# Patient Record
Sex: FEMALE | Race: AMERICAN INDIAN OR ALASKA NATIVE | NOT HISPANIC OR LATINO | Employment: OTHER | RURAL
[De-identification: names, ages, dates, MRNs, and addresses within clinical notes are randomized per-mention and may not be internally consistent; named-entity substitution may affect disease eponyms.]

---

## 2020-03-17 ENCOUNTER — HISTORICAL (OUTPATIENT)
Dept: ADMINISTRATIVE | Facility: HOSPITAL | Age: 50
End: 2020-03-17

## 2021-03-23 ENCOUNTER — HOSPITAL ENCOUNTER (OUTPATIENT)
Dept: RADIOLOGY | Facility: HOSPITAL | Age: 51
Discharge: HOME | End: 2021-03-23
Payer: MEDICAID

## 2021-03-23 VITALS — HEIGHT: 57 IN | WEIGHT: 167 LBS | BODY MASS INDEX: 36.03 KG/M2

## 2021-03-23 DIAGNOSIS — Z12.39 SCREENING FOR BREAST CANCER: ICD-10-CM

## 2021-03-23 PROCEDURE — 77067 SCR MAMMO BI INCL CAD: CPT | Mod: TC

## 2021-05-21 ENCOUNTER — OFFICE VISIT (OUTPATIENT)
Dept: FAMILY MEDICINE | Facility: CLINIC | Age: 51
End: 2021-05-21
Payer: MEDICAID

## 2021-05-21 VITALS
WEIGHT: 172 LBS | DIASTOLIC BLOOD PRESSURE: 87 MMHG | TEMPERATURE: 98 F | SYSTOLIC BLOOD PRESSURE: 135 MMHG | HEART RATE: 96 BPM | BODY MASS INDEX: 37.11 KG/M2 | HEIGHT: 57 IN

## 2021-05-21 DIAGNOSIS — K59.1 FUNCTIONAL DIARRHEA: ICD-10-CM

## 2021-05-21 DIAGNOSIS — K58.0 IRRITABLE BOWEL SYNDROME WITH DIARRHEA: Primary | ICD-10-CM

## 2021-05-21 DIAGNOSIS — Z85.038 HISTORY OF COLON CANCER: ICD-10-CM

## 2021-05-21 PROCEDURE — 99214 PR OFFICE/OUTPT VISIT, EST, LEVL IV, 30-39 MIN: ICD-10-PCS | Mod: ,,, | Performed by: FAMILY MEDICINE

## 2021-05-21 PROCEDURE — 99214 OFFICE O/P EST MOD 30 MIN: CPT | Mod: ,,, | Performed by: FAMILY MEDICINE

## 2021-05-21 RX ORDER — HYOSCYAMINE SULFATE 0.125 MG
125 TABLET ORAL 3 TIMES DAILY
Qty: 30 TABLET | Refills: 0 | Status: SHIPPED | OUTPATIENT
Start: 2021-05-21 | End: 2021-06-08 | Stop reason: SDUPTHER

## 2021-05-21 RX ORDER — LISINOPRIL 10 MG/1
10 TABLET ORAL DAILY
COMMUNITY
Start: 2021-05-17 | End: 2021-11-30

## 2021-05-21 RX ORDER — INSULIN GLARGINE 100 [IU]/ML
50 INJECTION, SOLUTION SUBCUTANEOUS DAILY
COMMUNITY
End: 2021-06-08 | Stop reason: SDUPTHER

## 2021-05-21 RX ORDER — DICYCLOMINE HYDROCHLORIDE 10 MG/1
10 CAPSULE ORAL 3 TIMES DAILY
COMMUNITY
Start: 2021-05-11 | End: 2021-05-21

## 2021-05-21 RX ORDER — INSULIN ASPART 100 [IU]/ML
INJECTION, SOLUTION INTRAVENOUS; SUBCUTANEOUS
COMMUNITY
Start: 2021-02-04 | End: 2021-09-29

## 2021-05-21 RX ORDER — LEVETIRACETAM 500 MG/1
500 TABLET ORAL DAILY
COMMUNITY
Start: 2021-05-17 | End: 2021-11-30

## 2021-05-21 RX ORDER — HYDROCHLOROTHIAZIDE 12.5 MG/1
12.5 CAPSULE ORAL DAILY
COMMUNITY
Start: 2021-03-12 | End: 2021-10-28 | Stop reason: CLARIF

## 2021-05-21 RX ORDER — ERGOCALCIFEROL 1.25 MG/1
50000 CAPSULE ORAL
COMMUNITY
Start: 2021-05-04 | End: 2021-11-02 | Stop reason: SDUPTHER

## 2021-06-08 ENCOUNTER — OFFICE VISIT (OUTPATIENT)
Dept: FAMILY MEDICINE | Facility: CLINIC | Age: 51
End: 2021-06-08
Payer: MEDICAID

## 2021-06-08 VITALS
DIASTOLIC BLOOD PRESSURE: 84 MMHG | WEIGHT: 174 LBS | TEMPERATURE: 98 F | SYSTOLIC BLOOD PRESSURE: 131 MMHG | HEART RATE: 96 BPM | HEIGHT: 57 IN | BODY MASS INDEX: 37.54 KG/M2

## 2021-06-08 DIAGNOSIS — Z85.038 HISTORY OF COLON CANCER: Chronic | ICD-10-CM

## 2021-06-08 DIAGNOSIS — E11.9 TYPE 2 DIABETES MELLITUS WITHOUT COMPLICATION, WITH LONG-TERM CURRENT USE OF INSULIN: Primary | Chronic | ICD-10-CM

## 2021-06-08 DIAGNOSIS — Z79.4 TYPE 2 DIABETES MELLITUS WITHOUT COMPLICATION, WITH LONG-TERM CURRENT USE OF INSULIN: Primary | Chronic | ICD-10-CM

## 2021-06-08 DIAGNOSIS — K58.0 IRRITABLE BOWEL SYNDROME WITH DIARRHEA: Chronic | ICD-10-CM

## 2021-06-08 PROCEDURE — 99214 PR OFFICE/OUTPT VISIT, EST, LEVL IV, 30-39 MIN: ICD-10-PCS | Mod: ,,, | Performed by: FAMILY MEDICINE

## 2021-06-08 PROCEDURE — 99214 OFFICE O/P EST MOD 30 MIN: CPT | Mod: ,,, | Performed by: FAMILY MEDICINE

## 2021-06-08 RX ORDER — HYOSCYAMINE SULFATE 0.125 MG
125 TABLET ORAL 3 TIMES DAILY
Qty: 90 TABLET | Refills: 1 | Status: SHIPPED | OUTPATIENT
Start: 2021-06-08 | End: 2021-07-08

## 2021-06-08 RX ORDER — INSULIN GLARGINE 100 [IU]/ML
50 INJECTION, SOLUTION SUBCUTANEOUS DAILY
Qty: 10 ML | Refills: 5 | Status: SHIPPED | OUTPATIENT
Start: 2021-06-08 | End: 2021-08-10 | Stop reason: SDUPTHER

## 2021-07-15 ENCOUNTER — OFFICE VISIT (OUTPATIENT)
Dept: FAMILY MEDICINE | Facility: CLINIC | Age: 51
End: 2021-07-15
Payer: MEDICAID

## 2021-07-15 VITALS
HEIGHT: 57 IN | HEART RATE: 103 BPM | SYSTOLIC BLOOD PRESSURE: 140 MMHG | WEIGHT: 174 LBS | DIASTOLIC BLOOD PRESSURE: 89 MMHG | BODY MASS INDEX: 37.54 KG/M2

## 2021-07-15 DIAGNOSIS — Z13.0 SCREENING FOR DISORDER OF BLOOD AND BLOOD-FORMING ORGANS: ICD-10-CM

## 2021-07-15 DIAGNOSIS — Z13.220 SCREENING FOR LIPOID DISORDERS: ICD-10-CM

## 2021-07-15 DIAGNOSIS — Z13.1 SCREENING FOR DIABETES MELLITUS: ICD-10-CM

## 2021-07-15 DIAGNOSIS — Z00.00 ROUTINE GENERAL MEDICAL EXAMINATION AT A HEALTH CARE FACILITY: Primary | ICD-10-CM

## 2021-07-15 LAB
ALBUMIN SERPL BCP-MCNC: 3.8 G/DL (ref 3.5–5)
ALBUMIN/GLOB SERPL: 0.9 {RATIO}
ALP SERPL-CCNC: 125 U/L (ref 39–100)
ALT SERPL W P-5'-P-CCNC: 68 U/L (ref 13–56)
ANION GAP SERPL CALCULATED.3IONS-SCNC: 12 MMOL/L (ref 7–16)
AST SERPL W P-5'-P-CCNC: 37 U/L (ref 15–37)
BASOPHILS # BLD AUTO: 0.04 K/UL (ref 0–0.2)
BASOPHILS NFR BLD AUTO: 0.4 % (ref 0–1)
BILIRUB SERPL-MCNC: 0.4 MG/DL (ref 0–1.2)
BUN SERPL-MCNC: 14 MG/DL (ref 7–18)
BUN/CREAT SERPL: 18 (ref 6–20)
CALCIUM SERPL-MCNC: 9.2 MG/DL (ref 8.5–10.1)
CHLORIDE SERPL-SCNC: 99 MMOL/L (ref 98–107)
CHOLEST SERPL-MCNC: 186 MG/DL (ref 0–200)
CHOLEST/HDLC SERPL: 4.5 {RATIO}
CO2 SERPL-SCNC: 28 MMOL/L (ref 21–32)
CREAT SERPL-MCNC: 0.77 MG/DL (ref 0.55–1.02)
DIFFERENTIAL METHOD BLD: ABNORMAL
EOSINOPHIL # BLD AUTO: 0.16 K/UL (ref 0–0.5)
EOSINOPHIL NFR BLD AUTO: 1.7 % (ref 1–4)
ERYTHROCYTE [DISTWIDTH] IN BLOOD BY AUTOMATED COUNT: 15.8 % (ref 11.5–14.5)
EST. AVERAGE GLUCOSE BLD GHB EST-MCNC: 144 MG/DL
GLOBULIN SER-MCNC: 4.1 G/DL (ref 2–4)
GLUCOSE SERPL-MCNC: 205 MG/DL (ref 74–106)
HBA1C MFR BLD HPLC: 6.9 % (ref 4.5–6.6)
HCT VFR BLD AUTO: 44.7 % (ref 38–47)
HDLC SERPL-MCNC: 41 MG/DL (ref 40–60)
HGB BLD-MCNC: 14.5 G/DL (ref 12–16)
IMM GRANULOCYTES # BLD AUTO: 0.06 K/UL (ref 0–0.04)
IMM GRANULOCYTES NFR BLD: 0.6 % (ref 0–0.4)
LDLC SERPL CALC-MCNC: 84 MG/DL
LDLC/HDLC SERPL: 2 {RATIO}
LYMPHOCYTES # BLD AUTO: 2.05 K/UL (ref 1–4.8)
LYMPHOCYTES NFR BLD AUTO: 21.2 % (ref 27–41)
MCH RBC QN AUTO: 27.5 PG (ref 27–31)
MCHC RBC AUTO-ENTMCNC: 32.4 G/DL (ref 32–36)
MCV RBC AUTO: 84.8 FL (ref 80–96)
MONOCYTES # BLD AUTO: 0.65 K/UL (ref 0–0.8)
MONOCYTES NFR BLD AUTO: 6.7 % (ref 2–6)
MPC BLD CALC-MCNC: 12.9 FL (ref 9.4–12.4)
NEUTROPHILS # BLD AUTO: 6.71 K/UL (ref 1.8–7.7)
NEUTROPHILS NFR BLD AUTO: 69.4 % (ref 53–65)
NONHDLC SERPL-MCNC: 145 MG/DL
NRBC # BLD AUTO: 0 X10E3/UL
NRBC, AUTO (.00): 0 %
PLATELET # BLD AUTO: 165 K/UL (ref 150–400)
POTASSIUM SERPL-SCNC: 3.8 MMOL/L (ref 3.5–5.1)
PROT SERPL-MCNC: 7.9 G/DL (ref 6.4–8.2)
RBC # BLD AUTO: 5.27 M/UL (ref 4.2–5.4)
SODIUM SERPL-SCNC: 135 MMOL/L (ref 136–145)
TRIGL SERPL-MCNC: 306 MG/DL (ref 35–150)
VLDLC SERPL-MCNC: 61 MG/DL
WBC # BLD AUTO: 9.67 K/UL (ref 4.5–11)

## 2021-07-15 PROCEDURE — 80061 LIPID PANEL: CPT | Mod: ,,, | Performed by: CLINICAL MEDICAL LABORATORY

## 2021-07-15 PROCEDURE — 85025 CBC WITH DIFFERENTIAL: ICD-10-PCS | Mod: ,,, | Performed by: CLINICAL MEDICAL LABORATORY

## 2021-07-15 PROCEDURE — 80053 COMPREHEN METABOLIC PANEL: CPT | Mod: ,,, | Performed by: CLINICAL MEDICAL LABORATORY

## 2021-07-15 PROCEDURE — 99396 PR PREVENTIVE VISIT,EST,40-64: ICD-10-PCS | Mod: ,,, | Performed by: FAMILY MEDICINE

## 2021-07-15 PROCEDURE — 83036 HEMOGLOBIN A1C: ICD-10-PCS | Mod: ,,, | Performed by: CLINICAL MEDICAL LABORATORY

## 2021-07-15 PROCEDURE — 83036 HEMOGLOBIN GLYCOSYLATED A1C: CPT | Mod: ,,, | Performed by: CLINICAL MEDICAL LABORATORY

## 2021-07-15 PROCEDURE — 80053 COMPREHENSIVE METABOLIC PANEL: ICD-10-PCS | Mod: ,,, | Performed by: CLINICAL MEDICAL LABORATORY

## 2021-07-15 PROCEDURE — 80061 LIPID PANEL: ICD-10-PCS | Mod: ,,, | Performed by: CLINICAL MEDICAL LABORATORY

## 2021-07-15 PROCEDURE — 85025 COMPLETE CBC W/AUTO DIFF WBC: CPT | Mod: ,,, | Performed by: CLINICAL MEDICAL LABORATORY

## 2021-07-15 PROCEDURE — 99396 PREV VISIT EST AGE 40-64: CPT | Mod: ,,, | Performed by: FAMILY MEDICINE

## 2021-08-09 ENCOUNTER — OFFICE VISIT (OUTPATIENT)
Dept: FAMILY MEDICINE | Facility: CLINIC | Age: 51
End: 2021-08-09
Payer: MEDICAID

## 2021-08-09 DIAGNOSIS — Z20.822 CLOSE EXPOSURE TO COVID-19 VIRUS: Primary | ICD-10-CM

## 2021-08-09 LAB
CTP QC/QA: YES
SARS-COV-2 AG RESP QL IA.RAPID: NEGATIVE

## 2021-08-09 PROCEDURE — 99212 OFFICE O/P EST SF 10 MIN: CPT | Mod: ,,, | Performed by: NURSE PRACTITIONER

## 2021-08-09 PROCEDURE — 87426 SARSCOV CORONAVIRUS AG IA: CPT | Mod: RHCUB | Performed by: NURSE PRACTITIONER

## 2021-08-09 PROCEDURE — 99212 PR OFFICE/OUTPT VISIT, EST, LEVL II, 10-19 MIN: ICD-10-PCS | Mod: ,,, | Performed by: NURSE PRACTITIONER

## 2021-08-10 ENCOUNTER — OFFICE VISIT (OUTPATIENT)
Dept: FAMILY MEDICINE | Facility: CLINIC | Age: 51
End: 2021-08-10
Payer: MEDICAID

## 2021-08-10 DIAGNOSIS — Z79.4 TYPE 2 DIABETES MELLITUS WITHOUT COMPLICATION, WITH LONG-TERM CURRENT USE OF INSULIN: Primary | Chronic | ICD-10-CM

## 2021-08-10 DIAGNOSIS — E11.9 TYPE 2 DIABETES MELLITUS WITHOUT COMPLICATION, WITH LONG-TERM CURRENT USE OF INSULIN: Primary | Chronic | ICD-10-CM

## 2021-08-10 DIAGNOSIS — K58.0 IRRITABLE BOWEL SYNDROME WITH DIARRHEA: Chronic | ICD-10-CM

## 2021-08-10 DIAGNOSIS — R25.2 LEG CRAMPS: ICD-10-CM

## 2021-08-10 DIAGNOSIS — J06.9 UPPER RESPIRATORY TRACT INFECTION, UNSPECIFIED TYPE: ICD-10-CM

## 2021-08-10 PROCEDURE — 99214 OFFICE O/P EST MOD 30 MIN: CPT | Mod: ,,, | Performed by: FAMILY MEDICINE

## 2021-08-10 PROCEDURE — 99214 PR OFFICE/OUTPT VISIT, EST, LEVL IV, 30-39 MIN: ICD-10-PCS | Mod: ,,, | Performed by: FAMILY MEDICINE

## 2021-08-10 RX ORDER — INSULIN GLARGINE 100 [IU]/ML
70 INJECTION, SOLUTION SUBCUTANEOUS DAILY
Qty: 10 ML | Refills: 5 | Status: SHIPPED | OUTPATIENT
Start: 2021-08-10 | End: 2021-10-28 | Stop reason: SDUPTHER

## 2021-08-10 RX ORDER — POTASSIUM CHLORIDE 20 MEQ/1
20 TABLET, EXTENDED RELEASE ORAL DAILY
Qty: 7 TABLET | Refills: 0 | Status: SHIPPED | OUTPATIENT
Start: 2021-08-10 | End: 2022-04-28 | Stop reason: SDUPTHER

## 2021-09-01 ENCOUNTER — TELEPHONE (OUTPATIENT)
Dept: FAMILY MEDICINE | Facility: CLINIC | Age: 51
End: 2021-09-01

## 2021-09-01 ENCOUNTER — OFFICE VISIT (OUTPATIENT)
Dept: FAMILY MEDICINE | Facility: CLINIC | Age: 51
End: 2021-09-01
Payer: MEDICAID

## 2021-09-01 ENCOUNTER — HOSPITAL ENCOUNTER (OUTPATIENT)
Dept: RADIOLOGY | Facility: HOSPITAL | Age: 51
Discharge: HOME | End: 2021-09-01
Attending: NURSE PRACTITIONER
Payer: MEDICAID

## 2021-09-01 VITALS
HEIGHT: 57 IN | DIASTOLIC BLOOD PRESSURE: 92 MMHG | SYSTOLIC BLOOD PRESSURE: 141 MMHG | HEART RATE: 107 BPM | WEIGHT: 174 LBS | OXYGEN SATURATION: 97 % | BODY MASS INDEX: 37.54 KG/M2 | TEMPERATURE: 98 F

## 2021-09-01 DIAGNOSIS — S69.91XA INJURY OF FINGER OF RIGHT HAND, INITIAL ENCOUNTER: ICD-10-CM

## 2021-09-01 DIAGNOSIS — M79.644 PAIN IN FINGER OF RIGHT HAND: ICD-10-CM

## 2021-09-01 DIAGNOSIS — M79.644 PAIN IN FINGER OF RIGHT HAND: Primary | ICD-10-CM

## 2021-09-01 PROCEDURE — 99212 PR OFFICE/OUTPT VISIT, EST, LEVL II, 10-19 MIN: ICD-10-PCS | Mod: ,,, | Performed by: NURSE PRACTITIONER

## 2021-09-01 PROCEDURE — 99212 OFFICE O/P EST SF 10 MIN: CPT | Mod: ,,, | Performed by: NURSE PRACTITIONER

## 2021-09-01 PROCEDURE — 73130 X-RAY EXAM OF HAND: CPT | Mod: TC,RT

## 2021-09-01 RX ORDER — CALCIUM POLYCARBOPHIL 625 MG/1
2 CAPSULE ORAL DAILY
COMMUNITY
Start: 2021-07-27 | End: 2022-08-05

## 2021-09-01 RX ORDER — SYRINGE-NEEDLE,INSULIN,0.5 ML 30 GX5/16"
SYRINGE, EMPTY DISPOSABLE MISCELLANEOUS
COMMUNITY
Start: 2021-08-13 | End: 2022-09-07 | Stop reason: SDUPTHER

## 2021-09-01 RX ORDER — HYOSCYAMINE SULFATE 0.38 MG/1
1 TABLET, EXTENDED RELEASE ORAL 2 TIMES DAILY
COMMUNITY
Start: 2021-07-27 | End: 2022-11-01

## 2021-09-14 ENCOUNTER — OFFICE VISIT (OUTPATIENT)
Dept: FAMILY MEDICINE | Facility: CLINIC | Age: 51
End: 2021-09-14
Payer: MEDICAID

## 2021-09-14 VITALS
HEIGHT: 57 IN | HEART RATE: 99 BPM | DIASTOLIC BLOOD PRESSURE: 88 MMHG | WEIGHT: 174 LBS | SYSTOLIC BLOOD PRESSURE: 134 MMHG | BODY MASS INDEX: 37.54 KG/M2

## 2021-09-14 DIAGNOSIS — R42 DIZZINESS: ICD-10-CM

## 2021-09-14 DIAGNOSIS — Z85.038 HISTORY OF COLON CANCER: Chronic | ICD-10-CM

## 2021-09-14 DIAGNOSIS — Z79.4 TYPE 2 DIABETES MELLITUS WITHOUT COMPLICATION, WITH LONG-TERM CURRENT USE OF INSULIN: Chronic | ICD-10-CM

## 2021-09-14 DIAGNOSIS — R00.0 TACHYCARDIA: Primary | ICD-10-CM

## 2021-09-14 DIAGNOSIS — E11.9 TYPE 2 DIABETES MELLITUS WITHOUT COMPLICATION, WITH LONG-TERM CURRENT USE OF INSULIN: Chronic | ICD-10-CM

## 2021-09-14 LAB
ANION GAP SERPL CALCULATED.3IONS-SCNC: 10 MMOL/L (ref 7–16)
BASOPHILS # BLD AUTO: 0.05 K/UL (ref 0–0.2)
BASOPHILS NFR BLD AUTO: 0.5 % (ref 0–1)
BUN SERPL-MCNC: 13 MG/DL (ref 7–18)
BUN/CREAT SERPL: 17 (ref 6–20)
CALCIUM SERPL-MCNC: 9 MG/DL (ref 8.5–10.1)
CHLORIDE SERPL-SCNC: 98 MMOL/L (ref 98–107)
CO2 SERPL-SCNC: 30 MMOL/L (ref 21–32)
CREAT SERPL-MCNC: 0.76 MG/DL (ref 0.55–1.02)
DIFFERENTIAL METHOD BLD: ABNORMAL
EOSINOPHIL # BLD AUTO: 0.14 K/UL (ref 0–0.5)
EOSINOPHIL NFR BLD AUTO: 1.4 % (ref 1–4)
ERYTHROCYTE [DISTWIDTH] IN BLOOD BY AUTOMATED COUNT: 15 % (ref 11.5–14.5)
GLUCOSE SERPL-MCNC: 231 MG/DL (ref 74–106)
HCT VFR BLD AUTO: 44.5 % (ref 38–47)
HGB BLD-MCNC: 14.3 G/DL (ref 12–16)
IMM GRANULOCYTES # BLD AUTO: 0.05 K/UL (ref 0–0.04)
IMM GRANULOCYTES NFR BLD: 0.5 % (ref 0–0.4)
LYMPHOCYTES # BLD AUTO: 1.94 K/UL (ref 1–4.8)
LYMPHOCYTES NFR BLD AUTO: 18.8 % (ref 27–41)
MCH RBC QN AUTO: 27.3 PG (ref 27–31)
MCHC RBC AUTO-ENTMCNC: 32.1 G/DL (ref 32–36)
MCV RBC AUTO: 85.1 FL (ref 80–96)
MONOCYTES # BLD AUTO: 0.64 K/UL (ref 0–0.8)
MONOCYTES NFR BLD AUTO: 6.2 % (ref 2–6)
MPC BLD CALC-MCNC: 13.4 FL (ref 9.4–12.4)
NEUTROPHILS # BLD AUTO: 7.5 K/UL (ref 1.8–7.7)
NEUTROPHILS NFR BLD AUTO: 72.6 % (ref 53–65)
NRBC # BLD AUTO: 0 X10E3/UL
NRBC, AUTO (.00): 0 %
PLATELET # BLD AUTO: 165 K/UL (ref 150–400)
PLATELET MORPHOLOGY: ABNORMAL
POTASSIUM SERPL-SCNC: 3.7 MMOL/L (ref 3.5–5.1)
RBC # BLD AUTO: 5.23 M/UL (ref 4.2–5.4)
RBC MORPH BLD: NORMAL
SODIUM SERPL-SCNC: 134 MMOL/L (ref 136–145)
TSH SERPL DL<=0.005 MIU/L-ACNC: 0.88 UIU/ML (ref 0.36–3.74)
WBC # BLD AUTO: 10.32 K/UL (ref 4.5–11)

## 2021-09-14 PROCEDURE — 99214 OFFICE O/P EST MOD 30 MIN: CPT | Mod: ,,, | Performed by: FAMILY MEDICINE

## 2021-09-14 PROCEDURE — 80048 BASIC METABOLIC PANEL: ICD-10-PCS | Mod: ,,, | Performed by: CLINICAL MEDICAL LABORATORY

## 2021-09-14 PROCEDURE — 85025 COMPLETE CBC W/AUTO DIFF WBC: CPT | Mod: ,,, | Performed by: CLINICAL MEDICAL LABORATORY

## 2021-09-14 PROCEDURE — 84443 ASSAY THYROID STIM HORMONE: CPT | Mod: ,,, | Performed by: CLINICAL MEDICAL LABORATORY

## 2021-09-14 PROCEDURE — 85025 CBC WITH DIFFERENTIAL: ICD-10-PCS | Mod: ,,, | Performed by: CLINICAL MEDICAL LABORATORY

## 2021-09-14 PROCEDURE — 99214 PR OFFICE/OUTPT VISIT, EST, LEVL IV, 30-39 MIN: ICD-10-PCS | Mod: ,,, | Performed by: FAMILY MEDICINE

## 2021-09-14 PROCEDURE — 84443 TSH: ICD-10-PCS | Mod: ,,, | Performed by: CLINICAL MEDICAL LABORATORY

## 2021-09-14 PROCEDURE — 80048 BASIC METABOLIC PNL TOTAL CA: CPT | Mod: ,,, | Performed by: CLINICAL MEDICAL LABORATORY

## 2021-09-16 ENCOUNTER — OFFICE VISIT (OUTPATIENT)
Dept: FAMILY MEDICINE | Facility: CLINIC | Age: 51
End: 2021-09-16
Payer: MEDICAID

## 2021-09-16 VITALS
DIASTOLIC BLOOD PRESSURE: 83 MMHG | BODY MASS INDEX: 37.54 KG/M2 | HEIGHT: 57 IN | WEIGHT: 174 LBS | HEART RATE: 103 BPM | SYSTOLIC BLOOD PRESSURE: 140 MMHG

## 2021-09-16 DIAGNOSIS — R53.83 FATIGUE, UNSPECIFIED TYPE: Primary | ICD-10-CM

## 2021-09-16 DIAGNOSIS — Z79.4 TYPE 2 DIABETES MELLITUS WITHOUT COMPLICATION, WITH LONG-TERM CURRENT USE OF INSULIN: Chronic | ICD-10-CM

## 2021-09-16 DIAGNOSIS — E11.9 TYPE 2 DIABETES MELLITUS WITHOUT COMPLICATION, WITH LONG-TERM CURRENT USE OF INSULIN: Chronic | ICD-10-CM

## 2021-09-16 DIAGNOSIS — Z76.89 ENCOUNTER FOR MEDICAL CARE: ICD-10-CM

## 2021-09-16 PROCEDURE — 99213 OFFICE O/P EST LOW 20 MIN: CPT | Mod: ,,, | Performed by: FAMILY MEDICINE

## 2021-09-16 PROCEDURE — 99213 PR OFFICE/OUTPT VISIT, EST, LEVL III, 20-29 MIN: ICD-10-PCS | Mod: ,,, | Performed by: FAMILY MEDICINE

## 2021-09-21 PROBLEM — M20.011 MALLET FINGER OF RIGHT HAND: Status: ACTIVE | Noted: 2021-09-21

## 2021-10-28 ENCOUNTER — OFFICE VISIT (OUTPATIENT)
Dept: FAMILY MEDICINE | Facility: CLINIC | Age: 51
End: 2021-10-28
Payer: MEDICAID

## 2021-10-28 VITALS — SYSTOLIC BLOOD PRESSURE: 142 MMHG | DIASTOLIC BLOOD PRESSURE: 68 MMHG | HEART RATE: 87 BPM

## 2021-10-28 DIAGNOSIS — M19.041 PRIMARY OSTEOARTHRITIS OF RIGHT HAND: Chronic | ICD-10-CM

## 2021-10-28 DIAGNOSIS — Z79.4 TYPE 2 DIABETES MELLITUS WITHOUT COMPLICATION, WITH LONG-TERM CURRENT USE OF INSULIN: Chronic | ICD-10-CM

## 2021-10-28 DIAGNOSIS — M79.644 FINGER PAIN, RIGHT: Primary | ICD-10-CM

## 2021-10-28 DIAGNOSIS — Z85.038 HISTORY OF COLON CANCER: Chronic | ICD-10-CM

## 2021-10-28 DIAGNOSIS — E11.9 TYPE 2 DIABETES MELLITUS WITHOUT COMPLICATION, WITH LONG-TERM CURRENT USE OF INSULIN: Chronic | ICD-10-CM

## 2021-10-28 LAB
EST. AVERAGE GLUCOSE BLD GHB EST-MCNC: 137 MG/DL
HBA1C MFR BLD HPLC: 6.7 % (ref 4.5–6.6)

## 2021-10-28 PROCEDURE — 99214 OFFICE O/P EST MOD 30 MIN: CPT | Mod: ,,, | Performed by: FAMILY MEDICINE

## 2021-10-28 PROCEDURE — 99214 PR OFFICE/OUTPT VISIT, EST, LEVL IV, 30-39 MIN: ICD-10-PCS | Mod: ,,, | Performed by: FAMILY MEDICINE

## 2021-10-28 PROCEDURE — 83036 HEMOGLOBIN A1C: ICD-10-PCS | Mod: ,,, | Performed by: CLINICAL MEDICAL LABORATORY

## 2021-10-28 PROCEDURE — 83036 HEMOGLOBIN GLYCOSYLATED A1C: CPT | Mod: ,,, | Performed by: CLINICAL MEDICAL LABORATORY

## 2021-10-28 RX ORDER — INSULIN GLARGINE 100 [IU]/ML
70 INJECTION, SOLUTION SUBCUTANEOUS DAILY
Qty: 10 ML | Refills: 5 | Status: SHIPPED | OUTPATIENT
Start: 2021-10-28 | End: 2022-02-09 | Stop reason: SDUPTHER

## 2021-11-02 RX ORDER — ERGOCALCIFEROL 1.25 MG/1
50000 CAPSULE ORAL
Qty: 4 CAPSULE | Refills: 1 | Status: SHIPPED | OUTPATIENT
Start: 2021-11-02 | End: 2023-10-16 | Stop reason: DRUGHIGH

## 2021-11-23 DIAGNOSIS — E11.9 TYPE 2 DIABETES MELLITUS WITHOUT COMPLICATION, WITH LONG-TERM CURRENT USE OF INSULIN: Chronic | ICD-10-CM

## 2021-11-23 DIAGNOSIS — Z79.4 TYPE 2 DIABETES MELLITUS WITHOUT COMPLICATION, WITH LONG-TERM CURRENT USE OF INSULIN: Chronic | ICD-10-CM

## 2021-12-15 ENCOUNTER — OFFICE VISIT (OUTPATIENT)
Dept: FAMILY MEDICINE | Facility: CLINIC | Age: 51
End: 2021-12-15
Payer: MEDICAID

## 2021-12-15 VITALS — HEART RATE: 90 BPM | SYSTOLIC BLOOD PRESSURE: 148 MMHG | DIASTOLIC BLOOD PRESSURE: 96 MMHG

## 2021-12-15 DIAGNOSIS — Z79.4 TYPE 2 DIABETES MELLITUS WITHOUT COMPLICATION, WITH LONG-TERM CURRENT USE OF INSULIN: Chronic | ICD-10-CM

## 2021-12-15 DIAGNOSIS — Z11.4 SCREENING FOR HIV WITHOUT PRESENCE OF RISK FACTORS: ICD-10-CM

## 2021-12-15 DIAGNOSIS — I10 PRIMARY HYPERTENSION: Chronic | ICD-10-CM

## 2021-12-15 DIAGNOSIS — E11.9 TYPE 2 DIABETES MELLITUS WITHOUT COMPLICATION, WITH LONG-TERM CURRENT USE OF INSULIN: Chronic | ICD-10-CM

## 2021-12-15 DIAGNOSIS — Z11.59 NEED FOR HEPATITIS C SCREENING TEST: Primary | ICD-10-CM

## 2021-12-15 LAB
CREAT UR-MCNC: 224 MG/DL (ref 28–219)
HCV AB SER QL: NORMAL
MICROALBUMIN UR-MCNC: 2.7 MG/DL (ref 0–2.8)
MICROALBUMIN/CREAT RATIO PNL UR: 12.1 MG/G (ref 0–30)

## 2021-12-15 PROCEDURE — 82043 UR ALBUMIN QUANTITATIVE: CPT | Mod: ,,, | Performed by: CLINICAL MEDICAL LABORATORY

## 2021-12-15 PROCEDURE — 87536 HIV-1 RNA DETECTION AND QUANTIFICATION: ICD-10-PCS | Mod: 90,,, | Performed by: CLINICAL MEDICAL LABORATORY

## 2021-12-15 PROCEDURE — 82570 MICROALBUMIN / CREATININE RATIO URINE: ICD-10-PCS | Mod: ,,, | Performed by: CLINICAL MEDICAL LABORATORY

## 2021-12-15 PROCEDURE — 86803 HEPATITIS C AB TEST: CPT | Mod: ,,, | Performed by: CLINICAL MEDICAL LABORATORY

## 2021-12-15 PROCEDURE — 87536 HIV-1 QUANT&REVRSE TRNSCRPJ: CPT | Mod: 90,,, | Performed by: CLINICAL MEDICAL LABORATORY

## 2021-12-15 PROCEDURE — 99214 OFFICE O/P EST MOD 30 MIN: CPT | Mod: ,,, | Performed by: FAMILY MEDICINE

## 2021-12-15 PROCEDURE — 99214 PR OFFICE/OUTPT VISIT, EST, LEVL IV, 30-39 MIN: ICD-10-PCS | Mod: ,,, | Performed by: FAMILY MEDICINE

## 2021-12-15 PROCEDURE — 82043 MICROALBUMIN / CREATININE RATIO URINE: ICD-10-PCS | Mod: ,,, | Performed by: CLINICAL MEDICAL LABORATORY

## 2021-12-15 PROCEDURE — 86803 HEPATITIS C ANTIBODY: ICD-10-PCS | Mod: ,,, | Performed by: CLINICAL MEDICAL LABORATORY

## 2021-12-15 PROCEDURE — 82570 ASSAY OF URINE CREATININE: CPT | Mod: ,,, | Performed by: CLINICAL MEDICAL LABORATORY

## 2021-12-18 LAB — HIV1 RNA # PLAS NAA DL=20: NORMAL COPIES/ML

## 2021-12-23 ENCOUNTER — OFFICE VISIT (OUTPATIENT)
Dept: FAMILY MEDICINE | Facility: CLINIC | Age: 51
End: 2021-12-23
Payer: MEDICAID

## 2021-12-23 ENCOUNTER — APPOINTMENT (OUTPATIENT)
Dept: RADIOLOGY | Facility: CLINIC | Age: 51
End: 2021-12-23
Attending: FAMILY MEDICINE
Payer: MEDICAID

## 2021-12-23 VITALS
SYSTOLIC BLOOD PRESSURE: 155 MMHG | WEIGHT: 172 LBS | HEIGHT: 56 IN | BODY MASS INDEX: 38.69 KG/M2 | DIASTOLIC BLOOD PRESSURE: 89 MMHG | HEART RATE: 101 BPM

## 2021-12-23 DIAGNOSIS — R20.2 PARESTHESIA OF RIGHT FOOT: Chronic | ICD-10-CM

## 2021-12-23 DIAGNOSIS — E11.9 ENCOUNTER FOR DIABETIC FOOT EXAM: ICD-10-CM

## 2021-12-23 DIAGNOSIS — E11.9 TYPE 2 DIABETES MELLITUS WITHOUT COMPLICATION, WITH LONG-TERM CURRENT USE OF INSULIN: Chronic | ICD-10-CM

## 2021-12-23 DIAGNOSIS — M54.16 LUMBAR RADICULOPATHY, RIGHT: Primary | Chronic | ICD-10-CM

## 2021-12-23 DIAGNOSIS — M54.16 LUMBAR RADICULOPATHY, RIGHT: ICD-10-CM

## 2021-12-23 DIAGNOSIS — Z79.4 TYPE 2 DIABETES MELLITUS WITHOUT COMPLICATION, WITH LONG-TERM CURRENT USE OF INSULIN: Chronic | ICD-10-CM

## 2021-12-23 PROCEDURE — 4010F ACE/ARB THERAPY RXD/TAKEN: CPT | Mod: CPTII,,, | Performed by: FAMILY MEDICINE

## 2021-12-23 PROCEDURE — 3077F PR MOST RECENT SYSTOLIC BLOOD PRESSURE >= 140 MM HG: ICD-10-PCS | Mod: CPTII,,, | Performed by: FAMILY MEDICINE

## 2021-12-23 PROCEDURE — 99214 OFFICE O/P EST MOD 30 MIN: CPT | Mod: ,,, | Performed by: FAMILY MEDICINE

## 2021-12-23 PROCEDURE — 3008F BODY MASS INDEX DOCD: CPT | Mod: CPTII,,, | Performed by: FAMILY MEDICINE

## 2021-12-23 PROCEDURE — 72100 X-RAY EXAM L-S SPINE 2/3 VWS: CPT | Mod: TC,RHCUB,FY | Performed by: FAMILY MEDICINE

## 2021-12-23 PROCEDURE — 1160F RVW MEDS BY RX/DR IN RCRD: CPT | Mod: CPTII,,, | Performed by: FAMILY MEDICINE

## 2021-12-23 PROCEDURE — 3044F PR MOST RECENT HEMOGLOBIN A1C LEVEL <7.0%: ICD-10-PCS | Mod: CPTII,,, | Performed by: FAMILY MEDICINE

## 2021-12-23 PROCEDURE — 3066F NEPHROPATHY DOC TX: CPT | Mod: CPTII,,, | Performed by: FAMILY MEDICINE

## 2021-12-23 PROCEDURE — 3079F DIAST BP 80-89 MM HG: CPT | Mod: CPTII,,, | Performed by: FAMILY MEDICINE

## 2021-12-23 PROCEDURE — 3044F HG A1C LEVEL LT 7.0%: CPT | Mod: CPTII,,, | Performed by: FAMILY MEDICINE

## 2021-12-23 PROCEDURE — 3061F NEG MICROALBUMINURIA REV: CPT | Mod: CPTII,,, | Performed by: FAMILY MEDICINE

## 2021-12-23 PROCEDURE — 99214 PR OFFICE/OUTPT VISIT, EST, LEVL IV, 30-39 MIN: ICD-10-PCS | Mod: ,,, | Performed by: FAMILY MEDICINE

## 2021-12-23 PROCEDURE — 3079F PR MOST RECENT DIASTOLIC BLOOD PRESSURE 80-89 MM HG: ICD-10-PCS | Mod: CPTII,,, | Performed by: FAMILY MEDICINE

## 2021-12-23 PROCEDURE — 4010F PR ACE/ARB THEARPY RXD/TAKEN: ICD-10-PCS | Mod: CPTII,,, | Performed by: FAMILY MEDICINE

## 2021-12-23 PROCEDURE — 3008F PR BODY MASS INDEX (BMI) DOCUMENTED: ICD-10-PCS | Mod: CPTII,,, | Performed by: FAMILY MEDICINE

## 2021-12-23 PROCEDURE — 1159F PR MEDICATION LIST DOCUMENTED IN MEDICAL RECORD: ICD-10-PCS | Mod: CPTII,,, | Performed by: FAMILY MEDICINE

## 2021-12-23 PROCEDURE — 3066F PR DOCUMENTATION OF TREATMENT FOR NEPHROPATHY: ICD-10-PCS | Mod: CPTII,,, | Performed by: FAMILY MEDICINE

## 2021-12-23 PROCEDURE — 3061F PR NEG MICROALBUMINURIA RESULT DOCUMENTED/REVIEW: ICD-10-PCS | Mod: CPTII,,, | Performed by: FAMILY MEDICINE

## 2021-12-23 PROCEDURE — 3077F SYST BP >= 140 MM HG: CPT | Mod: CPTII,,, | Performed by: FAMILY MEDICINE

## 2021-12-23 PROCEDURE — 1159F MED LIST DOCD IN RCRD: CPT | Mod: CPTII,,, | Performed by: FAMILY MEDICINE

## 2021-12-23 PROCEDURE — 1160F PR REVIEW ALL MEDS BY PRESCRIBER/CLIN PHARMACIST DOCUMENTED: ICD-10-PCS | Mod: CPTII,,, | Performed by: FAMILY MEDICINE

## 2021-12-23 RX ORDER — DULOXETIN HYDROCHLORIDE 30 MG/1
30 CAPSULE, DELAYED RELEASE ORAL DAILY
Qty: 30 CAPSULE | Refills: 2 | Status: SHIPPED | OUTPATIENT
Start: 2021-12-23 | End: 2022-05-10 | Stop reason: SDUPTHER

## 2022-02-09 ENCOUNTER — OFFICE VISIT (OUTPATIENT)
Dept: FAMILY MEDICINE | Facility: CLINIC | Age: 52
End: 2022-02-09
Payer: MEDICAID

## 2022-02-09 VITALS
DIASTOLIC BLOOD PRESSURE: 86 MMHG | HEART RATE: 80 BPM | BODY MASS INDEX: 38.69 KG/M2 | WEIGHT: 172 LBS | HEIGHT: 56 IN | SYSTOLIC BLOOD PRESSURE: 145 MMHG

## 2022-02-09 DIAGNOSIS — R20.2 NUMBNESS AND TINGLING OF FOOT: Chronic | ICD-10-CM

## 2022-02-09 DIAGNOSIS — G57.50 TARSAL TUNNEL SYNDROME, UNSPECIFIED LATERALITY: Primary | Chronic | ICD-10-CM

## 2022-02-09 DIAGNOSIS — M79.672 FOOT PAIN, LEFT: Chronic | ICD-10-CM

## 2022-02-09 DIAGNOSIS — R20.0 NUMBNESS AND TINGLING OF FOOT: Chronic | ICD-10-CM

## 2022-02-09 DIAGNOSIS — E11.9 TYPE 2 DIABETES MELLITUS WITHOUT COMPLICATION, WITH LONG-TERM CURRENT USE OF INSULIN: ICD-10-CM

## 2022-02-09 DIAGNOSIS — Z79.4 TYPE 2 DIABETES MELLITUS WITHOUT COMPLICATION, WITH LONG-TERM CURRENT USE OF INSULIN: ICD-10-CM

## 2022-02-09 PROCEDURE — 3008F PR BODY MASS INDEX (BMI) DOCUMENTED: ICD-10-PCS | Mod: CPTII,,, | Performed by: FAMILY MEDICINE

## 2022-02-09 PROCEDURE — 1160F PR REVIEW ALL MEDS BY PRESCRIBER/CLIN PHARMACIST DOCUMENTED: ICD-10-PCS | Mod: CPTII,,, | Performed by: FAMILY MEDICINE

## 2022-02-09 PROCEDURE — 3077F SYST BP >= 140 MM HG: CPT | Mod: CPTII,,, | Performed by: FAMILY MEDICINE

## 2022-02-09 PROCEDURE — 1159F MED LIST DOCD IN RCRD: CPT | Mod: CPTII,,, | Performed by: FAMILY MEDICINE

## 2022-02-09 PROCEDURE — 99214 OFFICE O/P EST MOD 30 MIN: CPT | Mod: ,,, | Performed by: FAMILY MEDICINE

## 2022-02-09 PROCEDURE — 3008F BODY MASS INDEX DOCD: CPT | Mod: CPTII,,, | Performed by: FAMILY MEDICINE

## 2022-02-09 PROCEDURE — 3079F PR MOST RECENT DIASTOLIC BLOOD PRESSURE 80-89 MM HG: ICD-10-PCS | Mod: CPTII,,, | Performed by: FAMILY MEDICINE

## 2022-02-09 PROCEDURE — 3079F DIAST BP 80-89 MM HG: CPT | Mod: CPTII,,, | Performed by: FAMILY MEDICINE

## 2022-02-09 PROCEDURE — 1160F RVW MEDS BY RX/DR IN RCRD: CPT | Mod: CPTII,,, | Performed by: FAMILY MEDICINE

## 2022-02-09 PROCEDURE — 1159F PR MEDICATION LIST DOCUMENTED IN MEDICAL RECORD: ICD-10-PCS | Mod: CPTII,,, | Performed by: FAMILY MEDICINE

## 2022-02-09 PROCEDURE — 99214 PR OFFICE/OUTPT VISIT, EST, LEVL IV, 30-39 MIN: ICD-10-PCS | Mod: ,,, | Performed by: FAMILY MEDICINE

## 2022-02-09 PROCEDURE — 3077F PR MOST RECENT SYSTOLIC BLOOD PRESSURE >= 140 MM HG: ICD-10-PCS | Mod: CPTII,,, | Performed by: FAMILY MEDICINE

## 2022-02-09 RX ORDER — DICLOFENAC SODIUM 10 MG/G
2 GEL TOPICAL 4 TIMES DAILY
Qty: 200 G | Refills: 2 | Status: SHIPPED | OUTPATIENT
Start: 2022-02-09 | End: 2023-07-06

## 2022-02-09 RX ORDER — INSULIN GLARGINE 100 [IU]/ML
70 INJECTION, SOLUTION SUBCUTANEOUS DAILY
Qty: 10 ML | Refills: 5 | Status: SHIPPED | OUTPATIENT
Start: 2022-02-09 | End: 2022-02-09

## 2022-02-09 RX ORDER — INSULIN GLARGINE 100 [IU]/ML
70 INJECTION, SOLUTION SUBCUTANEOUS DAILY
Qty: 10 ML | Refills: 5 | Status: SHIPPED | OUTPATIENT
Start: 2022-02-09 | End: 2022-06-15

## 2022-02-09 NOTE — PROGRESS NOTES
"   Elkin Coombs MD   [facility]  MEDICAL [facility] [address] [address]  [phone]      PATIENT NAME: Hayley Landis  : 1970  DATE: 22  MRN: [id number]      Billing Provider: Elkin Coombs MD  Level of Service:   Patient PCP Information       Provider PCP Type    Elkin Coombs MD General            Reason for Visit / Chief Complaint: Foot Pain       Update PCP  Update Chief Complaint         History of Present Illness / Problem Focused Workflow     Hayley Landis presents to the clinic with Foot Pain       Chronic pain and numbness/tingling in toes left foot.  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  Did "toe dancing" per pt.  Does do epsom salt sokas.  Symptoms worse over the past 6 months.  Thay are worse at night.      Needs lantus refilled.      Review of Systems     Review of Systems   Musculoskeletal: Positive for arthralgias.   Neurological: Positive for numbness.        Medical / Social / Family History     Past Medical History:   Diagnosis Date    Colon cancer     CVA (cerebral vascular accident)     Diabetes     HTN (hypertension)     Seizure        Past Surgical History:   Procedure Laterality Date    COLON SURGERY      HERNIA REPAIR         Social History    reports that she has never smoked. She has never used smokeless tobacco. She reports that she does not drink alcohol and does not use drugs.   Social History     Tobacco Use    Smoking status: Never Smoker    Smokeless tobacco: Never Used   Substance Use Topics    Alcohol use: Never    Drug use: Never       Family History  Family History   Problem Relation Age of Onset    No Known Problems Mother     No Known Problems Father     No Known Problems Sister     No Known Problems Brother     No Known Problems Daughter     No Known Problems Son     No Known Problems Maternal Aunt     No Known Problems Maternal Uncle     No Known Problems " Paternal Aunt     No Known Problems Paternal Uncle     No Known Problems Maternal Grandmother     No Known Problems Maternal Grandfather     No Known Problems Paternal Grandmother     No Known Problems Paternal Grandfather        Medications and Allergies     Medications  No outpatient medications have been marked as taking for the 2/9/22 encounter (Office Visit) with Elkin Coombs MD.       Allergies  Review of patient's allergies indicates:   Allergen Reactions    Grape Anaphylaxis    Opioids - morphine analogues Anaphylaxis    Pineapple Anaphylaxis    Gabapentin     Hydrocodone      Headache      Iron analogues      Pt hx of hemachromatosis    Metformin      diarrhea    Penicillins        Physical Examination     Vitals:    02/09/22 1018   BP: (!) 145/86   Pulse: 80     Physical Exam  Vitals reviewed.   Constitutional:       Appearance: Normal appearance.   HENT:      Head: Normocephalic and atraumatic.   Eyes:      Extraocular Movements: Extraocular movements intact.      Conjunctiva/sclera: Conjunctivae normal.      Pupils: Pupils are equal, round, and reactive to light.   Cardiovascular:      Rate and Rhythm: Normal rate and regular rhythm.      Heart sounds: Normal heart sounds.   Pulmonary:      Effort: Pulmonary effort is normal.      Breath sounds: Normal breath sounds.   Musculoskeletal:         General: Normal range of motion.      Cervical back: Normal range of motion.   Skin:     General: Skin is warm and dry.   Neurological:      General: No focal deficit present.      Mental Status: She is alert and oriented to person, place, and time.      Comments: + Tinel's test   Psychiatric:         Mood and Affect: Mood normal.         Behavior: Behavior normal.          Assessment and Plan (including Health Maintenance)      Problem List  Smart Sets  Document Outside HM   :    Plan: maya SCHULZ  May try orthotics  Recommend shingles vaccine  Recommend COVID-19 booster.  She plans to [facility]  next Tuesday to get booster.  Schedule appt for wellness visit        Health Maintenance Due   Topic Date Due    Eye Exam  Never done    TETANUS VACCINE  Never done    Low Dose Statin  Never done    Shingles Vaccine (1 of 2) Never done    COVID-19 Vaccine (3 - Booster for Moderna series) 11/05/2021    Mammogram  03/23/2022       Problem List Items Addressed This Visit        Neuro    Tarsal tunnel syndrome - Primary (Chronic)       Endocrine    Type 2 diabetes mellitus without complication, with long-term current use of insulin (Chronic)    Relevant Medications    insulin glargine (LANTUS U-100 INSULIN) 100 unit/mL injection       Orthopedic    Foot pain, left    Relevant Medications    diclofenac sodium (VOLTAREN) 1 % Gel       Other    Numbness and tingling of foot          Health Maintenance Topics with due status: Not Due       Topic Last Completion Date    Colorectal Cancer Screening 06/08/2020    Lipid Panel 07/15/2021    Hemoglobin A1c 10/28/2021    Diabetes Urine Screening 12/15/2021    Foot Exam 12/23/2021       Future Appointments   Date Time Provider Department Center   3/15/2022  9:00 AM [facility]   3/30/2022  7:45 AM [facility]            Signature:  Elkin Coombs MD  [facility]  MEDICAL [facility] - FAMILY MEDICINE  [address] [place] [address] [place] [address]  [phone]    Date of encounter: 2/9/22

## 2022-02-09 NOTE — PATIENT INSTRUCTIONS
Patient Education       Tarsal Tunnel Syndrome   About this topic   Tarsal tunnel syndrome (TTS) is when a nerve along the inside of your ankle and on the bottom of your foot is compressed. The nerve passes through a tunnel on the inside of your ankle that is formed by bones and ligaments. When the nerve is compressed, you can have pain, numbness, or tingling in your foot. This condition is like carpal tunnel syndrome in your wrist. In both cases, the nerve passes through an area that is very small. If the nerve is pinched, you have problems.  What are the causes?   Doctors do not always know what causes TTS. Some people have a tumor or cyst that causes pressure on the nerve. So can an injury, diabetes, arthritis, or flat feet. Swelling, a varicose vein, or tight shoes can also cause pressure.  What can make this more likely to happen?   You are more likely to have problems with TTS if you:  · Have flat feet or other foot deformity  · Have muscle impingement  · Had an ankle sprain or other ankle injury  · Have had surgery in that area before  · Have an illness, like diabetes or arthritis  · Spend a lot of time standing  · Wear tight or improper shoes  What are the main signs?   You may have pain in your foot. The pain may be in the bottom or side of your foot or it may also be in your ankle and calf. Your foot may feel numb or tingly or it may burn. Some people have numbness or weakness in their big toe and first three toes. Other muscles in the ankle and foot may get weak. Your signs may come on all of a sudden or they may happen after you have used your foot a lot.  How does the doctor diagnose this health problem?   The doctor will ask you questions about your health history and activities and will do an exam. Your doctor may order:  · X-rays  · MRI  · Ultrasounds  · Nerve conduction and electromyography (EMG) tests to look for nerve and muscle problems  How does the doctor treat this health problem?   Your  doctor may order:  · Rest and ice  · You to keep your foot elevated  · A compression wrap, brace, or cast  · Physical therapy to help with range of motion and strength  · Shoe inserts or special shoes  What drugs may be needed?   · The doctor may order drugs to:  ? Help with pain and swelling  · The doctor may give you a shot of an anti-inflammatory drug called a corticosteroid. This will help with swelling.  Will surgery be needed?   Sometimes surgery is needed to help ease your symptoms.  What can be done to prevent this health problem?   · Stay active and work out to keep your muscles strong and flexible.  · Warm up slowly and stretch after you work out. Use good ways to train, such as slowly adding to how far you run. Do not work out if you are overly tired. Take extra care if working out in very cold or very hot weather.  · Wear the right equipment when playing sports.  · Keep a healthy weight. Being heavy puts more stress on your joints and then you are more likely to get hurt.  · Wear supportive shoes. If your feet are flat, talk with your doctor about getting inserts or orthotics for your shoes.  · Be sure your shoes are not too tight.  Where can I learn more?   National Organization for Rare Disorders  https://rarediseases.org/rare-diseases/tarsal-tunnel-syndrome/   Last Reviewed Date   2021-03-16  Consumer Information Use and Disclaimer   This information is not specific medical advice and does not replace information you receive from your health care provider. This is only a brief summary of general information. It does NOT include all information about conditions, illnesses, injuries, tests, procedures, treatments, therapies, discharge instructions or life-style choices that may apply to you. You must talk with your health care provider for complete information about your health and treatment options. This information should not be used to decide whether or not to accept your health care providers  advice, instructions or recommendations. Only your health care provider has the knowledge and training to provide advice that is right for you.  Copyright   Copyright © 2021 [employer] and its affiliates and/or licensors. All rights reserved.

## 2022-03-31 ENCOUNTER — OFFICE VISIT (OUTPATIENT)
Dept: FAMILY MEDICINE | Facility: CLINIC | Age: 52
End: 2022-03-31
Payer: MEDICAID

## 2022-03-31 VITALS
WEIGHT: 172 LBS | SYSTOLIC BLOOD PRESSURE: 130 MMHG | TEMPERATURE: 99 F | HEART RATE: 93 BPM | DIASTOLIC BLOOD PRESSURE: 78 MMHG | BODY MASS INDEX: 38.56 KG/M2

## 2022-03-31 DIAGNOSIS — K52.9 GASTROENTERITIS: Primary | ICD-10-CM

## 2022-03-31 DIAGNOSIS — I10 PRIMARY HYPERTENSION: Chronic | ICD-10-CM

## 2022-03-31 DIAGNOSIS — R19.7 DIARRHEA, UNSPECIFIED TYPE: ICD-10-CM

## 2022-03-31 DIAGNOSIS — Z85.038 HISTORY OF COLON CANCER: Chronic | ICD-10-CM

## 2022-03-31 LAB
ANION GAP SERPL CALCULATED.3IONS-SCNC: 10 MMOL/L (ref 7–16)
BUN SERPL-MCNC: 15 MG/DL (ref 7–18)
BUN/CREAT SERPL: 18 (ref 6–20)
CALCIUM SERPL-MCNC: 9.1 MG/DL (ref 8.5–10.1)
CHLORIDE SERPL-SCNC: 97 MMOL/L (ref 98–107)
CO2 SERPL-SCNC: 29 MMOL/L (ref 21–32)
CREAT SERPL-MCNC: 0.82 MG/DL (ref 0.55–1.02)
GLUCOSE SERPL-MCNC: 329 MG/DL (ref 74–106)
POTASSIUM SERPL-SCNC: 3.7 MMOL/L (ref 3.5–5.1)
SODIUM SERPL-SCNC: 132 MMOL/L (ref 136–145)

## 2022-03-31 PROCEDURE — 99214 OFFICE O/P EST MOD 30 MIN: CPT | Mod: ,,, | Performed by: FAMILY MEDICINE

## 2022-03-31 PROCEDURE — 3075F PR MOST RECENT SYSTOLIC BLOOD PRESS GE 130-139MM HG: ICD-10-PCS | Mod: CPTII,,, | Performed by: FAMILY MEDICINE

## 2022-03-31 PROCEDURE — 3075F SYST BP GE 130 - 139MM HG: CPT | Mod: CPTII,,, | Performed by: FAMILY MEDICINE

## 2022-03-31 PROCEDURE — 1159F PR MEDICATION LIST DOCUMENTED IN MEDICAL RECORD: ICD-10-PCS | Mod: CPTII,,, | Performed by: FAMILY MEDICINE

## 2022-03-31 PROCEDURE — 3078F PR MOST RECENT DIASTOLIC BLOOD PRESSURE < 80 MM HG: ICD-10-PCS | Mod: CPTII,,, | Performed by: FAMILY MEDICINE

## 2022-03-31 PROCEDURE — 1160F PR REVIEW ALL MEDS BY PRESCRIBER/CLIN PHARMACIST DOCUMENTED: ICD-10-PCS | Mod: CPTII,,, | Performed by: FAMILY MEDICINE

## 2022-03-31 PROCEDURE — 4010F PR ACE/ARB THEARPY RXD/TAKEN: ICD-10-PCS | Mod: CPTII,,, | Performed by: FAMILY MEDICINE

## 2022-03-31 PROCEDURE — 80048 BASIC METABOLIC PANEL: ICD-10-PCS | Mod: ,,, | Performed by: CLINICAL MEDICAL LABORATORY

## 2022-03-31 PROCEDURE — 1160F RVW MEDS BY RX/DR IN RCRD: CPT | Mod: CPTII,,, | Performed by: FAMILY MEDICINE

## 2022-03-31 PROCEDURE — 99214 PR OFFICE/OUTPT VISIT, EST, LEVL IV, 30-39 MIN: ICD-10-PCS | Mod: ,,, | Performed by: FAMILY MEDICINE

## 2022-03-31 PROCEDURE — 80048 BASIC METABOLIC PNL TOTAL CA: CPT | Mod: ,,, | Performed by: CLINICAL MEDICAL LABORATORY

## 2022-03-31 PROCEDURE — 3078F DIAST BP <80 MM HG: CPT | Mod: CPTII,,, | Performed by: FAMILY MEDICINE

## 2022-03-31 PROCEDURE — 3008F PR BODY MASS INDEX (BMI) DOCUMENTED: ICD-10-PCS | Mod: CPTII,,, | Performed by: FAMILY MEDICINE

## 2022-03-31 PROCEDURE — 4010F ACE/ARB THERAPY RXD/TAKEN: CPT | Mod: CPTII,,, | Performed by: FAMILY MEDICINE

## 2022-03-31 PROCEDURE — 1159F MED LIST DOCD IN RCRD: CPT | Mod: CPTII,,, | Performed by: FAMILY MEDICINE

## 2022-03-31 PROCEDURE — 3008F BODY MASS INDEX DOCD: CPT | Mod: CPTII,,, | Performed by: FAMILY MEDICINE

## 2022-03-31 RX ORDER — DIPHENOXYLATE HYDROCHLORIDE AND ATROPINE SULFATE 2.5; .025 MG/1; MG/1
1 TABLET ORAL 2 TIMES DAILY PRN
Qty: 20 TABLET | Refills: 0 | Status: SHIPPED | OUTPATIENT
Start: 2022-03-31 | End: 2022-11-01

## 2022-03-31 NOTE — PROGRESS NOTES
Elkin Coombs MD   [facility]  [facility] [address] [address]  [phone]      PATIENT NAME: Hayley Landis  : 1970  DATE: 3/31/22  MRN: [id number]      Billing Provider: Elkin Coombs MD  Level of Service:   Patient PCP Information       Provider PCP Type    Elkin Coombs MD General            Reason for Visit / Chief Complaint: Diarrhea (X 3 weeks)       Update PCP  Update Chief Complaint         History of Present Illness / Problem Focused Workflow     Hayley Landis presents to the clinic with Diarrhea (X 3 weeks)       Says that BMs will improve but then get worse again.  OTC meds reportedly have not helped.  Says that she feels like she is dehydrated.      Review of Systems     Review of Systems   Constitutional: Negative for chills and fever.   Gastrointestinal: Positive for diarrhea, nausea and vomiting (says that she has vomited about 3 times total over past 3 weks). Negative for blood in stool.   Genitourinary: Positive for bladder incontinence (is seeing urogyn).        Medical / Social / Family History     Past Medical History:   Diagnosis Date    Colon cancer     CVA (cerebral vascular accident)     Diabetes     HTN (hypertension)     Seizure        Past Surgical History:   Procedure Laterality Date    COLON SURGERY      HERNIA REPAIR         Social History    reports that she has never smoked. She has never used smokeless tobacco. She reports that she does not drink alcohol and does not use drugs.   Social History     Tobacco Use    Smoking status: Never Smoker    Smokeless tobacco: Never Used   Substance Use Topics    Alcohol use: Never    Drug use: Never       Family History  Family History   Problem Relation Age of Onset    No Known Problems Mother     No Known Problems Father     No Known Problems Sister     No Known Problems Brother     No Known Problems Daughter     No Known  Problems Son     No Known Problems Maternal Aunt     No Known Problems Maternal Uncle     No Known Problems Paternal Aunt     No Known Problems Paternal Uncle     No Known Problems Maternal Grandmother     No Known Problems Maternal Grandfather     No Known Problems Paternal Grandmother     No Known Problems Paternal Grandfather        Medications and Allergies     Medications  No outpatient medications have been marked as taking for the 3/31/22 encounter (Office Visit) with Elkin Coombs MD.       Allergies  Review of patient's allergies indicates:   Allergen Reactions    Grape Anaphylaxis    Opioids - morphine analogues Anaphylaxis    Pineapple Anaphylaxis    Gabapentin     Hydrocodone      Headache      Iron analogues      Pt hx of hemachromatosis    Metformin      diarrhea    Penicillins        Physical Examination     Vitals:    03/31/22 0950   BP: 130/78   Pulse: 93   Temp: 98.6 °F (37 °C)     Physical Exam  Vitals reviewed.   Constitutional:       Appearance: Normal appearance.   HENT:      Head: Normocephalic and atraumatic.   Eyes:      Extraocular Movements: Extraocular movements intact.      Conjunctiva/sclera: Conjunctivae normal.      Pupils: Pupils are equal, round, and reactive to light.   Cardiovascular:      Rate and Rhythm: Normal rate.   Pulmonary:      Effort: Pulmonary effort is normal.   Musculoskeletal:         General: Normal range of motion.      Cervical back: Normal range of motion.   Skin:     General: Skin is warm and dry.   Neurological:      General: No focal deficit present.      Mental Status: She is alert and oriented to person, place, and time.   Psychiatric:         Mood and Affect: Mood normal.         Behavior: Behavior normal.          Assessment and Plan (including Health Maintenance)      Problem List  Smart Sets  Document Outside HM   :    Plan: Patient does not appear dehydrated but will obtain BMP to make sure that electrolytes are wnl  Recommend bland  diet and stay well hydrated        Health Maintenance Due   Topic Date Due    Eye Exam  Never done    Low Dose Statin  Never done    Mammogram  03/23/2022       Problem List Items Addressed This Visit        Cardiac/Vascular    Primary hypertension (Chronic)      Other Visit Diagnoses     Gastroenteritis    -  Primary    Relevant Medications    diphenoxylate-atropine 2.5-0.025 mg (LOMOTIL) 2.5-0.025 mg per tablet    Other Relevant Orders    Basic Metabolic Panel    History of colon cancer  (Chronic)       Diarrhea, unspecified type              Health Maintenance Topics with due status: Not Due       Topic Last Completion Date    Colorectal Cancer Screening 06/08/2020    Lipid Panel 07/15/2021    Hemoglobin A1c 10/28/2021    Diabetes Urine Screening 12/15/2021    Foot Exam 12/23/2021       Future Appointments   Date Time Provider Department Center   4/14/2022  9:30 AM [facility]   7/19/2022  9:00 AM Elkin Coombs MD [facility]            Signature:  Elkin Coombs MD  [facility]  MEDICAL GROUP [facility] - FAMILY MEDICINE  [address] [place] [id number]  [phone]    Date of encounter: 3/31/22

## 2022-04-28 ENCOUNTER — OFFICE VISIT (OUTPATIENT)
Dept: FAMILY MEDICINE | Facility: CLINIC | Age: 52
End: 2022-04-28
Payer: MEDICAID

## 2022-04-28 VITALS
BODY MASS INDEX: 38.56 KG/M2 | DIASTOLIC BLOOD PRESSURE: 77 MMHG | WEIGHT: 172 LBS | SYSTOLIC BLOOD PRESSURE: 126 MMHG | HEART RATE: 99 BPM

## 2022-04-28 DIAGNOSIS — E11.9 TYPE 2 DIABETES MELLITUS WITHOUT COMPLICATION, WITH LONG-TERM CURRENT USE OF INSULIN: Primary | Chronic | ICD-10-CM

## 2022-04-28 DIAGNOSIS — Z79.4 TYPE 2 DIABETES MELLITUS WITHOUT COMPLICATION, WITH LONG-TERM CURRENT USE OF INSULIN: Primary | Chronic | ICD-10-CM

## 2022-04-28 DIAGNOSIS — R25.2 LEG CRAMPS: ICD-10-CM

## 2022-04-28 DIAGNOSIS — Z85.038 HISTORY OF COLON CANCER: ICD-10-CM

## 2022-04-28 DIAGNOSIS — I10 PRIMARY HYPERTENSION: Chronic | ICD-10-CM

## 2022-04-28 PROCEDURE — 1160F PR REVIEW ALL MEDS BY PRESCRIBER/CLIN PHARMACIST DOCUMENTED: ICD-10-PCS | Mod: CPTII,,, | Performed by: FAMILY MEDICINE

## 2022-04-28 PROCEDURE — 3078F PR MOST RECENT DIASTOLIC BLOOD PRESSURE < 80 MM HG: ICD-10-PCS | Mod: CPTII,,, | Performed by: FAMILY MEDICINE

## 2022-04-28 PROCEDURE — 3074F PR MOST RECENT SYSTOLIC BLOOD PRESSURE < 130 MM HG: ICD-10-PCS | Mod: CPTII,,, | Performed by: FAMILY MEDICINE

## 2022-04-28 PROCEDURE — 3074F SYST BP LT 130 MM HG: CPT | Mod: CPTII,,, | Performed by: FAMILY MEDICINE

## 2022-04-28 PROCEDURE — 3008F BODY MASS INDEX DOCD: CPT | Mod: CPTII,,, | Performed by: FAMILY MEDICINE

## 2022-04-28 PROCEDURE — 99214 OFFICE O/P EST MOD 30 MIN: CPT | Mod: ,,, | Performed by: FAMILY MEDICINE

## 2022-04-28 PROCEDURE — 1159F PR MEDICATION LIST DOCUMENTED IN MEDICAL RECORD: ICD-10-PCS | Mod: CPTII,,, | Performed by: FAMILY MEDICINE

## 2022-04-28 PROCEDURE — 4010F ACE/ARB THERAPY RXD/TAKEN: CPT | Mod: CPTII,,, | Performed by: FAMILY MEDICINE

## 2022-04-28 PROCEDURE — 1159F MED LIST DOCD IN RCRD: CPT | Mod: CPTII,,, | Performed by: FAMILY MEDICINE

## 2022-04-28 PROCEDURE — 3078F DIAST BP <80 MM HG: CPT | Mod: CPTII,,, | Performed by: FAMILY MEDICINE

## 2022-04-28 PROCEDURE — 1160F RVW MEDS BY RX/DR IN RCRD: CPT | Mod: CPTII,,, | Performed by: FAMILY MEDICINE

## 2022-04-28 PROCEDURE — 3008F PR BODY MASS INDEX (BMI) DOCUMENTED: ICD-10-PCS | Mod: CPTII,,, | Performed by: FAMILY MEDICINE

## 2022-04-28 PROCEDURE — 4010F PR ACE/ARB THEARPY RXD/TAKEN: ICD-10-PCS | Mod: CPTII,,, | Performed by: FAMILY MEDICINE

## 2022-04-28 PROCEDURE — 99214 PR OFFICE/OUTPT VISIT, EST, LEVL IV, 30-39 MIN: ICD-10-PCS | Mod: ,,, | Performed by: FAMILY MEDICINE

## 2022-04-28 RX ORDER — POTASSIUM CHLORIDE 20 MEQ/1
20 TABLET, EXTENDED RELEASE ORAL DAILY
Qty: 30 TABLET | Refills: 1 | Status: SHIPPED | OUTPATIENT
Start: 2022-04-28 | End: 2022-11-22 | Stop reason: SDUPTHER

## 2022-04-28 NOTE — PROGRESS NOTES
"   Elkin Coombs MD   [facility]  MEDICAL GROUP [address] [address]  [phone]      PATIENT NAME: Hayley Landis  : 1970  DATE: 22  MRN: [id number]      Billing Provider: Elkin Coombs MD  Level of Service:   Patient PCP Information       Provider PCP Type    Elkin Coombs MD General            Reason for Visit / Chief Complaint: Medication Refill       Update PCP  Update Chief Complaint         History of Present Illness / Problem Focused Workflow     Hayley Landis presents to the clinic with Medication Refill       50 yo WF with DM, HTN, h/o colon cancer.  Says that chronic conditions are stable.  BP well controlled.  Has recently seen oncologist in [place] and says that "everything looked good."  She has been having leg cramps and cramps in side.  Wants new rx for potassium which she states helped with last episode of cramps/spasms.      Medication Refill  Associated symptoms include myalgias.       Review of Systems     Review of Systems   Musculoskeletal: Positive for leg pain and myalgias.        Medical / Social / Family History     Past Medical History:   Diagnosis Date    Colon cancer     CVA (cerebral vascular accident)     Diabetes     HTN (hypertension)     Seizure        Past Surgical History:   Procedure Laterality Date    COLON SURGERY      HERNIA REPAIR         Social History    reports that she has never smoked. She has never used smokeless tobacco. She reports that she does not drink alcohol and does not use drugs.   Social History     Tobacco Use    Smoking status: Never Smoker    Smokeless tobacco: Never Used   Substance Use Topics    Alcohol use: Never    Drug use: Never       Family History  Family History   Problem Relation Age of Onset    No Known Problems Mother     No Known Problems Father     No Known Problems Sister     No Known Problems Brother     No Known Problems " Daughter     No Known Problems Son     No Known Problems Maternal Aunt     No Known Problems Maternal Uncle     No Known Problems Paternal Aunt     No Known Problems Paternal Uncle     No Known Problems Maternal Grandmother     No Known Problems Maternal Grandfather     No Known Problems Paternal Grandmother     No Known Problems Paternal Grandfather        Medications and Allergies     Medications  No outpatient medications have been marked as taking for the 4/28/22 encounter (Office Visit) with Eklin Coombs MD.       Allergies  Review of patient's allergies indicates:   Allergen Reactions    Grape Anaphylaxis    Opioids - morphine analogues Anaphylaxis    Pineapple Anaphylaxis    Gabapentin     Hydrocodone      Headache      Iron analogues      Pt hx of hemachromatosis    Metformin      diarrhea    Penicillins        Physical Examination     Vitals:    04/28/22 1402   BP: 126/77   Pulse: 99     Physical Exam  Vitals reviewed.   Constitutional:       Appearance: Normal appearance.   HENT:      Head: Normocephalic and atraumatic.   Eyes:      Extraocular Movements: Extraocular movements intact.      Conjunctiva/sclera: Conjunctivae normal.      Pupils: Pupils are equal, round, and reactive to light.   Cardiovascular:      Rate and Rhythm: Normal rate and regular rhythm.      Heart sounds: Normal heart sounds.   Pulmonary:      Effort: Pulmonary effort is normal.      Breath sounds: Normal breath sounds.   Musculoskeletal:         General: Normal range of motion.      Cervical back: Normal range of motion.   Skin:     General: Skin is warm and dry.   Neurological:      General: No focal deficit present.      Mental Status: She is alert and oriented to person, place, and time.   Psychiatric:         Mood and Affect: Mood normal.         Behavior: Behavior normal.        Office Visit on 03/31/2022   Component Date Value Ref Range Status    Sodium 03/31/2022 132 (A) 136 - 145 mmol/L Final     Potassium 03/31/2022 3.7  3.5 - 5.1 mmol/L Final    Chloride 03/31/2022 97 (A) 98 - 107 mmol/L Final    CO2 03/31/2022 29  21 - 32 mmol/L Final    Anion Gap 03/31/2022 10  7 - 16 mmol/L Final    Glucose 03/31/2022 329 (A) 74 - 106 mg/dL Final    BUN 03/31/2022 15  7 - 18 mg/dL Final    Creatinine 03/31/2022 0.82  0.55 - 1.02 mg/dL Final    BUN/Creatinine Ratio 03/31/2022 18  6 - 20 Final    Calcium 03/31/2022 9.1  8.5 - 10.1 mg/dL Final    eGFR 03/31/2022 78  >=60 mL/min/1.73m² Final         Assessment and Plan (including Health Maintenance)      Problem List  Smart Monthlys  Document Outside HM   :    Plan: she had to cancel mammogram but plans to get next month  Has follow up appt scheduled at diabetes clinic in June        Health Maintenance Due   Topic Date Due    Eye Exam  Never done    Low Dose Statin  Never done    Mammogram  03/23/2022    Hemoglobin A1c  04/28/2022       Problem List Items Addressed This Visit        Cardiac/Vascular    Primary hypertension (Chronic)       Oncology    History of colon cancer       Endocrine    Type 2 diabetes mellitus without complication, with long-term current use of insulin - Primary (Chronic)       Other    Leg cramps    Relevant Medications    potassium chloride SA (K-DUR,KLOR-CON) 20 MEQ tablet          Health Maintenance Topics with due status: Not Due       Topic Last Completion Date    Colorectal Cancer Screening 06/08/2020    Lipid Panel 07/15/2021    Diabetes Urine Screening 12/15/2021    Foot Exam 12/23/2021       Future Appointments   Date Time Provider Department Center   7/19/2022  9:00 AM Eklin Coombs MD [facility]            Signature:  Elkin Coombs MD  [facility]  MEDICAL GROUP [facility] - FAMILY MEDICINE  [address] [place] [address] [place] [address]  [phone]    Date of encounter: 4/28/22

## 2022-05-10 ENCOUNTER — OFFICE VISIT (OUTPATIENT)
Dept: FAMILY MEDICINE | Facility: CLINIC | Age: 52
End: 2022-05-10
Payer: MEDICAID

## 2022-05-10 VITALS
BODY MASS INDEX: 38.56 KG/M2 | SYSTOLIC BLOOD PRESSURE: 128 MMHG | HEART RATE: 99 BPM | DIASTOLIC BLOOD PRESSURE: 87 MMHG | WEIGHT: 172 LBS

## 2022-05-10 DIAGNOSIS — R20.2 NUMBNESS AND TINGLING OF FOOT: Primary | Chronic | ICD-10-CM

## 2022-05-10 DIAGNOSIS — S29.011A MUSCLE STRAIN OF CHEST WALL, INITIAL ENCOUNTER: ICD-10-CM

## 2022-05-10 DIAGNOSIS — R10.84 GENERALIZED ABDOMINAL PAIN: Chronic | ICD-10-CM

## 2022-05-10 DIAGNOSIS — Z85.038 HISTORY OF COLON CANCER: ICD-10-CM

## 2022-05-10 DIAGNOSIS — R20.2 PARESTHESIA OF RIGHT FOOT: Chronic | ICD-10-CM

## 2022-05-10 DIAGNOSIS — R20.0 NUMBNESS AND TINGLING OF FOOT: Primary | Chronic | ICD-10-CM

## 2022-05-10 PROCEDURE — 4010F PR ACE/ARB THEARPY RXD/TAKEN: ICD-10-PCS | Mod: CPTII,,, | Performed by: FAMILY MEDICINE

## 2022-05-10 PROCEDURE — 99214 PR OFFICE/OUTPT VISIT, EST, LEVL IV, 30-39 MIN: ICD-10-PCS | Mod: ,,, | Performed by: FAMILY MEDICINE

## 2022-05-10 PROCEDURE — 1160F RVW MEDS BY RX/DR IN RCRD: CPT | Mod: CPTII,,, | Performed by: FAMILY MEDICINE

## 2022-05-10 PROCEDURE — 1160F PR REVIEW ALL MEDS BY PRESCRIBER/CLIN PHARMACIST DOCUMENTED: ICD-10-PCS | Mod: CPTII,,, | Performed by: FAMILY MEDICINE

## 2022-05-10 PROCEDURE — 3079F PR MOST RECENT DIASTOLIC BLOOD PRESSURE 80-89 MM HG: ICD-10-PCS | Mod: CPTII,,, | Performed by: FAMILY MEDICINE

## 2022-05-10 PROCEDURE — 1159F MED LIST DOCD IN RCRD: CPT | Mod: CPTII,,, | Performed by: FAMILY MEDICINE

## 2022-05-10 PROCEDURE — 4010F ACE/ARB THERAPY RXD/TAKEN: CPT | Mod: CPTII,,, | Performed by: FAMILY MEDICINE

## 2022-05-10 PROCEDURE — 99214 OFFICE O/P EST MOD 30 MIN: CPT | Mod: ,,, | Performed by: FAMILY MEDICINE

## 2022-05-10 PROCEDURE — 3079F DIAST BP 80-89 MM HG: CPT | Mod: CPTII,,, | Performed by: FAMILY MEDICINE

## 2022-05-10 PROCEDURE — 3074F PR MOST RECENT SYSTOLIC BLOOD PRESSURE < 130 MM HG: ICD-10-PCS | Mod: CPTII,,, | Performed by: FAMILY MEDICINE

## 2022-05-10 PROCEDURE — 3008F PR BODY MASS INDEX (BMI) DOCUMENTED: ICD-10-PCS | Mod: CPTII,,, | Performed by: FAMILY MEDICINE

## 2022-05-10 PROCEDURE — 3008F BODY MASS INDEX DOCD: CPT | Mod: CPTII,,, | Performed by: FAMILY MEDICINE

## 2022-05-10 PROCEDURE — 1159F PR MEDICATION LIST DOCUMENTED IN MEDICAL RECORD: ICD-10-PCS | Mod: CPTII,,, | Performed by: FAMILY MEDICINE

## 2022-05-10 PROCEDURE — 3074F SYST BP LT 130 MM HG: CPT | Mod: CPTII,,, | Performed by: FAMILY MEDICINE

## 2022-05-10 RX ORDER — DULOXETIN HYDROCHLORIDE 30 MG/1
30 CAPSULE, DELAYED RELEASE ORAL DAILY
Qty: 30 CAPSULE | Refills: 5 | Status: SHIPPED | OUTPATIENT
Start: 2022-05-10 | End: 2022-05-10 | Stop reason: SDUPTHER

## 2022-05-10 RX ORDER — DULOXETIN HYDROCHLORIDE 30 MG/1
30 CAPSULE, DELAYED RELEASE ORAL DAILY
Qty: 30 CAPSULE | Refills: 5 | Status: SHIPPED | OUTPATIENT
Start: 2022-05-10 | End: 2022-06-24 | Stop reason: CLARIF

## 2022-05-10 RX ORDER — DULOXETIN HYDROCHLORIDE 30 MG/1
30 CAPSULE, DELAYED RELEASE ORAL DAILY
Qty: 30 CAPSULE | Refills: 5 | Status: SHIPPED | OUTPATIENT
Start: 2022-05-10 | End: 2022-05-10

## 2022-05-10 NOTE — PROGRESS NOTES
Elkin Coombs MD   [facility]  MEDICAL GROUP [facility] FAM[facility] [address] [address]  [phone]      PATIENT NAME: Hayley Landis  : 1970  DATE: 5/10/22  MRN: [id number]      Billing Provider: Elkin Coombs MD  Level of Service:   Patient PCP Information       Provider PCP Type    Elkin Coombs MD General            Reason for Visit / Chief Complaint: left big toe in fected, pain and Chest Pain       Update PCP  Update Chief Complaint         History of Present Illness / Problem Focused Workflow     Hayley Landis presents to the clinic with left big toe in fected, pain and Chest Pain       Says that she strained some muscles in chest.    Most recent glucose 127.  Has been well controlled.      Thinks that big toe was getting infected.      Needs cymbalta refilled. Takes for nerve pain in foot and abdomen.  Has h/o colon cancer.        Review of Systems     Review of Systems   Constitutional: Negative for activity change, chills and fever.   HENT: Negative for sore throat.    Eyes: Negative for pain.   Respiratory: Negative for cough, chest tightness and shortness of breath.    Cardiovascular: Positive for chest pain (muscle strain per pt). Negative for palpitations.   Gastrointestinal: Negative for abdominal pain.   Musculoskeletal: Positive for arthralgias.   Neurological: Negative for dizziness, syncope and weakness.   Psychiatric/Behavioral: Negative for confusion.        Medical / Social / Family History     Past Medical History:   Diagnosis Date    Colon cancer     CVA (cerebral vascular accident)     Diabetes     HTN (hypertension)     Seizure        Past Surgical History:   Procedure Laterality Date    COLON SURGERY      HERNIA REPAIR         Social History    reports that she has never smoked. She has never used smokeless tobacco. She reports that she does not drink alcohol and does not use drugs.   Social History      Tobacco Use    Smoking status: Never Smoker    Smokeless tobacco: Never Used   Substance Use Topics    Alcohol use: Never    Drug use: Never       Family History  Family History   Problem Relation Age of Onset    No Known Problems Mother     No Known Problems Father     No Known Problems Sister     No Known Problems Brother     No Known Problems Daughter     No Known Problems Son     No Known Problems Maternal Aunt     No Known Problems Maternal Uncle     No Known Problems Paternal Aunt     No Known Problems Paternal Uncle     No Known Problems Maternal Grandmother     No Known Problems Maternal Grandfather     No Known Problems Paternal Grandmother     No Known Problems Paternal Grandfather        Medications and Allergies     Medications  No outpatient medications have been marked as taking for the 5/10/22 encounter (Office Visit) with Elkin Coombs MD.       Allergies  Review of patient's allergies indicates:   Allergen Reactions    Grape Anaphylaxis    Opioids - morphine analogues Anaphylaxis    Pineapple Anaphylaxis    Gabapentin     Hydrocodone      Headache      Iron analogues      Pt hx of hemachromatosis    Metformin      diarrhea    Penicillins        Physical Examination     Vitals:    05/10/22 1058   BP: 128/87   Pulse: 99     Physical Exam  Vitals reviewed.   Constitutional:       Appearance: Normal appearance.   HENT:      Head: Normocephalic and atraumatic.   Eyes:      Extraocular Movements: Extraocular movements intact.      Conjunctiva/sclera: Conjunctivae normal.      Pupils: Pupils are equal, round, and reactive to light.   Cardiovascular:      Rate and Rhythm: Normal rate and regular rhythm.      Heart sounds: Normal heart sounds.   Pulmonary:      Effort: Pulmonary effort is normal.      Breath sounds: Normal breath sounds.   Musculoskeletal:         General: Normal range of motion.      Cervical back: Normal range of motion.   Skin:     General: Skin is warm  and dry.      Findings: No erythema.      Comments: Toe does not appear infected but may do epsom salt soaks   Neurological:      General: No focal deficit present.      Mental Status: She is alert and oriented to person, place, and time.   Psychiatric:         Mood and Affect: Mood normal.         Behavior: Behavior normal.          Assessment and Plan (including Health Maintenance)      Problem List  Smart Sets  Document Outside HM   :    Plan:         Health Maintenance Due   Topic Date Due    Eye Exam  Never done    Low Dose Statin  Never done    Mammogram  03/23/2022    Hemoglobin A1c  04/28/2022    COVID-19 Vaccine (4 - Booster for Moderna series) 06/15/2022       Problem List Items Addressed This Visit        Oncology    History of colon cancer       Other    Numbness and tingling of foot - Primary    Relevant Medications    DULoxetine (CYMBALTA) 30 MG capsule      Other Visit Diagnoses     Generalized abdominal pain  (Chronic)       Relevant Medications    DULoxetine (CYMBALTA) 30 MG capsule    Paresthesia of right foot  (Chronic)       Relevant Medications    DULoxetine (CYMBALTA) 30 MG capsule    Muscle strain of chest wall, initial encounter              Health Maintenance Topics with due status: Not Due       Topic Last Completion Date    Colorectal Cancer Screening 06/08/2020    Lipid Panel 07/15/2021    Diabetes Urine Screening 12/15/2021    Foot Exam 12/23/2021       Future Appointments   Date Time Provider Department Center   7/19/2022  9:00 AM Elkin Coombs MD [facility]            Signature:  MD RADHA [facility]  MEDICAL GROUP [facility] - FAMILY MEDICINE  [address] [place] [id number]  [phone]    Date of encounter: 5/10/22

## 2022-05-24 NOTE — TELEPHONE ENCOUNTER
----- Message from Mik Valenzuela sent at 5/23/2022  8:50 AM CDT -----  Rx refill on lisinopril to the pharmacy  [phone] thanks

## 2022-05-25 RX ORDER — LISINOPRIL 10 MG/1
10 TABLET ORAL DAILY
Qty: 90 TABLET | Refills: 2 | Status: SHIPPED | OUTPATIENT
Start: 2022-05-25 | End: 2023-01-12 | Stop reason: SDUPTHER

## 2022-06-17 ENCOUNTER — OFFICE VISIT (OUTPATIENT)
Dept: FAMILY MEDICINE | Facility: CLINIC | Age: 52
End: 2022-06-17
Payer: MEDICAID

## 2022-06-17 VITALS
DIASTOLIC BLOOD PRESSURE: 90 MMHG | SYSTOLIC BLOOD PRESSURE: 138 MMHG | HEART RATE: 85 BPM | BODY MASS INDEX: 38.56 KG/M2 | WEIGHT: 172 LBS

## 2022-06-17 DIAGNOSIS — I10 PRIMARY HYPERTENSION: Chronic | ICD-10-CM

## 2022-06-17 DIAGNOSIS — K58.0 IRRITABLE BOWEL SYNDROME WITH DIARRHEA: Chronic | ICD-10-CM

## 2022-06-17 DIAGNOSIS — Z85.038 HISTORY OF COLON CANCER: ICD-10-CM

## 2022-06-17 DIAGNOSIS — R10.84 GENERALIZED ABDOMINAL PAIN: Primary | ICD-10-CM

## 2022-06-17 PROCEDURE — 3008F BODY MASS INDEX DOCD: CPT | Mod: CPTII,,, | Performed by: FAMILY MEDICINE

## 2022-06-17 PROCEDURE — 99214 OFFICE O/P EST MOD 30 MIN: CPT | Mod: ,,, | Performed by: FAMILY MEDICINE

## 2022-06-17 PROCEDURE — 4010F PR ACE/ARB THEARPY RXD/TAKEN: ICD-10-PCS | Mod: CPTII,,, | Performed by: FAMILY MEDICINE

## 2022-06-17 PROCEDURE — 1159F MED LIST DOCD IN RCRD: CPT | Mod: CPTII,,, | Performed by: FAMILY MEDICINE

## 2022-06-17 PROCEDURE — 1160F RVW MEDS BY RX/DR IN RCRD: CPT | Mod: CPTII,,, | Performed by: FAMILY MEDICINE

## 2022-06-17 PROCEDURE — 3080F PR MOST RECENT DIASTOLIC BLOOD PRESSURE >= 90 MM HG: ICD-10-PCS | Mod: CPTII,,, | Performed by: FAMILY MEDICINE

## 2022-06-17 PROCEDURE — 3080F DIAST BP >= 90 MM HG: CPT | Mod: CPTII,,, | Performed by: FAMILY MEDICINE

## 2022-06-17 PROCEDURE — 1160F PR REVIEW ALL MEDS BY PRESCRIBER/CLIN PHARMACIST DOCUMENTED: ICD-10-PCS | Mod: CPTII,,, | Performed by: FAMILY MEDICINE

## 2022-06-17 PROCEDURE — 3008F PR BODY MASS INDEX (BMI) DOCUMENTED: ICD-10-PCS | Mod: CPTII,,, | Performed by: FAMILY MEDICINE

## 2022-06-17 PROCEDURE — 4010F ACE/ARB THERAPY RXD/TAKEN: CPT | Mod: CPTII,,, | Performed by: FAMILY MEDICINE

## 2022-06-17 PROCEDURE — 99214 PR OFFICE/OUTPT VISIT, EST, LEVL IV, 30-39 MIN: ICD-10-PCS | Mod: ,,, | Performed by: FAMILY MEDICINE

## 2022-06-17 PROCEDURE — 3075F SYST BP GE 130 - 139MM HG: CPT | Mod: CPTII,,, | Performed by: FAMILY MEDICINE

## 2022-06-17 PROCEDURE — 1159F PR MEDICATION LIST DOCUMENTED IN MEDICAL RECORD: ICD-10-PCS | Mod: CPTII,,, | Performed by: FAMILY MEDICINE

## 2022-06-17 PROCEDURE — 3075F PR MOST RECENT SYSTOLIC BLOOD PRESS GE 130-139MM HG: ICD-10-PCS | Mod: CPTII,,, | Performed by: FAMILY MEDICINE

## 2022-06-17 RX ORDER — HYDROCHLOROTHIAZIDE 12.5 MG/1
TABLET ORAL
Qty: 90 TABLET | Refills: 3 | Status: SHIPPED | OUTPATIENT
Start: 2022-06-17

## 2022-06-17 RX ORDER — PEPPERMINT OIL 90 MG
2 CAPSULE, DELAYED, AND EXTENDED RELEASE ORAL DAILY
Qty: 48 EACH | Refills: 2 | Status: SHIPPED | OUTPATIENT
Start: 2022-06-17

## 2022-06-17 NOTE — PROGRESS NOTES
"   Elkin Coombs MD   [facility]  MEDICAL GROUP [address] [place] [address]  [phone]      PATIENT NAME: Hayley Landis  : 1970  DATE: 22  MRN: [id number]      Billing Provider: Elkin Coombs MD  Level of Service: OR OFFICE/OUTPT VISIT, EST, LEVL IV, 30-39 MIN  Patient PCP Information       Provider PCP Type    Elkin Coombs MD General            Reason for Visit / Chief Complaint: Nausea, Abdominal Pain, Diarrhea, and Constipation       Update PCP  Update Chief Complaint         History of Present Illness / Problem Focused Workflow     Hayley Landis presents to the clinic with Nausea, Abdominal Pain, Diarrhea, and Constipation       50 yo WF who complains of constipation x several days.  After about 4 days she started becoming mildly nauseated.  Did start to have multiple soft BMs per day.  Has been diagnosed with IBS in past.  Also has h/o colon cancer.  Has seen her oncologist recently (22).  At that time she had a CT chest/ c abdomen/ c pelvis which showed "No evidence of recurrent or metastatic colon cancer."  I have reviewed that encounter note and associated labs/imaging.    Says that she quit taking cymblata.    Is requesting refill of her HCTZ.      Review of Systems     Review of Systems   Constitutional: Negative for chills and fever.   HENT: Negative for sore throat.    Respiratory: Negative for cough and shortness of breath.    Cardiovascular: Negative for chest pain and palpitations.   Gastrointestinal: Positive for change in bowel habit, constipation, diarrhea, nausea and change in bowel habit. Negative for blood in stool and vomiting.   Genitourinary: Negative for hematuria.   Neurological: Negative for dizziness and syncope.        Medical / Social / Family History     Past Medical History:   Diagnosis Date    Colon cancer     CVA (cerebral vascular accident)     Diabetes     HTN " (hypertension)     Seizure        Past Surgical History:   Procedure Laterality Date    COLON SURGERY      HERNIA REPAIR         Social History    reports that she has never smoked. She has never used smokeless tobacco. She reports that she does not drink alcohol and does not use drugs.   Social History     Tobacco Use    Smoking status: Never Smoker    Smokeless tobacco: Never Used   Substance Use Topics    Alcohol use: Never    Drug use: Never       Family History  Family History   Problem Relation Age of Onset    No Known Problems Mother     No Known Problems Father     No Known Problems Sister     No Known Problems Brother     No Known Problems Daughter     No Known Problems Son     No Known Problems Maternal Aunt     No Known Problems Maternal Uncle     No Known Problems Paternal Aunt     No Known Problems Paternal Uncle     No Known Problems Maternal Grandmother     No Known Problems Maternal Grandfather     No Known Problems Paternal Grandmother     No Known Problems Paternal Grandfather        Medications and Allergies     Medications  No outpatient medications have been marked as taking for the 6/17/22 encounter (Office Visit) with Elkin Coombs MD.       Allergies  Review of patient's allergies indicates:   Allergen Reactions    Grape Anaphylaxis    Opioids - morphine analogues Anaphylaxis    Pineapple Anaphylaxis    Gabapentin     Hydrocodone      Headache      Iron analogues      Pt hx of hemachromatosis    Metformin      diarrhea    Penicillins        Physical Examination     Vitals:    06/17/22 1625   BP: (!) 138/90   Pulse: 85     Physical Exam  Vitals reviewed.   Constitutional:       Appearance: Normal appearance.   HENT:      Head: Normocephalic and atraumatic.   Eyes:      Extraocular Movements: Extraocular movements intact.      Conjunctiva/sclera: Conjunctivae normal.      Pupils: Pupils are equal, round, and reactive to light.   Cardiovascular:      Rate and  Rhythm: Normal rate and regular rhythm.      Heart sounds: Normal heart sounds.   Pulmonary:      Effort: Pulmonary effort is normal.      Breath sounds: Normal breath sounds.   Musculoskeletal:         General: Normal range of motion.      Cervical back: Normal range of motion.   Skin:     General: Skin is warm and dry.   Neurological:      General: No focal deficit present.      Mental Status: She is alert and oriented to person, place, and time.   Psychiatric:         Mood and Affect: Mood normal.         Behavior: Behavior normal.          Assessment and Plan (including Health Maintenance)      Problem List  Smart Sets  Document Outside HM   :    Plan:  Trial of IBGard        Health Maintenance Due   Topic Date Due    Eye Exam  Never done    Low Dose Statin  Never done    Mammogram  03/23/2022    Hemoglobin A1c  04/28/2022    COVID-19 Vaccine (4 - Booster for Moderna series) 06/15/2022       Problem List Items Addressed This Visit        Cardiac/Vascular    Primary hypertension (Chronic)    Relevant Medications    hydroCHLOROthiazide (HYDRODIURIL) 12.5 MG Tab       Oncology    History of colon cancer      Other Visit Diagnoses     Generalized abdominal pain    -  Primary    Relevant Medications    peppermint oiL (IBGARD) 90 mg CECX    Irritable bowel syndrome with diarrhea  (Chronic)             Health Maintenance Topics with due status: Not Due       Topic Last Completion Date    Colorectal Cancer Screening 06/08/2020    Lipid Panel 07/15/2021    Diabetes Urine Screening 12/15/2021    Foot Exam 12/23/2021       Future Appointments   Date Time Provider Department Center   7/19/2022  9:00 AM Elkin Coombs MD [facility]            Signature:  MD RADHA [facility]  MEDICAL [facility]  [address] [place] [id number]  [phone]    Date of encounter: 6/17/22

## 2022-06-24 ENCOUNTER — HOSPITAL ENCOUNTER (EMERGENCY)
Facility: HOSPITAL | Age: 52
Discharge: HOME | End: 2022-06-24
Payer: MEDICAID

## 2022-06-24 VITALS
HEIGHT: 57 IN | OXYGEN SATURATION: 96 % | TEMPERATURE: 98 F | DIASTOLIC BLOOD PRESSURE: 64 MMHG | SYSTOLIC BLOOD PRESSURE: 114 MMHG | WEIGHT: 173 LBS | RESPIRATION RATE: 20 BRPM | BODY MASS INDEX: 37.32 KG/M2 | HEART RATE: 83 BPM

## 2022-06-24 DIAGNOSIS — M25.551 RIGHT HIP PAIN: ICD-10-CM

## 2022-06-24 DIAGNOSIS — S49.91XA INJURY OF RIGHT SHOULDER, INITIAL ENCOUNTER: ICD-10-CM

## 2022-06-24 DIAGNOSIS — M79.671 RIGHT FOOT PAIN: ICD-10-CM
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
## 2022-06-24 DIAGNOSIS — M25.532 BILATERAL WRIST PAIN: ICD-10-CM

## 2022-06-24 DIAGNOSIS — M25.531 BILATERAL WRIST PAIN: ICD-10-CM

## 2022-06-24 DIAGNOSIS — R51.9 ACUTE NONINTRACTABLE HEADACHE, UNSPECIFIED HEADACHE TYPE: ICD-10-CM

## 2022-06-24 DIAGNOSIS — M54.2 NECK PAIN: ICD-10-CM

## 2022-06-24 PROCEDURE — 99282 EMERGENCY DEPT VISIT SF MDM: CPT | Mod: ,,, | Performed by: NURSE PRACTITIONER

## 2022-06-24 PROCEDURE — 99285 EMERGENCY DEPT VISIT HI MDM: CPT | Mod: 25

## 2022-06-24 PROCEDURE — 99282 PR EMERGENCY DEPT VISIT,LEVEL II: ICD-10-PCS | Mod: ,,, | Performed by: NURSE PRACTITIONER

## 2022-06-24 NOTE — ED TRIAGE NOTES
Patient states that she was walking under her carport when she slipped in cat vomit causing her to fall and land on her right side. Patient denies hitting her head or loss of consciousness.

## 2022-06-24 NOTE — DISCHARGE INSTRUCTIONS
Take Tylenol as needed for pain. Follow-up with your PCP if pain persists in 1 week. Return to the ED for any new or worsening symptoms.

## 2022-06-24 NOTE — ED PROVIDER NOTES
"Encounter Date: 6/24/2022       History     Chief Complaint   Patient presents with    Fall     Presented with c/o headache, neck pain, right shoulder pain, right rib pain, right hip and proximal femur pain, and bilat wrist pain after slip and fall just PTA. States was getting groceries/bags out of the car and slipped on cat emesis and fell on right side. Denies known head injury or loss of consciousness.  drove to the ED and is able to ambulate with mild pain.        Review of patient's allergies indicates:   Allergen Reactions    Grape Anaphylaxis    Opioids - morphine analogues Anaphylaxis    Pineapple Anaphylaxis    Aspirin      "Veins breakdown" per patient    Dicyclomine Diarrhea    Gabapentin     Hydrocodone      Headache      Iron analogues      Pt hx of hemachromatosis    Metformin      diarrhea    Penicillins     Latex Rash     Past Medical History:   Diagnosis Date    Colon cancer     CVA (cerebral vascular accident)     Diabetes     HTN (hypertension)     Seizure      Past Surgical History:   Procedure Laterality Date    COLON SURGERY      HERNIA REPAIR       Family History   Problem Relation Age of Onset    No Known Problems Mother     No Known Problems Father     No Known Problems Sister     No Known Problems Brother     No Known Problems Daughter     No Known Problems Son     No Known Problems Maternal Aunt     No Known Problems Maternal Uncle     No Known Problems Paternal Aunt     No Known Problems Paternal Uncle     No Known Problems Maternal Grandmother     No Known Problems Maternal Grandfather     No Known Problems Paternal Grandmother     No Known Problems Paternal Grandfather      Social History     Tobacco Use    Smoking status: Never Smoker    Smokeless tobacco: Never Used   Substance Use Topics    Alcohol use: Never    Drug use: Never     Review of Systems   Constitutional: Positive for activity change.   HENT: Negative.    Respiratory: Negative.  " Negative for shortness of breath.    Cardiovascular: Negative.  Negative for chest pain.   Gastrointestinal: Negative.  Negative for abdominal pain, nausea and vomiting.   Genitourinary: Negative.    Musculoskeletal: Positive for arthralgias and neck pain.   Skin: Negative.    Neurological: Positive for headaches. Negative for dizziness and weakness.   Psychiatric/Behavioral: Negative.        Physical Exam     Initial Vitals [06/24/22 1220]   BP Pulse Resp Temp SpO2   113/68 93 20 98.4 °F (36.9 °C) 99 %      MAP       --         Physical Exam    Nursing note and vitals reviewed.  Constitutional: She appears well-developed and well-nourished. No distress.   HENT:   Head: Normocephalic.   Right Ear: External ear normal.   Left Ear: External ear normal.   Nose: Nose normal.   Mouth/Throat: Oropharynx is clear and moist.   Eyes: Conjunctivae and EOM are normal. Pupils are equal, round, and reactive to light.   Neck: Neck supple. No JVD present.   No midline cervical tenderness, c/o pain with active ROM   Normal range of motion.  Cardiovascular: Normal rate, regular rhythm, normal heart sounds and intact distal pulses.   Pulmonary/Chest: Breath sounds normal.   Abdominal: Abdomen is soft. Bowel sounds are normal.   Musculoskeletal:         General: Tenderness present. Normal range of motion.      Right shoulder: Tenderness present.        Arms:       Cervical back: Normal range of motion and neck supple.        Legs:      Neurological: She is alert and oriented to person, place, and time. She has normal strength. No cranial nerve deficit. GCS score is 15. GCS eye subscore is 4. GCS verbal subscore is 5. GCS motor subscore is 6.   Skin: Skin is warm and dry. Capillary refill takes less than 2 seconds.   NO bruising or abrasion   Psychiatric: She has a normal mood and affect. Her behavior is normal. Judgment and thought content normal.         Medical Screening Exam   See Full Note    ED Course   Procedures  Labs Reviewed  - No data to display       Imaging Results          X-Ray Foot Complete Right (Final result)  Result time 06/24/22 13:29:30    Final result by Calderon Harden II, MD (06/24/22 13:29:30)                 Impression:      No evidence of abnormality demonstrated      Electronically signed by: Calderon Harden  Date:    06/24/2022  Time:    13:29             Narrative:    EXAMINATION:  XR FOOT COMPLETE 3 VIEW RIGHT    CLINICAL HISTORY:  Fall on same level from slipping, tripping and stumbling without subsequent striking against object, initial encounter    COMPARISON:  None available    FINDINGS:  No evidence of fracture seen.  The alignment of the joints appears normal.  No degenerative change is present.  No soft tissue abnormality is seen.                               X-Ray Wrist Complete Bilateral (Final result)  Result time 06/24/22 13:23:03   Procedure changed from X-Ray Wrist Complete Left     Final result by Calderon Harden II, MD (06/24/22 13:23:03)                 Impression:      No evidence of abnormality demonstrated      Electronically signed by: Calderon Harden  Date:    06/24/2022  Time:    13:23             Narrative:    EXAMINATION:  XR WRIST COMPLETE 3 VIEWS BILATERAL    CLINICAL HISTORY:  fall; Fall on same level from slipping, tripping and stumbling without subsequent striking against object, initial encounter    COMPARISON:  None available    FINDINGS:  No evidence of fracture seen.  The alignment of the joints appears normal.  No degenerative change is present.  No soft tissue abnormality is seen.                               X-Ray Hip 2 or 3 views Right (with Pelvis when performed) (Final result)  Result time 06/24/22 13:22:43    Final result by Calderon Harden II, MD (06/24/22 13:22:43)                 Impression:      No evidence of acute injury demonstrated      Electronically signed by: Calderon Harden  Date:    06/24/2022  Time:    13:22             Narrative:    EXAMINATION:  XR HIP  WITH PELVIS WHEN PERFORMED, 2 OR 3  VIEWS RIGHT    COMPARISON:  None available    FINDINGS:  No evidence of fracture seen.  The alignment of the joints appears normal.  Mild hip degenerative change is present.  No soft tissue abnormality is seen.                               XR Ribs Min 3 Views w/PA Chest Right (Final result)  Result time 06/24/22 13:24:32    Final result by Henrique Armenta MD (06/24/22 13:24:32)                 Impression:      No acute findings.      Electronically signed by: Henrique Armenta  Date:    06/24/2022  Time:    13:24             Narrative:    EXAMINATION:  XR RIBS MIN 3 VIEWS W/ PA CHEST RIGHT    CLINICAL HISTORY:  right rib pain;    TECHNIQUE:  XR RIBS MIN 3 VIEWS W/ PA CHEST RIGHT    COMPARISON:  None    FINDINGS:  Heart size normal.  Lungs clear.  No pneumothorax or pleural effusion.  There is no rib fracture on the right or the left.                               CT Cervical Spine Without Contrast (Final result)  Result time 06/24/22 13:29:08    Final result by Calderon Harden II, MD (06/24/22 13:29:08)                 Impression:      No evidence of acute injury or other significant abnormality demonstrated      Electronically signed by: Calderon Harden  Date:    06/24/2022  Time:    13:29             Narrative:    EXAMINATION:  CT CERVICAL SPINE WITHOUT CONTRAST    CLINICAL HISTORY:  neck pain after slip and fall;    TECHNIQUE:  Axial CT imaging of the cervical spine is performed without contrast.  Computer reformatting is viewed in the sagittal and coronal planes.    CT dose reduction technique used - Dose Rite and tube current modulation.    COMPARISON:  None available    FINDINGS:  No fracture is seen.  Alignment of the cervical spine is within normal limits.  Vertebral body heights are normal.  No other abnormality is demonstrated.                               CT Head Without Contrast (Final result)  Result time 06/24/22 13:19:44    Final result by Calderon Haredn II, MD  (06/24/22 13:19:44)                 Impression:      No evidence of abnormality demonstrated.      Electronically signed by: Calderon Harden  Date:    06/24/2022  Time:    13:19             Narrative:    EXAMINATION:  CT HEAD WITHOUT CONTRAST    CLINICAL HISTORY:  headache after slip and fall;    TECHNIQUE:  Axial CT imaging of the brain is performed without contrast with 3 mm increments.    CT dose reduction technique used - Dose Rite and tube current modulation.    COMPARISON:  None available    FINDINGS:  No evidence of hemorrhage, mass, mass effect, midline shift or acute infarct seen.  The brain parenchyma attenuation and differentiation appears within normal limits. The ventricles and cisterns are normal in caliber.  No cranial or skull base abnormality is identified.                               X-Ray Shoulder Complete 2 View Right (Final result)  Result time 06/24/22 13:12:14    Final result by Calderon Harden II, MD (06/24/22 13:12:14)                 Impression:      No evidence of abnormality demonstrated      Electronically signed by: Calderon Harden  Date:    06/24/2022  Time:    13:12             Narrative:    EXAMINATION:  XR SHOULDER COMPLETE 2 OR MORE VIEWS RIGHT    COMPARISON:  None available    FINDINGS:  No evidence of fracture seen.  The alignment of the joints appears normal.  No degenerative change is present.  No soft tissue abnormality is seen.                              X-Rays:   Independently Interpreted Readings:   Chest X-Ray: No evidence of acute injury demonstrated   Head CT: No acute findings.   Other Readings:  Right foot: No acute abnormality.    CT c-spine:  No evidence of acute injury or other significant abnormality demonstrated    Right hip/pelvis: No evidence of acute injury demonstrated    Right shoulder: No evidence of acute injury demonstrated    Bilat wrists: No evidence of acute injury demonstrated    Medications - No data to display  Medical Decision Making:   ED  Management:  All xrays negative for acute findings. CT head neg. CT c-spine negative. Instructed to follow-up with PCP if 1 week if pain persists.                   Clinical Impression:   Final diagnoses:  [W01.0XXA] Fall from slip, trip, or stumble, initial encounter (Primary)  [R51.9] Acute nonintractable headache, unspecified headache type  [M54.2] Neck pain  [S49.91XA] Injury of right shoulder, initial encounter  [M25.531, M25.532] Bilateral wrist pain  [M79.671] Right foot pain  [M25.551] Right hip pain          ED Disposition Condition    Discharge Stable        ED Prescriptions     None        Follow-up Information     Follow up With Specialties Details Why Contact Info    Elkin Coombs MD Family Medicine In 1 week IF pain persists [address] [place] [address]  [facility] [place] [address]  [phone]             Yojana Madera NP  06/24/22 2185

## 2022-06-25 ENCOUNTER — TELEPHONE (OUTPATIENT)
Dept: EMERGENCY MEDICINE | Facility: HOSPITAL | Age: 52
End: 2022-06-25
Payer: MEDICAID

## 2022-07-21 ENCOUNTER — OFFICE VISIT (OUTPATIENT)
Dept: FAMILY MEDICINE | Facility: CLINIC | Age: 52
End: 2022-07-21
Payer: MEDICAID

## 2022-07-21 VITALS
BODY MASS INDEX: 37.32 KG/M2 | DIASTOLIC BLOOD PRESSURE: 85 MMHG | HEIGHT: 57 IN | HEART RATE: 94 BPM | WEIGHT: 173 LBS | SYSTOLIC BLOOD PRESSURE: 137 MMHG

## 2022-07-21 DIAGNOSIS — Z13.1 SCREENING FOR DIABETES MELLITUS: ICD-10-CM

## 2022-07-21 DIAGNOSIS — Z00.00 WELL ADULT EXAM: Primary | ICD-10-CM

## 2022-07-21 DIAGNOSIS — Z13.220 SCREENING FOR LIPOID DISORDERS: ICD-10-CM

## 2022-07-21 LAB
CHOLEST SERPL-MCNC: 199 MG/DL (ref 0–200)
CHOLEST/HDLC SERPL: 4.5 {RATIO}
EST. AVERAGE GLUCOSE BLD GHB EST-MCNC: 117 MG/DL
HBA1C MFR BLD HPLC: 6.1 % (ref 4.5–6.6)
HDLC SERPL-MCNC: 44 MG/DL (ref 40–60)
LDLC SERPL CALC-MCNC: 110 MG/DL
LDLC/HDLC SERPL: 2.5 {RATIO}
NONHDLC SERPL-MCNC: 155 MG/DL
TRIGL SERPL-MCNC: 226 MG/DL (ref 35–150)
VLDLC SERPL-MCNC: 45 MG/DL

## 2022-07-21 PROCEDURE — 3008F BODY MASS INDEX DOCD: CPT | Mod: CPTII,,, | Performed by: FAMILY MEDICINE

## 2022-07-21 PROCEDURE — 99396 PR PREVENTIVE VISIT,EST,40-64: ICD-10-PCS | Mod: ,,, | Performed by: FAMILY MEDICINE

## 2022-07-21 PROCEDURE — 83036 HEMOGLOBIN A1C: ICD-10-PCS | Mod: ,,, | Performed by: CLINICAL MEDICAL LABORATORY

## 2022-07-21 PROCEDURE — 4010F ACE/ARB THERAPY RXD/TAKEN: CPT | Mod: CPTII,,, | Performed by: FAMILY MEDICINE

## 2022-07-21 PROCEDURE — 3075F SYST BP GE 130 - 139MM HG: CPT | Mod: CPTII,,, | Performed by: FAMILY MEDICINE

## 2022-07-21 PROCEDURE — 1159F PR MEDICATION LIST DOCUMENTED IN MEDICAL RECORD: ICD-10-PCS | Mod: CPTII,,, | Performed by: FAMILY MEDICINE

## 2022-07-21 PROCEDURE — 80061 LIPID PANEL: ICD-10-PCS | Mod: ,,, | Performed by: CLINICAL MEDICAL LABORATORY

## 2022-07-21 PROCEDURE — 3079F PR MOST RECENT DIASTOLIC BLOOD PRESSURE 80-89 MM HG: ICD-10-PCS | Mod: CPTII,,, | Performed by: FAMILY MEDICINE

## 2022-07-21 PROCEDURE — 3075F PR MOST RECENT SYSTOLIC BLOOD PRESS GE 130-139MM HG: ICD-10-PCS | Mod: CPTII,,, | Performed by: FAMILY MEDICINE

## 2022-07-21 PROCEDURE — 83036 HEMOGLOBIN GLYCOSYLATED A1C: CPT | Mod: ,,, | Performed by: CLINICAL MEDICAL LABORATORY

## 2022-07-21 PROCEDURE — 3008F PR BODY MASS INDEX (BMI) DOCUMENTED: ICD-10-PCS | Mod: CPTII,,, | Performed by: FAMILY MEDICINE

## 2022-07-21 PROCEDURE — 80061 LIPID PANEL: CPT | Mod: ,,, | Performed by: CLINICAL MEDICAL LABORATORY

## 2022-07-21 PROCEDURE — 1160F PR REVIEW ALL MEDS BY PRESCRIBER/CLIN PHARMACIST DOCUMENTED: ICD-10-PCS | Mod: CPTII,,, | Performed by: FAMILY MEDICINE

## 2022-07-21 PROCEDURE — 3079F DIAST BP 80-89 MM HG: CPT | Mod: CPTII,,, | Performed by: FAMILY MEDICINE

## 2022-07-21 PROCEDURE — 1160F RVW MEDS BY RX/DR IN RCRD: CPT | Mod: CPTII,,, | Performed by: FAMILY MEDICINE

## 2022-07-21 PROCEDURE — 99396 PREV VISIT EST AGE 40-64: CPT | Mod: ,,, | Performed by: FAMILY MEDICINE

## 2022-07-21 PROCEDURE — 4010F PR ACE/ARB THEARPY RXD/TAKEN: ICD-10-PCS | Mod: CPTII,,, | Performed by: FAMILY MEDICINE

## 2022-07-21 PROCEDURE — 1159F MED LIST DOCD IN RCRD: CPT | Mod: CPTII,,, | Performed by: FAMILY MEDICINE

## 2022-07-21 NOTE — PROGRESS NOTES
Elkin Coombs MD   [facility]  MEDICAL GROUP [facility] FAMILY [address] [address]  [phone]      PATIENT NAME: Hayley Landis  : 1970  DATE: 22  MRN: [id number]      Billing Provider: Elkin Coombs MD  Level of Service:   Patient PCP Information       Provider PCP Type    Elkin Coombs MD General            Reason for Visit / Chief Complaint: Other ([facility] Wellness Visit)       Update PCP  Update Chief Complaint         History of Present Illness / Problem Focused Workflow     Hayley Landis presents to the clinic with Other ([facility])       52 yo WF here for wellness visit.  She does not smoke, drink alcohol or use illicit drugs.  Has h/o colon cancer.  Is followed by oncology and GI.  Is UTD on colonoscopy ().  Has had total hysterectomy ().   Is not UTD on mammogram (2021).  Says that she has had to cancel twice in the past few months but plans to get done in September.  Is due for A1C and lipid panel.        Review of Systems     Review of Systems   Constitutional: Negative for activity change, chills and fever.   HENT: Negative for sore throat.    Eyes: Negative for pain.   Respiratory: Negative for cough, chest tightness and shortness of breath.    Cardiovascular: Negative for chest pain and palpitations.   Neurological: Negative for dizziness, syncope and weakness.        Medical / Social / Family History     Past Medical History:   Diagnosis Date    Colon cancer     CVA (cerebral vascular accident)     Diabetes     HTN (hypertension)     Seizure        Past Surgical History:   Procedure Laterality Date    COLON SURGERY      HERNIA REPAIR         Social History    reports that she has never smoked. She has never used smokeless tobacco. She reports that she does not drink alcohol and does not use drugs.   Social History     Tobacco Use    Smoking status: Never Smoker     "Smokeless tobacco: Never Used   Substance Use Topics    Alcohol use: Never    Drug use: Never       Family History  Family History   Problem Relation Age of Onset    No Known Problems Mother     No Known Problems Father     No Known Problems Sister     No Known Problems Brother     No Known Problems Daughter     No Known Problems Son     No Known Problems Maternal Aunt     No Known Problems Maternal Uncle     No Known Problems Paternal Aunt     No Known Problems Paternal Uncle     No Known Problems Maternal Grandmother     No Known Problems Maternal Grandfather     No Known Problems Paternal Grandmother     No Known Problems Paternal Grandfather        Medications and Allergies     Medications  No outpatient medications have been marked as taking for the 7/21/22 encounter (Office Visit) with Elkin Coombs MD.       Allergies  Review of patient's allergies indicates:   Allergen Reactions    Grape Anaphylaxis    Opioids - morphine analogues Anaphylaxis    Pineapple Anaphylaxis    Aspirin      "Veins breakdown" per patient    Dicyclomine Diarrhea    Gabapentin     Hydrocodone      Headache      Iron analogues      Pt hx of hemachromatosis    Metformin      diarrhea    Penicillins     Latex Rash       Physical Examination     Vitals:    07/21/22 0956   BP: 137/85   Pulse: 94     Physical Exam  Vitals reviewed.   Constitutional:       Appearance: Normal appearance.   HENT:      Head: Normocephalic and atraumatic.   Eyes:      Extraocular Movements: Extraocular movements intact.      Conjunctiva/sclera: Conjunctivae normal.      Pupils: Pupils are equal, round, and reactive to light.   Cardiovascular:      Rate and Rhythm: Normal rate and regular rhythm.      Heart sounds: Normal heart sounds.   Pulmonary:      Effort: Pulmonary effort is normal.      Breath sounds: Normal breath sounds.   Musculoskeletal:         General: Normal range of motion.      Cervical back: Normal range of motion. "   Skin:     General: Skin is warm and dry.   Neurological:      General: No focal deficit present.      Mental Status: She is alert and oriented to person, place, and time.   Psychiatric:         Mood and Affect: Mood normal.         Behavior: Behavior normal.      Mammo Digital Screening Bilat  Order: [id number]   Status: Final result     Visible to patient: Yes (not seen)     Next appt: None     Dx: Screening for breast cancer     0 Result Notes     1  Topic    Assessment    Overall   1 - Negative     Mammography Recommendations     Side Due   Routine Screening Mammogram in 1 year  3/23/2022       Breast Density     Overall   Breast Composition a - Almost entirely fatty     Details    Reading Physician Reading Date Result Priority   Lonnie Nino MD  [phone] 3/23/2021 Routine     Physician Responsible for MQSA Outcome Reason    Lonnie Nino MD Signed      Narrative & Impression     Result:  Mammo Digital Screening Bilat     History:  Patient is 50 y.o. and is seen for a screening mammogram.     Films Compared:  Prior images (if available) were compared.     Findings:   Computer-aided detection was utilized in the interpretation of this examination.     The breasts are almost entirely fatty. There is no evidence of suspicious masses, microcalcifications or architectural distortion.     Impression:  No mammographic evidence of malignancy.     BI-RADS Category 1: Negative     Recommendation:  Routine screening mammogram in 1 year is recommended.     Your estimated lifetime risk of breast cancer (to age 85) based on Tyrer-Cuzick risk assessment model is Tyrer-Cuzick: 12.75 %. According to the American Cancer Society, patients with a lifetime breast cancer risk of 20% or higher might benefit from supplemental screening tests.                        Exam Ended: 03/23/21 08:12 Last Resulted: 03/23/21 08:45               Assessment and Plan (including Health Maintenance)      Problem List  [employer]   Document Outside HM   :    Plan:         Health Maintenance Due   Topic Date Due    Eye Exam  Never done    Low Dose Statin  Never done    Mammogram  03/23/2022    Hemoglobin A1c  04/28/2022    COVID-19 Vaccine (4 - Booster for Moderna series) 06/15/2022    Lipid Panel  07/15/2022       Problem List Items Addressed This Visit    None     Visit Diagnoses     Well adult exam    -  Primary    Screening for diabetes mellitus        Relevant Orders    Hemoglobin A1C    Screening for lipoid disorders        Relevant Orders    Lipid Panel          Health Maintenance Topics with due status: Not Due       Topic Last Completion Date    Colorectal Cancer Screening 06/08/2020    Diabetes Urine Screening 12/15/2021    Foot Exam 12/23/2021       No future appointments.         Signature:  MD RADHA Doyle [facility]  [facility] [place] [facility] - [facility]IC[facility]  [address] [place] [id number]  [phone]    Date of encounter: 7/21/22

## 2022-07-24 ENCOUNTER — HOSPITAL ENCOUNTER (EMERGENCY)
Facility: HOSPITAL | Age: 52
Discharge: HOME | End: 2022-07-24
Payer: MEDICAID

## 2022-07-24 VITALS
HEIGHT: 57 IN | HEART RATE: 87 BPM | SYSTOLIC BLOOD PRESSURE: 147 MMHG | DIASTOLIC BLOOD PRESSURE: 94 MMHG | OXYGEN SATURATION: 97 % | BODY MASS INDEX: 37.32 KG/M2 | WEIGHT: 173 LBS | RESPIRATION RATE: 18 BRPM | TEMPERATURE: 98 F

## 2022-07-24 DIAGNOSIS — G51.0 BELL'S PALSY: Primary | ICD-10-CM

## 2022-07-24 LAB
ALBUMIN SERPL BCP-MCNC: 3.5 G/DL (ref 3.5–5)
ALBUMIN/GLOB SERPL: 1 {RATIO}
ALP SERPL-CCNC: 108 U/L (ref 41–108)
ALT SERPL W P-5'-P-CCNC: 51 U/L (ref 13–56)
ANION GAP SERPL CALCULATED.3IONS-SCNC: 13 MMOL/L (ref 7–16)
AST SERPL W P-5'-P-CCNC: 21 U/L (ref 15–37)
BILIRUB SERPL-MCNC: 0.5 MG/DL (ref 0–1.2)
BUN SERPL-MCNC: 15 MG/DL (ref 7–18)
BUN/CREAT SERPL: 19 (ref 6–20)
CALCIUM SERPL-MCNC: 8.4 MG/DL (ref 8.5–10.1)
CHLORIDE SERPL-SCNC: 102 MMOL/L (ref 98–107)
CO2 SERPL-SCNC: 29 MMOL/L (ref 21–32)
CREAT SERPL-MCNC: 0.8 MG/DL (ref 0.55–1.02)
ERYTHROCYTE [DISTWIDTH] IN BLOOD BY AUTOMATED COUNT: 15.4 % (ref 11.5–14.5)
GLOBULIN SER-MCNC: 3.5 G/DL (ref 2–4)
GLUCOSE SERPL-MCNC: 203 MG/DL (ref 74–106)
HCT VFR BLD AUTO: 41.7 % (ref 38–47)
HGB BLD-MCNC: 13.4 G/DL (ref 12–16)
LYMPHOCYTES NFR BLD MANUAL: 23 % (ref 27–41)
MCH RBC QN AUTO: 27.5 PG (ref 27–31)
MCHC RBC AUTO-ENTMCNC: 32.1 G/DL (ref 32–36)
MCV RBC AUTO: 85.5 FL (ref 80–96)
MONOCYTES NFR BLD MANUAL: 7 % (ref 2–6)
MPC BLD CALC-MCNC: 13.1 FL (ref 9.4–12.4)
NEUTS BAND NFR BLD MANUAL: 3 % (ref 1–5)
NEUTS SEG NFR BLD MANUAL: 67 % (ref 50–62)
NRBC BLD MANUAL-RTO: ABNORMAL %
PLATELET # BLD AUTO: 147 K/UL (ref 150–400)
PLATELET MORPHOLOGY: NORMAL
POTASSIUM SERPL-SCNC: 3.8 MMOL/L (ref 3.5–5.1)
PROT SERPL-MCNC: 7 G/DL (ref 6.4–8.2)
RBC # BLD AUTO: 4.88 M/UL (ref 4.2–5.4)
RBC MORPH BLD: NORMAL
SARS-COV+SARS-COV-2 AG RESP QL IA.RAPID: NEGATIVE
SODIUM SERPL-SCNC: 140 MMOL/L (ref 136–145)
WBC # BLD AUTO: 8.4 K/UL (ref 4.5–11)

## 2022-07-24 PROCEDURE — 82435 ASSAY OF BLOOD CHLORIDE: CPT | Performed by: NURSE PRACTITIONER

## 2022-07-24 PROCEDURE — 96374 THER/PROPH/DIAG INJ IV PUSH: CPT

## 2022-07-24 PROCEDURE — 87426 SARSCOV CORONAVIRUS AG IA: CPT | Performed by: NURSE PRACTITIONER

## 2022-07-24 PROCEDURE — 36415 COLL VENOUS BLD VENIPUNCTURE: CPT | Performed by: NURSE PRACTITIONER

## 2022-07-24 PROCEDURE — 99285 EMERGENCY DEPT VISIT HI MDM: CPT | Mod: 25

## 2022-07-24 PROCEDURE — 63600175 PHARM REV CODE 636 W HCPCS: Performed by: NURSE PRACTITIONER

## 2022-07-24 PROCEDURE — 99284 EMERGENCY DEPT VISIT MOD MDM: CPT | Mod: ,,, | Performed by: NURSE PRACTITIONER

## 2022-07-24 PROCEDURE — 99284 PR EMERGENCY DEPT VISIT,LEVEL IV: ICD-10-PCS | Mod: ,,, | Performed by: NURSE PRACTITIONER

## 2022-07-24 PROCEDURE — 85025 COMPLETE CBC W/AUTO DIFF WBC: CPT | Performed by: NURSE PRACTITIONER

## 2022-07-24 PROCEDURE — 80053 COMPREHEN METABOLIC PANEL: CPT | Performed by: NURSE PRACTITIONER

## 2022-07-24 RX ORDER — DEXAMETHASONE SODIUM PHOSPHATE 4 MG/ML
4 INJECTION, SOLUTION INTRA-ARTICULAR; INTRALESIONAL; INTRAMUSCULAR; INTRAVENOUS; SOFT TISSUE
Status: COMPLETED | OUTPATIENT
Start: 2022-07-24 | End: 2022-07-24

## 2022-07-24 RX ORDER — PREDNISONE 20 MG/1
60 TABLET ORAL DAILY
Qty: 10 TABLET | Refills: 0 | Status: SHIPPED | OUTPATIENT
Start: 2022-07-24 | End: 2022-07-31

## 2022-07-24 RX ADMIN — DEXAMETHASONE SODIUM PHOSPHATE 4 MG: 4 INJECTION, SOLUTION INTRAMUSCULAR; INTRAVENOUS at 03:07

## 2022-07-24 NOTE — ED PROVIDER NOTES
"Encounter Date: 7/24/2022       History     Chief Complaint   Patient presents with    Facial Droop     Presented with c/o left sided facial paralysis with drooping left eye and fullness in left ear that started on 7/22/22 and noticed slurred speech this am upon awakening. Denies focal weakness, dizziness or loss of balance, dysphagia, or headache. States has had previous CVA and has also had Hidalgo palsy multiple.        Review of patient's allergies indicates:   Allergen Reactions    Grape Anaphylaxis    Opioids - morphine analogues Anaphylaxis    Pineapple Anaphylaxis    Aspirin      "Veins breakdown" per patient    Dicyclomine Diarrhea    Gabapentin     Hydrocodone      Headache      Iron analogues      Pt hx of hemachromatosis    Metformin      diarrhea    Penicillins     Latex Rash     Past Medical History:   Diagnosis Date    Colon cancer     CVA (cerebral vascular accident)     Diabetes     HTN (hypertension)     Seizure      Past Surgical History:   Procedure Laterality Date    COLON SURGERY      HERNIA REPAIR       Family History   Problem Relation Age of Onset    No Known Problems Mother     No Known Problems Father     No Known Problems Sister     No Known Problems Brother     No Known Problems Daughter     No Known Problems Son     No Known Problems Maternal Aunt     No Known Problems Maternal Uncle     No Known Problems Paternal Aunt     No Known Problems Paternal Uncle     No Known Problems Maternal Grandmother     No Known Problems Maternal Grandfather     No Known Problems Paternal Grandmother     No Known Problems Paternal Grandfather      Social History     Tobacco Use    Smoking status: Never Smoker    Smokeless tobacco: Never Used   Substance Use Topics    Alcohol use: Never    Drug use: Never     Review of Systems   Constitutional: Negative.  Negative for chills and fever.   HENT:        Left sided facial paralysis   Eyes: Positive for discharge (left eye) " and redness (left eye).   Respiratory: Negative for cough, shortness of breath, wheezing and stridor.    Cardiovascular: Negative for chest pain and leg swelling.   Gastrointestinal: Negative for abdominal pain, constipation, diarrhea, nausea and vomiting.   Genitourinary: Negative for decreased urine volume, dysuria, flank pain and frequency.   Musculoskeletal: Negative.    Skin: Negative.    Neurological: Positive for facial asymmetry and speech difficulty. Negative for dizziness, tremors, syncope, weakness, light-headedness and numbness.   Psychiatric/Behavioral: Negative.        Physical Exam     Initial Vitals [07/24/22 1407]   BP Pulse Resp Temp SpO2   (!) 164/108 91 18 98 °F (36.7 °C) 96 %      MAP       --         Physical Exam    Nursing note and vitals reviewed.  Constitutional: She appears well-developed and well-nourished. No distress.   HENT:   Head:       Right Ear: External ear normal.   Left Ear: External ear normal.   Nose: Nose normal.   Mouth/Throat: Oropharynx is clear and moist.   Eyes: Conjunctivae and EOM are normal. Pupils are equal, round, and reactive to light.   Erythema and tearing left eye   Neck: Neck supple.   Normal range of motion.  Cardiovascular: Normal rate, regular rhythm, normal heart sounds and intact distal pulses.   Pulmonary/Chest: Breath sounds normal.   Abdominal: Abdomen is soft. Bowel sounds are normal.   Musculoskeletal:         General: Normal range of motion.      Cervical back: Normal range of motion and neck supple.     Neurological: She is alert and oriented to person, place, and time. She has normal strength. A cranial nerve deficit is present. She displays a negative Romberg sign. GCS score is 15. GCS eye subscore is 4. GCS verbal subscore is 5. GCS motor subscore is 6.   Unable to wrinkle left forehead or raise brow. Left facial paralysis. No dysphagia.   Skin: Skin is warm and dry. Capillary refill takes less than 2 seconds.   Psychiatric: She has a normal mood  and affect. Her behavior is normal. Judgment and thought content normal.         Medical Screening Exam   See Full Note    ED Course   Procedures  Labs Reviewed   COMPREHENSIVE METABOLIC PANEL - Abnormal; Notable for the following components:       Result Value    Glucose 203 (*)     Calcium 8.4 (*)     All other components within normal limits   CBC WITH DIFFERENTIAL - Abnormal; Notable for the following components:    RDW 15.4 (*)     Platelet Count 147 (*)     MPV 13.1 (*)     All other components within normal limits   MANUAL DIFFERENTIAL - Abnormal; Notable for the following components:    Segmented Neutrophils, Man % 67 (*)     Lymphocytes, Man % 23 (*)     Monocytes, Man % 7 (*)     All other components within normal limits   SARS ANTIGEN(EVELINE) - Normal    Narrative:     Negative SARS-CoV results should not be used as the sole basis for treatment or patient management decisions; negative results should be considered in the context of a patient's recent exposures, history and the presene of clinical signs and symptoms consistent with COVID-19.  Negative results should be treated as presumptive and confirmed by molecular assay, if necessary for patient management.   CBC W/ AUTO DIFFERENTIAL    Narrative:     The following orders were created for panel order CBC Auto Differential.  Procedure                               Abnormality         Status                     ---------                               -----------         ------                     CBC with Differential[[id number]]        Abnormal            Final result               Manual Differential[[id number]]          Abnormal            Final result                 Please view results for these tests on the individual orders.          Imaging Results          CT Head Without Contrast (Final result)  Result time 07/24/22 14:45:42    Final result by Calderon Harden II, MD (07/24/22 14:45:42)                 Impression:      No evidence of abnormality  demonstrated.      Electronically signed by: Calderon Harden  Date:    07/24/2022  Time:    14:45             Narrative:    EXAMINATION:  CT HEAD WITHOUT CONTRAST    CLINICAL HISTORY:  left facial paralysis;    TECHNIQUE:  Axial CT imaging of the brain is performed without contrast with 3 mm increments.    CT dose reduction technique used - Dose Rite and tube current modulation.    COMPARISON:  24 June 2022    FINDINGS:  No evidence of hemorrhage, mass, mass effect, midline shift or acute infarct seen.  The brain parenchyma attenuation and differentiation appears within normal limits. The ventricles and cisterns are normal in caliber.  No cranial or skull base abnormality is identified.                              X-Rays:   Independently Interpreted Readings:   Head CT: No acute findings.     Medications   dexamethasone injection 4 mg (4 mg Intravenous Given 7/24/22 8877)     Medical Decision Making:   Clinical Tests:   Lab Tests: Ordered and Reviewed  The following lab test(s) were unremarkable: CBC and CMP  Radiological Study: Ordered and Reviewed  ED Management:  CT head negative. Discussed pt's case with Dr. Hansen, [facility] ED. He agreed that symptoms are indicative of Monroe Palsy. Recommended 7-day cour of prednisone and follow-up with PCP.    Instructed pt on prednisone use, to check glucose QID, and follow-up with PCP.                   Clinical Impression:   Final diagnoses:  [G51.0] Bell's palsy (Primary)          ED Disposition Condition    Discharge Stable        ED Prescriptions     Medication Sig Dispense Start Date End Date Auth. Provider    predniSONE (DELTASONE) 20 MG tablet Take 3 tablets (60 mg total) by mouth once daily. for 7 days 10 tablet 7/24/2022 7/31/2022 Yojana Madera NP    dextran 70-hypromellose (TEARS) ophthalmic solution Place 1 drop into the left eye every 4 (four) hours. Use at least every 4 hours and more frequently as needed. Use at bedtime and apply eye patch. 30 mL 7/24/2022  Yojana  ROSARIO Madera        Follow-up Information     Follow up With Specialties Details Why Contact Info    Elkin Coombs MD Family Medicine In 3 days  [address] [facility]  [facility] [place]  [place] [address]  [phone]             Yojana Madera NP  07/24/22 5095

## 2022-07-24 NOTE — DISCHARGE INSTRUCTIONS
Take prednisone as prescribed. Drink plenty of water. Check your glucose 4 times per day and record. Follow-up with your PCP in 2-3 days for recheck. Return to the ED if needed for signs of worsening or new symptoms.

## 2022-07-24 NOTE — ED TRIAGE NOTES
Pt presents with c/o left side facial droop that began two days ago. Pt has a history of Bell's Pals ey that effects the left side of her face.  Pt has difficulty closing left eye and left ear pain.

## 2022-07-25 ENCOUNTER — TELEPHONE (OUTPATIENT)
Dept: EMERGENCY MEDICINE | Facility: HOSPITAL | Age: 52
End: 2022-07-25
Payer: MEDICAID

## 2022-08-02 ENCOUNTER — OFFICE VISIT (OUTPATIENT)
Dept: FAMILY MEDICINE | Facility: CLINIC | Age: 52
End: 2022-08-02
Payer: MEDICAID

## 2022-08-02 VITALS — DIASTOLIC BLOOD PRESSURE: 96 MMHG | SYSTOLIC BLOOD PRESSURE: 152 MMHG | HEART RATE: 96 BPM

## 2022-08-02 DIAGNOSIS — G51.0 BELL'S PALSY: Primary | ICD-10-CM

## 2022-08-02 DIAGNOSIS — E11.9 TYPE 2 DIABETES MELLITUS WITHOUT COMPLICATION, WITH LONG-TERM CURRENT USE OF INSULIN: Chronic | ICD-10-CM

## 2022-08-02 DIAGNOSIS — I10 PRIMARY HYPERTENSION: Chronic | ICD-10-CM

## 2022-08-02 DIAGNOSIS — Z79.4 TYPE 2 DIABETES MELLITUS WITHOUT COMPLICATION, WITH LONG-TERM CURRENT USE OF INSULIN: Chronic | ICD-10-CM

## 2022-08-02 PROCEDURE — 3077F PR MOST RECENT SYSTOLIC BLOOD PRESSURE >= 140 MM HG: ICD-10-PCS | Mod: CPTII,,, | Performed by: FAMILY MEDICINE

## 2022-08-02 PROCEDURE — 1159F PR MEDICATION LIST DOCUMENTED IN MEDICAL RECORD: ICD-10-PCS | Mod: CPTII,,, | Performed by: FAMILY MEDICINE

## 2022-08-02 PROCEDURE — 3080F PR MOST RECENT DIASTOLIC BLOOD PRESSURE >= 90 MM HG: ICD-10-PCS | Mod: CPTII,,, | Performed by: FAMILY MEDICINE

## 2022-08-02 PROCEDURE — 99214 PR OFFICE/OUTPT VISIT, EST, LEVL IV, 30-39 MIN: ICD-10-PCS | Mod: ,,, | Performed by: FAMILY MEDICINE

## 2022-08-02 PROCEDURE — 4010F ACE/ARB THERAPY RXD/TAKEN: CPT | Mod: CPTII,,, | Performed by: FAMILY MEDICINE

## 2022-08-02 PROCEDURE — 3044F HG A1C LEVEL LT 7.0%: CPT | Mod: CPTII,,, | Performed by: FAMILY MEDICINE

## 2022-08-02 PROCEDURE — 99214 OFFICE O/P EST MOD 30 MIN: CPT | Mod: ,,, | Performed by: FAMILY MEDICINE

## 2022-08-02 PROCEDURE — 3080F DIAST BP >= 90 MM HG: CPT | Mod: CPTII,,, | Performed by: FAMILY MEDICINE

## 2022-08-02 PROCEDURE — 3077F SYST BP >= 140 MM HG: CPT | Mod: CPTII,,, | Performed by: FAMILY MEDICINE

## 2022-08-02 PROCEDURE — 1160F RVW MEDS BY RX/DR IN RCRD: CPT | Mod: CPTII,,, | Performed by: FAMILY MEDICINE

## 2022-08-02 PROCEDURE — 4010F PR ACE/ARB THEARPY RXD/TAKEN: ICD-10-PCS | Mod: CPTII,,, | Performed by: FAMILY MEDICINE

## 2022-08-02 PROCEDURE — 3044F PR MOST RECENT HEMOGLOBIN A1C LEVEL <7.0%: ICD-10-PCS | Mod: CPTII,,, | Performed by: FAMILY MEDICINE

## 2022-08-02 PROCEDURE — 1159F MED LIST DOCD IN RCRD: CPT | Mod: CPTII,,, | Performed by: FAMILY MEDICINE

## 2022-08-02 PROCEDURE — 1160F PR REVIEW ALL MEDS BY PRESCRIBER/CLIN PHARMACIST DOCUMENTED: ICD-10-PCS | Mod: CPTII,,, | Performed by: FAMILY MEDICINE

## 2022-08-02 RX ORDER — ACYCLOVIR 400 MG/1
400 TABLET ORAL
Qty: 35 TABLET | Refills: 0 | Status: SHIPPED | OUTPATIENT
Start: 2022-08-02 | End: 2023-07-24

## 2022-08-02 RX ORDER — PREDNISONE 10 MG/1
TABLET ORAL
Qty: 9 TABLET | Refills: 0 | Status: SHIPPED | OUTPATIENT
Start: 2022-08-02 | End: 2022-11-01 | Stop reason: ALTCHOICE

## 2022-08-02 NOTE — PROGRESS NOTES
Elkin Coombs MD   [facility]  MEDICAL GROUP [address] [address]  [phone]      PATIENT NAME: Hayley Landis  : 1970  DATE: 22  MRN: [id number]      Billing Provider: Elkin Coombs MD  Level of Service:   Patient PCP Information       Provider PCP Type    Elkin Coombs MD General            Reason for Visit / Chief Complaint: Other (Follow-up Bell Palsy)       Update PCP  Update Chief Complaint         History of Present Illness / Problem Focused Workflow     Hayley Landis presents to the clinic with Other (Follow-up Leroy Palsy)       Was diagnosed with Bell's Palsy on  at ED.  I have reviewed that encounter note, lab results and imaging report.  Says that this is the 4th time she has had Bell's Palsy, with the last being in .  Just completed course of steroids. Has not had antiviral medication.      Review of Systems     Review of Systems   HENT: Positive for ear pain and trouble swallowing (pt states this symptom has improved).    Eyes: Positive for pain and eye dryness.        Medical / Social / Family History     Past Medical History:   Diagnosis Date    Colon cancer     CVA (cerebral vascular accident)     Diabetes     HTN (hypertension)     Seizure        Past Surgical History:   Procedure Laterality Date    COLON SURGERY      HERNIA REPAIR         Social History    reports that she has never smoked. She has never used smokeless tobacco. She reports that she does not drink alcohol and does not use drugs.   Social History     Tobacco Use    Smoking status: Never Smoker    Smokeless tobacco: Never Used   Substance Use Topics    Alcohol use: Never    Drug use: Never       Family History  Family History   Problem Relation Age of Onset    No Known Problems Mother     No Known Problems Father     No Known Problems Sister     No Known Problems Brother     No Known Problems Daughter   "   No Known Problems Son     No Known Problems Maternal Aunt     No Known Problems Maternal Uncle     No Known Problems Paternal Aunt     No Known Problems Paternal Uncle     No Known Problems Maternal Grandmother     No Known Problems Maternal Grandfather     No Known Problems Paternal Grandmother     No Known Problems Paternal Grandfather        Medications and Allergies     Medications  No outpatient medications have been marked as taking for the 8/2/22 encounter (Office Visit) with Elkin Coombs MD.       Allergies  Review of patient's allergies indicates:   Allergen Reactions    Grape Anaphylaxis    Opioids - morphine analogues Anaphylaxis    Pineapple Anaphylaxis    Aspirin      "Veins breakdown" per patient    Dicyclomine Diarrhea    Gabapentin     Hydrocodone      Headache      Iron analogues      Pt hx of hemachromatosis    Metformin      diarrhea    Penicillins     Latex Rash       Physical Examination     Vitals:    08/02/22 1107   BP: (!) 152/96   Pulse: 96     Physical Exam  Vitals reviewed.   Constitutional:       Appearance: Normal appearance.   HENT:      Head: Normocephalic and atraumatic.   Eyes:      General:         Right eye: No discharge.         Left eye: No discharge.      Conjunctiva/sclera: Conjunctivae normal.      Pupils: Pupils are equal, round, and reactive to light.      Comments: Cannot close left eye   Cardiovascular:      Rate and Rhythm: Normal rate and regular rhythm.      Heart sounds: Normal heart sounds.   Pulmonary:      Effort: Pulmonary effort is normal.      Breath sounds: Normal breath sounds.   Musculoskeletal:         General: Normal range of motion.      Cervical back: Normal range of motion.   Skin:     General: Skin is warm and dry.   Neurological:      Mental Status: She is alert and oriented to person, place, and time.      Gait: Gait normal.      Comments: Left sided facial droop   Psychiatric:         Mood and Affect: Mood normal.         " Behavior: Behavior normal.        No visits with results within 1 Week(s) from this visit.   Latest known visit with results is:   Admission on 07/24/2022, Discharged on 07/24/2022   Component Date Value Ref Range Status    Sodium 07/24/2022 140  136 - 145 mmol/L Final    Potassium 07/24/2022 3.8  3.5 - 5.1 mmol/L Final    Chloride 07/24/2022 102  98 - 107 mmol/L Final    CO2 07/24/2022 29  21 - 32 mmol/L Final    Anion Gap 07/24/2022 13  7 - 16 mmol/L Final    Glucose 07/24/2022 203 (A) 74 - 106 mg/dL Final    BUN 07/24/2022 15  7 - 18 mg/dL Final    Creatinine 07/24/2022 0.80  0.55 - 1.02 mg/dL Final    BUN/Creatinine Ratio 07/24/2022 19  6 - 20 Final    Calcium 07/24/2022 8.4 (A) 8.5 - 10.1 mg/dL Final    Total Protein 07/24/2022 7.0  6.4 - 8.2 g/dL Final    Albumin 07/24/2022 3.5  3.5 - 5.0 g/dL Final    Globulin 07/24/2022 3.5  2.0 - 4.0 g/dL Final    A/G Ratio 07/24/2022 1.0   Final    Bilirubin, Total 07/24/2022 0.5  0.0 - 1.2 mg/dL Final    Alk Phos 07/24/2022 108  41 - 108 U/L Final    ALT 07/24/2022 51  13 - 56 U/L Final    AST 07/24/2022 21  15 - 37 U/L Final    eGFR 07/24/2022 81  >=60 mL/min/1.73m² Final    WBC 07/24/2022 8.40  4.50 - 11.00 K/uL Final    RBC 07/24/2022 4.88  4.20 - 5.40 M/uL Final    Hemoglobin 07/24/2022 13.4  12.0 - 16.0 g/dL Final    Hematocrit 07/24/2022 41.7  38.0 - 47.0 % Final    MCV 07/24/2022 85.5  80.0 - 96.0 fL Final    MCH 07/24/2022 27.5  27.0 - 31.0 pg Final    MCHC 07/24/2022 32.1  32.0 - 36.0 g/dL Final    RDW 07/24/2022 15.4 (A) 11.5 - 14.5 % Final    Platelet Count 07/24/2022 147 (A) 150 - 400 K/uL Final    MPV 07/24/2022 13.1 (A) 9.4 - 12.4 fL Final    COVID-19 Ag 07/24/2022 Negative  Negative, Invalid Final    Segmented Neutrophils, Man % 07/24/2022 67 (A) 50 - 62 % Final    Bands, Man % 07/24/2022 3  1 - 5 % Final    Lymphocytes, Man % 07/24/2022 23 (A) 27 - 41 % Final    Monocytes, Man % 07/24/2022 7 (A) 2 - 6 % Final    Platelet  Morphology 07/24/2022 Normal  Normal Final    RBC Morphology 07/24/2022 Normal   Final     CT Head Without Contrast  Narrative: EXAMINATION:  CT HEAD WITHOUT CONTRAST    CLINICAL HISTORY:  left facial paralysis;    TECHNIQUE:  Axial CT imaging of the brain is performed without contrast with 3 mm increments.    CT dose reduction technique used - Dose Rite and tube current modulation.    COMPARISON:  24 June 2022    FINDINGS:  No evidence of hemorrhage, mass, mass effect, midline shift or acute infarct seen.  The brain parenchyma attenuation and differentiation appears within normal limits. The ventricles and cisterns are normal in caliber.  No cranial or skull base abnormality is identified.  Impression: No evidence of abnormality demonstrated.    Electronically signed by: Calderon Harden  Date:    07/24/2022  Time:    14:45        Assessment and Plan (including Health Maintenance)      Problem List  [employer]  Document Outside HM   :    Plan: patient aware that steroids can affect glucose and HTN.  States that she probably will not take any additional steroids.  Gave pribnted rx in case she does decide to take taper off.  Says that she just completed 7 day course of 60 mg daily.          Health Maintenance Due   Topic Date Due    Eye Exam  Never done    Low Dose Statin  Never done    Mammogram  03/23/2022    COVID-19 Vaccine (4 - Booster for Moderna series) 06/15/2022       Problem List Items Addressed This Visit        Cardiac/Vascular    Primary hypertension (Chronic)       Endocrine    Type 2 diabetes mellitus without complication, with long-term current use of insulin (Chronic)      Other Visit Diagnoses     Bell's palsy    -  Primary    Relevant Medications    acyclovir (ZOVIRAX) 400 MG tablet    predniSONE (DELTASONE) 10 MG tablet          Health Maintenance Topics with due status: Not Due       Topic Last Completion Date    Colorectal Cancer Screening 06/08/2020    Diabetes Urine Screening 12/15/2021     Foot Exam 12/23/2021    Lipid Panel 07/21/2022    Hemoglobin A1c 07/21/2022       No future appointments.         Signature:  MD RADHA Doyle [facility]  MEDICAL GROUP [facility] - FAMILY MEDICINE  [address] [place] [address] [place] [address]  [phone]    Date of encounter: 8/2/22

## 2022-09-07 ENCOUNTER — OFFICE VISIT (OUTPATIENT)
Dept: FAMILY MEDICINE | Facility: CLINIC | Age: 52
End: 2022-09-07
Payer: MEDICAID

## 2022-09-07 VITALS
WEIGHT: 179 LBS | SYSTOLIC BLOOD PRESSURE: 130 MMHG | DIASTOLIC BLOOD PRESSURE: 66 MMHG | HEIGHT: 57 IN | BODY MASS INDEX: 38.62 KG/M2

## 2022-09-07 DIAGNOSIS — K58.0 IRRITABLE BOWEL SYNDROME WITH DIARRHEA: Chronic | ICD-10-CM

## 2022-09-07 DIAGNOSIS — E11.9 TYPE 2 DIABETES MELLITUS WITHOUT COMPLICATION, WITH LONG-TERM CURRENT USE OF INSULIN: Chronic | ICD-10-CM

## 2022-09-07 DIAGNOSIS — Z85.038 HISTORY OF COLON CANCER: Primary | ICD-10-CM

## 2022-09-07 DIAGNOSIS — Z79.4 TYPE 2 DIABETES MELLITUS WITHOUT COMPLICATION, WITH LONG-TERM CURRENT USE OF INSULIN: Chronic | ICD-10-CM

## 2022-09-07 PROCEDURE — 3008F BODY MASS INDEX DOCD: CPT | Mod: CPTII,,, | Performed by: FAMILY MEDICINE

## 2022-09-07 PROCEDURE — 1159F MED LIST DOCD IN RCRD: CPT | Mod: CPTII,,, | Performed by: FAMILY MEDICINE

## 2022-09-07 PROCEDURE — 99214 PR OFFICE/OUTPT VISIT, EST, LEVL IV, 30-39 MIN: ICD-10-PCS | Mod: ,,, | Performed by: FAMILY MEDICINE

## 2022-09-07 PROCEDURE — 3044F PR MOST RECENT HEMOGLOBIN A1C LEVEL <7.0%: ICD-10-PCS | Mod: CPTII,,, | Performed by: FAMILY MEDICINE

## 2022-09-07 PROCEDURE — 3044F HG A1C LEVEL LT 7.0%: CPT | Mod: CPTII,,, | Performed by: FAMILY MEDICINE

## 2022-09-07 PROCEDURE — 1160F RVW MEDS BY RX/DR IN RCRD: CPT | Mod: CPTII,,, | Performed by: FAMILY MEDICINE

## 2022-09-07 PROCEDURE — 1159F PR MEDICATION LIST DOCUMENTED IN MEDICAL RECORD: ICD-10-PCS | Mod: CPTII,,, | Performed by: FAMILY MEDICINE

## 2022-09-07 PROCEDURE — 3078F DIAST BP <80 MM HG: CPT | Mod: CPTII,,, | Performed by: FAMILY MEDICINE

## 2022-09-07 PROCEDURE — 4010F PR ACE/ARB THEARPY RXD/TAKEN: ICD-10-PCS | Mod: CPTII,,, | Performed by: FAMILY MEDICINE

## 2022-09-07 PROCEDURE — 99214 OFFICE O/P EST MOD 30 MIN: CPT | Mod: ,,, | Performed by: FAMILY MEDICINE

## 2022-09-07 PROCEDURE — 3075F SYST BP GE 130 - 139MM HG: CPT | Mod: CPTII,,, | Performed by: FAMILY MEDICINE

## 2022-09-07 PROCEDURE — 3078F PR MOST RECENT DIASTOLIC BLOOD PRESSURE < 80 MM HG: ICD-10-PCS | Mod: CPTII,,, | Performed by: FAMILY MEDICINE

## 2022-09-07 PROCEDURE — 1160F PR REVIEW ALL MEDS BY PRESCRIBER/CLIN PHARMACIST DOCUMENTED: ICD-10-PCS | Mod: CPTII,,, | Performed by: FAMILY MEDICINE

## 2022-09-07 PROCEDURE — 4010F ACE/ARB THERAPY RXD/TAKEN: CPT | Mod: CPTII,,, | Performed by: FAMILY MEDICINE

## 2022-09-07 PROCEDURE — 3008F PR BODY MASS INDEX (BMI) DOCUMENTED: ICD-10-PCS | Mod: CPTII,,, | Performed by: FAMILY MEDICINE

## 2022-09-07 PROCEDURE — 3075F PR MOST RECENT SYSTOLIC BLOOD PRESS GE 130-139MM HG: ICD-10-PCS | Mod: CPTII,,, | Performed by: FAMILY MEDICINE

## 2022-09-07 RX ORDER — INSULIN GLARGINE 100 [IU]/ML
70 INJECTION, SOLUTION SUBCUTANEOUS DAILY
Qty: 20 ML | Refills: 5 | Status: SHIPPED | OUTPATIENT
Start: 2022-09-07 | End: 2023-04-20 | Stop reason: SDUPTHER

## 2022-09-07 RX ORDER — SYRINGE-NEEDLE,INSULIN,0.5 ML 30 GX5/16"
SYRINGE, EMPTY DISPOSABLE MISCELLANEOUS
Qty: 100 EACH | Refills: 5 | Status: SHIPPED | OUTPATIENT
Start: 2022-09-07

## 2022-09-07 NOTE — PROGRESS NOTES
Elkin Coombs MD   [facility]  MEDICAL [facility] [address] [address]  [phone]      PATIENT NAME: Hayley Landis  : 1970  DATE: 22  MRN: [id number]      Billing Provider: Elkin Coombs MD  Level of Service:   Patient PCP Information       Provider PCP Type    Elkin Coombs MD General            Reason for Visit / Chief Complaint: Follow-up (Follow-up. Medication refills. )       Update PCP  Update Chief Complaint         History of Present Illness / Problem Focused Workflow     Hayley Landis presents to the clinic with Follow-up (Follow-up. Medication refills. )       52 yo WF with IBS with alternating constipation and diarrhea and h/o colon cancer.  Sees oncology at [facility] and has also been seeing GI there.  Due to difficulty travelling, as well as expenses, she would like to see a GI specialist closer to home.    Has diabetes and needs insulin and rx for syringes refilled.      Is due for mammogram but states that she will get scheduled and does not need referral.      Has had flare up of Bell's palsy x approx 6 weeks.  Is almost completely resolved but still having some mild eyelid sag.      Review of Systems     Review of Systems   Constitutional:  Negative for activity change, chills and fever.   HENT:  Negative for sore throat.    Eyes:  Negative for pain.   Respiratory:  Negative for cough, chest tightness and shortness of breath.    Cardiovascular:  Negative for chest pain and palpitations.   Gastrointestinal:  Positive for constipation and diarrhea. Negative for abdominal pain and vomiting.   Neurological:  Negative for dizziness, syncope and weakness.   Psychiatric/Behavioral:  Negative for confusion.       Medical / Social / Family History     Past Medical History:   Diagnosis Date    Colon cancer     CVA (cerebral vascular accident)     Diabetes     HTN (hypertension)     Seizure        Past Surgical  History:   Procedure Laterality Date    COLON SURGERY      HERNIA REPAIR         Social History    reports that she has never smoked. She has never used smokeless tobacco. She reports that she does not drink alcohol and does not use drugs.   Social History     Tobacco Use    Smoking status: Never    Smokeless tobacco: Never   Substance Use Topics    Alcohol use: Never    Drug use: Never       Family History  Family History   Problem Relation Age of Onset    No Known Problems Mother     No Known Problems Father     No Known Problems Sister     No Known Problems Brother     No Known Problems Daughter     No Known Problems Son     No Known Problems Maternal Aunt     No Known Problems Maternal Uncle     No Known Problems Paternal Aunt     No Known Problems Paternal Uncle     No Known Problems Maternal Grandmother     No Known Problems Maternal Grandfather     No Known Problems Paternal Grandmother     No Known Problems Paternal Grandfather        Medications and Allergies     Medications  Outpatient Medications Marked as Taking for the 9/7/22 encounter (Office Visit) with Elkin Coombs MD   Medication Sig Dispense Refill    acyclovir (ZOVIRAX) 400 MG tablet Take 1 tablet (400 mg total) by mouth 5 (five) times daily. for 7 days 35 tablet 0    dextran 70-hypromellose (TEARS) ophthalmic solution Place 1 drop into the left eye every 4 (four) hours. Use at least every 4 hours and more frequently as needed. Use at bedtime and apply eye patch. 30 mL 0    diclofenac sodium (VOLTAREN) 1 % Gel Apply 2 g topically 4 (four) times daily. 200 g 2    diphenoxylate-atropine 2.5-0.025 mg (LOMOTIL) 2.5-0.025 mg per tablet Take 1 tablet by mouth 2 (two) times daily as needed for Diarrhea. 20 tablet 0    ergocalciferol (ERGOCALCIFEROL) 50,000 unit Cap Take 1 capsule (50,000 Units total) by mouth every 7 days. 4 capsule 1    FIBER-TABS 625 mg tablet TAKE TWO TABLETS BY MOUTH EVERY  tablet 3    hydroCHLOROthiazide (HYDRODIURIL)  "12.5 MG Tab TAKE ONE TABLET BY MOUTH DAILY IN THE MORNING 90 tablet 3    hyoscyamine (LEVBID) 0.375 mg Tb12 Take 1 tablet by mouth 2 (two) times daily.      insulin aspart U-100 (NOVOLOG) 100 unit/mL injection inject as directed by your physician per sliding scale 10 mL 5    levETIRAcetam (KEPPRA) 500 MG Tab TAKE ONE TABLET BY MOUTH EVERY DAY 90 tablet 1    lisinopriL 10 MG tablet Take 1 tablet (10 mg total) by mouth once daily. 90 tablet 2    peppermint oiL (IBGARD) 90 mg CECX Take 2 capsules by mouth once daily. 48 each 2    potassium chloride SA (K-DUR,KLOR-CON) 20 MEQ tablet Take 1 tablet (20 mEq total) by mouth once daily. 30 tablet 1    predniSONE (DELTASONE) 10 MG tablet 2 tablets PO daily x 3 days and then one tablet PO daily x 3 days 9 tablet 0    [DISCONTINUED] insulin syringe-needle U-100 1 mL 30 gauge x 5/16 Syrg       [DISCONTINUED] LANTUS U-100 INSULIN 100 unit/mL injection Inject 70 Units into the skin once daily. 10 mL 5       Allergies  Review of patient's allergies indicates:   Allergen Reactions    Grape Anaphylaxis    Opioids - morphine analogues Anaphylaxis    Pineapple Anaphylaxis    Aspirin      "Veins breakdown" per patient    Dicyclomine Diarrhea    Gabapentin     Hydrocodone      Headache      Iron analogues      Pt hx of hemachromatosis    Metformin      diarrhea    Penicillins     Latex Rash       Physical Examination     Vitals:    09/07/22 0914   BP: 130/66     Physical Exam  Vitals reviewed.   Constitutional:       Appearance: Normal appearance.   HENT:      Head: Normocephalic and atraumatic.   Eyes:      Extraocular Movements: Extraocular movements intact.      Conjunctiva/sclera: Conjunctivae normal.      Pupils: Pupils are equal, round, and reactive to light.   Cardiovascular:      Rate and Rhythm: Normal rate and regular rhythm.      Heart sounds: Normal heart sounds.   Pulmonary:      Effort: Pulmonary effort is normal.      Breath sounds: Normal breath sounds.   Musculoskeletal: "         General: Normal range of motion.      Cervical back: Normal range of motion.   Skin:     General: Skin is warm and dry.   Neurological:      General: No focal deficit present.      Mental Status: She is alert and oriented to person, place, and time.   Psychiatric:         Mood and Affect: Mood normal.         Behavior: Behavior normal.      Admission on 07/24/2022, Discharged on 07/24/2022   Component Date Value Ref Range Status    Sodium 07/24/2022 140  136 - 145 mmol/L Final    Potassium 07/24/2022 3.8  3.5 - 5.1 mmol/L Final    Chloride 07/24/2022 102  98 - 107 mmol/L Final    CO2 07/24/2022 29  21 - 32 mmol/L Final    Anion Gap 07/24/2022 13  7 - 16 mmol/L Final    Glucose 07/24/2022 203 (H)  74 - 106 mg/dL Final    BUN 07/24/2022 15  7 - 18 mg/dL Final    Creatinine 07/24/2022 0.80  0.55 - 1.02 mg/dL Final    BUN/Creatinine Ratio 07/24/2022 19  6 - 20 Final    Calcium 07/24/2022 8.4 (L)  8.5 - 10.1 mg/dL Final    Total Protein 07/24/2022 7.0  6.4 - 8.2 g/dL Final    Albumin 07/24/2022 3.5  3.5 - 5.0 g/dL Final    Globulin 07/24/2022 3.5  2.0 - 4.0 g/dL Final    A/G Ratio 07/24/2022 1.0   Final    Bilirubin, Total 07/24/2022 0.5  0.0 - 1.2 mg/dL Final    Alk Phos 07/24/2022 108  41 - 108 U/L Final    ALT 07/24/2022 51  13 - 56 U/L Final    AST 07/24/2022 21  15 - 37 U/L Final    eGFR 07/24/2022 81  >=60 mL/min/1.73m² Final    WBC 07/24/2022 8.40  4.50 - 11.00 K/uL Final    RBC 07/24/2022 4.88  4.20 - 5.40 M/uL Final    Hemoglobin 07/24/2022 13.4  12.0 - 16.0 g/dL Final    Hematocrit 07/24/2022 41.7  38.0 - 47.0 % Final    MCV 07/24/2022 85.5  80.0 - 96.0 fL Final    MCH 07/24/2022 27.5  27.0 - 31.0 pg Final    MCHC 07/24/2022 32.1  32.0 - 36.0 g/dL Final    RDW 07/24/2022 15.4 (H)  11.5 - 14.5 % Final    Platelet Count 07/24/2022 147 (L)  150 - 400 K/uL Final    MPV 07/24/2022 13.1 (H)  9.4 - 12.4 fL Final    COVID-19 Ag 07/24/2022 Negative  Negative, Invalid Final    Segmented Neutrophils, Man %  07/24/2022 67 (H)  50 - 62 % Final    Bands, Man % 07/24/2022 3  1 - 5 % Final    Lymphocytes, Man % 07/24/2022 23 (L)  27 - 41 % Final    Monocytes, Man % 07/24/2022 7 (H)  2 - 6 % Final    Platelet Morphology 07/24/2022 Normal  Normal Final    RBC Morphology 07/24/2022 Normal   Final   Office Visit on 07/21/2022   Component Date Value Ref Range Status    Hemoglobin A1C 07/21/2022 6.1  4.5 - 6.6 % Final      Normal:               <5.7%  Pre-Diabetic:       5.7% to 6.4%  Diabetic:             >6.4%  Diabetic Goal:     <7%    Estimated Average Glucose 07/21/2022 117  mg/dL Final    Triglycerides 07/21/2022 226 (H)  35 - 150 mg/dL Final      Normal:  <150 mg/dL  Borderline High: 150-199 mg/dL  High:   200-499 mg/dL  Very High:  >=500    Cholesterol 07/21/2022 199  0 - 200 mg/dL Final      <200 mg/dL:  Desirable  200-240 mg/dL: Borderline High  >240 mg/dL:  High    HDL Cholesterol 07/21/2022 44  40 - 60 mg/dL Final      <40 mg/dL: Low HDL  40-60 mg/dL: Normal  >60 mg/dL: Desirable    Cholesterol/HDL Ratio (Risk Factor) 07/21/2022 4.5   Final    Non-HDL 07/21/2022 155  mg/dL Final    LDL Calculated 07/21/2022 110  mg/dL Final    LDL/HDL 07/21/2022 2.5   Final    VLDL 07/21/2022 45  mg/dL Final       Assessment and Plan (including Health Maintenance)      Problem List  Smart Sets  Document Outside    :    Plan: will refer to GI in [facility] Due   Topic Date Due    Eye Exam  Never done    Low Dose Statin  Never done    Mammogram  03/23/2022    COVID-19 Vaccine (4 - Booster for Moderna series) 06/15/2022       Problem List Items Addressed This Visit          Oncology    History of colon cancer - Primary    Relevant Orders    Ambulatory referral/consult to Gastroenterology       Endocrine    Type 2 diabetes mellitus without complication, with long-term current use of insulin (Chronic)    Relevant Medications    insulin glargine (LANTUS U-100 INSULIN) 100 unit/mL injection    insulin syringe-needle  U-100 1 mL 30 gauge x 5/16 Syrg       GI    Irritable bowel syndrome with diarrhea (Chronic)    Relevant Orders    Ambulatory referral/consult to Gastroenterology       Health Maintenance Topics with due status: Not Due       Topic Last Completion Date    Colorectal Cancer Screening 06/08/2020    Diabetes Urine Screening 12/15/2021    Foot Exam 12/23/2021    Lipid Panel 07/21/2022    Hemoglobin A1c 07/21/2022       No future appointments.         Signature:  MD RADHA [facility]  MEDICAL GROUP [facility] - [facility] MEDIC[facility]  [address] [place] [id number]  [phone]    Date of encounter: 9/7/22

## 2022-09-20 ENCOUNTER — OFFICE VISIT (OUTPATIENT)
Dept: FAMILY MEDICINE | Facility: CLINIC | Age: 52
End: 2022-09-20
Payer: MEDICAID

## 2022-09-20 VITALS — TEMPERATURE: 97 F | OXYGEN SATURATION: 98 %

## 2022-09-20 DIAGNOSIS — Z20.9 EXPOSURE TO COMMUNICABLE DISEASE: Primary | ICD-10-CM

## 2022-09-20 DIAGNOSIS — R05.9 COUGH: ICD-10-CM

## 2022-09-20 LAB
CTP QC/QA: YES
FLUAV AG NPH QL: NEGATIVE
FLUBV AG NPH QL: NEGATIVE
SARS-COV-2 AG RESP QL IA.RAPID: NEGATIVE

## 2022-09-20 PROCEDURE — 4010F ACE/ARB THERAPY RXD/TAKEN: CPT | Mod: CPTII,,, | Performed by: FAMILY MEDICINE

## 2022-09-20 PROCEDURE — 1159F MED LIST DOCD IN RCRD: CPT | Mod: CPTII,,, | Performed by: FAMILY MEDICINE

## 2022-09-20 PROCEDURE — 87428 SARSCOV & INF VIR A&B AG IA: CPT | Mod: RHCUB | Performed by: FAMILY MEDICINE

## 2022-09-20 PROCEDURE — 3044F PR MOST RECENT HEMOGLOBIN A1C LEVEL <7.0%: ICD-10-PCS | Mod: CPTII,,, | Performed by: FAMILY MEDICINE

## 2022-09-20 PROCEDURE — 1159F PR MEDICATION LIST DOCUMENTED IN MEDICAL RECORD: ICD-10-PCS | Mod: CPTII,,, | Performed by: FAMILY MEDICINE

## 2022-09-20 PROCEDURE — 99213 PR OFFICE/OUTPT VISIT, EST, LEVL III, 20-29 MIN: ICD-10-PCS | Mod: ,,, | Performed by: FAMILY MEDICINE

## 2022-09-20 PROCEDURE — 99213 OFFICE O/P EST LOW 20 MIN: CPT | Mod: ,,, | Performed by: FAMILY MEDICINE

## 2022-09-20 PROCEDURE — 4010F PR ACE/ARB THEARPY RXD/TAKEN: ICD-10-PCS | Mod: CPTII,,, | Performed by: FAMILY MEDICINE

## 2022-09-20 PROCEDURE — 3044F HG A1C LEVEL LT 7.0%: CPT | Mod: CPTII,,, | Performed by: FAMILY MEDICINE

## 2022-09-20 RX ORDER — AZITHROMYCIN 250 MG/1
TABLET, FILM COATED ORAL
Qty: 6 TABLET | Refills: 0 | Status: SHIPPED | OUTPATIENT
Start: 2022-09-20 | End: 2022-09-25

## 2022-09-20 NOTE — PROGRESS NOTES
Mack Harp IV, DO  [facility]  [address] [place] [address]  Phone: [phone]      Subjective     Name: Hayley Landis   Sex: female  YOB: 1970 (51 y.o.)  MRN: [id number]  Visit Date: 09/20/2022   Chief Complaint: Cough (Pt wants to rule out covid, sinus congestion, H/A)        HISTORY OF PRESENT ILLNESS:      Patient ID: Hayley Landis is a 51 y.o. female.    Chief Complaint: Cough (Pt wants to rule out covid, sinus congestion, H/A)      51 y.o. patient who presents to [facility] for COVID-19 testing. Patient is symptomatic since yesterday. Symptoms include rhinorrhea, cough, and headaches. Patient has no known exposure. . Patient denies using tobacco. Patient has previous medical history of colon cancer, CVA, diabetes, hypertension, seizures. Patient has received the COVID-19 vaccine.       Vitals:  Temp 97.2 °F (36.2 °C)   SpO2 98%     Patient has minimal symptoms and has asked to be tested for COVID.  The test was ordered and interpreted and related to the patient while in clinic.  She is negative for COVID at this time can consider still URI supportive treatment with fluids, possible electrolytes, plenty of rest, vitamin-D, vitamin-C, zinc.      PAST MEDICAL HISTORY:  Significant Diagnoses - Patient  has a past medical history of Colon cancer, CVA (cerebral vascular accident), Diabetes, HTN (hypertension), and Seizure.  Medications - Patient has a current medication list which includes the following long-term medication(s): acyclovir, diclofenac sodium, hydrochlorothiazide, hyoscyamine, insulin aspart u-100, insulin glargine, levetiracetam, and lisinopril.   Allergies - Patient is allergic to grape, opioids - morphine analogues, pineapple, aspirin, dicyclomine, gabapentin, hydrocodone, iron analogues, metformin, penicillins, and latex.  Surgeries - Patient  has a past surgical history that includes Hernia repair and Colon  "surgery.  Family History - Patient family history includes No Known Problems in her brother, daughter, father, maternal aunt, maternal grandfather, maternal grandmother, maternal uncle, mother, paternal aunt, paternal grandfather, paternal grandmother, paternal uncle, sister, and son.      SOCIAL HISTORY:  Tobacco - Patient  reports that she has never smoked. She has never used smokeless tobacco.   Alcohol - Patient  reports no history of alcohol use.   Recreational Drugs - Patient  reports no history of drug use.       Review of Systems   Constitutional:  Negative for fatigue and fever.   HENT:  Positive for rhinorrhea. Negative for sore throat.    Respiratory:  Positive for cough. Negative for shortness of breath.    Cardiovascular:  Negative for chest pain.   Gastrointestinal:  Negative for abdominal pain, nausea and vomiting.   Genitourinary:  Negative for dysuria.   Musculoskeletal:  Negative for myalgias.   Integumentary:  Negative for rash.   Neurological:  Positive for headaches. Negative for dizziness and weakness.        Past Medical History:   Diagnosis Date    Colon cancer     CVA (cerebral vascular accident)     Diabetes     HTN (hypertension)     Seizure         Review of patient's allergies indicates:   Allergen Reactions    Grape Anaphylaxis    Opioids - morphine analogues Anaphylaxis    Pineapple Anaphylaxis    Aspirin      "Veins breakdown" per patient    Dicyclomine Diarrhea    Gabapentin     Hydrocodone      Headache      Iron analogues      Pt hx of hemachromatosis    Metformin      diarrhea    Penicillins     Latex Rash        Past Surgical History:   Procedure Laterality Date    COLON SURGERY      HERNIA REPAIR          Family History   Problem Relation Age of Onset    No Known Problems Mother     No Known Problems Father     No Known Problems Sister     No Known Problems Brother     No Known Problems Daughter     No Known Problems Son     No Known Problems Maternal " Aunt     No Known Problems Maternal Uncle     No Known Problems Paternal Aunt     No Known Problems Paternal Uncle     No Known Problems Maternal Grandmother     No Known Problems Maternal Grandfather     No Known Problems Paternal Grandmother     No Known Problems Paternal Grandfather        Current Outpatient Medications   Medication Instructions    acyclovir (ZOVIRAX) 400 mg, Oral, 5 times daily    dextran 70-hypromellose (TEARS) ophthalmic solution 1 drop, Left Eye, Every 4 hours, Use at least every 4 hours and more frequently as needed. Use at bedtime and apply eye patch.    diclofenac sodium (VOLTAREN) 2 g, Topical (Top), 4 times daily    diphenoxylate-atropine 2.5-0.025 mg (LOMOTIL) 2.5-0.025 mg per tablet 1 tablet, Oral, 2 times daily PRN    ergocalciferol (ERGOCALCIFEROL) 50,000 Units, Oral, Every 7 days    FIBER-TABS 625 mg tablet TAKE TWO TABLETS BY MOUTH EVERY DAY    hydroCHLOROthiazide (HYDRODIURIL) 12.5 MG Tab TAKE ONE TABLET BY MOUTH DAILY IN THE MORNING    hyoscyamine (LEVBID) 0.375 mg Tb12 1 tablet, Oral, 2 times daily    insulin aspart U-100 (NOVOLOG) 100 unit/mL injection inject as directed by your physician per sliding scale    insulin glargine (LANTUS U-100 INSULIN) 70 Units, Subcutaneous, Daily    insulin syringe-needle U-100 1 mL 30 gauge x 5/16 Syrg Use as directed.    levETIRAcetam (KEPPRA) 500 MG Tab TAKE ONE TABLET BY MOUTH EVERY DAY    lisinopriL 10 mg, Oral, Daily    peppermint oiL (IBGARD) 90 mg CECX 2 capsules, Oral, Daily    potassium chloride SA (K-DUR,KLOR-CON) 20 MEQ tablet 20 mEq, Oral, Daily    predniSONE (DELTASONE) 10 MG tablet 2 tablets PO daily x 3 days and then one tablet PO daily x 3 days        Objective     Temp 97.2 °F (36.2 °C)   SpO2 98%     Physical Exam  Constitutional:       General: She is not in acute distress.     Appearance: Normal appearance. She is not ill-appearing.   HENT:      Head: Normocephalic and atraumatic.      Right Ear:  External ear normal.      Left Ear: External ear normal.   Eyes:      Extraocular Movements: Extraocular movements intact.      Conjunctiva/sclera: Conjunctivae normal.   Cardiovascular:      Rate and Rhythm: Normal rate.      Heart sounds: No murmur heard.  Pulmonary:      Effort: Pulmonary effort is normal.   Musculoskeletal:      Cervical back: Normal range of motion.   Skin:     General: Skin is warm and dry.      Coloration: Skin is not jaundiced.      Findings: No rash.   Neurological:      Mental Status: She is alert.   Psychiatric:         Mood and Affect: Mood normal.        All recently obtained labs have been reviewed and discussed in detail with the patient.   Assessment     1. Exposure to communicable disease         Plan        Problem List Items Addressed This Visit    None  Visit Diagnoses       Exposure to communicable disease    -  Primary    Relevant Orders    POCT SARS-COV2 (COVID) with Flu Antigen (Completed)            No follow-ups on file.    Mack Harp DO  [facility]

## 2022-09-29 ENCOUNTER — OFFICE VISIT (OUTPATIENT)
Dept: GASTROENTEROLOGY | Facility: CLINIC | Age: 52
End: 2022-09-29
Payer: MEDICAID

## 2022-09-29 VITALS
HEIGHT: 57 IN | SYSTOLIC BLOOD PRESSURE: 147 MMHG | BODY MASS INDEX: 38.16 KG/M2 | DIASTOLIC BLOOD PRESSURE: 87 MMHG | OXYGEN SATURATION: 97 % | WEIGHT: 176.88 LBS | HEART RATE: 99 BPM

## 2022-09-29 DIAGNOSIS — Z85.038 HISTORY OF COLON CANCER: ICD-10-CM

## 2022-09-29 DIAGNOSIS — K58.0 IRRITABLE BOWEL SYNDROME WITH DIARRHEA: Primary | Chronic | ICD-10-CM

## 2022-09-29 PROCEDURE — 3044F PR MOST RECENT HEMOGLOBIN A1C LEVEL <7.0%: ICD-10-PCS | Mod: CPTII,,, | Performed by: NURSE PRACTITIONER

## 2022-09-29 PROCEDURE — 3044F HG A1C LEVEL LT 7.0%: CPT | Mod: CPTII,,, | Performed by: NURSE PRACTITIONER

## 2022-09-29 PROCEDURE — 4010F PR ACE/ARB THEARPY RXD/TAKEN: ICD-10-PCS | Mod: CPTII,,, | Performed by: NURSE PRACTITIONER

## 2022-09-29 PROCEDURE — 3079F DIAST BP 80-89 MM HG: CPT | Mod: CPTII,,, | Performed by: NURSE PRACTITIONER

## 2022-09-29 PROCEDURE — 99214 PR OFFICE/OUTPT VISIT, EST, LEVL IV, 30-39 MIN: ICD-10-PCS | Mod: ,,, | Performed by: NURSE PRACTITIONER

## 2022-09-29 PROCEDURE — 99214 OFFICE O/P EST MOD 30 MIN: CPT | Mod: ,,, | Performed by: NURSE PRACTITIONER

## 2022-09-29 PROCEDURE — 3077F PR MOST RECENT SYSTOLIC BLOOD PRESSURE >= 140 MM HG: ICD-10-PCS | Mod: CPTII,,, | Performed by: NURSE PRACTITIONER

## 2022-09-29 PROCEDURE — 3077F SYST BP >= 140 MM HG: CPT | Mod: CPTII,,, | Performed by: NURSE PRACTITIONER

## 2022-09-29 PROCEDURE — 3008F PR BODY MASS INDEX (BMI) DOCUMENTED: ICD-10-PCS | Mod: CPTII,,, | Performed by: NURSE PRACTITIONER

## 2022-09-29 PROCEDURE — 1160F RVW MEDS BY RX/DR IN RCRD: CPT | Mod: CPTII,,, | Performed by: NURSE PRACTITIONER

## 2022-09-29 PROCEDURE — 1160F PR REVIEW ALL MEDS BY PRESCRIBER/CLIN PHARMACIST DOCUMENTED: ICD-10-PCS | Mod: CPTII,,, | Performed by: NURSE PRACTITIONER

## 2022-09-29 PROCEDURE — 3008F BODY MASS INDEX DOCD: CPT | Mod: CPTII,,, | Performed by: NURSE PRACTITIONER

## 2022-09-29 PROCEDURE — 4010F ACE/ARB THERAPY RXD/TAKEN: CPT | Mod: CPTII,,, | Performed by: NURSE PRACTITIONER

## 2022-09-29 PROCEDURE — 1159F MED LIST DOCD IN RCRD: CPT | Mod: CPTII,,, | Performed by: NURSE PRACTITIONER

## 2022-09-29 PROCEDURE — 3079F PR MOST RECENT DIASTOLIC BLOOD PRESSURE 80-89 MM HG: ICD-10-PCS | Mod: CPTII,,, | Performed by: NURSE PRACTITIONER

## 2022-09-29 PROCEDURE — 1159F PR MEDICATION LIST DOCUMENTED IN MEDICAL RECORD: ICD-10-PCS | Mod: CPTII,,, | Performed by: NURSE PRACTITIONER

## 2022-09-29 NOTE — PROGRESS NOTES
Hayley Landis is a 51 y.o. female here for Irritable Bowel Syndrome        PCP: Elkin Coombs  Referring Provider: No referring provider defined for this encounter.     HPI:  Presents to establish care. She is followed at [facility] by oncology and GI due to a personal history of colon cancer. Review of [facility] records. Adenocarcinoma of the sigmoid colon with colorectal anastomosis. She states that she has a history of IBS-D. HUB on 8/10/22 showed moderate colonic stool and she was given laxative. States that this has improved. She takes Levobid. Has pelvic floor dysfunction according to the chart. Last colonoscopy 6/2020. She is taking Fibercon. Was given IBGARD, but is not         ROS:  Review of Systems   Constitutional:  Negative for appetite change, fatigue, fever and unexpected weight change.   HENT:  Negative for trouble swallowing.    Cardiovascular:  Negative for chest pain.   Gastrointestinal:  Positive for constipation and diarrhea (improved). Negative for abdominal pain, blood in stool, change in bowel habit, nausea, vomiting, reflux and change in bowel habit.   Genitourinary:  Negative for dysuria.   Musculoskeletal:  Negative for gait problem.   Integumentary:  Negative for pallor.   Neurological:  Negative for light-headedness.   Psychiatric/Behavioral:  The patient is not nervous/anxious.         PMHX:  has a past medical history of Colon cancer, CVA (cerebral vascular accident), Diabetes, HTN (hypertension), and Seizure.    PSHX:  has a past surgical history that includes Hernia repair and Colon surgery.    PFHX: family history includes No Known Problems in her brother, daughter, father, maternal aunt, maternal grandfather, maternal grandmother, maternal uncle, mother, paternal aunt, paternal grandfather, paternal grandmother, paternal uncle, sister, and son.    PSlHX:  reports that she has never smoked. She has never used smokeless tobacco. She reports that she does not drink alcohol and does not  "use drugs.        Review of patient's allergies indicates:   Allergen Reactions    Grape Anaphylaxis    Opioids - morphine analogues Anaphylaxis    Pineapple Anaphylaxis    Aspirin      "Veins breakdown" per patient    Dicyclomine Diarrhea    Gabapentin     Hydrocodone      Headache      Iron analogues      Pt hx of hemachromatosis    Metformin      diarrhea    Penicillins     Latex Rash       Medication List with Changes/Refills   Current Medications    ACYCLOVIR (ZOVIRAX) 400 MG TABLET    Take 1 tablet (400 mg total) by mouth 5 (five) times daily. for 7 days    DEXTRAN 70-HYPROMELLOSE (TEARS) OPHTHALMIC SOLUTION    Place 1 drop into the left eye every 4 (four) hours. Use at least every 4 hours and more frequently as needed. Use at bedtime and apply eye patch.    DICLOFENAC SODIUM (VOLTAREN) 1 % GEL    Apply 2 g topically 4 (four) times daily.    DIPHENOXYLATE-ATROPINE 2.5-0.025 MG (LOMOTIL) 2.5-0.025 MG PER TABLET    Take 1 tablet by mouth 2 (two) times daily as needed for Diarrhea.    ERGOCALCIFEROL (ERGOCALCIFEROL) 50,000 UNIT CAP    Take 1 capsule (50,000 Units total) by mouth every 7 days.    FIBER-TABS 625 MG TABLET    TAKE TWO TABLETS BY MOUTH EVERY DAY    HYDROCHLOROTHIAZIDE (HYDRODIURIL) 12.5 MG TAB    TAKE ONE TABLET BY MOUTH DAILY IN THE MORNING    HYOSCYAMINE (LEVBID) 0.375 MG TB12    Take 1 tablet by mouth 2 (two) times daily.    INSULIN ASPART U-100 (NOVOLOG) 100 UNIT/ML INJECTION    inject as directed by your physician per sliding scale    INSULIN GLARGINE (LANTUS U-100 INSULIN) 100 UNIT/ML INJECTION    Inject 70 Units into the skin once daily.    INSULIN SYRINGE-NEEDLE U-100 1 ML 30 GAUGE X 5/16 SYRG    Use as directed.    LEVETIRACETAM (KEPPRA) 500 MG TAB    TAKE ONE TABLET BY MOUTH EVERY DAY    LISINOPRIL 10 MG TABLET    Take 1 tablet (10 mg total) by mouth once daily.    PEPPERMINT OIL (IBGARD) 90 MG CECX    Take 2 capsules by mouth once daily.    POTASSIUM CHLORIDE SA (K-DUR,KLOR-CON) 20 MEQ " "TABLET    Take 1 tablet (20 mEq total) by mouth once daily.    PREDNISONE (DELTASONE) 10 MG TABLET    2 tablets PO daily x 3 days and then one tablet PO daily x 3 days        Objective Findings:  Vital Signs:  BP (!) 147/87   Pulse 99   Ht 4' 9" (1.448 m)   Wt 80.2 kg (176 lb 14.4 oz)   SpO2 97%   BMI 38.28 kg/m²  Body mass index is 38.28 kg/m².    Physical Exam:  Physical Exam  Vitals and nursing note reviewed.   Constitutional:       General: She is not in acute distress.     Appearance: Normal appearance. She is not ill-appearing.   HENT:      Mouth/Throat:      Mouth: Mucous membranes are moist.   Cardiovascular:      Rate and Rhythm: Normal rate.   Pulmonary:      Effort: Pulmonary effort is normal.      Breath sounds: No wheezing, rhonchi or rales.   Abdominal:      General: Bowel sounds are normal. There is no distension.      Palpations: Abdomen is soft. There is no mass.      Tenderness: There is no abdominal tenderness. There is no guarding or rebound.      Hernia: No hernia is present.   Skin:     General: Skin is warm and dry.      Coloration: Skin is not jaundiced or pale.   Neurological:      Mental Status: She is alert and oriented to person, place, and time.   Psychiatric:         Mood and Affect: Mood normal.        Labs:  Lab Results   Component Value Date    WBC 8.40 07/24/2022    HGB 13.4 07/24/2022    HCT 41.7 07/24/2022    MCV 85.5 07/24/2022    RDW 15.4 (H) 07/24/2022     (L) 07/24/2022    LYMPH 23 (L) 07/24/2022    MONO 7 (H) 07/24/2022    EOS 0.14 09/14/2021    BASO 0.05 09/14/2021     Lab Results   Component Value Date     07/24/2022    K 3.8 07/24/2022     07/24/2022    CO2 29 07/24/2022     (H) 07/24/2022    BUN 15 07/24/2022    CREATININE 0.80 07/24/2022    CALCIUM 8.4 (L) 07/24/2022    PROT 7.0 07/24/2022    ALBUMIN 3.5 07/24/2022    BILITOT 0.5 07/24/2022    ALKPHOS 108 07/24/2022    AST 21 07/24/2022    ALT 51 07/24/2022         Imaging: No results " found.      Assessment:  Hayley Landis is a 51 y.o. female here with:  1. Irritable bowel syndrome with diarrhea    2. History of colon cancer          Recommendations:  1. Continue current medications  2. May continue fiber supplements    Follow up in about 3 months (around 12/29/2022).      Order summary:       Thank you for allowing me to participate in the care of Hayley Landis.      DORETHA Acharya

## 2022-10-17 ENCOUNTER — HOSPITAL ENCOUNTER (OUTPATIENT)
Dept: RADIOLOGY | Facility: HOSPITAL | Age: 52
Discharge: HOME | End: 2022-10-17
Payer: MEDICAID

## 2022-10-17 DIAGNOSIS — Z12.31 BREAST CANCER SCREENING BY MAMMOGRAM: ICD-10-CM

## 2022-10-17 PROCEDURE — 77067 SCR MAMMO BI INCL CAD: CPT | Mod: TC

## 2022-11-01 ENCOUNTER — OFFICE VISIT (OUTPATIENT)
Dept: FAMILY MEDICINE | Facility: CLINIC | Age: 52
End: 2022-11-01
Payer: MEDICAID

## 2022-11-01 VITALS — DIASTOLIC BLOOD PRESSURE: 84 MMHG | HEART RATE: 96 BPM | SYSTOLIC BLOOD PRESSURE: 136 MMHG

## 2022-11-01 DIAGNOSIS — K59.00 CONSTIPATION, UNSPECIFIED CONSTIPATION TYPE: ICD-10-CM

## 2022-11-01 DIAGNOSIS — K58.0 IRRITABLE BOWEL SYNDROME WITH DIARRHEA: Chronic | ICD-10-CM

## 2022-11-01 DIAGNOSIS — Z85.038 HISTORY OF COLON CANCER: Primary | ICD-10-CM

## 2022-11-01 PROCEDURE — 1160F RVW MEDS BY RX/DR IN RCRD: CPT | Mod: CPTII,,, | Performed by: FAMILY MEDICINE

## 2022-11-01 PROCEDURE — 3079F DIAST BP 80-89 MM HG: CPT | Mod: CPTII,,, | Performed by: FAMILY MEDICINE

## 2022-11-01 PROCEDURE — 99214 OFFICE O/P EST MOD 30 MIN: CPT | Mod: ,,, | Performed by: FAMILY MEDICINE

## 2022-11-01 PROCEDURE — 4010F PR ACE/ARB THEARPY RXD/TAKEN: ICD-10-PCS | Mod: CPTII,,, | Performed by: FAMILY MEDICINE

## 2022-11-01 PROCEDURE — 1160F PR REVIEW ALL MEDS BY PRESCRIBER/CLIN PHARMACIST DOCUMENTED: ICD-10-PCS | Mod: CPTII,,, | Performed by: FAMILY MEDICINE

## 2022-11-01 PROCEDURE — 1159F PR MEDICATION LIST DOCUMENTED IN MEDICAL RECORD: ICD-10-PCS | Mod: CPTII,,, | Performed by: FAMILY MEDICINE

## 2022-11-01 PROCEDURE — 3075F PR MOST RECENT SYSTOLIC BLOOD PRESS GE 130-139MM HG: ICD-10-PCS | Mod: CPTII,,, | Performed by: FAMILY MEDICINE

## 2022-11-01 PROCEDURE — 3044F HG A1C LEVEL LT 7.0%: CPT | Mod: CPTII,,, | Performed by: FAMILY MEDICINE

## 2022-11-01 PROCEDURE — 4010F ACE/ARB THERAPY RXD/TAKEN: CPT | Mod: CPTII,,, | Performed by: FAMILY MEDICINE

## 2022-11-01 PROCEDURE — 3079F PR MOST RECENT DIASTOLIC BLOOD PRESSURE 80-89 MM HG: ICD-10-PCS | Mod: CPTII,,, | Performed by: FAMILY MEDICINE

## 2022-11-01 PROCEDURE — 1159F MED LIST DOCD IN RCRD: CPT | Mod: CPTII,,, | Performed by: FAMILY MEDICINE

## 2022-11-01 PROCEDURE — 99214 PR OFFICE/OUTPT VISIT, EST, LEVL IV, 30-39 MIN: ICD-10-PCS | Mod: ,,, | Performed by: FAMILY MEDICINE

## 2022-11-01 PROCEDURE — 3075F SYST BP GE 130 - 139MM HG: CPT | Mod: CPTII,,, | Performed by: FAMILY MEDICINE

## 2022-11-01 PROCEDURE — 3044F PR MOST RECENT HEMOGLOBIN A1C LEVEL <7.0%: ICD-10-PCS | Mod: CPTII,,, | Performed by: FAMILY MEDICINE

## 2022-11-01 NOTE — PROGRESS NOTES
"   Elkin Coombs MD   [facility]  MEDICAL GROUP [address] [place] [address]  [phone]      PATIENT NAME: Hayley Landis  : 1970  DATE: 22  MRN: [id number]      Billing Provider: Elkin Coombs MD  Level of Service:   Patient PCP Information       Provider PCP Type    Elkin Coombs MD General            Reason for Visit / Chief Complaint: Follow-up       Update PCP  Update Chief Complaint         History of Present Illness / Problem Focused Workflow     Hayley Landis presents to the clinic with Follow-up       51 yo WF with h/o colon cancer (followed by [facility]) and IBS.  It is unclear if she has mixed IBS or if she mainly has constipation with overflow diarrhea.  Has reported instances of fecal incontinence in the past.  Has been tried on multiple meds including bentyl, levbid and lomotil.  Says that she is no longer taking levbid because "it doesn't work anymore."  She had been seeing GI at [facility] (where she sees oncology), but had requested a referral to see someone in [place].  I have reviewed the most recent GI encounter notes from both [facility] and [facility].  She now wants a referral to see someone in [place].      Review of Systems     Review of Systems   Constitutional:  Negative for activity change, chills and fever.   HENT:  Negative for sore throat.    Eyes:  Negative for pain.   Respiratory:  Negative for cough, chest tightness and shortness of breath.    Cardiovascular:  Negative for chest pain and palpitations.   Gastrointestinal:  Positive for constipation and diarrhea. Negative for abdominal pain and vomiting.   Neurological:  Negative for dizziness, syncope and weakness.   Psychiatric/Behavioral:  Negative for confusion.       Medical / Social / Family History     Past Medical History:   Diagnosis Date    Colon cancer     CVA (cerebral vascular accident)     Diabetes     HTN (hypertension)     Seizure        Past " "Surgical History:   Procedure Laterality Date    COLON SURGERY      HERNIA REPAIR         Social History    reports that she has never smoked. She has never used smokeless tobacco. She reports that she does not drink alcohol and does not use drugs.   Social History     Tobacco Use    Smoking status: Never    Smokeless tobacco: Never   Substance Use Topics    Alcohol use: Never    Drug use: Never       Family History  Family History   Problem Relation Age of Onset    No Known Problems Mother     No Known Problems Father     No Known Problems Sister     No Known Problems Brother     No Known Problems Daughter     No Known Problems Son     No Known Problems Maternal Aunt     No Known Problems Maternal Uncle     No Known Problems Paternal Aunt     No Known Problems Paternal Uncle     No Known Problems Maternal Grandmother     No Known Problems Maternal Grandfather     No Known Problems Paternal Grandmother     No Known Problems Paternal Grandfather        Medications and Allergies     Medications  No outpatient medications have been marked as taking for the 11/1/22 encounter (Office Visit) with Elkin Coombs MD.       Allergies  Review of patient's allergies indicates:   Allergen Reactions    Grape Anaphylaxis    Opioids - morphine analogues Anaphylaxis    Pineapple Anaphylaxis    Aspirin      "Veins breakdown" per patient    Dicyclomine Diarrhea    Gabapentin     Hydrocodone      Headache      Iron analogues      Pt hx of hemachromatosis    Metformin      diarrhea    Penicillins     Latex Rash       Physical Examination     Vitals:    11/01/22 1149   BP: 136/84   Pulse: 96     Physical Exam  Vitals reviewed.   Constitutional:       Appearance: Normal appearance.   HENT:      Head: Normocephalic and atraumatic.   Eyes:      Extraocular Movements: Extraocular movements intact.      Conjunctiva/sclera: Conjunctivae normal.      Pupils: Pupils are equal, round, and reactive to light. "   Cardiovascular:      Rate and Rhythm: Normal rate and regular rhythm.      Heart sounds: Normal heart sounds.   Pulmonary:      Effort: Pulmonary effort is normal.      Breath sounds: Normal breath sounds.   Musculoskeletal:         General: Normal range of motion.      Cervical back: Normal range of motion.   Skin:     General: Skin is warm and dry.   Neurological:      General: No focal deficit present.      Mental Status: She is alert and oriented to person, place, and time.   Psychiatric:         Mood and Affect: Mood normal.         Behavior: Behavior normal.         Arianne Ely MD - 08/10/2022    Formatting of this note might be different from the original.  RADIOLOGIC EXAM: XR ABDOMEN 1 VIEW    DATE AND TIME OF EXAMINATION: 8/10/2022 10:21 AM    CLINICAL HISTORY: K59.09 - Other constipation;  Please comment on stool burden     COMPARISON: 3/28/2019    TECHNIQUE: XR ABDOMEN 1 VIEW    FINDINGS:     Moderate amount of stool within nondistended small and large bowel. No free air. Multiple surgical clips in the central abdomen and pelvis. Lung bases are clear. Mild dextroscoliotic curvature of the thoracolumbar spine. No acute osseous abnormality.        IMPRESSION:     No acute abdominal abnormality. Moderate amount of stool within nondistended bowel.      RESIDENT RADIOLOGIST:    ATTENDING RADIOLOGIST: Arianne Ely M.D.       Assessment and Plan (including Health Maintenance)      Problem List  Smart Sets  Document Outside HM   :    Plan: will refer to GI in [place] as requested.  In the meantime, I will give a trial of low dose Linzess 72 mcg to see if it helps.  I know that it is for IBS-C but we will see if it helps regulate her bowels and alleviate her symptoms.  Samples given #8        Health Maintenance Due   Topic Date Due    Eye Exam  Never done    Low Dose Statin  Never done    COVID-19 Vaccine (4 - Booster for Moderna series) 04/12/2022    Diabetes Urine Screening  12/15/2022     Foot Exam  12/23/2022       Problem List Items Addressed This Visit          Oncology    History of colon cancer - Primary    Relevant Orders    Ambulatory referral/consult to Gastroenterology       GI    Irritable bowel syndrome with diarrhea (Chronic)    Relevant Orders    Ambulatory referral/consult to Gastroenterology     Other Visit Diagnoses       Constipation, unspecified constipation type        Relevant Medications    linaCLOtide (LINZESS) 72 mcg Cap capsule            Health Maintenance Topics with due status: Not Due       Topic Last Completion Date    Colorectal Cancer Screening 06/08/2020    Lipid Panel 07/21/2022    Hemoglobin A1c 07/21/2022    Mammogram 10/17/2022       Future Appointments   Date Time Provider Department Center   11/22/2022  9:15 AM [facility]   10/23/2023 11:30 AM [facility]1 [facility]            Signature:  Elkin Coombs MD  [facility]  [facility] OF [place] - FAMILY MEDICINE  [address] [place] [id number]  [phone]    Date of encounter: 11/1/22         Opt out

## 2022-11-22 ENCOUNTER — OFFICE VISIT (OUTPATIENT)
Dept: FAMILY MEDICINE | Facility: CLINIC | Age: 52
End: 2022-11-22
Payer: MEDICAID

## 2022-11-22 VITALS
WEIGHT: 178 LBS | HEART RATE: 96 BPM | SYSTOLIC BLOOD PRESSURE: 133 MMHG | TEMPERATURE: 97 F | HEIGHT: 57 IN | BODY MASS INDEX: 38.4 KG/M2 | DIASTOLIC BLOOD PRESSURE: 79 MMHG

## 2022-11-22 DIAGNOSIS — I10 PRIMARY HYPERTENSION: Chronic | ICD-10-CM

## 2022-11-22 DIAGNOSIS — Z79.4 TYPE 2 DIABETES MELLITUS WITHOUT COMPLICATION, WITH LONG-TERM CURRENT USE OF INSULIN: Primary | Chronic | ICD-10-CM

## 2022-11-22 DIAGNOSIS — R25.2 LEG CRAMPS: Chronic | ICD-10-CM

## 2022-11-22 DIAGNOSIS — K58.0 IRRITABLE BOWEL SYNDROME WITH DIARRHEA: Chronic | ICD-10-CM

## 2022-11-22 DIAGNOSIS — E11.9 TYPE 2 DIABETES MELLITUS WITHOUT COMPLICATION, WITH LONG-TERM CURRENT USE OF INSULIN: Primary | Chronic | ICD-10-CM

## 2022-11-22 PROCEDURE — 1159F PR MEDICATION LIST DOCUMENTED IN MEDICAL RECORD: ICD-10-PCS | Mod: CPTII,,, | Performed by: FAMILY MEDICINE

## 2022-11-22 PROCEDURE — 3078F PR MOST RECENT DIASTOLIC BLOOD PRESSURE < 80 MM HG: ICD-10-PCS | Mod: CPTII,,, | Performed by: FAMILY MEDICINE

## 2022-11-22 PROCEDURE — 83036 HEMOGLOBIN A1C: ICD-10-PCS | Mod: ,,, | Performed by: CLINICAL MEDICAL LABORATORY

## 2022-11-22 PROCEDURE — 4010F PR ACE/ARB THEARPY RXD/TAKEN: ICD-10-PCS | Mod: CPTII,,, | Performed by: FAMILY MEDICINE

## 2022-11-22 PROCEDURE — 1159F MED LIST DOCD IN RCRD: CPT | Mod: CPTII,,, | Performed by: FAMILY MEDICINE

## 2022-11-22 PROCEDURE — 4010F ACE/ARB THERAPY RXD/TAKEN: CPT | Mod: CPTII,,, | Performed by: FAMILY MEDICINE

## 2022-11-22 PROCEDURE — 3008F BODY MASS INDEX DOCD: CPT | Mod: CPTII,,, | Performed by: FAMILY MEDICINE

## 2022-11-22 PROCEDURE — 3075F PR MOST RECENT SYSTOLIC BLOOD PRESS GE 130-139MM HG: ICD-10-PCS | Mod: CPTII,,, | Performed by: FAMILY MEDICINE

## 2022-11-22 PROCEDURE — 3075F SYST BP GE 130 - 139MM HG: CPT | Mod: CPTII,,, | Performed by: FAMILY MEDICINE

## 2022-11-22 PROCEDURE — 99214 OFFICE O/P EST MOD 30 MIN: CPT | Mod: ,,, | Performed by: FAMILY MEDICINE

## 2022-11-22 PROCEDURE — 3008F PR BODY MASS INDEX (BMI) DOCUMENTED: ICD-10-PCS | Mod: CPTII,,, | Performed by: FAMILY MEDICINE

## 2022-11-22 PROCEDURE — 83036 HEMOGLOBIN GLYCOSYLATED A1C: CPT | Mod: ,,, | Performed by: CLINICAL MEDICAL LABORATORY

## 2022-11-22 PROCEDURE — 3078F DIAST BP <80 MM HG: CPT | Mod: CPTII,,, | Performed by: FAMILY MEDICINE

## 2022-11-22 PROCEDURE — 1160F RVW MEDS BY RX/DR IN RCRD: CPT | Mod: CPTII,,, | Performed by: FAMILY MEDICINE

## 2022-11-22 PROCEDURE — 3044F HG A1C LEVEL LT 7.0%: CPT | Mod: CPTII,,, | Performed by: FAMILY MEDICINE

## 2022-11-22 PROCEDURE — 3044F PR MOST RECENT HEMOGLOBIN A1C LEVEL <7.0%: ICD-10-PCS | Mod: CPTII,,, | Performed by: FAMILY MEDICINE

## 2022-11-22 PROCEDURE — 99214 PR OFFICE/OUTPT VISIT, EST, LEVL IV, 30-39 MIN: ICD-10-PCS | Mod: ,,, | Performed by: FAMILY MEDICINE

## 2022-11-22 PROCEDURE — 1160F PR REVIEW ALL MEDS BY PRESCRIBER/CLIN PHARMACIST DOCUMENTED: ICD-10-PCS | Mod: CPTII,,, | Performed by: FAMILY MEDICINE

## 2022-11-22 RX ORDER — POTASSIUM CHLORIDE 20 MEQ/1
20 TABLET, EXTENDED RELEASE ORAL DAILY
Qty: 30 TABLET | Refills: 1 | Status: SHIPPED | OUTPATIENT
Start: 2022-11-22 | End: 2023-06-16

## 2022-11-22 NOTE — PROGRESS NOTES
Elkin Coombs MD   [facility]  MEDICAL GROUP [facility] FAMILY [address] [address]  [phone]      PATIENT NAME: Hayley Landis  : 1970  DATE: 22  MRN: [id number]      Billing Provider: Elkin Coombs MD  Level of Service:   Patient PCP Information       Provider PCP Type    Elkin Coombs MD General            Reason for Visit / Chief Complaint: Follow-up (A1c check)       Update PCP  Update Chief Complaint         History of Present Illness / Problem Focused Workflow     Hayley Landis presents to the clinic with Follow-up (A1c check)       Here for follow up DM, HTN and IBS.  Needs to get A1C today.  BP is doing well.  Says that she is currently having well formed, solid stools.  Does not report any recent abdominal pain.  Says that she did not start Linzess.  Is requesting refill of potassium.    Review of Systems     Review of Systems   Constitutional:  Negative for activity change, chills and fever.   HENT:  Negative for sore throat.    Eyes:  Negative for pain.   Respiratory:  Negative for cough, chest tightness and shortness of breath.    Cardiovascular:  Negative for chest pain and palpitations.   Gastrointestinal:  Negative for abdominal pain.   Neurological:  Negative for dizziness, syncope and weakness.   Psychiatric/Behavioral:  Negative for confusion.       Medical / Social / Family History     Past Medical History:   Diagnosis Date    Colon cancer     CVA (cerebral vascular accident)     Diabetes     HTN (hypertension)     Seizure        Past Surgical History:   Procedure Laterality Date    COLON SURGERY      HERNIA REPAIR         Social History    reports that she has never smoked. She has never used smokeless tobacco. She reports that she does not drink alcohol and does not use drugs.   Social History     Tobacco Use    Smoking status: Never    Smokeless tobacco: Never   Substance Use Topics    Alcohol  "use: Never    Drug use: Never       Family History  Family History   Problem Relation Age of Onset    No Known Problems Mother     No Known Problems Father     No Known Problems Sister     No Known Problems Brother     No Known Problems Daughter     No Known Problems Son     No Known Problems Maternal Aunt     No Known Problems Maternal Uncle     No Known Problems Paternal Aunt     No Known Problems Paternal Uncle     No Known Problems Maternal Grandmother     No Known Problems Maternal Grandfather     No Known Problems Paternal Grandmother     No Known Problems Paternal Grandfather        Medications and Allergies     Medications  No outpatient medications have been marked as taking for the 11/22/22 encounter (Office Visit) with Elkin Coombs MD.       Allergies  Review of patient's allergies indicates:   Allergen Reactions    Grape Anaphylaxis    Opioids - morphine analogues Anaphylaxis    Pineapple Anaphylaxis    Aspirin      "Veins breakdown" per patient    Dicyclomine Diarrhea    Gabapentin     Hydrocodone      Headache      Iron analogues      Pt hx of hemachromatosis    Metformin      diarrhea    Penicillins     Latex Rash       Physical Examination     Vitals:    11/22/22 0924   BP: 133/79   Pulse: 96   Temp: 97.2 °F (36.2 °C)     Physical Exam  Vitals reviewed.   Constitutional:       Appearance: Normal appearance.   HENT:      Head: Normocephalic and atraumatic.   Eyes:      Extraocular Movements: Extraocular movements intact.      Conjunctiva/sclera: Conjunctivae normal.      Pupils: Pupils are equal, round, and reactive to light.   Cardiovascular:      Rate and Rhythm: Normal rate and regular rhythm.      Heart sounds: Normal heart sounds.   Pulmonary:      Effort: Pulmonary effort is normal.      Breath sounds: Normal breath sounds.   Musculoskeletal:         General: Normal range of motion.      Cervical back: Normal range of motion.   Skin:     General: Skin is warm and " dry.   Neurological:      General: No focal deficit present.      Mental Status: She is alert and oriented to person, place, and time.   Psychiatric:         Mood and Affect: Mood normal.         Behavior: Behavior normal.        Assessment and Plan (including Health Maintenance)      Problem List  Smart Sets  Document Outside HM   :    Plan:         Health Maintenance Due   Topic Date Due    Eye Exam  Never done    Low Dose Statin  Never done    COVID-19 Vaccine (4 - Booster for Moderna series) 04/12/2022    Diabetes Urine Screening  12/15/2022    Foot Exam  12/23/2022       Problem List Items Addressed This Visit          Cardiac/Vascular    Primary hypertension (Chronic)       Endocrine    Type 2 diabetes mellitus without complication, with long-term current use of insulin - Primary (Chronic)    Relevant Orders    Hemoglobin A1C       GI    Irritable bowel syndrome with diarrhea (Chronic)       Other    Leg cramps    Relevant Medications    potassium chloride SA (K-DUR,KLOR-CON) 20 MEQ tablet       Health Maintenance Topics with due status: Not Due       Topic Last Completion Date    Colorectal Cancer Screening 06/08/2020    Lipid Panel 07/21/2022    Hemoglobin A1c 07/21/2022    Mammogram 10/17/2022       Future Appointments   Date Time Provider Department Center   10/23/2023 11:30 AM [facility]            Signature:  MD RADHA Doyle [facility]  MEDICAL GROUP [facility] - FAMILY MEDICINE  [address] [place] [id number]  [phone]    Date of encounter: 11/22/22

## 2022-11-23 LAB
EST. AVERAGE GLUCOSE BLD GHB EST-MCNC: 137 MG/DL
HBA1C MFR BLD HPLC: 6.7 % (ref 4.5–6.6)

## 2023-01-12 RX ORDER — LISINOPRIL 10 MG/1
10 TABLET ORAL DAILY
Qty: 90 TABLET | Refills: 2 | Status: SHIPPED | OUTPATIENT
Start: 2023-01-12 | End: 2023-12-05 | Stop reason: SDUPTHER

## 2023-01-26 ENCOUNTER — OFFICE VISIT (OUTPATIENT)
Dept: FAMILY MEDICINE | Facility: CLINIC | Age: 53
End: 2023-01-26
Payer: MEDICAID

## 2023-01-26 VITALS
WEIGHT: 178 LBS | SYSTOLIC BLOOD PRESSURE: 138 MMHG | HEART RATE: 96 BPM | BODY MASS INDEX: 38.4 KG/M2 | DIASTOLIC BLOOD PRESSURE: 88 MMHG | HEIGHT: 57 IN

## 2023-01-26 DIAGNOSIS — Z79.4 TYPE 2 DIABETES MELLITUS WITHOUT COMPLICATION, WITH LONG-TERM CURRENT USE OF INSULIN: Chronic | ICD-10-CM

## 2023-01-26 DIAGNOSIS — M25.511 ACUTE PAIN OF RIGHT SHOULDER: Primary | ICD-10-CM

## 2023-01-26 DIAGNOSIS — Z85.038 HISTORY OF COLON CANCER: Chronic | ICD-10-CM

## 2023-01-26 DIAGNOSIS — G40.909 SEIZURE DISORDER: Chronic | ICD-10-CM

## 2023-01-26 DIAGNOSIS — E11.9 TYPE 2 DIABETES MELLITUS WITHOUT COMPLICATION, WITH LONG-TERM CURRENT USE OF INSULIN: Chronic | ICD-10-CM

## 2023-01-26 PROCEDURE — 99214 PR OFFICE/OUTPT VISIT, EST, LEVL IV, 30-39 MIN: ICD-10-PCS | Mod: 25,,, | Performed by: FAMILY MEDICINE

## 2023-01-26 PROCEDURE — 4010F PR ACE/ARB THEARPY RXD/TAKEN: ICD-10-PCS | Mod: CPTII,,, | Performed by: FAMILY MEDICINE

## 2023-01-26 PROCEDURE — 96372 THER/PROPH/DIAG INJ SC/IM: CPT | Mod: ,,, | Performed by: FAMILY MEDICINE

## 2023-01-26 PROCEDURE — 3075F SYST BP GE 130 - 139MM HG: CPT | Mod: CPTII,,, | Performed by: FAMILY MEDICINE

## 2023-01-26 PROCEDURE — 3061F PR NEG MICROALBUMINURIA RESULT DOCUMENTED/REVIEW: ICD-10-PCS | Mod: CPTII,,, | Performed by: FAMILY MEDICINE

## 2023-01-26 PROCEDURE — 3075F PR MOST RECENT SYSTOLIC BLOOD PRESS GE 130-139MM HG: ICD-10-PCS | Mod: CPTII,,, | Performed by: FAMILY MEDICINE

## 2023-01-26 PROCEDURE — 3061F NEG MICROALBUMINURIA REV: CPT | Mod: CPTII,,, | Performed by: FAMILY MEDICINE

## 2023-01-26 PROCEDURE — 3066F PR DOCUMENTATION OF TREATMENT FOR NEPHROPATHY: ICD-10-PCS | Mod: CPTII,,, | Performed by: FAMILY MEDICINE

## 2023-01-26 PROCEDURE — 4010F ACE/ARB THERAPY RXD/TAKEN: CPT | Mod: CPTII,,, | Performed by: FAMILY MEDICINE

## 2023-01-26 PROCEDURE — 96372 PR INJECTION,THERAP/PROPH/DIAG2ST, IM OR SUBCUT: ICD-10-PCS | Mod: ,,, | Performed by: FAMILY MEDICINE

## 2023-01-26 PROCEDURE — 99214 OFFICE O/P EST MOD 30 MIN: CPT | Mod: 25,,, | Performed by: FAMILY MEDICINE

## 2023-01-26 PROCEDURE — 3066F NEPHROPATHY DOC TX: CPT | Mod: CPTII,,, | Performed by: FAMILY MEDICINE

## 2023-01-26 PROCEDURE — 3008F BODY MASS INDEX DOCD: CPT | Mod: CPTII,,, | Performed by: FAMILY MEDICINE

## 2023-01-26 PROCEDURE — 3008F PR BODY MASS INDEX (BMI) DOCUMENTED: ICD-10-PCS | Mod: CPTII,,, | Performed by: FAMILY MEDICINE

## 2023-01-26 PROCEDURE — 3079F PR MOST RECENT DIASTOLIC BLOOD PRESSURE 80-89 MM HG: ICD-10-PCS | Mod: CPTII,,, | Performed by: FAMILY MEDICINE

## 2023-01-26 PROCEDURE — 3079F DIAST BP 80-89 MM HG: CPT | Mod: CPTII,,, | Performed by: FAMILY MEDICINE

## 2023-01-26 RX ORDER — LEVETIRACETAM 500 MG/1
500 TABLET ORAL DAILY
Qty: 90 TABLET | Refills: 1 | Status: SHIPPED | OUTPATIENT
Start: 2023-01-26 | End: 2023-11-06

## 2023-01-26 RX ORDER — KETOROLAC TROMETHAMINE 30 MG/ML
30 INJECTION, SOLUTION INTRAMUSCULAR; INTRAVENOUS
Status: COMPLETED | OUTPATIENT
Start: 2023-01-26 | End: 2023-01-26

## 2023-01-26 RX ORDER — INSULIN ASPART 100 [IU]/ML
INJECTION, SOLUTION INTRAVENOUS; SUBCUTANEOUS
Qty: 10 ML | Refills: 5 | Status: SHIPPED | OUTPATIENT
Start: 2023-01-26 | End: 2023-12-22 | Stop reason: SDUPTHER

## 2023-01-26 RX ADMIN — KETOROLAC TROMETHAMINE 30 MG: 30 INJECTION, SOLUTION INTRAMUSCULAR; INTRAVENOUS at 02:01

## 2023-01-26 NOTE — PROGRESS NOTES
Elkin Coombs MD   [facility]  MEDICAL GROUP [address] [address]  [phone]      PATIENT NAME: Hayley Landis  : 1970  DATE: 23  MRN: [id number]      Billing Provider: Elkin Coombs MD  Level of Service:   Patient PCP Information       Provider PCP Type    Elkin Coombs MD General            Reason for Visit / Chief Complaint: Shoulder Injury       Update PCP  Update Chief Complaint         History of Present Illness / Problem Focused Workflow     Hayley Landis presents to the clinic with Shoulder Injury       53 yo WF with complaint of R shoulder pain.  Says that she injured shoulder when she was teenager and thinks that she had torn rotator cuff at that time.  She has recently reinjured her shoulder 2 weeks ago.  Has pain throughout ROM.      Also has h/o colon cancer.  She had labs done in late December which showed a rise in CEA above normal limits.  She has follow up scheduled with her oncologist in [place] with imaging ordered.  She denies any acute/new GI symptoms.  Denies any change in bowel habits or blood with bowel movements.    Needs a couple of meds refilled for DM and seizure disorder.    Review of Systems     Review of Systems   Constitutional:  Negative for activity change, chills and fever.   HENT:  Negative for sore throat.    Eyes:  Negative for pain.   Respiratory:  Negative for cough, chest tightness and shortness of breath.    Cardiovascular:  Negative for chest pain and palpitations.   Gastrointestinal:  Negative for abdominal pain, anal bleeding, blood in stool, change in bowel habit, rectal pain and change in bowel habit.   Musculoskeletal:  Positive for arthralgias.   Neurological:  Negative for dizziness, syncope and weakness.   Psychiatric/Behavioral:  Negative for confusion.       Medical / Social / Family History     Past Medical History:   Diagnosis Date    Colon cancer     CVA  "(cerebral vascular accident)     Diabetes     HTN (hypertension)     Seizure        Past Surgical History:   Procedure Laterality Date    COLON SURGERY      HERNIA REPAIR         Social History    reports that she has never smoked. She has never been exposed to tobacco smoke. She has never used smokeless tobacco. She reports that she does not drink alcohol and does not use drugs.   Social History     Tobacco Use    Smoking status: Never     Passive exposure: Never    Smokeless tobacco: Never   Substance Use Topics    Alcohol use: Never    Drug use: Never       Family History  Family History   Problem Relation Age of Onset    No Known Problems Mother     No Known Problems Father     No Known Problems Sister     No Known Problems Brother     No Known Problems Daughter     No Known Problems Son     No Known Problems Maternal Aunt     No Known Problems Maternal Uncle     No Known Problems Paternal Aunt     No Known Problems Paternal Uncle     No Known Problems Maternal Grandmother     No Known Problems Maternal Grandfather     No Known Problems Paternal Grandmother     No Known Problems Paternal Grandfather        Medications and Allergies     Medications  No outpatient medications have been marked as taking for the 1/26/23 encounter (Office Visit) with Elkin Coombs MD.       Allergies  Review of patient's allergies indicates:   Allergen Reactions    Grape Anaphylaxis    Opioids - morphine analogues Anaphylaxis    Pineapple Anaphylaxis    Aspirin      "Veins breakdown" per patient    Dicyclomine Diarrhea    Gabapentin     Hydrocodone      Headache      Iron analogues      Pt hx of hemachromatosis    Metformin      diarrhea    Penicillins     Latex Rash       Physical Examination     Vitals:    01/26/23 1421   BP: 138/88   Pulse: 96     Physical Exam  Vitals reviewed.   Constitutional:       Appearance: Normal appearance.   HENT:      Head: Normocephalic and atraumatic.   Eyes:      Extraocular Movements: " Extraocular movements intact.      Conjunctiva/sclera: Conjunctivae normal.      Pupils: Pupils are equal, round, and reactive to light.   Cardiovascular:      Rate and Rhythm: Normal rate and regular rhythm.      Heart sounds: Normal heart sounds.   Pulmonary:      Effort: Pulmonary effort is normal.      Breath sounds: Normal breath sounds.   Musculoskeletal:      Cervical back: Normal range of motion.      Comments: R shoulder with decreased ROM.  Pain is worst with internal rotation   Skin:     General: Skin is warm and dry.   Neurological:      General: No focal deficit present.      Mental Status: She is alert and oriented to person, place, and time.   Psychiatric:         Mood and Affect: Mood normal.         Behavior: Behavior normal.      CEA  Order: [id number]   Ref Range & Units 2 mo ago   CEA <=5.00 ng/mL 9.94 High     Resulting Agency  [facility] DEPARTMENT OF PATHOLOGY   Narrative  Performed by [facility] DEPARTMENT OF PATHOLOGY  CEA NORMAL RANGES   SMOKERS     0-6 ng/mL   NON-SMOKERS 0-4 ng/mL  Specimen Collected: 12/06/22 08:49 Last Resulted: 12/06/22 13:19   Received From: [facility]  Result Received: 01/26/23 14:09     Assessment and Plan (including Health Maintenance)      Problem List  Smart Sets  Document Outside HM   :    Plan: follow up with oncologist as scheduled        Health Maintenance Due   Topic Date Due    Pneumococcal Vaccines (Age 0-64) (1 - PCV) Never done    Eye Exam  Never done    COVID-19 Vaccine (4 - Booster for Moderna series) 04/12/2022       Problem List Items Addressed This Visit          Oncology    History of colon cancer       Endocrine    Type 2 diabetes mellitus without complication, with long-term current use of insulin (Chronic)    Relevant Medications    insulin aspart U-100 (NOVOLOG) 100 unit/mL injection     Other Visit Diagnoses       Acute pain of right shoulder    -  Primary    Relevant Medications    ketorolac injection 30 mg (Completed)     Seizure disorder  (Chronic)       Relevant Medications    levETIRAcetam (KEPPRA) 500 MG Tab            Health Maintenance Topics with due status: Not Due       Topic Last Completion Date    Colorectal Cancer Screening 06/08/2020    Lipid Panel 07/21/2022    Mammogram 10/17/2022    Hemoglobin A1c 11/22/2022    Diabetes Urine Screening 02/03/2023    Foot Exam 02/03/2023       Future Appointments   Date Time Provider Department Center   10/23/2023 11:30 AM [facility]1 [facility]            Signature:  MD RADHA Doyle [facility]  [facility] [place] - FAMILY MEDICINE  [address] [place] [id number]  [phone]    Date of encounter: 1/26/23

## 2023-02-03 ENCOUNTER — OFFICE VISIT (OUTPATIENT)
Dept: FAMILY MEDICINE | Facility: CLINIC | Age: 53
End: 2023-02-03
Payer: MEDICAID

## 2023-02-03 VITALS
DIASTOLIC BLOOD PRESSURE: 81 MMHG | HEART RATE: 98 BPM | SYSTOLIC BLOOD PRESSURE: 130 MMHG | HEIGHT: 57 IN | BODY MASS INDEX: 38.4 KG/M2 | WEIGHT: 178 LBS

## 2023-02-03 DIAGNOSIS — E11.9 TYPE 2 DIABETES MELLITUS WITHOUT COMPLICATION, WITH LONG-TERM CURRENT USE OF INSULIN: Primary | Chronic | ICD-10-CM

## 2023-02-03 DIAGNOSIS — M25.511 ACUTE PAIN OF RIGHT SHOULDER: ICD-10-CM

## 2023-02-03 DIAGNOSIS — E11.9 ENCOUNTER FOR DIABETIC FOOT EXAM: ICD-10-CM

## 2023-02-03 DIAGNOSIS — Z85.038 HISTORY OF COLON CANCER: Chronic | ICD-10-CM

## 2023-02-03 DIAGNOSIS — Z79.4 TYPE 2 DIABETES MELLITUS WITHOUT COMPLICATION, WITH LONG-TERM CURRENT USE OF INSULIN: Primary | Chronic | ICD-10-CM

## 2023-02-03 LAB
CREAT UR-MCNC: 39 MG/DL (ref 28–219)
MICROALBUMIN UR-MCNC: <0.5 MG/DL (ref 0–2.8)
MICROALBUMIN/CREAT RATIO PNL UR: <12.8 MG/G (ref 0–30)

## 2023-02-03 PROCEDURE — 1159F PR MEDICATION LIST DOCUMENTED IN MEDICAL RECORD: ICD-10-PCS | Mod: CPTII,,, | Performed by: FAMILY MEDICINE

## 2023-02-03 PROCEDURE — 4010F ACE/ARB THERAPY RXD/TAKEN: CPT | Mod: CPTII,,, | Performed by: FAMILY MEDICINE

## 2023-02-03 PROCEDURE — 3079F DIAST BP 80-89 MM HG: CPT | Mod: CPTII,,, | Performed by: FAMILY MEDICINE

## 2023-02-03 PROCEDURE — 3079F PR MOST RECENT DIASTOLIC BLOOD PRESSURE 80-89 MM HG: ICD-10-PCS | Mod: CPTII,,, | Performed by: FAMILY MEDICINE

## 2023-02-03 PROCEDURE — 3008F PR BODY MASS INDEX (BMI) DOCUMENTED: ICD-10-PCS | Mod: CPTII,,, | Performed by: FAMILY MEDICINE

## 2023-02-03 PROCEDURE — 96372 PR INJECTION,THERAP/PROPH/DIAG2ST, IM OR SUBCUT: ICD-10-PCS | Mod: ,,, | Performed by: FAMILY MEDICINE

## 2023-02-03 PROCEDURE — 1160F PR REVIEW ALL MEDS BY PRESCRIBER/CLIN PHARMACIST DOCUMENTED: ICD-10-PCS | Mod: CPTII,,, | Performed by: FAMILY MEDICINE

## 2023-02-03 PROCEDURE — 99214 OFFICE O/P EST MOD 30 MIN: CPT | Mod: 25,,, | Performed by: FAMILY MEDICINE

## 2023-02-03 PROCEDURE — 4010F PR ACE/ARB THEARPY RXD/TAKEN: ICD-10-PCS | Mod: CPTII,,, | Performed by: FAMILY MEDICINE

## 2023-02-03 PROCEDURE — 82043 MICROALBUMIN / CREATININE RATIO URINE: ICD-10-PCS | Mod: ,,, | Performed by: CLINICAL MEDICAL LABORATORY

## 2023-02-03 PROCEDURE — 3075F SYST BP GE 130 - 139MM HG: CPT | Mod: CPTII,,, | Performed by: FAMILY MEDICINE

## 2023-02-03 PROCEDURE — 3075F PR MOST RECENT SYSTOLIC BLOOD PRESS GE 130-139MM HG: ICD-10-PCS | Mod: CPTII,,, | Performed by: FAMILY MEDICINE

## 2023-02-03 PROCEDURE — 82043 UR ALBUMIN QUANTITATIVE: CPT | Mod: ,,, | Performed by: CLINICAL MEDICAL LABORATORY

## 2023-02-03 PROCEDURE — 1160F RVW MEDS BY RX/DR IN RCRD: CPT | Mod: CPTII,,, | Performed by: FAMILY MEDICINE

## 2023-02-03 PROCEDURE — 82570 MICROALBUMIN / CREATININE RATIO URINE: ICD-10-PCS | Mod: ,,, | Performed by: CLINICAL MEDICAL LABORATORY

## 2023-02-03 PROCEDURE — 1159F MED LIST DOCD IN RCRD: CPT | Mod: CPTII,,, | Performed by: FAMILY MEDICINE

## 2023-02-03 PROCEDURE — 96372 THER/PROPH/DIAG INJ SC/IM: CPT | Mod: ,,, | Performed by: FAMILY MEDICINE

## 2023-02-03 PROCEDURE — 99214 PR OFFICE/OUTPT VISIT, EST, LEVL IV, 30-39 MIN: ICD-10-PCS | Mod: 25,,, | Performed by: FAMILY MEDICINE

## 2023-02-03 PROCEDURE — 3008F BODY MASS INDEX DOCD: CPT | Mod: CPTII,,, | Performed by: FAMILY MEDICINE

## 2023-02-03 PROCEDURE — 82570 ASSAY OF URINE CREATININE: CPT | Mod: ,,, | Performed by: CLINICAL MEDICAL LABORATORY

## 2023-02-03 RX ORDER — KETOROLAC TROMETHAMINE 30 MG/ML
30 INJECTION, SOLUTION INTRAMUSCULAR; INTRAVENOUS
Status: COMPLETED | OUTPATIENT
Start: 2023-02-03 | End: 2023-02-03

## 2023-02-03 RX ADMIN — KETOROLAC TROMETHAMINE 30 MG: 30 INJECTION, SOLUTION INTRAMUSCULAR; INTRAVENOUS at 11:02

## 2023-02-03 NOTE — PROGRESS NOTES
Elkin Coombs MD   [facility]  MEDICAL GROUP [facility] [address] [place] [address]  [phone]      PATIENT NAME: Hayley Landis  : 1970  DATE: 2/3/23  MRN: [id number]      Billing Provider: Elkin Coombs MD  Level of Service: NC OFFICE/OUTPT VISIT, EST, LEVL IV, 30-39 MIN  Patient PCP Information       Provider PCP Type    Elkin Coombs MD General            Reason for Visit / Chief Complaint: Shoulder Pain (Pt c/o shoulder pain. Pt stated pain comes and goes and that she has a little more mobility in range of motion in shoulder and that she can feel heat in shoulder when in pain)       Update PCP  Update Chief Complaint         History of Present Illness / Problem Focused Workflow     Hayley Landis presents to the clinic with Shoulder Pain (Pt c/o shoulder pain. Pt stated pain comes and goes and that she has a little more mobility in range of motion in shoulder and that she can feel heat in shoulder when in pain)       53 yo WF with DM and h/o colon cancer.  Is due for diabetic foot exam and urine MA/Cr.  Still complaining of R shoulder pain c movement.  Has follow up scheduled with oncologist in [place]  at [facility] with imaging ordered.  Had labs done in 2022 which showed a rise in CEA level.      Shoulder Pain   Pertinent negatives include no fever.     Review of Systems     Review of Systems   Constitutional:  Negative for activity change, chills and fever.   HENT:  Negative for sore throat.    Eyes:  Negative for pain.   Respiratory:  Negative for cough, chest tightness and shortness of breath.    Cardiovascular:  Negative for chest pain and palpitations.   Gastrointestinal:  Negative for abdominal pain, anal bleeding, blood in stool, change in bowel habit, rectal pain and change in bowel habit.   Musculoskeletal:  Positive for arthralgias. Negative for joint deformity.   Neurological:  Negative for dizziness, syncope and  weakness.   Psychiatric/Behavioral:  Negative for confusion.       Medical / Social / Family History     Past Medical History:   Diagnosis Date    Colon cancer     CVA (cerebral vascular accident)     Diabetes     HTN (hypertension)     Seizure        Past Surgical History:   Procedure Laterality Date    COLON SURGERY      HERNIA REPAIR         Social History    reports that she has never smoked. She has never been exposed to tobacco smoke. She has never used smokeless tobacco. She reports that she does not drink alcohol and does not use drugs.   Social History     Tobacco Use    Smoking status: Never     Passive exposure: Never    Smokeless tobacco: Never   Substance Use Topics    Alcohol use: Never    Drug use: Never       Family History  Family History   Problem Relation Age of Onset    No Known Problems Mother     No Known Problems Father     No Known Problems Sister     No Known Problems Brother     No Known Problems Daughter     No Known Problems Son     No Known Problems Maternal Aunt     No Known Problems Maternal Uncle     No Known Problems Paternal Aunt     No Known Problems Paternal Uncle     No Known Problems Maternal Grandmother     No Known Problems Maternal Grandfather     No Known Problems Paternal Grandmother     No Known Problems Paternal Grandfather        Medications and Allergies     Medications  No outpatient medications have been marked as taking for the 2/3/23 encounter (Office Visit) with Elkin Coombs MD.     Current Facility-Administered Medications for the 2/3/23 encounter (Office Visit) with Elkin Coombs MD   Medication Dose Route Frequency Provider Last Rate Last Admin    [COMPLETED] ketorolac injection 30 mg  30 mg Intramuscular 1 time in Clinic/HOD Elkin Coombs MD   30 mg at 02/03/23 1131       Allergies  Review of patient's allergies indicates:   Allergen Reactions    Grape Anaphylaxis    Opioids - morphine analogues Anaphylaxis    Pineapple Anaphylaxis    Aspirin       ""Veins breakdown" per patient    Dicyclomine Diarrhea    Gabapentin     Hydrocodone      Headache      Iron analogues      Pt hx of hemachromatosis    Metformin      diarrhea    Penicillins     Latex Rash       Physical Examination     Vitals:    02/03/23 1053   BP: 130/81   Pulse: 98     Physical Exam  Vitals reviewed.   Constitutional:       Appearance: Normal appearance.   HENT:      Head: Normocephalic and atraumatic.   Eyes:      Extraocular Movements: Extraocular movements intact.      Conjunctiva/sclera: Conjunctivae normal.      Pupils: Pupils are equal, round, and reactive to light.   Cardiovascular:      Rate and Rhythm: Normal rate and regular rhythm.      Pulses:           Dorsalis pedis pulses are 2+ on the right side and 2+ on the left side.        Posterior tibial pulses are 2+ on the right side and 2+ on the left side.      Heart sounds: Normal heart sounds.   Pulmonary:      Effort: Pulmonary effort is normal.      Breath sounds: Normal breath sounds.   Musculoskeletal:         General: Tenderness (right shoulder) present. Normal range of motion.      Cervical back: Normal range of motion.      Right foot: Normal range of motion. No deformity, bunion, Charcot foot, foot drop or prominent metatarsal heads.      Left foot: Normal range of motion. No deformity, bunion, Charcot foot, foot drop or prominent metatarsal heads.      Comments: Decreased ROM right shoulder due to pain   Feet:      Right foot:      Protective Sensation: 10 sites tested.  10 sites sensed.      Skin integrity: Skin integrity normal. No ulcer, blister, skin breakdown, callus, dry skin or fissure.      Toenail Condition: Right toenails are normal.      Left foot:      Protective Sensation: 10 sites tested.  10 sites sensed.      Skin integrity: Skin integrity normal. No ulcer, blister, skin breakdown, callus, dry skin or fissure.      Toenail Condition: Left toenails are normal.   Skin:     General: Skin is warm and dry. "   Neurological:      General: No focal deficit present.      Mental Status: She is alert and oriented to person, place, and time.   Psychiatric:         Mood and Affect: Mood normal.         Behavior: Behavior normal.        Assessment and Plan (including Health Maintenance)      Problem List  Smart Sets  Document Outside HM   :    Plan: no xray available in clinic today.  Will get urine microalbumin/Cr.  I reviewed her most recent lipids and they are wnl.  She does not want to start a low dose statin.          Health Maintenance Due   Topic Date Due    Pneumococcal Vaccines (Age 0-64) (1 - PCV) Never done    Eye Exam  Never done    COVID-19 Vaccine (4 - Booster for Moderna series) 04/12/2022    Diabetes Urine Screening  12/15/2022       Problem List Items Addressed This Visit          Oncology    History of colon cancer       Endocrine    Type 2 diabetes mellitus without complication, with long-term current use of insulin - Primary (Chronic)    Relevant Orders    Microalbumin/Creatinine Ratio, Urine     Other Visit Diagnoses       Acute pain of right shoulder        Relevant Medications    ketorolac injection 30 mg (Completed)    Encounter for diabetic foot exam                Health Maintenance Topics with due status: Not Due       Topic Last Completion Date    Colorectal Cancer Screening 06/08/2020    Lipid Panel 07/21/2022    Mammogram 10/17/2022    Hemoglobin A1c 11/22/2022    Foot Exam 02/03/2023       Future Appointments   Date Time Provider Department Center   10/23/2023 11:30 AM [facility]            Signature:  MD RADHA Doyle [facility]  MEDICAL GROUP [facility] - FAMILY MEDIC[facility]  [address] [place] [id number]  [phone]    Date of encounter: 2/3/23

## 2023-02-21 ENCOUNTER — OFFICE VISIT (OUTPATIENT)
Dept: FAMILY MEDICINE | Facility: CLINIC | Age: 53
End: 2023-02-21
Payer: MEDICAID

## 2023-02-21 VITALS
HEART RATE: 98 BPM | BODY MASS INDEX: 38.4 KG/M2 | HEIGHT: 57 IN | SYSTOLIC BLOOD PRESSURE: 136 MMHG | WEIGHT: 178 LBS | DIASTOLIC BLOOD PRESSURE: 79 MMHG

## 2023-02-21 DIAGNOSIS — I10 PRIMARY HYPERTENSION: ICD-10-CM

## 2023-02-21 DIAGNOSIS — R20.0 NUMBNESS AND TINGLING OF FOOT: Chronic | ICD-10-CM

## 2023-02-21 DIAGNOSIS — R94.8 ABNORMAL POSITRON EMISSION TOMOGRAPHY (PET) SCAN: ICD-10-CM

## 2023-02-21 DIAGNOSIS — R20.2 NUMBNESS AND TINGLING OF FOOT: Chronic | ICD-10-CM

## 2023-02-21 DIAGNOSIS — Z85.038 HISTORY OF COLON CANCER: Primary | Chronic | ICD-10-CM

## 2023-02-21 PROCEDURE — 99214 OFFICE O/P EST MOD 30 MIN: CPT | Mod: ,,, | Performed by: FAMILY MEDICINE

## 2023-02-21 PROCEDURE — 3078F PR MOST RECENT DIASTOLIC BLOOD PRESSURE < 80 MM HG: ICD-10-PCS | Mod: CPTII,,, | Performed by: FAMILY MEDICINE

## 2023-02-21 PROCEDURE — 1160F RVW MEDS BY RX/DR IN RCRD: CPT | Mod: CPTII,,, | Performed by: FAMILY MEDICINE

## 2023-02-21 PROCEDURE — 4010F PR ACE/ARB THEARPY RXD/TAKEN: ICD-10-PCS | Mod: CPTII,,, | Performed by: FAMILY MEDICINE

## 2023-02-21 PROCEDURE — 83540 ASSAY OF IRON: CPT | Mod: ,,, | Performed by: CLINICAL MEDICAL LABORATORY

## 2023-02-21 PROCEDURE — 3066F PR DOCUMENTATION OF TREATMENT FOR NEPHROPATHY: ICD-10-PCS | Mod: CPTII,,, | Performed by: FAMILY MEDICINE

## 2023-02-21 PROCEDURE — 3061F PR NEG MICROALBUMINURIA RESULT DOCUMENTED/REVIEW: ICD-10-PCS | Mod: CPTII,,, | Performed by: FAMILY MEDICINE

## 2023-02-21 PROCEDURE — 80076 HEPATIC FUNCTION PANEL: CPT | Mod: ,,, | Performed by: CLINICAL MEDICAL LABORATORY

## 2023-02-21 PROCEDURE — 3008F BODY MASS INDEX DOCD: CPT | Mod: CPTII,,, | Performed by: FAMILY MEDICINE

## 2023-02-21 PROCEDURE — 80048 BASIC METABOLIC PANEL: ICD-10-PCS | Mod: ,,, | Performed by: CLINICAL MEDICAL LABORATORY

## 2023-02-21 PROCEDURE — 82728 FERRITIN: ICD-10-PCS | Mod: ,,, | Performed by: CLINICAL MEDICAL LABORATORY

## 2023-02-21 PROCEDURE — 99214 PR OFFICE/OUTPT VISIT, EST, LEVL IV, 30-39 MIN: ICD-10-PCS | Mod: ,,, | Performed by: FAMILY MEDICINE

## 2023-02-21 PROCEDURE — 3078F DIAST BP <80 MM HG: CPT | Mod: CPTII,,, | Performed by: FAMILY MEDICINE

## 2023-02-21 PROCEDURE — 80048 BASIC METABOLIC PNL TOTAL CA: CPT | Mod: ,,, | Performed by: CLINICAL MEDICAL LABORATORY

## 2023-02-21 PROCEDURE — 1159F PR MEDICATION LIST DOCUMENTED IN MEDICAL RECORD: ICD-10-PCS | Mod: CPTII,,, | Performed by: FAMILY MEDICINE

## 2023-02-21 PROCEDURE — 3066F NEPHROPATHY DOC TX: CPT | Mod: CPTII,,, | Performed by: FAMILY MEDICINE

## 2023-02-21 PROCEDURE — 80076 HEPATIC FUNCTION PANEL: ICD-10-PCS | Mod: ,,, | Performed by: CLINICAL MEDICAL LABORATORY

## 2023-02-21 PROCEDURE — 84443 TSH: ICD-10-PCS | Mod: ,,, | Performed by: CLINICAL MEDICAL LABORATORY

## 2023-02-21 PROCEDURE — 1159F MED LIST DOCD IN RCRD: CPT | Mod: CPTII,,, | Performed by: FAMILY MEDICINE

## 2023-02-21 PROCEDURE — 3061F NEG MICROALBUMINURIA REV: CPT | Mod: CPTII,,, | Performed by: FAMILY MEDICINE

## 2023-02-21 PROCEDURE — 83550 IRON BINDING TEST: CPT | Mod: ,,, | Performed by: CLINICAL MEDICAL LABORATORY

## 2023-02-21 PROCEDURE — 3075F PR MOST RECENT SYSTOLIC BLOOD PRESS GE 130-139MM HG: ICD-10-PCS | Mod: CPTII,,, | Performed by: FAMILY MEDICINE

## 2023-02-21 PROCEDURE — 83550 IRON AND TIBC: ICD-10-PCS | Mod: ,,, | Performed by: CLINICAL MEDICAL LABORATORY

## 2023-02-21 PROCEDURE — 82728 ASSAY OF FERRITIN: CPT | Mod: ,,, | Performed by: CLINICAL MEDICAL LABORATORY

## 2023-02-21 PROCEDURE — 85025 COMPLETE CBC W/AUTO DIFF WBC: CPT | Mod: ,,, | Performed by: CLINICAL MEDICAL LABORATORY

## 2023-02-21 PROCEDURE — 1160F PR REVIEW ALL MEDS BY PRESCRIBER/CLIN PHARMACIST DOCUMENTED: ICD-10-PCS | Mod: CPTII,,, | Performed by: FAMILY MEDICINE

## 2023-02-21 PROCEDURE — 3008F PR BODY MASS INDEX (BMI) DOCUMENTED: ICD-10-PCS | Mod: CPTII,,, | Performed by: FAMILY MEDICINE

## 2023-02-21 PROCEDURE — 85025 CBC WITH DIFFERENTIAL: ICD-10-PCS | Mod: ,,, | Performed by: CLINICAL MEDICAL LABORATORY

## 2023-02-21 PROCEDURE — 3075F SYST BP GE 130 - 139MM HG: CPT | Mod: CPTII,,, | Performed by: FAMILY MEDICINE

## 2023-02-21 PROCEDURE — 83540 IRON AND TIBC: ICD-10-PCS | Mod: ,,, | Performed by: CLINICAL MEDICAL LABORATORY

## 2023-02-21 PROCEDURE — 84443 ASSAY THYROID STIM HORMONE: CPT | Mod: ,,, | Performed by: CLINICAL MEDICAL LABORATORY

## 2023-02-21 PROCEDURE — 4010F ACE/ARB THERAPY RXD/TAKEN: CPT | Mod: CPTII,,, | Performed by: FAMILY MEDICINE

## 2023-02-21 RX ORDER — CYCLOBENZAPRINE HCL 10 MG
10 TABLET ORAL
COMMUNITY
Start: 2022-12-06 | End: 2023-07-12

## 2023-02-21 NOTE — PROGRESS NOTES
Elkin Coombs MD   [facility]  MEDICAL GROUP [address] [address]  [phone]      PATIENT NAME: Hayley Landis  : 1970  DATE: 23  MRN: [id number]      Billing Provider: Elkin Coombs MD  Level of Service:   Patient PCP Information       Provider PCP Type    Elkin Coombs MD General            Reason for Visit / Chief Complaint: Follow-up (In today to discuss PET scan report. )       Update PCP  Update Chief Complaint         History of Present Illness / Problem Focused Workflow     Hayley Landis presents to the clinic with Follow-up (In today to discuss PET scan report. )       53 yo WF with h/o colon cancer.  Recently had PET scan done 02/15/2023 which showed multiple areas of increased activity worrisome for metastatic disease.  She is scheduled to get some biopsies done 23 in [place].  I have reviewed her most recent oncology encounter and lab/imaging results.  Is wanting to get labs done today.  Is treated for DM and HTN.  Has some lower extremity neuropathy.      Review of Systems     Review of Systems   Constitutional:  Negative for activity change, chills and fever.   HENT:  Negative for sore throat.    Eyes:  Negative for pain.   Respiratory:  Negative for cough, chest tightness and shortness of breath.    Cardiovascular:  Negative for chest pain and palpitations.   Gastrointestinal:  Negative for abdominal pain, anal bleeding, blood in stool, change in bowel habit, rectal pain and change in bowel habit.   Musculoskeletal:  Positive for arthralgias. Negative for joint deformity.   Neurological:  Positive for numbness. Negative for dizziness, syncope and weakness.   Psychiatric/Behavioral:  Negative for confusion.       Medical / Social / Family History     Past Medical History:   Diagnosis Date    Colon cancer     CVA (cerebral vascular accident)     Diabetes     HTN (hypertension)     Seizure         Past Surgical History:   Procedure Laterality Date    COLON SURGERY      HERNIA REPAIR         Social History    reports that she has never smoked. She has never been exposed to tobacco smoke. She has never used smokeless tobacco. She reports that she does not drink alcohol and does not use drugs.   Social History     Tobacco Use    Smoking status: Never     Passive exposure: Never    Smokeless tobacco: Never   Substance Use Topics    Alcohol use: Never    Drug use: Never       Family History  Family History   Problem Relation Age of Onset    No Known Problems Mother     No Known Problems Father     No Known Problems Sister     No Known Problems Brother     No Known Problems Daughter     No Known Problems Son     No Known Problems Maternal Aunt     No Known Problems Maternal Uncle     No Known Problems Paternal Aunt     No Known Problems Paternal Uncle     No Known Problems Maternal Grandmother     No Known Problems Maternal Grandfather     No Known Problems Paternal Grandmother     No Known Problems Paternal Grandfather        Medications and Allergies     Medications  Outpatient Medications Marked as Taking for the 2/21/23 encounter (Office Visit) with Elkin Coombs MD   Medication Sig Dispense Refill    acyclovir (ZOVIRAX) 400 MG tablet Take 1 tablet (400 mg total) by mouth 5 (five) times daily. for 7 days 35 tablet 0    cyclobenzaprine (FLEXERIL) 10 MG tablet Take 10 mg by mouth.      dextran 70-hypromellose (TEARS) ophthalmic solution Place 1 drop into the left eye every 4 (four) hours. Use at least every 4 hours and more frequently as needed. Use at bedtime and apply eye patch. 30 mL 0    diclofenac sodium (VOLTAREN) 1 % Gel Apply 2 g topically 4 (four) times daily. 200 g 2    ergocalciferol (ERGOCALCIFEROL) 50,000 unit Cap Take 1 capsule (50,000 Units total) by mouth every 7 days. 4 capsule 1    FIBER-TABS 625 mg tablet TAKE TWO TABLETS BY MOUTH EVERY  tablet 3    hydroCHLOROthiazide  "(HYDRODIURIL) 12.5 MG Tab TAKE ONE TABLET BY MOUTH DAILY IN THE MORNING 90 tablet 3    insulin aspart U-100 (NOVOLOG) 100 unit/mL injection inject as directed by your physician per sliding scale 10 mL 5    insulin glargine (LANTUS U-100 INSULIN) 100 unit/mL injection Inject 70 Units into the skin once daily. 20 mL 5    insulin syringe-needle U-100 1 mL 30 gauge x 5/16 Syrg Use as directed. 100 each 5    levETIRAcetam (KEPPRA) 500 MG Tab Take 1 tablet (500 mg total) by mouth once daily. 90 tablet 1    linaCLOtide (LINZESS) 72 mcg Cap capsule Take 1 capsule (72 mcg total) by mouth before breakfast. 8 capsule 0    lisinopriL 10 MG tablet Take 1 tablet (10 mg total) by mouth once daily. 90 tablet 2    peppermint oiL (IBGARD) 90 mg CECX Take 2 capsules by mouth once daily. 48 each 2    potassium chloride SA (K-DUR,KLOR-CON) 20 MEQ tablet Take 1 tablet (20 mEq total) by mouth once daily. 30 tablet 1       Allergies  Review of patient's allergies indicates:   Allergen Reactions    Grape Anaphylaxis    Opioids - morphine analogues Anaphylaxis    Pineapple Anaphylaxis    Aspirin      "Veins breakdown" per patient    Dicyclomine Diarrhea    Gabapentin     Hydrocodone      Headache      Iron analogues      Pt hx of hemachromatosis    Metformin      diarrhea    Penicillins     Latex Rash       Physical Examination     Vitals:    02/21/23 1522   BP: 136/79   Pulse: 98     Physical Exam  Vitals reviewed.   Constitutional:       Appearance: Normal appearance.   HENT:      Head: Normocephalic and atraumatic.   Eyes:      Extraocular Movements: Extraocular movements intact.      Conjunctiva/sclera: Conjunctivae normal.      Pupils: Pupils are equal, round, and reactive to light.   Cardiovascular:      Rate and Rhythm: Normal rate and regular rhythm.      Heart sounds: Normal heart sounds.   Pulmonary:      Effort: Pulmonary effort is normal.      Breath sounds: Normal breath sounds.   Musculoskeletal:         General: Normal range " of motion.      Cervical back: Normal range of motion.   Skin:     General: Skin is warm and dry.   Neurological:      General: No focal deficit present.      Mental Status: She is alert and oriented to person, place, and time.   Psychiatric:         Mood and Affect: Mood normal.         Behavior: Behavior normal.        Results - (ABNORMAL) NM PET CT Skull Base To Mid Thigh (Routine) (02/15/2023 11:16 AM CST)  Narrative   02/16/2023 5:02 AM CST    RADIOLOGIC EXAM: NM PET CT SKULL BASE TO MID THIGH (ROUTINE)    DATE AND TIME OF EXAMINATION: 2/15/2023 10:08 AM    CLINICAL HISTORY: 52-year-old female with history of colon cancer, CEA newly elevated to 9.9 on 12/6/2022 from 4.6 on 4/12/2022. History of stage III rectal adenocarcinoma, status post LAR/hysterectomy 10/18/2018 and 6 months perioperative Xeloda/XRT and Xelox completed 3/6/2019. Resumed Xelox 5/23/2019 after concerning soft tissue findings on a PET/CT, with good response. Currently on observation.    REASON FOR PET CT: Restaging / Subsequent anti-tumor treatment strategy due to possible recurrence.  See clinical history above for reason for possible recurrence.    COMPARISON:  CT CAP 11/16/2022    PET IMAGING TECHNIQUE: The patient fasted for 6 hours and serum glucose was checked prior to F-18 FDG intravenous injection.  After injection, the patient was moved to a quiet room for radiotracer uptake.  PET images were obtained from the skull vertex to the mid thighs.  Time of Flight (TOF) PET image reconstruction was performed, and axial and MIP PET images were generated, with coronal and sagittal reconstructions generated on a dedicated workstation.  Attenuation corrected (AC) and nonattenuation corrected (NAC) PET images were generated.  Quantitative volumetric maximum standardized uptake value (SUV) calculations are reported according to lean body mass.    F-18 FDG dose administered intravenously: 17.4 mCi via left hand vein  Serum glucose: 65 mg/dL  Time  of injection:  1005 hours   Time of scan:  1058 hours    CT IMAGING TECHNIQUE:  Diagnostic CT imaging of the chest, abdomen, and pelvis was performed without intravenous contrast     FINDINGS:      FINAL REPORT PENDING SUV MEASUREMENTS (internet connectivity issues). Findings emailed to Dr. Falk.       HEAD AND NECK: Focus of increased FDG activity corresponds to the posterior inferior aspect of the left thyroid lobe, without discrete CT correlate, as annotated on series 12, image 82, SUV max 1.8.    No abnormal intracranial FDG activity. No gross intracranial mass or hemorrhage.    CHEST: Two FDG avid superior left paratracheal nodes annotated on series 3: 1.5 cm node on image 87, SUV max 5.9, and 0.7 cm node on image 88, SUV max 2.4.    No other abnormal FDG activity. No suspicious lung nodules.    ABDOMEN/PELVIS: Two FDG avid portacaval nodes annotated on series 3, image 155, largest measuring 1.6 cm, SUV max 7.4.    2.5 cm FDG avid, hypodense lesion in hepatic segment 5, image 156, SUV max 8.8.    1.3 cm FDG avid left external iliac node on image 220, SUV max 7.7.    Postsurgical changes of LAR with reanastomosis and ileostomy reversal. No abnormal bowel wall thickening. Retroperitoneal surgical clips.    MUSCULOSKELETAL: No abnormal FDG activity. No acute or suspicious osseous lesions. T11 vertebral body hemangioma noted.           Results - (ABNORMAL) NM PET CT Skull Base To Mid Thigh (Routine) (02/15/2023 11:16 AM CST)  Authorizing Provider Result Type   Mily Ryan MD [facility] [place]     02/16/2023 5:02 AM CST    IMPRESSION:     FDG avid superior left paratracheal, portacaval, and left external iliac nodes, as above, suspicious for metastatic disease.    Increased FDG activity corresponding to the posterior inferior aspect of the left thyroid lobe, indeterminate. Recommend thyroid ultrasound for further characterization.    Other findings as above.      RESIDENT RADIOLOGIST:    ATTENDING  RADIOLOGIST: Arianne Ely M.D.    A Yellow - Electronic Alert message has been sent to ELLIS CORDOVA via the [employer] Critical Results system on 2/15/2023 4:03 PM, Message ID [id number]. Messages sent to [facility] providers are also integrated in the Epic electronic medical record.           Assessment and Plan (including Health Maintenance)      Problem List  Smart Sets  Document Outside HM   :    Plan: labs today.  Follow up for biopsy as scheduled.        Health Maintenance Due   Topic Date Due    Pneumococcal Vaccines (Age 0-64) (1 - PCV) Never done    Eye Exam  Never done    COVID-19 Vaccine (4 - Booster for Moderna series) 04/12/2022       Problem List Items Addressed This Visit          Cardiac/Vascular    Primary hypertension - Primary (Chronic)    Relevant Orders    CBC Auto Differential    Basic Metabolic Panel    Hepatic Function Panel    TSH       Other    Numbness and tingling of foot    Relevant Orders    Ferritin    Iron and TIBC       Health Maintenance Topics with due status: Not Due       Topic Last Completion Date    Colorectal Cancer Screening 06/08/2020    Lipid Panel 07/21/2022    Mammogram 10/17/2022    Hemoglobin A1c 11/22/2022    Diabetes Urine Screening 02/03/2023    Foot Exam 02/03/2023       Future Appointments   Date Time Provider Department Center   10/23/2023 11:30 AM [facility]            Signature:  MD RADHA Doyle [facility]  MEDICAL GROUP [facility]  [address] [place] [id number]  [phone]    Date of encounter: 2/21/23

## 2023-02-22 LAB
ALBUMIN SERPL BCP-MCNC: 3.7 G/DL (ref 3.5–5)
ALP SERPL-CCNC: 122 U/L (ref 41–108)
ALT SERPL W P-5'-P-CCNC: 49 U/L (ref 13–56)
ANION GAP SERPL CALCULATED.3IONS-SCNC: 9 MMOL/L (ref 7–16)
AST SERPL W P-5'-P-CCNC: 27 U/L (ref 15–37)
BASOPHILS # BLD AUTO: 0.08 K/UL (ref 0–0.2)
BASOPHILS NFR BLD AUTO: 0.7 % (ref 0–1)
BILIRUB DIRECT SERPL-MCNC: 0.1 MG/DL (ref 0–0.2)
BILIRUB SERPL-MCNC: 0.4 MG/DL (ref ?–1.2)
BUN SERPL-MCNC: 13 MG/DL (ref 7–18)
BUN/CREAT SERPL: 19 (ref 6–20)
CALCIUM SERPL-MCNC: 9 MG/DL (ref 8.5–10.1)
CHLORIDE SERPL-SCNC: 99 MMOL/L (ref 98–107)
CO2 SERPL-SCNC: 31 MMOL/L (ref 21–32)
CREAT SERPL-MCNC: 0.7 MG/DL (ref 0.55–1.02)
DIFFERENTIAL METHOD BLD: ABNORMAL
EGFR (NO RACE VARIABLE) (RUSH/TITUS): 105 ML/MIN/1.73M²
EOSINOPHIL # BLD AUTO: 0.55 K/UL (ref 0–0.5)
EOSINOPHIL NFR BLD AUTO: 5.1 % (ref 1–4)
ERYTHROCYTE [DISTWIDTH] IN BLOOD BY AUTOMATED COUNT: 15.2 % (ref 11.5–14.5)
FERRITIN SERPL-MCNC: 52 NG/ML (ref 8–252)
GLUCOSE SERPL-MCNC: 108 MG/DL (ref 74–106)
HCT VFR BLD AUTO: 46.9 % (ref 38–47)
HGB BLD-MCNC: 14.7 G/DL (ref 12–16)
IMM GRANULOCYTES # BLD AUTO: 0.07 K/UL (ref 0–0.04)
IMM GRANULOCYTES NFR BLD: 0.6 % (ref 0–0.4)
IRON SATN MFR SERPL: 8 % (ref 14–50)
IRON SERPL-MCNC: 33 ΜG/DL (ref 50–170)
LYMPHOCYTES # BLD AUTO: 2.13 K/UL (ref 1–4.8)
LYMPHOCYTES NFR BLD AUTO: 19.7 % (ref 27–41)
MCH RBC QN AUTO: 27.1 PG (ref 27–31)
MCHC RBC AUTO-ENTMCNC: 31.3 G/DL (ref 32–36)
MCV RBC AUTO: 86.5 FL (ref 80–96)
MONOCYTES # BLD AUTO: 0.56 K/UL (ref 0–0.8)
MONOCYTES NFR BLD AUTO: 5.2 % (ref 2–6)
MPC BLD CALC-MCNC: 12.8 FL (ref 9.4–12.4)
NEUTROPHILS # BLD AUTO: 7.43 K/UL (ref 1.8–7.7)
NEUTROPHILS NFR BLD AUTO: 68.7 % (ref 53–65)
NRBC # BLD AUTO: 0 X10E3/UL
NRBC, AUTO (.00): 0 %
PLATELET # BLD AUTO: 176 K/UL (ref 150–400)
POTASSIUM SERPL-SCNC: 3.9 MMOL/L (ref 3.5–5.1)
PROT SERPL-MCNC: 7.3 G/DL (ref 6.4–8.2)
RBC # BLD AUTO: 5.42 M/UL (ref 4.2–5.4)
SODIUM SERPL-SCNC: 135 MMOL/L (ref 136–145)
TIBC SERPL-MCNC: 403 ΜG/DL (ref 250–450)
TSH SERPL DL<=0.005 MIU/L-ACNC: 0.91 UIU/ML (ref 0.36–3.74)
WBC # BLD AUTO: 10.82 K/UL (ref 4.5–11)

## 2023-02-27 ENCOUNTER — OFFICE VISIT (OUTPATIENT)
Dept: FAMILY MEDICINE | Facility: CLINIC | Age: 53
End: 2023-02-27
Payer: MEDICAID

## 2023-02-27 VITALS
WEIGHT: 173 LBS | DIASTOLIC BLOOD PRESSURE: 88 MMHG | HEART RATE: 80 BPM | BODY MASS INDEX: 37.32 KG/M2 | HEIGHT: 57 IN | SYSTOLIC BLOOD PRESSURE: 149 MMHG | OXYGEN SATURATION: 96 %

## 2023-02-27 DIAGNOSIS — H92.03 OTALGIA OF BOTH EARS: ICD-10-CM

## 2023-02-27 DIAGNOSIS — H66.91 RIGHT OTITIS MEDIA, UNSPECIFIED OTITIS MEDIA TYPE: Primary | ICD-10-CM

## 2023-02-27 PROCEDURE — 3066F PR DOCUMENTATION OF TREATMENT FOR NEPHROPATHY: ICD-10-PCS | Mod: CPTII,,, | Performed by: NURSE PRACTITIONER

## 2023-02-27 PROCEDURE — 3079F PR MOST RECENT DIASTOLIC BLOOD PRESSURE 80-89 MM HG: ICD-10-PCS | Mod: CPTII,,, | Performed by: NURSE PRACTITIONER

## 2023-02-27 PROCEDURE — 1159F MED LIST DOCD IN RCRD: CPT | Mod: CPTII,,, | Performed by: NURSE PRACTITIONER

## 2023-02-27 PROCEDURE — 3008F BODY MASS INDEX DOCD: CPT | Mod: CPTII,,, | Performed by: NURSE PRACTITIONER

## 2023-02-27 PROCEDURE — 4010F PR ACE/ARB THEARPY RXD/TAKEN: ICD-10-PCS | Mod: CPTII,,, | Performed by: NURSE PRACTITIONER

## 2023-02-27 PROCEDURE — 1159F PR MEDICATION LIST DOCUMENTED IN MEDICAL RECORD: ICD-10-PCS | Mod: CPTII,,, | Performed by: NURSE PRACTITIONER

## 2023-02-27 PROCEDURE — 99213 PR OFFICE/OUTPT VISIT, EST, LEVL III, 20-29 MIN: ICD-10-PCS | Mod: ,,, | Performed by: NURSE PRACTITIONER

## 2023-02-27 PROCEDURE — 3061F PR NEG MICROALBUMINURIA RESULT DOCUMENTED/REVIEW: ICD-10-PCS | Mod: CPTII,,, | Performed by: NURSE PRACTITIONER

## 2023-02-27 PROCEDURE — 3077F PR MOST RECENT SYSTOLIC BLOOD PRESSURE >= 140 MM HG: ICD-10-PCS | Mod: CPTII,,, | Performed by: NURSE PRACTITIONER

## 2023-02-27 PROCEDURE — 3061F NEG MICROALBUMINURIA REV: CPT | Mod: CPTII,,, | Performed by: NURSE PRACTITIONER

## 2023-02-27 PROCEDURE — 1160F RVW MEDS BY RX/DR IN RCRD: CPT | Mod: CPTII,,, | Performed by: NURSE PRACTITIONER

## 2023-02-27 PROCEDURE — 1160F PR REVIEW ALL MEDS BY PRESCRIBER/CLIN PHARMACIST DOCUMENTED: ICD-10-PCS | Mod: CPTII,,, | Performed by: NURSE PRACTITIONER

## 2023-02-27 PROCEDURE — 99213 OFFICE O/P EST LOW 20 MIN: CPT | Mod: ,,, | Performed by: NURSE PRACTITIONER

## 2023-02-27 PROCEDURE — 3066F NEPHROPATHY DOC TX: CPT | Mod: CPTII,,, | Performed by: NURSE PRACTITIONER

## 2023-02-27 PROCEDURE — 4010F ACE/ARB THERAPY RXD/TAKEN: CPT | Mod: CPTII,,, | Performed by: NURSE PRACTITIONER

## 2023-02-27 PROCEDURE — 3079F DIAST BP 80-89 MM HG: CPT | Mod: CPTII,,, | Performed by: NURSE PRACTITIONER

## 2023-02-27 PROCEDURE — 3008F PR BODY MASS INDEX (BMI) DOCUMENTED: ICD-10-PCS | Mod: CPTII,,, | Performed by: NURSE PRACTITIONER

## 2023-02-27 PROCEDURE — 3077F SYST BP >= 140 MM HG: CPT | Mod: CPTII,,, | Performed by: NURSE PRACTITIONER

## 2023-02-27 RX ORDER — NEOMYCIN SULFATE, POLYMYXIN B SULFATE AND HYDROCORTISONE 10; 3.5; 1 MG/ML; MG/ML; [USP'U]/ML
4 SUSPENSION/ DROPS AURICULAR (OTIC) 3 TIMES DAILY
Qty: 6 ML | Refills: 0 | Status: SHIPPED | OUTPATIENT
Start: 2023-02-27 | End: 2023-03-06

## 2023-02-27 NOTE — PATIENT INSTRUCTIONS
Cortisporin ear drops as prescribed   Follow up in clinic in one week if symptoms persist, sooner if symptoms worsen

## 2023-02-27 NOTE — PROGRESS NOTES
"Clinic note     Patient name: Hayley Landis is a 52 y.o. female   Chief compliant   Chief Complaint   Patient presents with    Otalgia     Right ear is worse than left since Saturday.        Subjective     History of present illness   PCP: Dr Coombs  In clinic for evaluation of bilateral otalgia, right worse than left; denies any fever, cough, congestion or pharyngitis  Hx of colon cancer, recent PET scan 2/15/2023; biopsies scheduled for 3/6/2023  Oncology: Dr Ryan  Hx of DM, last A1c was 6.7, she has not checked her blood glucose this morning as of yet       Social History     Tobacco Use    Smoking status: Never     Passive exposure: Never    Smokeless tobacco: Never   Substance Use Topics    Alcohol use: Never    Drug use: Never       Review of patient's allergies indicates:   Allergen Reactions    Grape Anaphylaxis    Opioids - morphine analogues Anaphylaxis    Pineapple Anaphylaxis    Aspirin      "Veins breakdown" per patient    Dicyclomine Diarrhea    Gabapentin     Hydrocodone      Headache      Iron analogues      Pt hx of hemachromatosis    Metformin      diarrhea    Penicillins     Latex Rash       Past Medical History:   Diagnosis Date    Colon cancer     CVA (cerebral vascular accident)     Diabetes     HTN (hypertension)     Seizure        Past Surgical History:   Procedure Laterality Date    COLON SURGERY      HERNIA REPAIR          Family History   Problem Relation Age of Onset    No Known Problems Mother     No Known Problems Father     No Known Problems Sister     No Known Problems Brother     No Known Problems Daughter     No Known Problems Son     No Known Problems Maternal Aunt     No Known Problems Maternal Uncle     No Known Problems Paternal Aunt     No Known Problems Paternal Uncle     No Known Problems Maternal Grandmother     No Known Problems Maternal Grandfather     No Known Problems Paternal Grandmother     No Known Problems Paternal Grandfather          Current Outpatient " Medications:     cyclobenzaprine (FLEXERIL) 10 MG tablet, Take 10 mg by mouth., Disp: , Rfl:     dextran 70-hypromellose (TEARS) ophthalmic solution, Place 1 drop into the left eye every 4 (four) hours. Use at least every 4 hours and more frequently as needed. Use at bedtime and apply eye patch., Disp: 30 mL, Rfl: 0    diclofenac sodium (VOLTAREN) 1 % Gel, Apply 2 g topically 4 (four) times daily., Disp: 200 g, Rfl: 2    ergocalciferol (ERGOCALCIFEROL) 50,000 unit Cap, Take 1 capsule (50,000 Units total) by mouth every 7 days., Disp: 4 capsule, Rfl: 1    FIBER-TABS 625 mg tablet, TAKE TWO TABLETS BY MOUTH EVERY DAY, Disp: 180 tablet, Rfl: 3    hydroCHLOROthiazide (HYDRODIURIL) 12.5 MG Tab, TAKE ONE TABLET BY MOUTH DAILY IN THE MORNING, Disp: 90 tablet, Rfl: 3    insulin aspart U-100 (NOVOLOG) 100 unit/mL injection, inject as directed by your physician per sliding scale, Disp: 10 mL, Rfl: 5    insulin glargine (LANTUS U-100 INSULIN) 100 unit/mL injection, Inject 70 Units into the skin once daily. (Patient taking differently: Inject 80 Units into the skin once daily.), Disp: 20 mL, Rfl: 5    insulin syringe-needle U-100 1 mL 30 gauge x 5/16 Syrg, Use as directed., Disp: 100 each, Rfl: 5    levETIRAcetam (KEPPRA) 500 MG Tab, Take 1 tablet (500 mg total) by mouth once daily., Disp: 90 tablet, Rfl: 1    lisinopriL 10 MG tablet, Take 1 tablet (10 mg total) by mouth once daily., Disp: 90 tablet, Rfl: 2    peppermint oiL (IBGARD) 90 mg CECX, Take 2 capsules by mouth once daily., Disp: 48 each, Rfl: 2    potassium chloride SA (K-DUR,KLOR-CON) 20 MEQ tablet, Take 1 tablet (20 mEq total) by mouth once daily., Disp: 30 tablet, Rfl: 1    acyclovir (ZOVIRAX) 400 MG tablet, Take 1 tablet (400 mg total) by mouth 5 (five) times daily. for 7 days, Disp: 35 tablet, Rfl: 0    linaCLOtide (LINZESS) 72 mcg Cap capsule, Take 1 capsule (72 mcg total) by mouth before breakfast. (Patient not taking: Reported on 2/27/2023), Disp: 8 capsule,  "Rfl: 0    neomycin-polymyxin-hydrocortisone (CORTISPORIN) 3.5-10,000-1 mg/mL-unit/mL-% otic suspension, Place 4 drops into the right ear 3 (three) times daily. for 7 days, Disp: 6 mL, Rfl: 0    Review of Systems   Constitutional:  Negative for chills and fever.   HENT:  Positive for ear pain. Negative for nasal congestion, ear discharge and sore throat.    Respiratory:  Negative for cough and shortness of breath.    Cardiovascular:  Negative for chest pain.   Gastrointestinal:  Negative for diarrhea, nausea and vomiting.   Musculoskeletal:  Negative for myalgias.   Neurological:  Negative for headaches.     Objective     BP (!) 149/88   Pulse 80   Ht 4' 9" (1.448 m)   Wt 78.5 kg (173 lb)   SpO2 96%   BMI 37.44 kg/m²     Physical Exam   Constitutional: She is oriented to person, place, and time. No distress.   HENT:   Head: Atraumatic.   Right Ear: Tympanic membrane is erythematous and bulging.   Left Ear: Tympanic membrane and ear canal normal.   Mouth/Throat: Mucous membranes are moist.   Cardiovascular: Normal rate and regular rhythm. Pulmonary:      Effort: Pulmonary effort is normal. No respiratory distress.      Breath sounds: Normal breath sounds. No wheezing, rhonchi or rales.     Abdominal: Soft. Bowel sounds are normal. She exhibits no distension. There is no abdominal tenderness.   Musculoskeletal:         General: Normal range of motion.      Cervical back: Neck supple.      Right lower leg: No edema.      Left lower leg: No edema.   Neurological: She is alert and oriented to person, place, and time. Gait normal.   Skin: Skin is warm and dry.   Psychiatric: Her behavior is normal. Mood normal.     Lab Results   Component Value Date    WBC 10.82 02/21/2023    HGB 14.7 02/21/2023    HCT 46.9 02/21/2023    MCV 86.5 02/21/2023     02/21/2023       CMP  Sodium   Date Value Ref Range Status   02/21/2023 135 (L) 136 - 145 mmol/L Final     Potassium   Date Value Ref Range Status   02/21/2023 3.9 3.5 - " 5.1 mmol/L Final     Chloride   Date Value Ref Range Status   02/21/2023 99 98 - 107 mmol/L Final     CO2   Date Value Ref Range Status   02/21/2023 31 21 - 32 mmol/L Final     Glucose   Date Value Ref Range Status   02/21/2023 108 (H) 74 - 106 mg/dL Final     BUN   Date Value Ref Range Status   02/21/2023 13 7 - 18 mg/dL Final     Creatinine   Date Value Ref Range Status   02/21/2023 0.70 0.55 - 1.02 mg/dL Final     Calcium   Date Value Ref Range Status   02/21/2023 9.0 8.5 - 10.1 mg/dL Final     Total Protein   Date Value Ref Range Status   02/21/2023 7.3 6.4 - 8.2 g/dL Final     Albumin   Date Value Ref Range Status   02/21/2023 3.7 3.5 - 5.0 g/dL Final     Bilirubin, Total   Date Value Ref Range Status   02/21/2023 0.4 >0.0 - 1.2 mg/dL Final     Alk Phos   Date Value Ref Range Status   02/21/2023 122 (H) 41 - 108 U/L Final     AST   Date Value Ref Range Status   02/21/2023 27 15 - 37 U/L Final     ALT   Date Value Ref Range Status   02/21/2023 49 13 - 56 U/L Final     Anion Gap   Date Value Ref Range Status   02/21/2023 9 7 - 16 mmol/L Final     eGFR   Date Value Ref Range Status   07/24/2022 81 >=60 mL/min/1.73m² Final     Lab Results   Component Value Date    TSH 0.910 02/21/2023     Lab Results   Component Value Date    CHOL 199 07/21/2022    CHOL 186 07/15/2021     Lab Results   Component Value Date    HDL 44 07/21/2022    HDL 41 07/15/2021     Lab Results   Component Value Date    LDLCALC 110 07/21/2022    LDLCALC 84 07/15/2021     Lab Results   Component Value Date    TRIG 226 (H) 07/21/2022    TRIG 306 (H) 07/15/2021     Lab Results   Component Value Date    CHOLHDL 4.5 07/21/2022    CHOLHDL 4.5 07/15/2021     Lab Results   Component Value Date    HGBA1C 6.7 (H) 11/22/2022         Assessment and Plan   Right otitis media, unspecified otitis media type  -     neomycin-polymyxin-hydrocortisone (CORTISPORIN) 3.5-10,000-1 mg/mL-unit/mL-% otic suspension; Place 4 drops into the right ear 3 (three) times daily.  for 7 days  Dispense: 6 mL; Refill: 0    Otalgia of both ears          Patient Instructions  Patient Instructions   Cortisporin ear drops as prescribed   Follow up in clinic in one week if symptoms persist, sooner if symptoms worsen

## 2023-03-01 ENCOUNTER — OFFICE VISIT (OUTPATIENT)
Dept: FAMILY MEDICINE | Facility: CLINIC | Age: 53
End: 2023-03-01
Payer: MEDICAID

## 2023-03-01 VITALS
SYSTOLIC BLOOD PRESSURE: 130 MMHG | HEIGHT: 57 IN | BODY MASS INDEX: 37.32 KG/M2 | HEART RATE: 100 BPM | WEIGHT: 173 LBS | DIASTOLIC BLOOD PRESSURE: 83 MMHG

## 2023-03-01 DIAGNOSIS — G51.0 BELL'S PALSY: Chronic | ICD-10-CM

## 2023-03-01 DIAGNOSIS — Z85.038 HISTORY OF COLON CANCER: Chronic | ICD-10-CM

## 2023-03-01 DIAGNOSIS — H66.91 RIGHT OTITIS MEDIA, UNSPECIFIED OTITIS MEDIA TYPE: Primary | ICD-10-CM

## 2023-03-01 DIAGNOSIS — K04.7 DENTAL INFECTION: ICD-10-CM

## 2023-03-01 PROCEDURE — 3066F NEPHROPATHY DOC TX: CPT | Mod: CPTII,,, | Performed by: FAMILY MEDICINE

## 2023-03-01 PROCEDURE — 1160F PR REVIEW ALL MEDS BY PRESCRIBER/CLIN PHARMACIST DOCUMENTED: ICD-10-PCS | Mod: CPTII,,, | Performed by: FAMILY MEDICINE

## 2023-03-01 PROCEDURE — 3079F PR MOST RECENT DIASTOLIC BLOOD PRESSURE 80-89 MM HG: ICD-10-PCS | Mod: CPTII,,, | Performed by: FAMILY MEDICINE

## 2023-03-01 PROCEDURE — 96372 THER/PROPH/DIAG INJ SC/IM: CPT | Mod: JZ,,, | Performed by: FAMILY MEDICINE

## 2023-03-01 PROCEDURE — 3079F DIAST BP 80-89 MM HG: CPT | Mod: CPTII,,, | Performed by: FAMILY MEDICINE

## 2023-03-01 PROCEDURE — 3008F BODY MASS INDEX DOCD: CPT | Mod: CPTII,,, | Performed by: FAMILY MEDICINE

## 2023-03-01 PROCEDURE — 3066F PR DOCUMENTATION OF TREATMENT FOR NEPHROPATHY: ICD-10-PCS | Mod: CPTII,,, | Performed by: FAMILY MEDICINE

## 2023-03-01 PROCEDURE — 3061F NEG MICROALBUMINURIA REV: CPT | Mod: CPTII,,, | Performed by: FAMILY MEDICINE

## 2023-03-01 PROCEDURE — 99214 PR OFFICE/OUTPT VISIT, EST, LEVL IV, 30-39 MIN: ICD-10-PCS | Mod: 25,,, | Performed by: FAMILY MEDICINE

## 2023-03-01 PROCEDURE — 96372 PR INJECTION,THERAP/PROPH/DIAG2ST, IM OR SUBCUT: ICD-10-PCS | Mod: JZ,,, | Performed by: FAMILY MEDICINE

## 2023-03-01 PROCEDURE — 99214 OFFICE O/P EST MOD 30 MIN: CPT | Mod: 25,,, | Performed by: FAMILY MEDICINE

## 2023-03-01 PROCEDURE — 4010F PR ACE/ARB THEARPY RXD/TAKEN: ICD-10-PCS | Mod: CPTII,,, | Performed by: FAMILY MEDICINE

## 2023-03-01 PROCEDURE — 3075F SYST BP GE 130 - 139MM HG: CPT | Mod: CPTII,,, | Performed by: FAMILY MEDICINE

## 2023-03-01 PROCEDURE — 3008F PR BODY MASS INDEX (BMI) DOCUMENTED: ICD-10-PCS | Mod: CPTII,,, | Performed by: FAMILY MEDICINE

## 2023-03-01 PROCEDURE — 1159F MED LIST DOCD IN RCRD: CPT | Mod: CPTII,,, | Performed by: FAMILY MEDICINE

## 2023-03-01 PROCEDURE — 3061F PR NEG MICROALBUMINURIA RESULT DOCUMENTED/REVIEW: ICD-10-PCS | Mod: CPTII,,, | Performed by: FAMILY MEDICINE

## 2023-03-01 PROCEDURE — 4010F ACE/ARB THERAPY RXD/TAKEN: CPT | Mod: CPTII,,, | Performed by: FAMILY MEDICINE

## 2023-03-01 PROCEDURE — 1159F PR MEDICATION LIST DOCUMENTED IN MEDICAL RECORD: ICD-10-PCS | Mod: CPTII,,, | Performed by: FAMILY MEDICINE

## 2023-03-01 PROCEDURE — 3075F PR MOST RECENT SYSTOLIC BLOOD PRESS GE 130-139MM HG: ICD-10-PCS | Mod: CPTII,,, | Performed by: FAMILY MEDICINE

## 2023-03-01 PROCEDURE — 1160F RVW MEDS BY RX/DR IN RCRD: CPT | Mod: CPTII,,, | Performed by: FAMILY MEDICINE

## 2023-03-01 RX ORDER — LINCOMYCIN HYDROCHLORIDE 300 MG/ML
600 INJECTION, SOLUTION INTRAMUSCULAR; INTRAVENOUS; SUBCONJUNCTIVAL
Status: COMPLETED | OUTPATIENT
Start: 2023-03-01 | End: 2023-03-01

## 2023-03-01 RX ADMIN — LINCOMYCIN HYDROCHLORIDE 600 MG: 300 INJECTION, SOLUTION INTRAMUSCULAR; INTRAVENOUS; SUBCONJUNCTIVAL at 10:03

## 2023-03-01 NOTE — PROGRESS NOTES
Elkin Coombs MD   [facility]  [facility] [address] [address]  [phone]      PATIENT NAME: Hayley Landis  : 1970  DATE: 3/1/23  MRN: [id number]      Billing Provider: Elkin Coombs MD  Level of Service:   Patient PCP Information       Provider PCP Type    Elkin Coombs MD General            Reason for Visit / Chief Complaint: bells palsy and ear infection       Update PCP  Update Chief Complaint         History of Present Illness / Problem Focused Workflow     Hayley Landis presents to the clinic with bells palsy and ear infection       Recently saw NP for ear infection and is using cortisporin HC drops. Says that she thinks she is getting a dental infection on right.  Has flare up of Bell's palsy on right.  Dental pain could be associated with that.   Is scheduled to get biopsy done Monday in [place].    Review of Systems     Review of Systems   Constitutional:  Negative for activity change, chills and fever.   HENT:  Positive for ear pain. Negative for sore throat.    Respiratory:  Negative for cough, chest tightness and shortness of breath.    Cardiovascular:  Negative for chest pain and palpitations.   Gastrointestinal:  Negative for abdominal pain.   Neurological:  Positive for facial asymmetry ([place] Palsy right side). Negative for dizziness, syncope and weakness.      Medical / Social / Family History     Past Medical History:   Diagnosis Date    Colon cancer     CVA (cerebral vascular accident)     Diabetes     HTN (hypertension)     Seizure        Past Surgical History:   Procedure Laterality Date    COLON SURGERY      HERNIA REPAIR         Social History    reports that she has never smoked. She has never been exposed to tobacco smoke. She has never used smokeless tobacco. She reports that she does not drink alcohol and does not use drugs.   Social History     Tobacco Use    Smoking status: Never      Passive exposure: Never    Smokeless tobacco: Never   Substance Use Topics    Alcohol use: Never    Drug use: Never       Family History  Family History   Problem Relation Age of Onset    No Known Problems Mother     No Known Problems Father     No Known Problems Sister     No Known Problems Brother     No Known Problems Daughter     No Known Problems Son     No Known Problems Maternal Aunt     No Known Problems Maternal Uncle     No Known Problems Paternal Aunt     No Known Problems Paternal Uncle     No Known Problems Maternal Grandmother     No Known Problems Maternal Grandfather     No Known Problems Paternal Grandmother     No Known Problems Paternal Grandfather        Medications and Allergies     Medications  Outpatient Medications Marked as Taking for the 3/1/23 encounter (Office Visit) with Elkin Coombs MD   Medication Sig Dispense Refill    acyclovir (ZOVIRAX) 400 MG tablet Take 1 tablet (400 mg total) by mouth 5 (five) times daily. for 7 days 35 tablet 0    cyclobenzaprine (FLEXERIL) 10 MG tablet Take 10 mg by mouth.      dextran 70-hypromellose (TEARS) ophthalmic solution Place 1 drop into the left eye every 4 (four) hours. Use at least every 4 hours and more frequently as needed. Use at bedtime and apply eye patch. 30 mL 0    diclofenac sodium (VOLTAREN) 1 % Gel Apply 2 g topically 4 (four) times daily. 200 g 2    ergocalciferol (ERGOCALCIFEROL) 50,000 unit Cap Take 1 capsule (50,000 Units total) by mouth every 7 days. 4 capsule 1    FIBER-TABS 625 mg tablet TAKE TWO TABLETS BY MOUTH EVERY  tablet 3    hydroCHLOROthiazide (HYDRODIURIL) 12.5 MG Tab TAKE ONE TABLET BY MOUTH DAILY IN THE MORNING 90 tablet 3    insulin aspart U-100 (NOVOLOG) 100 unit/mL injection inject as directed by your physician per sliding scale 10 mL 5    insulin glargine (LANTUS U-100 INSULIN) 100 unit/mL injection Inject 70 Units into the skin once daily. (Patient taking differently: Inject 80 Units into the skin once  "daily.) 20 mL 5    insulin syringe-needle U-100 1 mL 30 gauge x 5/16 Syrg Use as directed. 100 each 5    levETIRAcetam (KEPPRA) 500 MG Tab Take 1 tablet (500 mg total) by mouth once daily. 90 tablet 1    linaCLOtide (LINZESS) 72 mcg Cap capsule Take 1 capsule (72 mcg total) by mouth before breakfast. 8 capsule 0    lisinopriL 10 MG tablet Take 1 tablet (10 mg total) by mouth once daily. 90 tablet 2    neomycin-polymyxin-hydrocortisone (CORTISPORIN) 3.5-10,000-1 mg/mL-unit/mL-% otic suspension Place 4 drops into the right ear 3 (three) times daily. for 7 days 6 mL 0    peppermint oiL (IBGARD) 90 mg CECX Take 2 capsules by mouth once daily. 48 each 2    potassium chloride SA (K-DUR,KLOR-CON) 20 MEQ tablet Take 1 tablet (20 mEq total) by mouth once daily. 30 tablet 1       Allergies  Review of patient's allergies indicates:   Allergen Reactions    Grape Anaphylaxis    Opioids - morphine analogues Anaphylaxis    Pineapple Anaphylaxis    Aspirin      "Veins breakdown" per patient    Dicyclomine Diarrhea    Gabapentin     Hydrocodone      Headache      Iron analogues      Pt hx of hemachromatosis    Metformin      diarrhea    Penicillins     Latex Rash       Physical Examination     Vitals:    03/01/23 1010   BP: 130/83   Pulse: 100     Physical Exam  Vitals reviewed.   Constitutional:       Appearance: Normal appearance.   HENT:      Head: Normocephalic and atraumatic.      Right Ear: Tympanic membrane normal.      Left Ear: Tympanic membrane normal.   Eyes:      Extraocular Movements: Extraocular movements intact.      Conjunctiva/sclera: Conjunctivae normal.      Pupils: Pupils are equal, round, and reactive to light.   Cardiovascular:      Rate and Rhythm: Normal rate and regular rhythm.      Heart sounds: Normal heart sounds.   Pulmonary:      Effort: Pulmonary effort is normal.      Breath sounds: Normal breath sounds.   Musculoskeletal:         General: Normal range of motion.      Cervical back: Normal range of " motion.   Skin:     General: Skin is warm and dry.   Neurological:      Mental Status: She is alert and oriented to person, place, and time.   Psychiatric:         Mood and Affect: Mood normal.         Behavior: Behavior normal.      Office Visit on 02/21/2023   Component Date Value Ref Range Status    Sodium 02/21/2023 135 (L)  136 - 145 mmol/L Final    Potassium 02/21/2023 3.9  3.5 - 5.1 mmol/L Final    Chloride 02/21/2023 99  98 - 107 mmol/L Final    CO2 02/21/2023 31  21 - 32 mmol/L Final    Anion Gap 02/21/2023 9  7 - 16 mmol/L Final    Glucose 02/21/2023 108 (H)  74 - 106 mg/dL Final    BUN 02/21/2023 13  7 - 18 mg/dL Final    Creatinine 02/21/2023 0.70  0.55 - 1.02 mg/dL Final    BUN/Creatinine Ratio 02/21/2023 19  6 - 20 Final    Calcium 02/21/2023 9.0  8.5 - 10.1 mg/dL Final    eGFR 02/21/2023 105  >=60 mL/min/1.73m² Final    Total Protein 02/21/2023 7.3  6.4 - 8.2 g/dL Final    Albumin 02/21/2023 3.7  3.5 - 5.0 g/dL Final    Bilirubin, Total 02/21/2023 0.4  >0.0 - 1.2 mg/dL Final    Bilirubin, Direct 02/21/2023 0.1  0.0 - 0.2 mg/dL Final    AST 02/21/2023 27  15 - 37 U/L Final    ALT 02/21/2023 49  13 - 56 U/L Final    Alk Phos 02/21/2023 122 (H)  41 - 108 U/L Final    Ferritin 02/21/2023 52  8 - 252 ng/mL Final    Iron 02/21/2023 33 (L)  50 - 170 µg/dL Final    Iron Saturation 02/21/2023 8 (L)  14 - 50 % Final    TIBC 02/21/2023 403  250 - 450 µg/dL Final    TSH 02/21/2023 0.910  0.358 - 3.740 uIU/mL Final    WBC 02/21/2023 10.82  4.50 - 11.00 K/uL Final    RBC 02/21/2023 5.42 (H)  4.20 - 5.40 M/uL Final    Hemoglobin 02/21/2023 14.7  12.0 - 16.0 g/dL Final    Hematocrit 02/21/2023 46.9  38.0 - 47.0 % Final    MCV 02/21/2023 86.5  80.0 - 96.0 fL Final    MCH 02/21/2023 27.1  27.0 - 31.0 pg Final    MCHC 02/21/2023 31.3 (L)  32.0 - 36.0 g/dL Final    RDW 02/21/2023 15.2 (H)  11.5 - 14.5 % Final    Platelet Count 02/21/2023 176  150 - 400 K/uL Final    MPV 02/21/2023 12.8 (H)  9.4 - 12.4 fL Final     Neutrophils % 02/21/2023 68.7 (H)  53.0 - 65.0 % Final    Lymphocytes % 02/21/2023 19.7 (L)  27.0 - 41.0 % Final    Monocytes % 02/21/2023 5.2  2.0 - 6.0 % Final    Eosinophils % 02/21/2023 5.1 (H)  1.0 - 4.0 % Final    Basophils % 02/21/2023 0.7  0.0 - 1.0 % Final    Immature Granulocytes % 02/21/2023 0.6 (H)  0.0 - 0.4 % Final    nRBC, Auto 02/21/2023 0.0  <=0.0 % Final    Neutrophils, Abs 02/21/2023 7.43  1.80 - 7.70 K/uL Final    Lymphocytes, Absolute 02/21/2023 2.13  1.00 - 4.80 K/uL Final    Monocytes, Absolute 02/21/2023 0.56  0.00 - 0.80 K/uL Final    Eosinophils, Absolute 02/21/2023 0.55 (H)  0.00 - 0.50 K/uL Final    Basophils, Absolute 02/21/2023 0.08  0.00 - 0.20 K/uL Final    Immature Granulocytes, Absolute 02/21/2023 0.07 (H)  0.00 - 0.04 K/uL Final    nRBC, Absolute 02/21/2023 0.00  <=0.00 x10e3/uL Final    Diff Type 02/21/2023 Auto   Final       Assessment and Plan (including Health Maintenance)      Problem List  Smart Sets  Document Outside HM   :    Plan: stressed importance of getting biopsy done as scheduled        Health Maintenance Due   Topic Date Due    Pneumococcal Vaccines (Age 0-64) (1 - PCV) Never done    Eye Exam  Never done    TETANUS VACCINE  Never done    Shingles Vaccine (1 of 2) Never done    COVID-19 Vaccine (4 - Booster for Moderna series) 04/12/2022       Problem List Items Addressed This Visit    None      Health Maintenance Topics with due status: Not Due       Topic Last Completion Date    Colorectal Cancer Screening 06/08/2020    Lipid Panel 07/21/2022    Mammogram 10/17/2022    Hemoglobin A1c 11/22/2022    Diabetes Urine Screening 02/03/2023    Foot Exam 02/03/2023       Future Appointments   Date Time Provider Department Center   10/23/2023 11:30 AM [facility]            Signature:  MD RADHA Doyle [facility]  [facility]  [address] [place] [address]  [id number]  [phone]    Date of encounter: 3/1/23

## 2023-03-30 DIAGNOSIS — M25.511 RIGHT SHOULDER PAIN: Primary | ICD-10-CM

## 2023-04-04 ENCOUNTER — CLINICAL SUPPORT (OUTPATIENT)
Dept: REHABILITATION | Facility: HOSPITAL | Age: 53
End: 2023-04-04
Payer: MEDICAID

## 2023-04-04 DIAGNOSIS — M25.511 RIGHT SHOULDER PAIN: ICD-10-CM

## 2023-04-04 DIAGNOSIS — G89.29 CHRONIC RIGHT SHOULDER PAIN: ICD-10-CM

## 2023-04-04 DIAGNOSIS — M25.511 CHRONIC RIGHT SHOULDER PAIN: ICD-10-CM

## 2023-04-04 PROCEDURE — 97161 PT EVAL LOW COMPLEX 20 MIN: CPT

## 2023-04-04 NOTE — PLAN OF CARE
"[facility]  Physical Therapy Initial Evaluation    Name: Hayley Landis  Clinic Number: [id number]    Therapy Diagnosis:   Encounter Diagnoses   Name Primary?    Right shoulder pain     Chronic right shoulder pain       Physician: Mily Ryan  Physician Orders: PT Eval and Treat  Medical Diagnosis from Referral: Right shoulder pain   Evaluation Date: 4/4/2023  Plan of Care Expiration: 5/16/2023    FOTO: 1 / 3    Visit # / Visits authorized: 1 / 1 (Eval)      PRECAUTIONS: Standard Precautions     Time In: 1:45 PM  Time Out: 2:30 PM  Total Appointment Time (timed & untimed codes): 45 minutes    SUBJECTIVE   Date of onset: Chronic with recent exacerbation Jan 2023     History of current condition - Hayley is a 52 y.o. female whom reports a bad injury 14 years ago where she fell of a bridge where she had to keep herself up with right arm, and usually she is not able to sleep on that side. In January she had difficulty moving it but over the past two weeks it has made some improvement, patient reports she has an xray recently and was told she arthritis. Another example was two weeks ago she was assigned her hands she got her arm stuck into extension and adduction  Hayley's current exercise regiment includes: None.  Seeking Physical Therapy for More movement with arm .    FARSHAD: None  Falls: None  Physician Instructions (per patient): None  Other concerns: None    Imaging: X-RAY performed on [facility] in [place]    ~ 2 weeks ago and states she has arthririts.     Prior Therapy: Yes; vaginal therapy for her bladder  Social History: Pt lives with their family  Living Environment: 3 steps to get inside house,   ADLs unable to complete: "not overly entregentic since she had cancer, but she is dong better"  Gym/Home Equipment: [place] [employer]  Occupation: Pt is disabled  Prior Level of Function: Independent with all ADLs  Current Level of Function: 35% of PLOF    Pain:  Current 3 /10, worst 8 /10, " "best 3 /10   Location: Right shoulder  Description: Tight and Sharp  Aggravating Factors: Movement into extension and abduction  Easing Factors: Heat    Dominant Extremity: Right    Pts goals: Pt reported goals are Decrease pain and increase motion     _______________________________________________________  Medical History:   Past Medical History:   Diagnosis Date    Colon cancer     CVA (cerebral vascular accident)     Diabetes     HTN (hypertension)     Seizure        Surgical History:   Hayley Landis  has a past surgical history that includes Hernia repair and Colon surgery.    Medications:   Hayley has a current medication list which includes the following prescription(s): acyclovir, cyclobenzaprine, dextran 70-hypromellose, diclofenac sodium, ergocalciferol, fiber-tabs, hydrochlorothiazide, insulin aspart u-100, insulin glargine, insulin syringe-needle u-100, levetiracetam, linaclotide, lisinopril, ibgard, and potassium chloride sa.    Allergies:   Review of patient's allergies indicates:   Allergen Reactions    Grape Anaphylaxis    Opioids - morphine analogues Anaphylaxis    Pineapple Anaphylaxis    Aspirin      "Veins breakdown" per patient    Dicyclomine Diarrhea    Gabapentin     Hydrocodone      Headache      Iron analogues      Pt hx of hemachromatosis    Metformin      diarrhea    Penicillins     Latex Rash        OBJECTIVE   (x = not tested due to pain and/or inability to obtain test position)    RANGE OF MOTION:    Cervical Right   (spine)  4/4/2023 Left     4/4/2023 Goal   Cervical Flexion (60) wfl --- 45  Initial: x   Cervical Extension (90) wfl --- 45  Initial: x   Cervical Side Bending (45) Wfl - Pulling  Wfl - Pulling  45  Initial: x   Cervical Rotation (75) Wfl - Pulling  Wfl - Pulling  60  Initial: x     Shoulder AROM/PROM Right  4/4/2023 Left  4/4/2023 Goal   Forward Flexion (180) WFL P!   Initial:    ER at 90 degrees (90) X X 90  Initial:    ER at 0 degrees (45)  X X 45  Initial: "    Functional ER (C7) C3 C7 C7  Initial: C7   Functional IR (T10) L4 T10 T10  Initial: T10     STRENGTH:    U/E MMT Right  4/4/2023 Goal   Shoulder Flexion 4-/5 5/5 B   Shoulder Abduction 4/5 5/5 B   Shoulder IR 4-/5 5/5 B   Shoulder ER 4-/5 5/5 B   Elbow Flexion  4-/5 5/5 B   Elbow Extension 4/5 5/5 B     U/E MMT Left  4/4/2023 Goal   Shoulder Flexion 4+/5 5/5 B   Shoulder Abduction 4/5 5/5 B   Shoulder IR 4+/5 5/5 B   Shoulder ER 4+/5 5/5 B   Elbow Flexion  4/5 5/5 B   Elbow Extension 4/5 5/5 B       JOINT MOBILITY:     Joint Motion Tested Right  4/4/2023 Left   4/4/2023 Goal   GHJ Posterior Glide Hypomobile Normal Normal B   GHJ Inferior Glide Hypomobile Normal Normal B   GHJ Lateral Glide Normal Normal Normal B   GHJ Anterior Glide Normal Normal Normal B     SPECIAL TESTS:     Right  4/4/2023 Left   4/4/2023 Goal   Colon Ulisses Negative Negative Negative B    Neers Negative Negative Negative B    Empty Can Positive Positive Negative B    Full Can Positive Negative Negative B    Sulcus Sign Negative Negative Negative B          Sensation:  Sensation is intact to light touch    Palpation: Increased tone and tenderness noted with palpation to: R Posterior deltoid, R Supraspinatus     Posture:  Pt presents with postural abnormalities which include: forward head, rounded shoulders , and rounded shoulders    Gait: N/A    Movement Analysis: N/A    Balance: N/A      FUNCTION:     CMS Impairment/Limitation/Restriction for FOTO Shoulder Survey    Therapist reviewed FOTO scores for Hayley Landis on 4/4/2023.   FOTO documents entered into [employer] - see Media section.    Limitation Score: 49%         TREATMENT     Total Treatment time separate from Evaluation:  minutes    Hayley received therapeutic exercises to develop strength, endurance, ROM, flexibility, and posture for minutes including: x = exercises performed     TherEx 4/4/2023    Shoulder Cane Flexion      Shoulder Cane External Rotatoin       Shoulder Cane  Extension            Plan for Next Visit:        Home Exercises and Patient Education Provided    Education/Self-Care provided:   Patient educated on the impairments noted above and the effects of physical therapy intervention to improve overall condition and QOL.   Patient was educated on all the above exercise prior/during/after for proper posture, positioning, and execution for safe performance with home exercise program.      Written Home Exercises Provided: yes. Prefers: Printed Copies  Exercises were reviewed and Hayley was able to demonstrate them prior to the end of the session.  Hayley demonstrated good understanding of the education provided.     See EMR under Patient Instructions for exercises provided during therapy sessions.    ASSESSMENT     Hayley is a 52 y.o. female referred to outpatient Physical Therapy with a medical diagnosis of Right shoulder pain [M25.511].  Hayley presents with clinical signs and symptoms that support this diagnosis with decreased shoulder girdle ROM, decreased scapular and shoulder strength, Glenohumeral joint hypomobility, and impaired functional mobility. The above impairments will be addressed through manual therapy techniques, therapeutic exercises, functional training, and modalities as necessary. Patient was treated and educated on exercises for home program, progression of therapy, and benefits of therapy to achieve full functional mobility.     Pt prognosis is Good.  Pt will benefit from skilled outpatient Physical Therapy to address the deficits stated above and in the chart below, provide pt/family education, and to maximize pt's level of independence.     Plan of care discussed with patient: Yes  Pt's spiritual, cultural and educational needs considered and patient is agreeable to the plan of care and goals as stated below:     Anticipated Barriers for therapy: co-morbidities, sedentary lifestyle, and chronicity of condition    Medical Necessity is  demonstrated by the following  Clinical Presentation stable and uncomplicated low   Decision Making/ Complexity Score: low       GOALS:  SHORT TERM GOALS: 3 weeks,  4/4/2023   Recent signs and systems trend is improving in order to progress towards LTG's.    Patient will be independent with HEP in order to further progress and return to maximal function.    Pain rating at Worst: 5/10 in order to progress towards increased independence with activity.    Patient will be able to correct postural deviations in sitting and standing, to decrease pain and promote postural awareness for injury prevention.       LONG TERM GOALS: 6 weeks,  4/4/2023   Patient will return to normal ADL, recreational, and work related activities with less pain and limitation.     Patient will improve AROM to stated goals in order to return to maximal functional potential.     Patient will improve Strength to stated goals of appropriate musculature in order to improve functional independence.     Pain Rating at Best: 1/10 to improve Quality of Life.     Patient will meet predicted functional outcome (FOTO) score: 5% to increase self-worth & perceived functional ability.    Patient will have met/partially met personal goal of:         PLAN   Plan of care Certification: 4/4/2023 to 5/16/2023    Outpatient Physical Therapy 2 times weekly for 6 weeks to include any combination of the following interventions: virtual visits, dry needling, modalities, electrical stimulation (IFC, Pre-Mod, Attended with Functional Dry Needling), Electrical Stimulation  , Manual Therapy, Moist Heat/ Ice, Neuromuscular Re-ed, Patient Education, Self Care, Therapeutic Activities, Therapeutic Exercise, Ultrasound, Functional Training, and Therapeutic Activites     Thank you for this referral.    These services are reasonable and necessary for the conditions set forth above while under my care.    Elizabeth Smith, PT, DPT

## 2023-04-20 ENCOUNTER — OFFICE VISIT (OUTPATIENT)
Dept: FAMILY MEDICINE | Facility: CLINIC | Age: 53
End: 2023-04-20
Payer: MEDICAID

## 2023-04-20 VITALS
HEIGHT: 57 IN | SYSTOLIC BLOOD PRESSURE: 148 MMHG | BODY MASS INDEX: 37.32 KG/M2 | DIASTOLIC BLOOD PRESSURE: 86 MMHG | HEART RATE: 94 BPM | WEIGHT: 173 LBS

## 2023-04-20 DIAGNOSIS — Z79.4 TYPE 2 DIABETES MELLITUS WITHOUT COMPLICATION, WITH LONG-TERM CURRENT USE OF INSULIN: Primary | Chronic | ICD-10-CM

## 2023-04-20 DIAGNOSIS — E11.9 TYPE 2 DIABETES MELLITUS WITHOUT COMPLICATION, WITH LONG-TERM CURRENT USE OF INSULIN: Primary | Chronic | ICD-10-CM

## 2023-04-20 DIAGNOSIS — I10 PRIMARY HYPERTENSION: Chronic | ICD-10-CM

## 2023-04-20 DIAGNOSIS — Z85.038 HISTORY OF COLON CANCER: ICD-10-CM

## 2023-04-20 PROCEDURE — 99214 OFFICE O/P EST MOD 30 MIN: CPT | Mod: ,,, | Performed by: FAMILY MEDICINE

## 2023-04-20 PROCEDURE — 99214 PR OFFICE/OUTPT VISIT, EST, LEVL IV, 30-39 MIN: ICD-10-PCS | Mod: ,,, | Performed by: FAMILY MEDICINE

## 2023-04-20 PROCEDURE — 3079F DIAST BP 80-89 MM HG: CPT | Mod: CPTII,,, | Performed by: FAMILY MEDICINE

## 2023-04-20 PROCEDURE — 3079F PR MOST RECENT DIASTOLIC BLOOD PRESSURE 80-89 MM HG: ICD-10-PCS | Mod: CPTII,,, | Performed by: FAMILY MEDICINE

## 2023-04-20 PROCEDURE — 1159F PR MEDICATION LIST DOCUMENTED IN MEDICAL RECORD: ICD-10-PCS | Mod: CPTII,,, | Performed by: FAMILY MEDICINE

## 2023-04-20 PROCEDURE — 3008F PR BODY MASS INDEX (BMI) DOCUMENTED: ICD-10-PCS | Mod: CPTII,,, | Performed by: FAMILY MEDICINE

## 2023-04-20 PROCEDURE — 3008F BODY MASS INDEX DOCD: CPT | Mod: CPTII,,, | Performed by: FAMILY MEDICINE

## 2023-04-20 PROCEDURE — 3077F PR MOST RECENT SYSTOLIC BLOOD PRESSURE >= 140 MM HG: ICD-10-PCS | Mod: CPTII,,, | Performed by: FAMILY MEDICINE

## 2023-04-20 PROCEDURE — 3061F NEG MICROALBUMINURIA REV: CPT | Mod: CPTII,,, | Performed by: FAMILY MEDICINE

## 2023-04-20 PROCEDURE — 3066F NEPHROPATHY DOC TX: CPT | Mod: CPTII,,, | Performed by: FAMILY MEDICINE

## 2023-04-20 PROCEDURE — 1160F PR REVIEW ALL MEDS BY PRESCRIBER/CLIN PHARMACIST DOCUMENTED: ICD-10-PCS | Mod: CPTII,,, | Performed by: FAMILY MEDICINE

## 2023-04-20 PROCEDURE — 4010F ACE/ARB THERAPY RXD/TAKEN: CPT | Mod: CPTII,,, | Performed by: FAMILY MEDICINE

## 2023-04-20 PROCEDURE — 4010F PR ACE/ARB THEARPY RXD/TAKEN: ICD-10-PCS | Mod: CPTII,,, | Performed by: FAMILY MEDICINE

## 2023-04-20 PROCEDURE — 1159F MED LIST DOCD IN RCRD: CPT | Mod: CPTII,,, | Performed by: FAMILY MEDICINE

## 2023-04-20 PROCEDURE — 3061F PR NEG MICROALBUMINURIA RESULT DOCUMENTED/REVIEW: ICD-10-PCS | Mod: CPTII,,, | Performed by: FAMILY MEDICINE

## 2023-04-20 PROCEDURE — 3077F SYST BP >= 140 MM HG: CPT | Mod: CPTII,,, | Performed by: FAMILY MEDICINE

## 2023-04-20 PROCEDURE — 3066F PR DOCUMENTATION OF TREATMENT FOR NEPHROPATHY: ICD-10-PCS | Mod: CPTII,,, | Performed by: FAMILY MEDICINE

## 2023-04-20 PROCEDURE — 1160F RVW MEDS BY RX/DR IN RCRD: CPT | Mod: CPTII,,, | Performed by: FAMILY MEDICINE

## 2023-04-20 RX ORDER — INSULIN GLARGINE 100 [IU]/ML
80 INJECTION, SOLUTION SUBCUTANEOUS DAILY
Qty: 30 ML | Refills: 5 | Status: SHIPPED | OUTPATIENT
Start: 2023-04-20 | End: 2023-12-22 | Stop reason: SDUPTHER

## 2023-04-20 NOTE — PROGRESS NOTES
"   Elkin Coombs MD   [facility]  MEDICAL [facility] [address] [place] [address]  [phone]      PATIENT NAME: Hayley Landis  : 1970  DATE: 23  MRN: [id number]      Billing Provider: Elkin Coombs MD  Level of Service:   Patient PCP Information       Provider PCP Type    Elkin Coombs MD General            Reason for Visit / Chief Complaint: Follow-up (Follow-up. Medication refills. )       Update PCP  Update Chief Complaint         History of Present Illness / Problem Focused Workflow     Hayley Landis presents to the clinic with Follow-up (Follow-up. Medication refills. )       53 yo WF treated for DM and HTN.  She is needing her lantus refilled.  Says that she has been out of lantus x 5 days.  She has a h/o colorectal cancer.  There was some concern for recurrence due to rise in CEA.  She has seen her oncologist recently and has had PET scan and biopsies done.  PET scan was suspicious for metastatic adenopathy.  Biopsies found to be benign.  Has also seen gynecological oncologist and surgical oncologist.  I have reviewed all of those encounter notes.  She will be followed closely with labs and imaging for now.  She declined any additional biopsies at this time.  She says that she feels good and that she does not "feel sick."  Says that she has good energy and that her bowels are "doing well."      Follow-up  Associated symptoms include arthralgias. Pertinent negatives include no abdominal pain, chest pain, chills, coughing, fever, sore throat or weakness.     Review of Systems     Review of Systems   Constitutional:  Negative for activity change, chills and fever.   HENT:  Positive for dental problem. Negative for sore throat.    Eyes:  Negative for pain.   Respiratory:  Negative for cough, chest tightness and shortness of breath.    Cardiovascular:  Negative for chest pain and palpitations.   Gastrointestinal:  Negative " for abdominal pain, blood in stool and fecal incontinence.   Musculoskeletal:  Positive for arthralgias.   Neurological:  Negative for dizziness, syncope and weakness.   Psychiatric/Behavioral:  Negative for confusion.       Medical / Social / Family History     Past Medical History:   Diagnosis Date    Colon cancer     CVA (cerebral vascular accident)     Diabetes     HTN (hypertension)     Seizure        Past Surgical History:   Procedure Laterality Date    COLON SURGERY      HERNIA REPAIR         Social History    reports that she has never smoked. She has never been exposed to tobacco smoke. She has never used smokeless tobacco. She reports that she does not drink alcohol and does not use drugs.   Social History     Tobacco Use    Smoking status: Never     Passive exposure: Never    Smokeless tobacco: Never   Substance Use Topics    Alcohol use: Never    Drug use: Never       Family History  Family History   Problem Relation Age of Onset    No Known Problems Mother     No Known Problems Father     No Known Problems Sister     No Known Problems Brother     No Known Problems Daughter     No Known Problems Son     No Known Problems Maternal Aunt     No Known Problems Maternal Uncle     No Known Problems Paternal Aunt     No Known Problems Paternal Uncle     No Known Problems Maternal Grandmother     No Known Problems Maternal Grandfather     No Known Problems Paternal Grandmother     No Known Problems Paternal Grandfather        Medications and Allergies     Medications  Outpatient Medications Marked as Taking for the 4/20/23 encounter (Office Visit) with Elkin Coombs MD   Medication Sig Dispense Refill    acyclovir (ZOVIRAX) 400 MG tablet Take 1 tablet (400 mg total) by mouth 5 (five) times daily. for 7 days 35 tablet 0    cyclobenzaprine (FLEXERIL) 10 MG tablet Take 10 mg by mouth.      dextran 70-hypromellose (TEARS) ophthalmic solution Place 1 drop into the left eye every 4  "(four) hours. Use at least every 4 hours and more frequently as needed. Use at bedtime and apply eye patch. 30 mL 0    diclofenac sodium (VOLTAREN) 1 % Gel Apply 2 g topically 4 (four) times daily. 200 g 2    ergocalciferol (ERGOCALCIFEROL) 50,000 unit Cap Take 1 capsule (50,000 Units total) by mouth every 7 days. 4 capsule 1    FIBER-TABS 625 mg tablet TAKE TWO TABLETS BY MOUTH EVERY  tablet 3    hydroCHLOROthiazide (HYDRODIURIL) 12.5 MG Tab TAKE ONE TABLET BY MOUTH DAILY IN THE MORNING 90 tablet 3    insulin aspart U-100 (NOVOLOG) 100 unit/mL injection inject as directed by your physician per sliding scale 10 mL 5    insulin syringe-needle U-100 1 mL 30 gauge x 5/16 Syrg Use as directed. 100 each 5    levETIRAcetam (KEPPRA) 500 MG Tab Take 1 tablet (500 mg total) by mouth once daily. 90 tablet 1    linaCLOtide (LINZESS) 72 mcg Cap capsule Take 1 capsule (72 mcg total) by mouth before breakfast. 8 capsule 0    lisinopriL 10 MG tablet Take 1 tablet (10 mg total) by mouth once daily. 90 tablet 2    peppermint oiL (IBGARD) 90 mg CECX Take 2 capsules by mouth once daily. 48 each 2    potassium chloride SA (K-DUR,KLOR-CON) 20 MEQ tablet Take 1 tablet (20 mEq total) by mouth once daily. 30 tablet 1    [DISCONTINUED] insulin glargine (LANTUS U-100 INSULIN) 100 unit/mL injection Inject 70 Units into the skin once daily. (Patient taking differently: Inject 80 Units into the skin once daily.) 20 mL 5       Allergies  Review of patient's allergies indicates:   Allergen Reactions    Grape Anaphylaxis    Opioids - morphine analogues Anaphylaxis    Pineapple Anaphylaxis    Aspirin      "Veins breakdown" per patient    Dicyclomine Diarrhea    Gabapentin     Hydrocodone      Headache      Iron analogues      Pt hx of hemachromatosis    Metformin      diarrhea    Penicillins     Latex Rash       Physical Examination     Vitals:    04/20/23 1133   BP: (!) 148/86   Pulse: 94     Physical Exam  Vitals " reviewed.   Constitutional:       Appearance: Normal appearance.   HENT:      Head: Normocephalic and atraumatic.   Eyes:      Extraocular Movements: Extraocular movements intact.      Conjunctiva/sclera: Conjunctivae normal.      Pupils: Pupils are equal, round, and reactive to light.   Cardiovascular:      Rate and Rhythm: Normal rate and regular rhythm.      Heart sounds: Normal heart sounds.   Pulmonary:      Effort: Pulmonary effort is normal.      Breath sounds: Normal breath sounds.   Musculoskeletal:         General: Normal range of motion.      Cervical back: Normal range of motion.   Skin:     General: Skin is warm and dry.   Neurological:      General: No focal deficit present.      Mental Status: She is alert and oriented to person, place, and time.   Psychiatric:         Mood and Affect: Mood normal.         Behavior: Behavior normal.        Component 03/22/23 12/06/22 04/12/22 10/27/21 06/30/21 03/31/21   CEA 24.60 High  9.94 High  4.59 3.37 3.46 2.90       Assessment and Plan (including Health Maintenance)      Problem List  Smart Sets  Document Outside HM   :    Plan:         Health Maintenance Due   Topic Date Due    Pneumococcal Vaccines (Age 0-64) (1 - PCV) Never done    Eye Exam  Never done    TETANUS VACCINE  Never done    Shingles Vaccine (1 of 2) Never done    COVID-19 Vaccine (4 - Booster for Moderna series) 04/12/2022       Problem List Items Addressed This Visit          Cardiac/Vascular    Primary hypertension (Chronic)       Oncology    History of colon cancer       Endocrine    Type 2 diabetes mellitus without complication, with long-term current use of insulin - Primary (Chronic)    Relevant Medications    insulin glargine (LANTUS U-100 INSULIN) 100 unit/mL injection       Health Maintenance Topics with due status: Not Due       Topic Last Completion Date    Colorectal Cancer Screening 06/08/2020    Lipid Panel 07/21/2022    Mammogram 10/17/2022    Hemoglobin A1c 11/22/2022     Diabetes Urine Screening 02/03/2023    Foot Exam 02/03/2023       Future Appointments   Date Time Provider Department Center   4/24/2023  8:30 AM [facility]   4/26/2023  9:15 AM [facility]   5/1/2023  7:45 AM [facility], [facility]   5/3/2023 10:45 AM [facility]   10/23/2023 11:30 AM [facility]            Signature:  Elkin Coombs MD  [facility]  MEDICAL GROUP [facility]  [address] [place] [id number]  [phone]    Date of encounter: 4/20/23

## 2023-04-26 ENCOUNTER — CLINICAL SUPPORT (OUTPATIENT)
Dept: REHABILITATION | Facility: HOSPITAL | Age: 53
End: 2023-04-26
Payer: MEDICAID

## 2023-04-26 DIAGNOSIS — M25.511 CHRONIC RIGHT SHOULDER PAIN: Primary | ICD-10-CM

## 2023-04-26 DIAGNOSIS — G89.29 CHRONIC RIGHT SHOULDER PAIN: Primary | ICD-10-CM

## 2023-04-26 PROCEDURE — 97110 THERAPEUTIC EXERCISES: CPT | Mod: CQ

## 2023-04-26 PROCEDURE — 97112 NEUROMUSCULAR REEDUCATION: CPT | Mod: CQ

## 2023-04-26 NOTE — PROGRESS NOTES
[facility]   Physical Therapy Treatment Note     Name: Hayley Landis  Clinic Number: [id number]    Therapy Diagnosis:   Encounter Diagnosis   Name Primary?    Chronic right shoulder pain Yes     Physician: Mily Ryan MD    Visit Date: 4/26/2023  Physician: Mily Ryan  Physician Orders: PT Eval and Treat  Medical Diagnosis from Referral: Right shoulder pain   Evaluation Date: 4/4/2023  Plan of Care Expiration: 5/16/2023                FOTO: 1 / 3    Visit # / Visits authorized: 2/13         PTA Visit #: 1/5     Time In: 9:15 am  Time Out: 9:55 am   Total Billable Time: 40 minutes    SUBJECTIVE     Pt reports: Pain is a 2/10.  She was compliant with home exercise program.  Response to previous treatment: first treatment after evaluation  Functional change: none     Pain: 2/10  Location: right shoulder      OBJECTIVE     Objective Measures updated at progress report unless specified.     Treatment     Hayley received the treatments listed below:      therapeutic exercises to develop strength, ROM, and posture for 25 minutes including:  UBE 5 minutes  Pulleys 3 minutes  Corner stretch 4 x 15 seconds  Cybex rows close and wide 2 x 10 2#  Shoulder press 2 x 10 2#    neuromuscular re-education activities to improve: Balance, Coordination, Proprioception, and Posture for 15 minutes. The following activities were included:    Bilateral shoulder external rotation 2 x 10 red theraband   Shoulder horizontal abduction 2 x 10 red theraband   External rotation with cane 2 x 10   Shoulder extension with retraction 2 x 10 green theraband       Patient Education and Home Exercises     Home Exercises Provided and Patient Education Provided     Education provided:   - encouraged to continue home exercise program     Written Home Exercises Provided: Patient instructed to cont prior HEP. Exercises were reviewed and Hayley was able to demonstrate them prior to the end of the session.   Hayley demonstrated good  understanding of the education provided. See EMR under Patient Instructions for exercises provided during therapy sessions    ASSESSMENT     Case conference with Elizabeth Smith DPT. Patient needed cues throughout treatment session. Patient did well today but had some difficulty. Will continue to progress as tolerated.    Hayley Is progressing towards her goals.   Pt prognosis is Good.     Pt will continue to benefit from skilled outpatient physical therapy to address the deficits listed in the problem list box on initial evaluation, provide pt/family education and to maximize pt's level of independence in the home and community environment.     Pt's spiritual, cultural and educational needs considered and pt agreeable to plan of care and goals.     Anticipated barriers to physical therapy: none     Goals:   SHORT TERM GOALS: 3 weeks,  4/4/2023   Recent signs and systems trend is improving in order to progress towards LTG's.     Patient will be independent with HEP in order to further progress and return to maximal function.     Pain rating at Worst: 5/10 in order to progress towards increased independence with activity.     Patient will be able to correct postural deviations in sitting and standing, to decrease pain and promote postural awareness for injury prevention.         LONG TERM GOALS: 6 weeks,  4/4/2023   Patient will return to normal ADL, recreational, and work related activities with less pain and limitation.      Patient will improve AROM to stated goals in order to return to maximal functional potential.      Patient will improve Strength to stated goals of appropriate musculature in order to improve functional independence.      Pain Rating at Best: 1/10 to improve Quality of Life.      Patient will meet predicted functional outcome (FOTO) score: 5% to increase self-worth & perceived functional ability.     Patient will have met/partially met personal goal of:          PLAN      Plan of care Certification: 4/4/2023 to 5/16/2023     Outpatient Physical Therapy 2 times weekly for 6 weeks to include any combination of the following interventions: virtual visits, dry needling, modalities, electrical stimulation (IFC, Pre-Mod, Attended with Functional Dry Needling), Electrical Stimulation  , Manual Therapy, Moist Heat/ Ice, Neuromuscular Re-ed, Patient Education, Self Care, Therapeutic Activities, Therapeutic Exercise, Ultrasound, Functional Training, and Therapeutic Activites        Christine Albarran, KAYODE   04/26/2023

## 2023-05-01 ENCOUNTER — CLINICAL SUPPORT (OUTPATIENT)
Dept: REHABILITATION | Facility: HOSPITAL | Age: 53
End: 2023-05-01
Payer: MEDICAID

## 2023-05-01 DIAGNOSIS — M25.511 CHRONIC RIGHT SHOULDER PAIN: Primary | ICD-10-CM

## 2023-05-01 DIAGNOSIS — G89.29 CHRONIC RIGHT SHOULDER PAIN: Primary | ICD-10-CM

## 2023-05-01 PROCEDURE — 97112 NEUROMUSCULAR REEDUCATION: CPT | Mod: CQ

## 2023-05-01 PROCEDURE — 97110 THERAPEUTIC EXERCISES: CPT | Mod: CQ

## 2023-05-01 NOTE — PROGRESS NOTES
[facility]   Physical Therapy Treatment Note     Name: Hayley Landis  Clinic Number: [id number]    Therapy Diagnosis:   Encounter Diagnosis   Name Primary?    Chronic right shoulder pain Yes     Physician: Mily Ryan MD    Visit Date: 5/1/2023  Physician: Mily Ryan  Physician Orders: PT Eval and Treat  Medical Diagnosis from Referral: Right shoulder pain   Evaluation Date: 4/4/2023  Plan of Care Expiration: 5/16/2023                FOTO: 1/3    Visit # / Visits authorized: 3/13         PTA Visit #: 2/5     Time In: 7:45 am  Time Out: 8:23 am   Total Billable Time: 38 minutes    SUBJECTIVE     Pt reports: Pain is a 4/10. I had a little trouble sleeping.  She was compliant with home exercise program.  Response to previous treatment: first treatment after evaluation  Functional change: none     Pain: 4/10  Location: right shoulder      OBJECTIVE     Objective Measures updated at progress report unless specified.     Treatment     Hayley received the treatments listed below:      therapeutic exercises to develop strength, ROM, and posture for 23 minutes including:  UBE 5 minutes  Pulleys 3 minutes  Corner stretch 4 x 15 seconds  Cybex rows close and wide 2 x 10 2#  Shoulder press 2 x 10 2#    neuromuscular re-education activities to improve: Balance, Coordination, Proprioception, and Posture for 15 minutes. The following activities were included:    Bilateral shoulder external rotation 3 x 10 red theraband   Shoulder horizontal abduction 3 x 10 red theraband   External rotation with cane 2 x 10   Shoulder extension with retraction 3 x 10 green theraband       Patient Education and Home Exercises     Home Exercises Provided and Patient Education Provided     Education provided:   - encouraged to continue home exercise program     Written Home Exercises Provided: Patient instructed to cont prior HEP. Exercises were reviewed and Hayley was able to demonstrate them prior  to the end of the session.  Hayley demonstrated good  understanding of the education provided. See EMR under Patient Instructions for exercises provided during therapy sessions    ASSESSMENT     Patient needed cues throughout treatment session. Patient did well today but had some difficulty. Patient continue to have forward head and rounded shoulders. Focused on strengthening and stretches. Will continue to progress as tolerated.    Hayley Is progressing towards her goals.   Pt prognosis is Good.     Pt will continue to benefit from skilled outpatient physical therapy to address the deficits listed in the problem list box on initial evaluation, provide pt/family education and to maximize pt's level of independence in the home and community environment.     Pt's spiritual, cultural and educational needs considered and pt agreeable to plan of care and goals.     Anticipated barriers to physical therapy: none     Goals:   SHORT TERM GOALS: 3 weeks,  4/4/2023   Recent signs and systems trend is improving in order to progress towards LTG's.     Patient will be independent with HEP in order to further progress and return to maximal function.     Pain rating at Worst: 5/10 in order to progress towards increased independence with activity.     Patient will be able to correct postural deviations in sitting and standing, to decrease pain and promote postural awareness for injury prevention.         LONG TERM GOALS: 6 weeks,  4/4/2023   Patient will return to normal ADL, recreational, and work related activities with less pain and limitation.      Patient will improve AROM to stated goals in order to return to maximal functional potential.      Patient will improve Strength to stated goals of appropriate musculature in order to improve functional independence.      Pain Rating at Best: 1/10 to improve Quality of Life.      Patient will meet predicted functional outcome (FOTO) score: 5% to increase self-worth & perceived  functional ability.     Patient will have met/partially met personal goal of:          PLAN     Plan of care Certification: 4/4/2023 to 5/16/2023     Outpatient Physical Therapy 2 times weekly for 6 weeks to include any combination of the following interventions: virtual visits, dry needling, modalities, electrical stimulation (IFC, Pre-Mod, Attended with Functional Dry Needling), Electrical Stimulation  , Manual Therapy, Moist Heat/ Ice, Neuromuscular Re-ed, Patient Education, Self Care, Therapeutic Activities, Therapeutic Exercise, Ultrasound, Functional Training, and Therapeutic Activites        Christine Albarran, KAYODE   05/01/2023

## 2023-05-08 ENCOUNTER — CLINICAL SUPPORT (OUTPATIENT)
Dept: REHABILITATION | Facility: HOSPITAL | Age: 53
End: 2023-05-08
Payer: MEDICAID

## 2023-05-08 DIAGNOSIS — G89.29 CHRONIC RIGHT SHOULDER PAIN: Primary | ICD-10-CM

## 2023-05-08 DIAGNOSIS — M25.511 CHRONIC RIGHT SHOULDER PAIN: Primary | ICD-10-CM

## 2023-05-08 PROCEDURE — 97110 THERAPEUTIC EXERCISES: CPT | Mod: CQ

## 2023-05-08 PROCEDURE — 97112 NEUROMUSCULAR REEDUCATION: CPT | Mod: CQ

## 2023-05-15 ENCOUNTER — CLINICAL SUPPORT (OUTPATIENT)
Dept: REHABILITATION | Facility: HOSPITAL | Age: 53
End: 2023-05-15
Payer: MEDICAID

## 2023-05-15 DIAGNOSIS — M25.511 CHRONIC RIGHT SHOULDER PAIN: Primary | ICD-10-CM

## 2023-05-15 DIAGNOSIS — G89.29 CHRONIC RIGHT SHOULDER PAIN: Primary | ICD-10-CM

## 2023-05-15 PROCEDURE — 97110 THERAPEUTIC EXERCISES: CPT

## 2023-05-15 PROCEDURE — 97112 NEUROMUSCULAR REEDUCATION: CPT

## 2023-05-15 NOTE — PLAN OF CARE
[facility]   Physical Therapy Updated Plan of Care     Name: Hayley Landis  Clinic Number: [id number]    Therapy Diagnosis:   Encounter Diagnosis   Name Primary?    Chronic right shoulder pain Yes     Physician:   Visit Date: 5/15/2023  Physician: Mily Ryan  Physician Orders: PT Eval and Treat  Medical Diagnosis from Referral: Right shoulder pain   Evaluation Date: 4/4/2023  Plan of Care Expiration: 8/14/2023                FOTO: 1/3    Visit # / Visits authorized: 5/13 until 6/2      PTA Visit #: 3/5     Time In:  1043  Time Out: 1121   Total Billable Time: 38 minutes    SUBJECTIVE     Pt reports: I am doing better today. My pain level is lower.     She was compliant with home exercise program.  Response to previous treatment: first treatment after evaluation  Functional change: none     Pain:   2 /10  Location: right shoulder      OBJECTIVE   Scoliosis  Left shoulder flexion 132  Right shoulder 125  Right behind back right hip with pain  Left behind back T8  Pain in the anterior shoulder when reaching behind back    Treatment     Hayley received the treatments listed below:      therapeutic exercises to develop strength, ROM, and posture for 23 minutes including:  Nustep x 5 minutes  Pulleys 3 minutes  Corner stretch 2 x 30 seconds  Cybex rows close and wide 2 x 10 2#  Shoulder press 2 x 10 2#    neuromuscular re-education activities to improve: Balance, Coordination, Proprioception, and Posture for 12 minutes. The following activities were included:    Bilateral shoulder external rotation 3 x 10 yellow theraband   Shoulder horizontal abduction 3 x 10 red theraband   Shoulder extension with retraction blue 2 x 10   Lat pull down 1 1/2 pl x 20    Patient Education and Home Exercises     Home Exercises Provided and Patient Education Provided     Education provided:   - encouraged to continue home exercise program     Written Home Exercises Provided: Patient instructed to  cont prior HEP. Exercises were reviewed and Hayley was able to demonstrate them prior to the end of the session.  Hayley demonstrated good  understanding of the education provided. See EMR under Patient Instructions for exercises provided during therapy sessions    ASSESSMENT     Patient needed cues throughout treatment session. Patient did well today but had some difficulty. Patient continue to have forward head and rounded shoulders. Focused on strengthening and stretches. Will continue to progress as tolerated.    Hayley Is progressing towards her goals.   Pt prognosis is Good.     Pt will continue to benefit from skilled outpatient physical therapy to address the deficits listed in the problem list box on initial evaluation, provide pt/family education and to maximize pt's level of independence in the home and community environment.     Pt's spiritual, cultural and educational needs considered and pt agreeable to plan of care and goals.     Anticipated barriers to physical therapy: none     Goals:   SHORT TERM GOALS: 3 weeks,  4/4/2023   Recent signs and systems trend is improving in order to progress towards LTG's. Met    Patient will be independent with HEP in order to further progress and return to maximal function. Not Met    Pain rating at Worst: 5/10 in order to progress towards increased independence with activity. Partially Met    Patient will be able to correct postural deviations in sitting and standing, to decrease pain and promote postural awareness for injury prevention.   Partially Met      LONG TERM GOALS: 6 weeks,  4/4/2023   Patient will return to normal ADL, recreational, and work related activities with less pain and limitation.  Met    Patient will improve AROM to stated goals in order to return to maximal functional potential.  Not Met    Patient will improve Strength to stated goals of appropriate musculature in order to improve functional independence.  Partially Met    Pain Rating  at Best: 1/10 to improve Quality of Life.  Not Met    Patient will meet predicted functional outcome (FOTO) score: 5% to increase self-worth & perceived functional ability. Partially Met    Patient will have met/partially met personal goal of:  N/A      Reasons for Recertification of Therapy: To continue to progress toward goals     Plan     Updated Certification Period: 5/15/2023 to 8/14/23  Recommended Treatment Plan: 2 times per week for 12 weeks: Aquatic Therapy, Cervical/Lumbar Traction, Electrical Stimulation IFC/Premod, Iontophoresis (with Dex), Manual Therapy, Moist Heat/ Ice, Neuromuscular Re-ed, Patient Education, Therapeutic Activities, Therapeutic Exercise, and Ultrasound  Other Recommendations: none    TERRI ZENDEJAS, PT  5/15/2023      I CERTIFY THE NEED FOR THESE SERVICES FURNISHED UNDER THIS PLAN OF TREATMENT AND WHILE UNDER MY CARE.    Physician's comments:      Physician's Signature: ___________________________________________________

## 2023-05-15 NOTE — PROGRESS NOTES
[facility]   Physical Therapy Treatment Note     Name: Hayley Landis  Clinic Number: [id number]    Therapy Diagnosis:   Encounter Diagnosis   Name Primary?    Chronic right shoulder pain Yes     Physician:   Visit Date: 5/15/2023  Physician: Mily Ryan  Physician Orders: PT Eval and Treat  Medical Diagnosis from Referral: Right shoulder pain   Evaluation Date: 4/4/2023  Plan of Care Expiration: 5/16/2023                FOTO: 1/3    Visit # / Visits authorized: 5/13 until 6/2      PTA Visit #: 3/5     Time In:  1045  Time Out:    Total Billable Time:     minutes    SUBJECTIVE     Pt reports: I am doing better today. My pain level is lower.     She was compliant with home exercise program.  Response to previous treatment: first treatment after evaluation  Functional change: none     Pain:   2 /10  Location: right shoulder      OBJECTIVE     Objective Measures updated at progress report unless specified.     Treatment     Hayley received the treatments listed below:      therapeutic exercises to develop strength, ROM, and posture for 20 minutes including:  Nustep x 5 minutes  Pulleys 3 minutes  Corner stretch 2 x 30 seconds  Cybex rows close and wide 2 x 10 2#  Shoulder press 2 x 10 2#    neuromuscular re-education activities to improve: Balance, Coordination, Proprioception, and Posture for 10 minutes. The following activities were included:    Bilateral shoulder external rotation 3 x 10 yellow theraband   Shoulder horizontal abduction 3 x 10 red theraband   External rotation with cane 2 x 10     Shoulder extension with retraction blue 2 x 10   Lat pull down 1 1/2 pl x 20    Patient Education and Home Exercises     Home Exercises Provided and Patient Education Provided     Education provided:   - encouraged to continue home exercise program     Written Home Exercises Provided: Patient instructed to cont prior HEP. Exercises were reviewed and Hayley was able to demonstrate  them prior to the end of the session.  Hayley demonstrated good  understanding of the education provided. See EMR under Patient Instructions for exercises provided during therapy sessions    ASSESSMENT     Patient needed cues throughout treatment session. Patient did well today but had some difficulty. Patient continue to have forward head and rounded shoulders. Focused on strengthening and stretches. Will continue to progress as tolerated.    Hayley Is progressing towards her goals.   Pt prognosis is Good.     Pt will continue to benefit from skilled outpatient physical therapy to address the deficits listed in the problem list box on initial evaluation, provide pt/family education and to maximize pt's level of independence in the home and community environment.     Pt's spiritual, cultural and educational needs considered and pt agreeable to plan of care and goals.     Anticipated barriers to physical therapy: none     Goals:   SHORT TERM GOALS: 3 weeks,  4/4/2023   Recent signs and systems trend is improving in order to progress towards LTG's. Met    Patient will be independent with HEP in order to further progress and return to maximal function. Not Met    Pain rating at Worst: 5/10 in order to progress towards increased independence with activity. Partially Met    Patient will be able to correct postural deviations in sitting and standing, to decrease pain and promote postural awareness for injury prevention.   Partially Met      LONG TERM GOALS: 6 weeks,  4/4/2023   Patient will return to normal ADL, recreational, and work related activities with less pain and limitation.  Met    Patient will improve AROM to stated goals in order to return to maximal functional potential.  Not Met    Patient will improve Strength to stated goals of appropriate musculature in order to improve functional independence.  Partially Met    Pain Rating at Best: 1/10 to improve Quality of Life.  Not Met    Patient will meet  predicted functional outcome (FOTO) score: 5% to increase self-worth & perceived functional ability. Partially Met    Patient will have met/partially met personal goal of:  N/A      Reasons for Recertification of Therapy: To continue to progress toward goals     Plan     Updated Certification Period: 5/15/2023 to 8/14/23  Recommended Treatment Plan: 2 times per week for 12 weeks: Aquatic Therapy, Cervical/Lumbar Traction, Electrical Stimulation IFC/Premod, Iontophoresis (with Dex), Manual Therapy, Moist Heat/ Ice, Neuromuscular Re-ed, Patient Education, Therapeutic Activities, Therapeutic Exercise, and Ultrasound  Other Recommendations: none    TERRI ZENDEJAS, PT  5/15/2023      I CERTIFY THE NEED FOR THESE SERVICES FURNISHED UNDER THIS PLAN OF TREATMENT AND WHILE UNDER MY CARE.    Physician's comments:      Physician's Signature: ___________________________________________________

## 2023-05-22 ENCOUNTER — CLINICAL SUPPORT (OUTPATIENT)
Dept: REHABILITATION | Facility: HOSPITAL | Age: 53
End: 2023-05-22
Payer: MEDICAID

## 2023-05-22 DIAGNOSIS — M25.511 CHRONIC RIGHT SHOULDER PAIN: Primary | ICD-10-CM

## 2023-05-22 DIAGNOSIS — G89.29 CHRONIC RIGHT SHOULDER PAIN: Primary | ICD-10-CM

## 2023-05-22 PROCEDURE — 97110 THERAPEUTIC EXERCISES: CPT | Mod: CQ

## 2023-05-22 PROCEDURE — 97112 NEUROMUSCULAR REEDUCATION: CPT | Mod: CQ

## 2023-05-22 NOTE — PROGRESS NOTES
[facility]   Physical Therapy Treatment Note     Name: Hayley Landis  Clinic Number: [id number]    Therapy Diagnosis:   Encounter Diagnosis   Name Primary?    Chronic right shoulder pain Yes     Physician: Mily Ryan MD    Visit Date: 5/22/2023  Physician: Mily Ryan  Physician Orders: PT Eval and Treat  Medical Diagnosis from Referral: Right shoulder pain   Evaluation Date: 4/4/2023  Plan of Care Expiration: 8/14/2023        Insurance approval through 6/2/2023          FOTO: 1/3    Visit # / Visits authorized: 6/13         PTA Visit #: 1/5     Time In: 9:17 AM   Time Out: 9:49 AM   Total Billable Time: 32 minutes    SUBJECTIVE     Pt reports: Pain is a 3/10. She does not sleep through the night but that is habitual. Main complaint at this point is internal rotation on right.   She was compliant with home exercise program.  Response to previous treatment: first treatment after evaluation  Functional change: none     Pain: 3/10  Location: right shoulder      OBJECTIVE     Objective Measures updated at progress report unless specified.   Case conference with FLAVIO Kulkarni PT, for initial PTA visit.     Treatment     Hayley received the treatments listed below:      therapeutic exercises to develop strength, ROM, and posture for 18 minutes including:  UBE x 5 minutes  Pulleys x 3 minutes  Corner stretch 2 x 30 seconds  Cybex rows close and wide 2 x 10 2#  Shoulder press 2 x 10 2#  Shoulder extension stretch 3 x 15 second hold      neuromuscular re-education activities to improve: Balance, Coordination, Proprioception, and Posture for 12 minutes. The following activities were included:  Bilateral shoulder external rotation 3 x 10 yellow theraband   Shoulder horizontal abduction 3 x 10 red theraband   Shoulder extension with retraction blue 2 x 10   Lat pull down 1 1/2 pl x 20      Patient Education and Home Exercises     Home Exercises Provided and Patient Education  Provided     Education provided:   - encouraged to continue home exercise program     Written Home Exercises Provided: Patient instructed to cont prior HEP. Exercises were reviewed and Hayley was able to demonstrate them prior to the end of the session.  Hayley demonstrated good  understanding of the education provided. See EMR under Patient Instructions for exercises provided during therapy sessions    ASSESSMENT     Patient continues to demonstrate poor spontaneous posture. She is able to correct this minimally with cues. Her primary complaint is reaching behind her back with her right arm. Added a stretch into extension with cues and explanation for retraction while extending arm. Demonstrated that she had more extension with scapular retraction with less discomfort. Explained that once she is able to actively reach into extension we will show her how to stretch internal rotation. Focused on strengthening and stretches. Will continue to progress as tolerated.    Hayley Is progressing towards her goals.   Pt prognosis is Good.     Pt will continue to benefit from skilled outpatient physical therapy to address the deficits listed in the problem list box on initial evaluation, provide pt/family education and to maximize pt's level of independence in the home and community environment.     Pt's spiritual, cultural and educational needs considered and pt agreeable to plan of care and goals.     Anticipated barriers to physical therapy: none     Goals:   SHORT TERM GOALS: 3 weeks,  4/4/2023   Recent signs and systems trend is improving in order to progress towards LTG's.     Patient will be independent with HEP in order to further progress and return to maximal function.     Pain rating at Worst: 5/10 in order to progress towards increased independence with activity.     Patient will be able to correct postural deviations in sitting and standing, to decrease pain and promote postural awareness for injury  prevention.         LONG TERM GOALS: 6 weeks,  4/4/2023   Patient will return to normal ADL, recreational, and work related activities with less pain and limitation.      Patient will improve AROM to stated goals in order to return to maximal functional potential.      Patient will improve Strength to stated goals of appropriate musculature in order to improve functional independence.      Pain Rating at Best: 1/10 to improve Quality of Life.      Patient will meet predicted functional outcome (FOTO) score: 5% to increase self-worth & perceived functional ability.     Patient will have met/partially met personal goal of:          PLAN      Outpatient Physical Therapy 2 times weekly for 6 weeks to include any combination of the following interventions: virtual visits, dry needling, modalities, electrical stimulation (IFC, Pre-Mod, Attended with Functional Dry Needling), Electrical Stimulation  , Manual Therapy, Moist Heat/ Ice, Neuromuscular Re-ed, Patient Education, Self Care, Therapeutic Activities, Therapeutic Exercise, Ultrasound, Functional Training, and Therapeutic Activites        Cherie Choi, PTA   05/22/2023

## 2023-05-30 ENCOUNTER — CLINICAL SUPPORT (OUTPATIENT)
Dept: REHABILITATION | Facility: HOSPITAL | Age: 53
End: 2023-05-30
Payer: MEDICAID

## 2023-05-30 DIAGNOSIS — G89.29 CHRONIC RIGHT SHOULDER PAIN: Primary | ICD-10-CM

## 2023-05-30 DIAGNOSIS — M25.511 CHRONIC RIGHT SHOULDER PAIN: Primary | ICD-10-CM

## 2023-05-30 PROCEDURE — 97110 THERAPEUTIC EXERCISES: CPT

## 2023-05-30 PROCEDURE — 97112 NEUROMUSCULAR REEDUCATION: CPT

## 2023-05-30 NOTE — PROGRESS NOTES
[facility]   Physical Therapy Treatment Note     Name: aHyley Landis  Clinic Number: [id number]    Therapy Diagnosis:   Encounter Diagnosis   Name Primary?    Chronic right shoulder pain Yes     Physician: Mily Ryan MD    Visit Date: 5/30/2023  Physician: Mily Ryan  Physician Orders: PT Eval and Treat  Medical Diagnosis from Referral: Right shoulder pain   Evaluation Date: 4/4/2023  Plan of Care Expiration: 8/14/2023        Insurance approval through 6/2/2023          FOTO: 1/3    Visit # / Visits authorized: 7/13         PTA Visit #: 1/5     Time In: 100   Time Out: 135  Total Billable Time: 35 minutes    SUBJECTIVE     Pt reports: The right hand is flared up from an old injury but I am unsure why.      She was compliant with home exercise program.  Response to previous treatment: first treatment after evaluation  Functional change: none     Pain: 3/10  Location: right shoulder      OBJECTIVE     Objective Measures updated at progress report unless specified.       Treatment     Hayley received the treatments listed below:      therapeutic exercises to develop strength, ROM, and posture for 15 minutes including:  UBE x 5 minutes  Pulleys x 3 minutes  Corner stretch 2 x 30 seconds  Cybex rows close and wide 2 x 10 2#  Shoulder press 2 x 10 2#  Shoulder extension stretch 3 x 15 second hold      neuromuscular re-education activities to improve: Balance, Coordination, Proprioception, and Posture for 15 minutes. The following activities were included:  Bilateral shoulder external rotation 3 x 10 yellow theraband   Shoulder horizontal abduction 3 x 10 red theraband   Shoulder extension with retraction blue 2 x 10   Lat pull down 1 1/2 pl x 10      Patient Education and Home Exercises     Home Exercises Provided and Patient Education Provided     Education provided:   - encouraged to continue home exercise program     Written Home Exercises Provided: Patient instructed  to cont prior HEP. Exercises were reviewed and Hayley was able to demonstrate them prior to the end of the session.  Hayley demonstrated good  understanding of the education provided. See EMR under Patient Instructions for exercises provided during therapy sessions    ASSESSMENT     Patient continues to demonstrate poor spontaneous posture. She is able to correct this minimally with cues. Her primary complaint is reaching behind her back with her right arm. Added a stretch into extension with cues and explanation for retraction while extending arm. Patient had some difficulty with the exercises secondary to the right palm pain. Progress as able.     Hayley Is progressing towards her goals.   Pt prognosis is Good.     Pt will continue to benefit from skilled outpatient physical therapy to address the deficits listed in the problem list box on initial evaluation, provide pt/family education and to maximize pt's level of independence in the home and community environment.     Pt's spiritual, cultural and educational needs considered and pt agreeable to plan of care and goals.     Anticipated barriers to physical therapy: none     Goals:   SHORT TERM GOALS: 3 weeks,  4/4/2023   Recent signs and systems trend is improving in order to progress towards LTG's.     Patient will be independent with HEP in order to further progress and return to maximal function.     Pain rating at Worst: 5/10 in order to progress towards increased independence with activity.     Patient will be able to correct postural deviations in sitting and standing, to decrease pain and promote postural awareness for injury prevention.         LONG TERM GOALS: 6 weeks,  4/4/2023   Patient will return to normal ADL, recreational, and work related activities with less pain and limitation.      Patient will improve AROM to stated goals in order to return to maximal functional potential.      Patient will improve Strength to stated goals of appropriate  musculature in order to improve functional independence.      Pain Rating at Best: 1/10 to improve Quality of Life.      Patient will meet predicted functional outcome (FOTO) score: 5% to increase self-worth & perceived functional ability.     Patient will have met/partially met personal goal of:          PLAN      Outpatient Physical Therapy 2 times weekly for 6 weeks to include any combination of the following interventions: virtual visits, dry needling, modalities, electrical stimulation (IFC, Pre-Mod, Attended with Functional Dry Needling), Electrical Stimulation  , Manual Therapy, Moist Heat/ Ice, Neuromuscular Re-ed, Patient Education, Self Care, Therapeutic Activities, Therapeutic Exercise, Ultrasound, Functional Training, and Therapeutic Activites        TERRI ZENDJEAS, PT   05/30/2023

## 2023-05-31 ENCOUNTER — CLINICAL SUPPORT (OUTPATIENT)
Dept: REHABILITATION | Facility: HOSPITAL | Age: 53
End: 2023-05-31
Payer: MEDICAID

## 2023-05-31 DIAGNOSIS — M25.511 CHRONIC RIGHT SHOULDER PAIN: Primary | ICD-10-CM

## 2023-05-31 DIAGNOSIS — G89.29 CHRONIC RIGHT SHOULDER PAIN: Primary | ICD-10-CM

## 2023-05-31 PROCEDURE — 97110 THERAPEUTIC EXERCISES: CPT | Mod: CQ

## 2023-05-31 PROCEDURE — 97112 NEUROMUSCULAR REEDUCATION: CPT | Mod: CQ

## 2023-05-31 NOTE — PROGRESS NOTES
[facility]   Physical Therapy Treatment Note     Name: Hayley Landis  Clinic Number: [id number]    Therapy Diagnosis:   Encounter Diagnosis   Name Primary?    Chronic right shoulder pain Yes     Physician: Mily Ryan MD    Visit Date: 5/31/2023  Physician: Mily Ryan  Physician Orders: PT Eval and Treat  Medical Diagnosis from Referral: Right shoulder pain   Evaluation Date: 4/4/2023  Plan of Care Expiration: 8/14/2023        Insurance approval through 6/2/2023          FOTO: 1/3    Visit # / Visits authorized: 8/13         PTA Visit #: 1/5     Time In: 1:45 pm    Time Out: 2:15 pm   Total Billable Time: 30 minutes    SUBJECTIVE     Pt reports: she did not feel well today and that she has been feeling bad since she has colon cancer    She was compliant with home exercise program.  Response to previous treatment: first treatment after evaluation  Functional change: none     Pain: 3/10  Location: right shoulder      OBJECTIVE     Objective Measures updated at progress report unless specified.       Treatment     Hayley received the treatments listed below:      therapeutic exercises to develop strength, ROM, and posture for 15 minutes including:  UBE x 5 minutes  Pulleys x 3 minutes  Corner stretch 2 x 30 seconds  Cybex rows close and wide 2 x 10 2#  Shoulder press 2 x 10 2#  Shoulder extension stretch 3 x 15 second hold      neuromuscular re-education activities to improve: Balance, Coordination, Proprioception, and Posture for 15 minutes. The following activities were included:  Bilateral shoulder external rotation 3 x 10 yellow theraband   Shoulder horizontal abduction 3 x 10 red theraband   Shoulder extension with retraction blue 2 x 10   Lat pull down 1 1/2 pl x 10      Patient Education and Home Exercises     Home Exercises Provided and Patient Education Provided     Education provided:   - encouraged to continue home exercise program     Written Home Exercises  Provided: Patient instructed to cont prior HEP. Exercises were reviewed and Hayley was able to demonstrate them prior to the end of the session.  Hayley demonstrated good  understanding of the education provided. See EMR under Patient Instructions for exercises provided during therapy sessions    ASSESSMENT     Patient continues to demonstrate poor spontaneous posture. She is able to correct this minimally with cues. Her primary complaint is reaching behind her back with her right arm. Patient states that she feels better since she has started coming here.    Hayley Is progressing towards her goals.   Pt prognosis is Good.     Pt will continue to benefit from skilled outpatient physical therapy to address the deficits listed in the problem list box on initial evaluation, provide pt/family education and to maximize pt's level of independence in the home and community environment.     Pt's spiritual, cultural and educational needs considered and pt agreeable to plan of care and goals.     Anticipated barriers to physical therapy: none     Goals:   SHORT TERM GOALS: 3 weeks,  4/4/2023   Recent signs and systems trend is improving in order to progress towards LTG's. M   Patient will be independent with HEP in order to further progress and return to maximal function. M    Pain rating at Worst: 5/10 in order to progress towards increased independence with activity. M   Patient will be able to correct postural deviations in sitting and standing, to decrease pain and promote postural awareness for injury prevention.  PM       LONG TERM GOALS: 6 weeks,  4/4/2023   Patient will return to normal ADL, recreational, and work related activities with less pain and limitation.  PM    Patient will improve AROM to stated goals in order to return to maximal functional potential.  O    Patient will improve Strength to stated goals of appropriate musculature in order to improve functional independence.   PM   Pain Rating at Best:  1/10 to improve Quality of Life.  NM    Patient will meet predicted functional outcome (FOTO) score: 5% to increase self-worth & perceived functional ability.     Patient will have met/partially met personal goal of:          PLAN      Discharged       Christine Albarran, KAYODE   05/31/2023

## 2023-06-11 DIAGNOSIS — R25.2 LEG CRAMPS: Chronic | ICD-10-CM

## 2023-06-16 RX ORDER — POTASSIUM CHLORIDE 20 MEQ/1
TABLET, EXTENDED RELEASE ORAL
Qty: 30 TABLET | Refills: 1 | Status: SHIPPED | OUTPATIENT
Start: 2023-06-16 | End: 2023-09-25

## 2023-07-06 ENCOUNTER — OFFICE VISIT (OUTPATIENT)
Dept: FAMILY MEDICINE | Facility: CLINIC | Age: 53
End: 2023-07-06
Payer: MEDICAID

## 2023-07-06 VITALS
SYSTOLIC BLOOD PRESSURE: 123 MMHG | HEART RATE: 93 BPM | WEIGHT: 172 LBS | HEIGHT: 57 IN | DIASTOLIC BLOOD PRESSURE: 74 MMHG | BODY MASS INDEX: 37.11 KG/M2

## 2023-07-06 DIAGNOSIS — M76.32 ILIOTIBIAL BAND SYNDROME OF LEFT SIDE: Primary | ICD-10-CM

## 2023-07-06 PROCEDURE — 3074F SYST BP LT 130 MM HG: CPT | Mod: CPTII,,, | Performed by: FAMILY MEDICINE

## 2023-07-06 PROCEDURE — 1159F PR MEDICATION LIST DOCUMENTED IN MEDICAL RECORD: ICD-10-PCS | Mod: CPTII,,, | Performed by: FAMILY MEDICINE

## 2023-07-06 PROCEDURE — 3061F NEG MICROALBUMINURIA REV: CPT | Mod: CPTII,,, | Performed by: FAMILY MEDICINE

## 2023-07-06 PROCEDURE — 4010F ACE/ARB THERAPY RXD/TAKEN: CPT | Mod: CPTII,,, | Performed by: FAMILY MEDICINE

## 2023-07-06 PROCEDURE — 4010F PR ACE/ARB THEARPY RXD/TAKEN: ICD-10-PCS | Mod: CPTII,,, | Performed by: FAMILY MEDICINE

## 2023-07-06 PROCEDURE — 3066F PR DOCUMENTATION OF TREATMENT FOR NEPHROPATHY: ICD-10-PCS | Mod: CPTII,,, | Performed by: FAMILY MEDICINE

## 2023-07-06 PROCEDURE — 3066F NEPHROPATHY DOC TX: CPT | Mod: CPTII,,, | Performed by: FAMILY MEDICINE

## 2023-07-06 PROCEDURE — 3008F BODY MASS INDEX DOCD: CPT | Mod: CPTII,,, | Performed by: FAMILY MEDICINE

## 2023-07-06 PROCEDURE — 3008F PR BODY MASS INDEX (BMI) DOCUMENTED: ICD-10-PCS | Mod: CPTII,,, | Performed by: FAMILY MEDICINE

## 2023-07-06 PROCEDURE — 96372 THER/PROPH/DIAG INJ SC/IM: CPT | Mod: ,,, | Performed by: FAMILY MEDICINE

## 2023-07-06 PROCEDURE — 3078F DIAST BP <80 MM HG: CPT | Mod: CPTII,,, | Performed by: FAMILY MEDICINE

## 2023-07-06 PROCEDURE — 99213 OFFICE O/P EST LOW 20 MIN: CPT | Mod: 25,,, | Performed by: FAMILY MEDICINE

## 2023-07-06 PROCEDURE — 3078F PR MOST RECENT DIASTOLIC BLOOD PRESSURE < 80 MM HG: ICD-10-PCS | Mod: CPTII,,, | Performed by: FAMILY MEDICINE

## 2023-07-06 PROCEDURE — 3074F PR MOST RECENT SYSTOLIC BLOOD PRESSURE < 130 MM HG: ICD-10-PCS | Mod: CPTII,,, | Performed by: FAMILY MEDICINE

## 2023-07-06 PROCEDURE — 99213 PR OFFICE/OUTPT VISIT, EST, LEVL III, 20-29 MIN: ICD-10-PCS | Mod: 25,,, | Performed by: FAMILY MEDICINE

## 2023-07-06 PROCEDURE — 3061F PR NEG MICROALBUMINURIA RESULT DOCUMENTED/REVIEW: ICD-10-PCS | Mod: CPTII,,, | Performed by: FAMILY MEDICINE

## 2023-07-06 PROCEDURE — 1159F MED LIST DOCD IN RCRD: CPT | Mod: CPTII,,, | Performed by: FAMILY MEDICINE

## 2023-07-06 PROCEDURE — 96372 PR INJECTION,THERAP/PROPH/DIAG2ST, IM OR SUBCUT: ICD-10-PCS | Mod: ,,, | Performed by: FAMILY MEDICINE

## 2023-07-06 RX ORDER — DEXAMETHASONE SODIUM PHOSPHATE 4 MG/ML
4 INJECTION, SOLUTION INTRA-ARTICULAR; INTRALESIONAL; INTRAMUSCULAR; INTRAVENOUS; SOFT TISSUE
Status: COMPLETED | OUTPATIENT
Start: 2023-07-06 | End: 2023-07-06

## 2023-07-06 RX ORDER — KETOROLAC TROMETHAMINE 30 MG/ML
60 INJECTION, SOLUTION INTRAMUSCULAR; INTRAVENOUS
Status: COMPLETED | OUTPATIENT
Start: 2023-07-06 | End: 2023-07-06

## 2023-07-06 RX ORDER — METHYLPREDNISOLONE ACETATE 40 MG/ML
40 INJECTION, SUSPENSION INTRA-ARTICULAR; INTRALESIONAL; INTRAMUSCULAR; SOFT TISSUE
Status: COMPLETED | OUTPATIENT
Start: 2023-07-06 | End: 2023-07-06

## 2023-07-06 RX ORDER — KETOROLAC TROMETHAMINE 10 MG/1
10 TABLET, FILM COATED ORAL EVERY 6 HOURS
Qty: 20 TABLET | Refills: 0 | Status: SHIPPED | OUTPATIENT
Start: 2023-07-06 | End: 2023-07-12

## 2023-07-06 RX ADMIN — KETOROLAC TROMETHAMINE 60 MG: 30 INJECTION, SOLUTION INTRAMUSCULAR; INTRAVENOUS at 04:07

## 2023-07-06 RX ADMIN — METHYLPREDNISOLONE ACETATE 40 MG: 40 INJECTION, SUSPENSION INTRA-ARTICULAR; INTRALESIONAL; INTRAMUSCULAR; SOFT TISSUE at 04:07

## 2023-07-06 RX ADMIN — DEXAMETHASONE SODIUM PHOSPHATE 4 MG: 4 INJECTION, SOLUTION INTRA-ARTICULAR; INTRALESIONAL; INTRAMUSCULAR; INTRAVENOUS; SOFT TISSUE at 04:07

## 2023-07-06 NOTE — PROGRESS NOTES
"              Mack Harp IV, DO  [facility]  [address] [place] [address]  Phone: [phone]      Subjective     Name: Hayley Landis   Sex: female  YOB: 1970 (52 y.o.)  MRN: [id number]  Visit Date: 07/06/2023   Chief Complaint: Leg Pain (Upper left thigh is hurting)        HISTORY OF PRESENT ILLNESS:    Chief Complaint   Patient presents with    Leg Pain     Upper left thigh is hurting       HPI  See tests that keep you healthy and the problem oriented documentation below.    Tests to Keep You Healthy    Mammogram: Met on 10/17/2022  Eye Exam: DUE  Colon Cancer Screening: Met on 6/8/2020  Cervical Cancer Screening: DUE  Last Blood Pressure <= 139/89 (7/6/2023): Yes  Last HbA1c < 8 (11/22/2022): Yes      Portions of this note may have been created with voice recognition software. Occasional "wrong-word" or "sound-a-like" substitutions may have occurred due to the inherent limitations of voice recognition software. Please, read the note carefully and recognize, using context, where substitutions have occurred.     PAST MEDICAL HISTORY:  Significant Diagnoses - Patient  has a past medical history of Colon cancer, CVA (cerebral vascular accident), Diabetes, HTN (hypertension), and Seizure.  Medications - Patient has a current medication list which includes the following long-term medication(s): hydrochlorothiazide, insulin aspart u-100, insulin glargine, levetiracetam, lisinopril, and acyclovir.   Allergies - Patient is allergic to grape, opioids - morphine analogues, pineapple, aspirin, dicyclomine, gabapentin, hydrocodone, iron analogues, metformin, penicillins, and latex.  Surgeries - Patient  has a past surgical history that includes Hernia repair and Colon surgery.  Family History - Patient family history includes No Known Problems in her brother, daughter, father, maternal aunt, maternal grandfather, maternal grandmother, maternal uncle, mother, paternal aunt, " "paternal grandfather, paternal grandmother, paternal uncle, sister, and son.      SOCIAL HISTORY:  Tobacco - Patient  reports that she has never smoked. She has never been exposed to tobacco smoke. She has never used smokeless tobacco.   Alcohol - Patient  reports no history of alcohol use.   Recreational Drugs - Patient  reports no history of drug use.       Review of Systems   All other systems reviewed and are negative.       Past Medical History:   Diagnosis Date    Colon cancer     CVA (cerebral vascular accident)     Diabetes     HTN (hypertension)     Seizure         Review of patient's allergies indicates:   Allergen Reactions    Grape Anaphylaxis    Opioids - morphine analogues Anaphylaxis    Pineapple Anaphylaxis    Aspirin      "Veins breakdown" per patient    Dicyclomine Diarrhea    Gabapentin     Hydrocodone      Headache      Iron analogues      Pt hx of hemachromatosis    Metformin      diarrhea    Penicillins     Latex Rash        Past Surgical History:   Procedure Laterality Date    COLON SURGERY      HERNIA REPAIR          Family History   Problem Relation Age of Onset    No Known Problems Mother     No Known Problems Father     No Known Problems Sister     No Known Problems Brother     No Known Problems Daughter     No Known Problems Son     No Known Problems Maternal Aunt     No Known Problems Maternal Uncle     No Known Problems Paternal Aunt     No Known Problems Paternal Uncle     No Known Problems Maternal Grandmother     No Known Problems Maternal Grandfather     No Known Problems Paternal Grandmother     No Known Problems Paternal Grandfather        Current Outpatient Medications   Medication Instructions    acyclovir (ZOVIRAX) 400 mg, Oral, 5 times daily    cyclobenzaprine (FLEXERIL) 10 mg, Oral    ergocalciferol (ERGOCALCIFEROL) 50,000 Units, Oral, Every 7 days    hydroCHLOROthiazide (HYDRODIURIL) 12.5 MG Tab TAKE ONE TABLET BY MOUTH DAILY IN THE " "MORNING    insulin aspart U-100 (NOVOLOG) 100 unit/mL injection inject as directed by your physician per sliding scale    insulin glargine (LANTUS U-100 INSULIN) 80 Units, Subcutaneous, Daily    insulin syringe-needle U-100 1 mL 30 gauge x 5/16 Syrg Use as directed.    ketorolac (TORADOL) 10 mg, Oral, Every 6 hours    levETIRAcetam (KEPPRA) 500 mg, Oral, Daily    lisinopriL 10 mg, Oral, Daily    peppermint oiL (IBGARD) 90 mg CECX 2 capsules, Oral, Daily    potassium chloride SA (K-DUR,KLOR-CON) 20 MEQ tablet TAKE ONE TABLET BY MOUTH EVERY DAY        Objective     /74   Pulse 93   Ht 4' 9" (1.448 m)   Wt 78 kg (172 lb)   BMI 37.22 kg/m²     Physical Exam  Constitutional:       General: She is not in acute distress.     Appearance: Normal appearance. She is not ill-appearing.   HENT:      Head: Normocephalic and atraumatic.      Right Ear: External ear normal.      Left Ear: External ear normal.   Eyes:      Extraocular Movements: Extraocular movements intact.      Conjunctiva/sclera: Conjunctivae normal.   Cardiovascular:      Rate and Rhythm: Normal rate.      Heart sounds: No murmur heard.  Pulmonary:      Effort: Pulmonary effort is normal.   Musculoskeletal:      Cervical back: Normal range of motion.   Skin:     General: Skin is warm and dry.      Coloration: Skin is not jaundiced.      Findings: No rash.   Neurological:      Mental Status: She is alert.   Psychiatric:         Mood and Affect: Mood normal.        All recently obtained labs have been reviewed and discussed in detail with the patient.   Assessment     1. Iliotibial band syndrome of left side         Plan        Problem List Items Addressed This Visit          Orthopedic    Iliotibial band syndrome of left side - Primary    Relevant Medications    ketorolac injection 60 mg (Completed)    ketorolac (TORADOL) 10 mg tablet    dexAMETHasone injection 4 mg (Completed)    methylPREDNISolone acetate injection 40 mg (Completed)       No " follow-ups on file.    Patient advised that is symptoms worsen, they should call or report directly to local emergency department.    Mack Harp DO  [facility]

## 2023-07-12 ENCOUNTER — OFFICE VISIT (OUTPATIENT)
Dept: FAMILY MEDICINE | Facility: CLINIC | Age: 53
End: 2023-07-12
Payer: MEDICAID

## 2023-07-12 VITALS
BODY MASS INDEX: 36.89 KG/M2 | DIASTOLIC BLOOD PRESSURE: 80 MMHG | HEIGHT: 57 IN | WEIGHT: 171 LBS | SYSTOLIC BLOOD PRESSURE: 156 MMHG | HEART RATE: 97 BPM

## 2023-07-12 DIAGNOSIS — R10.2 PELVIC PAIN: ICD-10-CM

## 2023-07-12 DIAGNOSIS — Z85.038 HISTORY OF COLON CANCER: ICD-10-CM

## 2023-07-12 DIAGNOSIS — Z01.812 ENCOUNTER FOR PREPROCEDURAL LABORATORY EXAMINATION: Primary | ICD-10-CM

## 2023-07-12 PROCEDURE — 3079F PR MOST RECENT DIASTOLIC BLOOD PRESSURE 80-89 MM HG: ICD-10-PCS | Mod: CPTII,,, | Performed by: FAMILY MEDICINE

## 2023-07-12 PROCEDURE — 3066F PR DOCUMENTATION OF TREATMENT FOR NEPHROPATHY: ICD-10-PCS | Mod: CPTII,,, | Performed by: FAMILY MEDICINE

## 2023-07-12 PROCEDURE — 3061F PR NEG MICROALBUMINURIA RESULT DOCUMENTED/REVIEW: ICD-10-PCS | Mod: CPTII,,, | Performed by: FAMILY MEDICINE

## 2023-07-12 PROCEDURE — 1159F MED LIST DOCD IN RCRD: CPT | Mod: CPTII,,, | Performed by: FAMILY MEDICINE

## 2023-07-12 PROCEDURE — 3008F PR BODY MASS INDEX (BMI) DOCUMENTED: ICD-10-PCS | Mod: CPTII,,, | Performed by: FAMILY MEDICINE

## 2023-07-12 PROCEDURE — 3077F PR MOST RECENT SYSTOLIC BLOOD PRESSURE >= 140 MM HG: ICD-10-PCS | Mod: CPTII,,, | Performed by: FAMILY MEDICINE

## 2023-07-12 PROCEDURE — 3066F NEPHROPATHY DOC TX: CPT | Mod: CPTII,,, | Performed by: FAMILY MEDICINE

## 2023-07-12 PROCEDURE — 3079F DIAST BP 80-89 MM HG: CPT | Mod: CPTII,,, | Performed by: FAMILY MEDICINE

## 2023-07-12 PROCEDURE — 4010F ACE/ARB THERAPY RXD/TAKEN: CPT | Mod: CPTII,,, | Performed by: FAMILY MEDICINE

## 2023-07-12 PROCEDURE — 99214 OFFICE O/P EST MOD 30 MIN: CPT | Mod: ,,, | Performed by: FAMILY MEDICINE

## 2023-07-12 PROCEDURE — 3077F SYST BP >= 140 MM HG: CPT | Mod: CPTII,,, | Performed by: FAMILY MEDICINE

## 2023-07-12 PROCEDURE — 1159F PR MEDICATION LIST DOCUMENTED IN MEDICAL RECORD: ICD-10-PCS | Mod: CPTII,,, | Performed by: FAMILY MEDICINE

## 2023-07-12 PROCEDURE — 3008F BODY MASS INDEX DOCD: CPT | Mod: CPTII,,, | Performed by: FAMILY MEDICINE

## 2023-07-12 PROCEDURE — 3061F NEG MICROALBUMINURIA REV: CPT | Mod: CPTII,,, | Performed by: FAMILY MEDICINE

## 2023-07-12 PROCEDURE — 99214 PR OFFICE/OUTPT VISIT, EST, LEVL IV, 30-39 MIN: ICD-10-PCS | Mod: ,,, | Performed by: FAMILY MEDICINE

## 2023-07-12 PROCEDURE — 4010F PR ACE/ARB THEARPY RXD/TAKEN: ICD-10-PCS | Mod: CPTII,,, | Performed by: FAMILY MEDICINE

## 2023-07-12 RX ORDER — METHOCARBAMOL 500 MG/1
500 TABLET, FILM COATED ORAL 4 TIMES DAILY
Qty: 40 TABLET | Refills: 0 | Status: SHIPPED | OUTPATIENT
Start: 2023-07-12 | End: 2023-07-22

## 2023-07-12 RX ORDER — TRAMADOL HYDROCHLORIDE 50 MG/1
50 TABLET ORAL EVERY 12 HOURS PRN
Qty: 14 TABLET | Refills: 0 | Status: SHIPPED | OUTPATIENT
Start: 2023-07-12 | End: 2023-07-19

## 2023-07-12 NOTE — PROGRESS NOTES
"              Mack Harp IV, DO  [facility]  [address] [place] [address]  Phone: [phone]      Subjective     Name: Hayley Landis   Sex: female  YOB: 1970 (52 y.o.)  MRN: [id number]  Visit Date: 07/12/2023   Chief Complaint: Leg Pain (Left leg pain has worsened and is radiating to the pelvis area)        HISTORY OF PRESENT ILLNESS:    Chief Complaint   Patient presents with    Leg Pain     Left leg pain has worsened and is radiating to the pelvis area       HPI  See tests that keep you healthy and the problem oriented documentation below.    Tests to Keep You Healthy    Mammogram: Met on 10/17/2022  Eye Exam: DUE  Colon Cancer Screening: Met on 6/8/2020  Cervical Cancer Screening: DUE  Last Blood Pressure <= 139/89 (7/12/2023): NO  Last HbA1c < 8 (11/22/2022): Yes      Portions of this note may have been created with voice recognition software. Occasional "wrong-word" or "sound-a-like" substitutions may have occurred due to the inherent limitations of voice recognition software. Please, read the note carefully and recognize, using context, where substitutions have occurred.     PAST MEDICAL HISTORY:  Significant Diagnoses - Patient  has a past medical history of Colon cancer, CVA (cerebral vascular accident), Diabetes, HTN (hypertension), and Seizure.  Medications - Patient has a current medication list which includes the following long-term medication(s): hydrochlorothiazide, insulin aspart u-100, insulin glargine, levetiracetam, lisinopril, and acyclovir.   Allergies - Patient is allergic to grape, opioids - morphine analogues, pineapple, aspirin, dicyclomine, gabapentin, hydrocodone, iron analogues, metformin, penicillins, and latex.  Surgeries - Patient  has a past surgical history that includes Hernia repair and Colon surgery.  Family History - Patient family history includes No Known Problems in her brother, daughter, father, maternal aunt, maternal " "grandfather, maternal grandmother, maternal uncle, mother, paternal aunt, paternal grandfather, paternal grandmother, paternal uncle, sister, and son.      SOCIAL HISTORY:  Tobacco - Patient  reports that she has never smoked. She has never been exposed to tobacco smoke. She has never used smokeless tobacco.   Alcohol - Patient  reports no history of alcohol use.   Recreational Drugs - Patient  reports no history of drug use.       Review of Systems   All other systems reviewed and are negative.       Past Medical History:   Diagnosis Date    Colon cancer     CVA (cerebral vascular accident)     Diabetes     HTN (hypertension)     Seizure         Review of patient's allergies indicates:   Allergen Reactions    Grape Anaphylaxis    Opioids - morphine analogues Anaphylaxis    Pineapple Anaphylaxis    Aspirin      "Veins breakdown" per patient    Dicyclomine Diarrhea    Gabapentin      weakness    Hydrocodone      Headache      Iron analogues      Pt hx of hemachromatosis    Metformin      diarrhea    Penicillins     Latex Rash        Past Surgical History:   Procedure Laterality Date    COLON SURGERY      HERNIA REPAIR          Family History   Problem Relation Age of Onset    No Known Problems Mother     No Known Problems Father     No Known Problems Sister     No Known Problems Brother     No Known Problems Daughter     No Known Problems Son     No Known Problems Maternal Aunt     No Known Problems Maternal Uncle     No Known Problems Paternal Aunt     No Known Problems Paternal Uncle     No Known Problems Maternal Grandmother     No Known Problems Maternal Grandfather     No Known Problems Paternal Grandmother     No Known Problems Paternal Grandfather        Current Outpatient Medications   Medication Instructions    acyclovir (ZOVIRAX) 400 mg, Oral, 5 times daily    ergocalciferol (ERGOCALCIFEROL) 50,000 Units, Oral, Every 7 days    hydroCHLOROthiazide (HYDRODIURIL) 12.5 MG Tab " "TAKE ONE TABLET BY MOUTH DAILY IN THE MORNING    insulin aspart U-100 (NOVOLOG) 100 unit/mL injection inject as directed by your physician per sliding scale    insulin glargine (LANTUS U-100 INSULIN) 80 Units, Subcutaneous, Daily    insulin syringe-needle U-100 1 mL 30 gauge x 5/16 Syrg Use as directed.    levETIRAcetam (KEPPRA) 500 mg, Oral, Daily    lisinopriL 10 mg, Oral, Daily    methocarbamoL (ROBAXIN) 500 mg, Oral, 4 times daily    peppermint oiL (IBGARD) 90 mg CECX 2 capsules, Oral, Daily    potassium chloride SA (K-DUR,KLOR-CON) 20 MEQ tablet TAKE ONE TABLET BY MOUTH EVERY DAY    traMADoL (ULTRAM) 50 mg, Oral, Every 12 hours PRN        Objective     BP (!) 156/80   Pulse 97   Ht 4' 9" (1.448 m)   Wt 77.6 kg (171 lb)   BMI 37.00 kg/m²     Physical Exam  Constitutional:       General: She is not in acute distress.     Appearance: Normal appearance. She is not ill-appearing.   HENT:      Head: Normocephalic and atraumatic.      Right Ear: External ear normal.      Left Ear: External ear normal.   Eyes:      Extraocular Movements: Extraocular movements intact.      Conjunctiva/sclera: Conjunctivae normal.   Cardiovascular:      Rate and Rhythm: Normal rate.      Heart sounds: No murmur heard.  Pulmonary:      Effort: Pulmonary effort is normal.   Musculoskeletal:      Cervical back: Normal range of motion.   Skin:     General: Skin is warm and dry.      Coloration: Skin is not jaundiced.      Findings: No rash.   Neurological:      Mental Status: She is alert.   Psychiatric:         Mood and Affect: Mood normal.        All recently obtained labs have been reviewed and discussed in detail with the patient.   Assessment     1. Encounter for preprocedural laboratory examination    2. Pelvic pain    3. History of colon cancer         Plan        Problem List Items Addressed This Visit          Oncology    History of colon cancer    Relevant Medications    methocarbamoL (ROBAXIN) 500 MG Tab    Other " Relevant Orders    CT Abdomen Pelvis With Contrast       GI    Pelvic pain    Relevant Medications    methocarbamoL (ROBAXIN) 500 MG Tab    traMADoL (ULTRAM) 50 mg tablet    Other Relevant Orders    CT Abdomen Pelvis With Contrast     Other Visit Diagnoses       Encounter for preprocedural laboratory examination    -  Primary    Relevant Orders    BUN & Creatinine            No follow-ups on file.    Patient advised that is symptoms worsen, they should call or report directly to local emergency department.    Mack Harp,   [facility]

## 2023-07-14 ENCOUNTER — LAB VISIT (OUTPATIENT)
Dept: LAB | Facility: HOSPITAL | Age: 53
End: 2023-07-14
Attending: FAMILY MEDICINE
Payer: MEDICAID

## 2023-07-14 ENCOUNTER — HOSPITAL ENCOUNTER (OUTPATIENT)
Dept: RADIOLOGY | Facility: HOSPITAL | Age: 53
Discharge: HOME | End: 2023-07-14
Attending: FAMILY MEDICINE
Payer: MEDICAID

## 2023-07-14 DIAGNOSIS — Z85.038 HISTORY OF COLON CANCER: ICD-10-CM

## 2023-07-14 DIAGNOSIS — R10.2 PELVIC PAIN: ICD-10-CM

## 2023-07-14 DIAGNOSIS — Z01.812 ENCOUNTER FOR PREPROCEDURAL LABORATORY EXAMINATION: ICD-10-CM

## 2023-07-14 LAB
BUN SERPL-MCNC: 15 MG/DL (ref 7–18)
BUN/CREAT SERPL: 22 (ref 6–20)
CREAT SERPL-MCNC: 0.67 MG/DL (ref 0.55–1.02)
EGFR (NO RACE VARIABLE) (RUSH/TITUS): 106 ML/MIN/1.73M2

## 2023-07-14 PROCEDURE — 82565 ASSAY OF CREATININE: CPT

## 2023-07-14 PROCEDURE — 36415 COLL VENOUS BLD VENIPUNCTURE: CPT

## 2023-07-14 PROCEDURE — 84520 ASSAY OF UREA NITROGEN: CPT

## 2023-07-17 ENCOUNTER — HOSPITAL ENCOUNTER (OUTPATIENT)
Dept: RADIOLOGY | Facility: HOSPITAL | Age: 53
Discharge: HOME | End: 2023-07-17
Attending: FAMILY MEDICINE
Payer: MEDICAID

## 2023-07-17 PROCEDURE — 74177 CT ABD & PELVIS W/CONTRAST: CPT | Mod: TC

## 2023-07-17 PROCEDURE — 25500020 PHARM REV CODE 255: Performed by: FAMILY MEDICINE

## 2023-07-17 RX ADMIN — IOPAMIDOL 100 ML: 755 INJECTION, SOLUTION INTRAVENOUS at 09:07

## 2023-07-20 ENCOUNTER — PATIENT MESSAGE (OUTPATIENT)
Dept: ADMINISTRATIVE | Facility: HOSPITAL | Age: 53
End: 2023-07-20

## 2023-07-24 ENCOUNTER — OFFICE VISIT (OUTPATIENT)
Dept: FAMILY MEDICINE | Facility: CLINIC | Age: 53
End: 2023-07-24
Payer: MEDICAID

## 2023-07-24 VITALS
SYSTOLIC BLOOD PRESSURE: 118 MMHG | DIASTOLIC BLOOD PRESSURE: 77 MMHG | BODY MASS INDEX: 36.68 KG/M2 | WEIGHT: 170 LBS | HEART RATE: 99 BPM | HEIGHT: 57 IN

## 2023-07-24 DIAGNOSIS — E11.9 TYPE 2 DIABETES MELLITUS WITHOUT COMPLICATION, WITH LONG-TERM CURRENT USE OF INSULIN: Chronic | ICD-10-CM

## 2023-07-24 DIAGNOSIS — Z79.4 TYPE 2 DIABETES MELLITUS WITHOUT COMPLICATION, WITH LONG-TERM CURRENT USE OF INSULIN: Chronic | ICD-10-CM

## 2023-07-24 DIAGNOSIS — M25.552 PAIN OF LEFT HIP: Primary | ICD-10-CM

## 2023-07-24 DIAGNOSIS — I10 PRIMARY HYPERTENSION: Chronic | ICD-10-CM

## 2023-07-24 LAB
CHOLEST SERPL-MCNC: 221 MG/DL (ref 0–200)
CHOLEST/HDLC SERPL: 4.8 {RATIO}
EST. AVERAGE GLUCOSE BLD GHB EST-MCNC: 157 MG/DL
HBA1C MFR BLD HPLC: 7.3 % (ref 4.5–6.6)
HDLC SERPL-MCNC: 46 MG/DL (ref 40–60)
LDLC SERPL CALC-MCNC: 141 MG/DL
LDLC/HDLC SERPL: 3.1 {RATIO}
NONHDLC SERPL-MCNC: 175 MG/DL
TRIGL SERPL-MCNC: 170 MG/DL (ref 35–150)
VLDLC SERPL-MCNC: 34 MG/DL

## 2023-07-24 PROCEDURE — 3074F PR MOST RECENT SYSTOLIC BLOOD PRESSURE < 130 MM HG: ICD-10-PCS | Mod: CPTII,,, | Performed by: FAMILY MEDICINE

## 2023-07-24 PROCEDURE — 3061F NEG MICROALBUMINURIA REV: CPT | Mod: CPTII,,, | Performed by: FAMILY MEDICINE

## 2023-07-24 PROCEDURE — 3061F PR NEG MICROALBUMINURIA RESULT DOCUMENTED/REVIEW: ICD-10-PCS | Mod: CPTII,,, | Performed by: FAMILY MEDICINE

## 2023-07-24 PROCEDURE — 1159F PR MEDICATION LIST DOCUMENTED IN MEDICAL RECORD: ICD-10-PCS | Mod: CPTII,,, | Performed by: FAMILY MEDICINE

## 2023-07-24 PROCEDURE — 4010F ACE/ARB THERAPY RXD/TAKEN: CPT | Mod: CPTII,,, | Performed by: FAMILY MEDICINE

## 2023-07-24 PROCEDURE — 3066F PR DOCUMENTATION OF TREATMENT FOR NEPHROPATHY: ICD-10-PCS | Mod: CPTII,,, | Performed by: FAMILY MEDICINE

## 2023-07-24 PROCEDURE — 1159F MED LIST DOCD IN RCRD: CPT | Mod: CPTII,,, | Performed by: FAMILY MEDICINE

## 2023-07-24 PROCEDURE — 3074F SYST BP LT 130 MM HG: CPT | Mod: CPTII,,, | Performed by: FAMILY MEDICINE

## 2023-07-24 PROCEDURE — 3078F PR MOST RECENT DIASTOLIC BLOOD PRESSURE < 80 MM HG: ICD-10-PCS | Mod: CPTII,,, | Performed by: FAMILY MEDICINE

## 2023-07-24 PROCEDURE — 99214 PR OFFICE/OUTPT VISIT, EST, LEVL IV, 30-39 MIN: ICD-10-PCS | Mod: ,,, | Performed by: FAMILY MEDICINE

## 2023-07-24 PROCEDURE — 3008F PR BODY MASS INDEX (BMI) DOCUMENTED: ICD-10-PCS | Mod: CPTII,,, | Performed by: FAMILY MEDICINE

## 2023-07-24 PROCEDURE — 80061 LIPID PANEL: CPT | Mod: ,,, | Performed by: CLINICAL MEDICAL LABORATORY

## 2023-07-24 PROCEDURE — 99214 OFFICE O/P EST MOD 30 MIN: CPT | Mod: ,,, | Performed by: FAMILY MEDICINE

## 2023-07-24 PROCEDURE — 83036 HEMOGLOBIN GLYCOSYLATED A1C: CPT | Mod: ,,, | Performed by: CLINICAL MEDICAL LABORATORY

## 2023-07-24 PROCEDURE — 3008F BODY MASS INDEX DOCD: CPT | Mod: CPTII,,, | Performed by: FAMILY MEDICINE

## 2023-07-24 PROCEDURE — 80061 LIPID PANEL: ICD-10-PCS | Mod: ,,, | Performed by: CLINICAL MEDICAL LABORATORY

## 2023-07-24 PROCEDURE — 3078F DIAST BP <80 MM HG: CPT | Mod: CPTII,,, | Performed by: FAMILY MEDICINE

## 2023-07-24 PROCEDURE — 4010F PR ACE/ARB THEARPY RXD/TAKEN: ICD-10-PCS | Mod: CPTII,,, | Performed by: FAMILY MEDICINE

## 2023-07-24 PROCEDURE — 3066F NEPHROPATHY DOC TX: CPT | Mod: CPTII,,, | Performed by: FAMILY MEDICINE

## 2023-07-24 PROCEDURE — 83036 HEMOGLOBIN A1C: ICD-10-PCS | Mod: ,,, | Performed by: CLINICAL MEDICAL LABORATORY

## 2023-07-24 RX ORDER — MELOXICAM 7.5 MG/1
7.5 TABLET ORAL DAILY
Qty: 30 TABLET | Refills: 0 | Status: SHIPPED | OUTPATIENT
Start: 2023-07-24 | End: 2023-08-23

## 2023-07-24 NOTE — PROGRESS NOTES
"              Mack Harp IV, DO  [facility]  [address] [place] [address]  Phone: [phone]      Subjective     Name: Hayley Landis   Sex: female  YOB: 1970 (52 y.o.)  MRN: [id number]  Visit Date: 07/24/2023   Chief Complaint: Hip Pain (Still having pain in her left hip/leg)        HISTORY OF PRESENT ILLNESS:    Chief Complaint   Patient presents with    Hip Pain     Still having pain in her left hip/leg       HPI  See tests that keep you healthy and the problem oriented documentation below.    Tests to Keep You Healthy    Mammogram: Met on 10/17/2022  Eye Exam: DUE  Colon Cancer Screening: Met on 6/8/2020  Cervical Cancer Screening: DUE  Last Blood Pressure <= 139/89 (7/24/2023): NO  Last HbA1c < 8 (11/22/2022): Yes      Portions of this note may have been created with voice recognition software. Occasional "wrong-word" or "sound-a-like" substitutions may have occurred due to the inherent limitations of voice recognition software. Please, read the note carefully and recognize, using context, where substitutions have occurred.     PAST MEDICAL HISTORY:  Significant Diagnoses - Patient  has a past medical history of Colon cancer, CVA (cerebral vascular accident), Diabetes, HTN (hypertension), and Seizure.  Medications - Patient has a current medication list which includes the following long-term medication(s): hydrochlorothiazide, insulin aspart u-100, insulin glargine, levetiracetam, and lisinopril.   Allergies - Patient is allergic to grape, pineapple, opioids - morphine analogues, aspirin, dicyclomine, gabapentin, hydrocodone, iron analogues, metformin, penicillins, and latex.  Surgeries - Patient  has a past surgical history that includes Hernia repair and Colon surgery.  Family History - Patient family history includes No Known Problems in her brother, daughter, father, maternal aunt, maternal grandfather, maternal grandmother, maternal uncle, mother, " "paternal aunt, paternal grandfather, paternal grandmother, paternal uncle, sister, and son.      SOCIAL HISTORY:  Tobacco - Patient  reports that she has never smoked. She has never been exposed to tobacco smoke. She has never used smokeless tobacco.   Alcohol - Patient  reports no history of alcohol use.   Recreational Drugs - Patient  reports no history of drug use.       Review of Systems   All other systems reviewed and are negative.       Past Medical History:   Diagnosis Date    Colon cancer     CVA (cerebral vascular accident)     Diabetes     HTN (hypertension)     Seizure         Review of patient's allergies indicates:   Allergen Reactions    Grape Anaphylaxis    Pineapple Anaphylaxis    Opioids - morphine analogues Nausea And Vomiting    Aspirin      "Veins breakdown" per patient    Dicyclomine Diarrhea    Gabapentin      weakness    Hydrocodone      Headache      Iron analogues      Pt hx of hemachromatosis    Metformin      diarrhea    Penicillins     Latex Rash        Past Surgical History:   Procedure Laterality Date    COLON SURGERY      HERNIA REPAIR          Family History   Problem Relation Age of Onset    No Known Problems Mother     No Known Problems Father     No Known Problems Sister     No Known Problems Brother     No Known Problems Daughter     No Known Problems Son     No Known Problems Maternal Aunt     No Known Problems Maternal Uncle     No Known Problems Paternal Aunt     No Known Problems Paternal Uncle     No Known Problems Maternal Grandmother     No Known Problems Maternal Grandfather     No Known Problems Paternal Grandmother     No Known Problems Paternal Grandfather        Current Outpatient Medications   Medication Instructions    ergocalciferol (ERGOCALCIFEROL) 50,000 Units, Oral, Every 7 days    hydroCHLOROthiazide (HYDRODIURIL) 12.5 MG Tab TAKE ONE TABLET BY MOUTH DAILY IN THE MORNING    insulin aspart U-100 (NOVOLOG) 100 unit/mL injection " "inject as directed by your physician per sliding scale    insulin glargine (LANTUS U-100 INSULIN) 80 Units, Subcutaneous, Daily    insulin syringe-needle U-100 1 mL 30 gauge x 5/16 Syrg Use as directed.    levETIRAcetam (KEPPRA) 500 mg, Oral, Daily    lisinopriL 10 mg, Oral, Daily    meloxicam (MOBIC) 7.5 mg, Oral, Daily    peppermint oiL (IBGARD) 90 mg CECX 2 capsules, Oral, Daily    potassium chloride SA (K-DUR,KLOR-CON) 20 MEQ tablet TAKE ONE TABLET BY MOUTH EVERY DAY        Objective     /77   Pulse 99   Ht 4' 9" (1.448 m)   Wt 77.1 kg (170 lb)   BMI 36.79 kg/m²     Physical Exam  Constitutional:       General: She is not in acute distress.     Appearance: Normal appearance. She is not ill-appearing.   HENT:      Head: Normocephalic and atraumatic.      Right Ear: External ear normal.      Left Ear: External ear normal.   Eyes:      Extraocular Movements: Extraocular movements intact.      Conjunctiva/sclera: Conjunctivae normal.   Cardiovascular:      Rate and Rhythm: Normal rate.      Heart sounds: No murmur heard.  Pulmonary:      Effort: Pulmonary effort is normal.   Musculoskeletal:      Cervical back: Normal range of motion.   Skin:     General: Skin is warm and dry.      Coloration: Skin is not jaundiced.      Findings: No rash.   Neurological:      Mental Status: She is alert.   Psychiatric:         Mood and Affect: Mood normal.        All recently obtained labs have been reviewed and discussed in detail with the patient.   Assessment     1. Pain of left hip    2. Type 2 diabetes mellitus without complication, with long-term current use of insulin    3. Primary hypertension         Plan        Problem List Items Addressed This Visit          Cardiac/Vascular    Primary hypertension (Chronic)    Relevant Orders    Lipid Panel       Endocrine    Type 2 diabetes mellitus without complication, with long-term current use of insulin (Chronic)    Relevant Orders    Hemoglobin A1C       " Orthopedic    Pain of left hip - Primary     Chronic pain of the left hip.  Patient did fall on this approximately 1 year ago.  Have talked with the patient about possible prognosis and patient opts for conservative care at this time.         Relevant Medications    meloxicam (MOBIC) 7.5 MG tablet    Other Relevant Orders    Ambulatory referral/consult to Physical/Occupational Therapy       No follow-ups on file.    Patient advised that is symptoms worsen, they should call or report directly to local emergency department.    Mack Harp,   [facility]

## 2023-07-24 NOTE — ASSESSMENT & PLAN NOTE
Chronic pain of the left hip.  Patient did fall on this approximately 1 year ago.  Have talked with the patient about possible prognosis and patient opts for conservative care at this time.

## 2023-08-01 ENCOUNTER — CLINICAL SUPPORT (OUTPATIENT)
Dept: REHABILITATION | Facility: HOSPITAL | Age: 53
End: 2023-08-01
Payer: MEDICAID

## 2023-08-01 DIAGNOSIS — M25.552 PAIN OF LEFT HIP: Primary | ICD-10-CM

## 2023-08-01 DIAGNOSIS — R29.898 WEAKNESS OF LEFT LOWER EXTREMITY: ICD-10-CM

## 2023-08-01 DIAGNOSIS — M25.652 DECREASED RANGE OF LEFT HIP MOVEMENT: ICD-10-CM

## 2023-08-01 PROCEDURE — 97161 PT EVAL LOW COMPLEX 20 MIN: CPT

## 2023-08-01 NOTE — PLAN OF CARE
[facility]  Physical Therapy Initial Evaluation    Name: Hayley Landis  Clinic Number: [id number]    Therapy Diagnosis:   Encounter Diagnoses   Name Primary?    Pain of left hip Yes    Decreased range of left hip movement     Weakness of left lower extremity        Physician: Mack Harp IV, DO    Physician Orders: PT Eval and Treat  Medical Diagnosis from Referral: M25.552 (ICD-10-CM) - Pain of left hip  Evaluation Date: 8/1/2023  Authorization Period Expiration: Only initial evaluation approved by [facility]. Subsequent visits require prior authorization.   Plan of Care Expiration: 9/8/2023  Visit # / Visits authorized: 1/Only initial evaluation approved by [facility]. Subsequent visits require prior authorization.     Time In: 1110  Time Out: 1150  Total Appointment Time (timed & untimed codes): 40 minutes    Precautions: Standard, Diabetes, and cancer (colon; in remission x 3 years)    Subjective     Date of onset: ~6 weeks ago    History of current condition - Hayley reports an ~6-week history of left hip pain that she feels may be related to osteoarthritis. Patient reports she has been told that she may need a total hip replacement; however, she is not wanting to go that rout as she does not do well with anesthesia.      Falls: one fall ~1 year ago in which she landed on concrete on her left hip    Imaging: no recent related imaging available to view in Epic    Prior Therapy: none for presenting condition  Social History: lives with their family  Steps/Stairs: 3 steps with bilateral handrails to enter home  Occupation: disability related to cancer  Driving: No  Durable Medical Equipment: single point cane  Dominant Extremity: right  Affected Extremity: left  Prior Level of Function: independent with all functional mobility without assistive device  Current Level of Function:  independent vs modified independent with all functional mobility with intermittent use  "of a single point cane    Pain:  Current 8/10, worst 10/10, best 2/10   Location: musculature surrounding the left hip (distally)  Description: Aching, Burning, and Sharp  Aggravating Factors: prolonged standing  Easing Factors: pain medication and rest    Pts goals: Patient wishes to decrease the pain in her left hip and avoid having to have a left total hip replacement.     Medical History:   Past Medical History:   Diagnosis Date    Colon cancer     CVA (cerebral vascular accident)     Diabetes     HTN (hypertension)     Seizure      Surgical History:   Hayley Landis  has a past surgical history that includes Hernia repair and Colon surgery.    Medications:   Hayley has a current medication list which includes the following prescription(s): ergocalciferol, hydrochlorothiazide, insulin aspart u-100, insulin glargine, insulin syringe-needle u-100, levetiracetam, lisinopril, meloxicam, ibgard, and potassium chloride sa.    Allergies:   Review of patient's allergies indicates:   Allergen Reactions    Grape Anaphylaxis    Pineapple Anaphylaxis    Opioids - morphine analogues Nausea And Vomiting    Aspirin      "Veins breakdown" per patient    Dicyclomine Diarrhea    Gabapentin      weakness    Hydrocodone      Headache      Iron analogues      Pt hx of hemachromatosis    Metformin      diarrhea    Penicillins     Latex Rash      Objective     Range of motion:   Right Left    Hip flexion (120) 105* 90*   Hip extension WFL WFL   Hip abduction WFL WFL   Hip adduction WFL WFL   Internal rotation (45) WFL WFL   External rotation (45) WFL WFL   Knee extension WFL WFL   Knee flexion (120) 125* 125*     Manual muscle testing:   Right  Left    Hip flexion  MMT strength: 4/5  MMT strength: 4/5   Hip extension  MMT strength: 4/5  MMT strength: 4/5   Hip abduction  MMT strength: 4/5  MMT strength: 4/5   Hip adduction  MMT strength: 4/5  MMT strength: 4/5   Knee extension  MMT strength: 4/5  MMT strength: 4/5   Knee flexion  " "MMT strength: 4/5  MMT strength: 4/5     Incision: N/A    Gait:  Weight bearing precautions: WBAT  Assistive device: none  Ambulation distance and deviations: community distances; mildly cautious but no significant abnormalities noted  Stairs: up steps reciprocally with unilateral upper extremity support with increased time and effort; down steps with a step-to pattern (one step leading with right lower extremity; one step leading with left lower extremity) with unilateral upper extremity support    Limitation/Restriction for FOTO Intake Survey    Therapist reviewed FOTO scores for Hayley Landis on 8/1/2023.   FOTO documents entered into EPIC - see Media section.    Limitation Score: 53%       Patient Education and Home Exercises     Education provided: Patient educated on the importance of completing skilled physical therapy treatment and home exercise program as prescribed to maximize functional gains.     Written Home Exercises Provided: yes. Exercises were reviewed and Hayley was able to demonstrate them prior to the end of the session. Hayley demonstrated good  understanding of the education provided.     See EMR under "Patient Instructions" for exercises provided during therapy sessions.    Assessment     Hayley is a 52 y.o. female referred to outpatient physical therapy with a medical diagnosis of M25.552 (ICD-10-CM) - Pain of left hip. Pt presents to PT with left hip pain, decreased left hip flexion active range of motion, bilateral lower extremity weakness, increased assist required with transfers compared to baseline, and abnormal stair negotiation mechanics. If patient does not notice a significant reduction in her left hip pain following physical therapy treatment, patient will be referred back to Dr. Harp with recommendations to refer patient for further imaging of the left hip as well as an evaluation by an orthopedic surgeon.     Pt prognosis is Good.   Pt will benefit from skilled " outpatient Physical Therapy to address the deficits stated above and in the chart below, provide pt/family education, and to maximize pt's level of independence.     Plan of care discussed with patient: Yes  Pt's spiritual, cultural and educational needs considered and pt agreeable to plan of care and goals as stated below:     Anticipated Barriers for therapy: compliance with home exercise program    Medical Necessity is demonstrated by the following:  History  Co-morbidities and personal factors that may impact the plan of care [x] LOW: no personal factors / co-morbidities  [] MODERATE: 1-2 personal factors / co-morbidities  [] HIGH: 3+ personal factors / co-morbidities    Moderate / High Support Documentation:   Co-morbidities affecting plan of care: N/A    Personal Factors:   no deficits     Examination  Body Structures and Functions, activity limitations and participation restrictions that may impact the plan of care [] LOW: addressing 1-2 elements  [] MODERATE: 3+ elements  [x] HIGH: 4+ elements (please support below)    Moderate / High Support Documentation: pain, range of motion, strength, transfer abilities, and stair negotiation mechanics     Clinical Presentation [x] LOW: stable  [] MODERATE: Evolving  [] HIGH: Unstable     Decision Making/ Complexity Score: low       Goals:    Short Term Goals:  Patient will demonstrate independence with home exercise program to ensure carryover of treatment.  Patient will perform sit to/from stand with standby assist without upper extremity support to improve independence and safety with transfers.  Patient will demonstrate 105 degrees of left hip flexion active range of motion in supine to improve functional use of the left lower extremity.   Patient will report a reduction in left hip pain from 10/10 to 8/10 at worst to improve quality of life.     Long Term Goals:  Patient will perform sit to/from stand with independence without upper extremity support to improve  independence and safety with transfers.  Patient will improve bilateral lower extremity strength to 4+/5 to improve independence and safety with bed mobility, transfers, and gait.  Patient will report a reduction in left hip pain from 10/10 to 6/10 at worst to improve quality of life.   Patient will ascend/descend 3 steps reciprocally with unilateral upper extremity support to improve independence and safety with stair negotiation.     Plan     Plan of care Certification: 8/1/2023 to 9/8/2023.    Outpatient Physical Therapy 2 times weekly for 4 weeks to include the following interventions: Gait Training, Manual Therapy, Neuromuscular Re-ed, Patient Education, Therapeutic Activities, and Therapeutic Exercise.     Clinical Information for [place] Authorization     Dates of Services: 8/1/2023 to 9/8/2023  Discharge Plan: Patient will be discharged from skilled physical therapy treatment once all goals have been met, patient has plateaued, or physician/insurance requests discontinuation of care. Patient will be discharged with a home exercise program.   Type of therapy: Rehabilitative  ICD-10 Diagnosis Code(s): M25.552 (ICD-10-CM) - Pain of left hip  Which side is symptomatic? Left  Surgical: No  Surgical procedure: N/A  Surgery date: N/A.  Presenting symptoms/diagnoses are unspecified in nature.  Presenting symptoms are non-radiating in nature.   The rehabilitation is not related to a diagnosis of cancer.  The rehabilitation is not related to a diagnosis of lymphedema.  Patient's clinical presentation is:  Moderate objective and functional deficits: consistent symptoms and/or symptoms that are intensified with activity with moderate loss of range of motion, strength, or ability to perform daily tasks  CPT Codes Requested:  06153 [therapeutic exercise], 87830 [neuromuscular re-education], 28885 [gait training], 10495 [manual therapy], and 69677 [therapeutic activities]      Abram Collins, PT, DPT  8/1/2023

## 2023-08-05 ENCOUNTER — HOSPITAL ENCOUNTER (EMERGENCY)
Facility: HOSPITAL | Age: 53
Discharge: HOME | End: 2023-08-05
Payer: MEDICAID

## 2023-08-05 VITALS
BODY MASS INDEX: 36.68 KG/M2 | RESPIRATION RATE: 19 BRPM | WEIGHT: 170 LBS | DIASTOLIC BLOOD PRESSURE: 67 MMHG | HEART RATE: 83 BPM | OXYGEN SATURATION: 97 % | TEMPERATURE: 98 F | SYSTOLIC BLOOD PRESSURE: 121 MMHG | HEIGHT: 57 IN

## 2023-08-05 DIAGNOSIS — E86.0 MILD DEHYDRATION: ICD-10-CM

## 2023-08-05 DIAGNOSIS — K52.9 GASTROENTERITIS: Primary | ICD-10-CM

## 2023-08-05 LAB
ALBUMIN SERPL BCP-MCNC: 3 G/DL (ref 3.5–5)
ALBUMIN/GLOB SERPL: 0.8 {RATIO}
ALP SERPL-CCNC: 121 U/L (ref 41–108)
ALT SERPL W P-5'-P-CCNC: 55 U/L (ref 13–56)
ANION GAP SERPL CALCULATED.3IONS-SCNC: 12 MMOL/L (ref 7–16)
AST SERPL W P-5'-P-CCNC: 36 U/L (ref 15–37)
BASOPHILS # BLD AUTO: 0.01 K/UL (ref 0–0.2)
BASOPHILS NFR BLD AUTO: 0.3 % (ref 0–1)
BILIRUB SERPL-MCNC: 0.5 MG/DL (ref ?–1.2)
BUN SERPL-MCNC: 14 MG/DL (ref 7–18)
BUN/CREAT SERPL: 19 (ref 6–20)
CALCIUM SERPL-MCNC: 8.3 MG/DL (ref 8.5–10.1)
CHLORIDE SERPL-SCNC: 98 MMOL/L (ref 98–107)
CO2 SERPL-SCNC: 30 MMOL/L (ref 21–32)
CREAT SERPL-MCNC: 0.74 MG/DL (ref 0.55–1.02)
DIFFERENTIAL METHOD BLD: ABNORMAL
EGFR (NO RACE VARIABLE) (RUSH/TITUS): 98 ML/MIN/1.73M2
EOSINOPHIL # BLD AUTO: 0.57 K/UL (ref 0–0.5)
EOSINOPHIL NFR BLD AUTO: 16.9 % (ref 1–4)
ERYTHROCYTE [DISTWIDTH] IN BLOOD BY AUTOMATED COUNT: 16.2 % (ref 11.5–14.5)
GLOBULIN SER-MCNC: 3.8 G/DL (ref 2–4)
GLUCOSE SERPL-MCNC: 218 MG/DL (ref 74–106)
HCT VFR BLD AUTO: 42.8 % (ref 38–47)
HGB BLD-MCNC: 13.9 G/DL (ref 12–16)
LYMPHOCYTES # BLD AUTO: 1 K/UL (ref 1–4.8)
LYMPHOCYTES NFR BLD AUTO: 29.7 % (ref 27–41)
MCH RBC QN AUTO: 27.9 PG (ref 27–31)
MCHC RBC AUTO-ENTMCNC: 32.5 G/DL (ref 32–36)
MCV RBC AUTO: 85.8 FL (ref 80–96)
MONOCYTES # BLD AUTO: 0.39 K/UL (ref 0–0.8)
MONOCYTES NFR BLD AUTO: 11.6 % (ref 2–6)
MPC BLD CALC-MCNC: 13.5 FL (ref 9.4–12.4)
NEUTROPHILS # BLD AUTO: 1.4 K/UL (ref 1.8–7.7)
NEUTROPHILS NFR BLD AUTO: 41.5 % (ref 53–65)
PLATELET # BLD AUTO: 106 K/UL (ref 150–400)
POTASSIUM SERPL-SCNC: 3.6 MMOL/L (ref 3.5–5.1)
PROT SERPL-MCNC: 6.8 G/DL (ref 6.4–8.2)
RBC # BLD AUTO: 4.99 M/UL (ref 4.2–5.4)
SODIUM SERPL-SCNC: 136 MMOL/L (ref 136–145)
WBC # BLD AUTO: 3.37 K/UL (ref 4.5–11)

## 2023-08-05 PROCEDURE — 80053 COMPREHEN METABOLIC PANEL: CPT | Performed by: NURSE PRACTITIONER

## 2023-08-05 PROCEDURE — 99284 EMERGENCY DEPT VISIT MOD MDM: CPT | Mod: 25

## 2023-08-05 PROCEDURE — 96360 HYDRATION IV INFUSION INIT: CPT

## 2023-08-05 PROCEDURE — 25000003 PHARM REV CODE 250: Performed by: NURSE PRACTITIONER

## 2023-08-05 PROCEDURE — 99284 PR EMERGENCY DEPT VISIT,LEVEL IV: ICD-10-PCS | Mod: ,,, | Performed by: NURSE PRACTITIONER

## 2023-08-05 PROCEDURE — 85025 COMPLETE CBC W/AUTO DIFF WBC: CPT | Performed by: NURSE PRACTITIONER

## 2023-08-05 PROCEDURE — 99284 EMERGENCY DEPT VISIT MOD MDM: CPT | Mod: ,,, | Performed by: NURSE PRACTITIONER

## 2023-08-05 RX ORDER — ONDANSETRON 4 MG/1
4 TABLET, ORALLY DISINTEGRATING ORAL EVERY 8 HOURS PRN
Qty: 10 TABLET | Refills: 0 | Status: SHIPPED | OUTPATIENT
Start: 2023-08-05 | End: 2023-09-25

## 2023-08-05 RX ADMIN — SODIUM CHLORIDE 1000 ML: 9 INJECTION, SOLUTION INTRAVENOUS at 10:08

## 2023-08-05 NOTE — ED PROVIDER NOTES
"Encounter Date: 8/5/2023       History     Chief Complaint   Patient presents with    Diarrhea     52 year old  female presents to the ER stating she is here for IVFs for dehydration related to stomach bug.  She reports n/v/d. She reports only 2 episodes of vomiting since Tuesday.  She reports her symptoms have improved today without any vomiting or diarrhea.     The history is provided by the patient.   Illness   The current episode started several days ago. The problem occurs frequently. The problem has been gradually improving. The pain is at a severity of 0/10. Nothing relieves the symptoms. Nothing aggravates the symptoms. Associated symptoms include diarrhea, nausea and vomiting. Pertinent negatives include no fever, no decreased vision, no double vision, no eye itching, no photophobia, no abdominal pain, no constipation, no congestion, no ear discharge, no ear pain, no headaches, no hearing loss, no mouth sores, no rhinorrhea, no sore throat, no stridor, no swollen glands, no cough, no shortness of breath, no URI, no wheezing, no rash, no discharge, no pain and no eye redness. She has received no recent medical care.     Review of patient's allergies indicates:   Allergen Reactions    Grape Anaphylaxis    Pineapple Anaphylaxis    Opioids - morphine analogues Nausea And Vomiting    Aspirin      "Veins breakdown" per patient    Dicyclomine Diarrhea    Gabapentin      weakness    Hydrocodone      Headache      Iron analogues      Pt hx of hemachromatosis    Metformin      diarrhea    Penicillins     Latex Rash     Past Medical History:   Diagnosis Date    Colon cancer     CVA (cerebral vascular accident)     Diabetes     HTN (hypertension)     Seizure      Past Surgical History:   Procedure Laterality Date    COLON SURGERY      HERNIA REPAIR      HYSTERECTOMY       Family History   Problem Relation Age of Onset    No Known Problems Mother     No Known Problems Father     No Known Problems Sister     No " Known Problems Brother     No Known Problems Daughter     No Known Problems Son     No Known Problems Maternal Aunt     No Known Problems Maternal Uncle     No Known Problems Paternal Aunt     No Known Problems Paternal Uncle     No Known Problems Maternal Grandmother     No Known Problems Maternal Grandfather     No Known Problems Paternal Grandmother     No Known Problems Paternal Grandfather      Social History     Tobacco Use    Smoking status: Never     Passive exposure: Never    Smokeless tobacco: Never   Substance Use Topics    Alcohol use: Never    Drug use: Never     Review of Systems   Constitutional:  Negative for fever.   HENT:  Negative for congestion, ear discharge, ear pain, hearing loss, mouth sores, rhinorrhea and sore throat.    Eyes:  Negative for double vision, photophobia, pain, discharge, redness and itching.   Respiratory:  Negative for cough, shortness of breath, wheezing and stridor.    Cardiovascular:  Negative for chest pain.   Gastrointestinal:  Positive for diarrhea, nausea and vomiting. Negative for abdominal pain and constipation.   Genitourinary:  Negative for dysuria.   Musculoskeletal:  Negative for back pain.   Skin:  Negative for rash.   Neurological:  Negative for weakness and headaches.   Hematological:  Does not bruise/bleed easily.       Physical Exam     Initial Vitals [08/05/23 0953]   BP Pulse Resp Temp SpO2   (!) 147/82 91 18 98.2 °F (36.8 °C) 97 %      MAP       --         Physical Exam    Nursing note and vitals reviewed.  Constitutional: She appears well-developed and well-nourished. She is not diaphoretic. She is Obese . She is cooperative.  Non-toxic appearance. She has a sickly appearance. She appears ill. No distress.   HENT:   Head: Normocephalic and atraumatic.   Mouth/Throat: Uvula is midline. Mucous membranes are dry.   Eyes: Pupils are equal, round, and reactive to light.   Neck: Neck supple.   Cardiovascular:  Normal rate, regular rhythm, normal heart sounds  and intact distal pulses.     Exam reveals no gallop and no friction rub.       No murmur heard.  Pulmonary/Chest: Breath sounds normal. No respiratory distress. She has no wheezes. She has no rhonchi. She has no rales. She exhibits no tenderness.   Abdominal: Abdomen is soft and flat. There is no abdominal tenderness.   Musculoskeletal:      Cervical back: Neck supple.     Neurological: She is alert and oriented to person, place, and time.   Skin: Skin is warm and dry. Capillary refill takes less than 2 seconds.   Psychiatric: She has a normal mood and affect.         Medical Screening Exam   See Full Note    ED Course   Procedures  Labs Reviewed   COMPREHENSIVE METABOLIC PANEL - Abnormal; Notable for the following components:       Result Value    Glucose 218 (*)     Calcium 8.3 (*)     Albumin 3.0 (*)     Alk Phos 121 (*)     All other components within normal limits   CBC WITH DIFFERENTIAL - Abnormal; Notable for the following components:    WBC 3.37 (*)     RDW 16.2 (*)     Platelet Count 106 (*)     MPV 13.5 (*)     Neutrophils % 41.5 (*)     Neutrophils, Abs 1.40 (*)     Monocytes % 11.6 (*)     Eosinophils % 16.9 (*)     Eosinophils, Absolute 0.57 (*)     All other components within normal limits   CBC W/ AUTO DIFFERENTIAL    Narrative:     The following orders were created for panel order CBC auto differential.  Procedure                               Abnormality         Status                     ---------                               -----------         ------                     CBC with Differential[[id number]]        Abnormal            Final result                 Please view results for these tests on the individual orders.          Imaging Results    None          Medications   sodium chloride 0.9% bolus 1,000 mL 1,000 mL (1,000 mLs Intravenous New Bag 8/5/23 1014)     Medical Decision Making:   Differential Diagnosis:   Gastroenteritis, dehydration, electrolyte abnormality, diverticulitis,  infectious diarrhea.   Clinical Tests:   Lab Tests: Ordered and Reviewed             ED Course as of 08/05/23 1040   Sat Aug 05, 2023   1021 Platelets(!): 106 [AG]   1037 Glucose(!): 218 [AG]   1037 Lab is stable but she exhibits signs of clinical dehydration.  Will hydrate well and dc home with instructions to followup with PCP [AG]      ED Course User Index  [AG] Daisy Oreilly FNP                Clinical Impression:   Final diagnoses:  [K52.9] Gastroenteritis (Primary)  [E86.0] Mild dehydration        ED Disposition Condition    Discharge Stable          ED Prescriptions       Medication Sig Dispense Start Date End Date Auth. Provider    ondansetron (ZOFRAN-ODT) 4 MG TbDL Take 1 tablet (4 mg total) by mouth every 8 (eight) hours as needed (nausea vomiting). 10 tablet 8/5/2023 -- Daisy Oreilly FNP          Follow-up Information       Follow up With Specialties Details Why Contact Info    Elkin Coombs MD Family Medicine Schedule an appointment as soon as possible for a visit in 3 days As needed, If symptoms worsen [address] [place] [address]  [facility] [place] [address] [phone]               Daisy Oreilly FNP  08/05/23 1040

## 2023-08-05 NOTE — DISCHARGE INSTRUCTIONS
Hydrate well at home with water and fluids with electrolytes such as gatorade.  I have sent you in some Zofran, in case you develop nausea/vomiting.  Return to the ER for new or worsening of your symptoms to include fever, uncontrolled vomiting or abdominal pain.

## 2023-08-05 NOTE — ED TRIAGE NOTES
Reports diarrhea started 08/01/23, has taken pepto with little to no relief, no diarrhea today, no vomiting today

## 2023-08-09 ENCOUNTER — OFFICE VISIT (OUTPATIENT)
Dept: FAMILY MEDICINE | Facility: CLINIC | Age: 53
End: 2023-08-09
Payer: MEDICAID

## 2023-08-09 VITALS
BODY MASS INDEX: 36.24 KG/M2 | HEIGHT: 57 IN | HEART RATE: 103 BPM | DIASTOLIC BLOOD PRESSURE: 83 MMHG | WEIGHT: 168 LBS | SYSTOLIC BLOOD PRESSURE: 138 MMHG

## 2023-08-09 DIAGNOSIS — D72.819 LEUKOPENIA, UNSPECIFIED TYPE: ICD-10-CM

## 2023-08-09 DIAGNOSIS — Z79.4 TYPE 2 DIABETES MELLITUS WITHOUT COMPLICATION, WITH LONG-TERM CURRENT USE OF INSULIN: Primary | Chronic | ICD-10-CM

## 2023-08-09 DIAGNOSIS — M25.552 PAIN OF LEFT HIP: ICD-10-CM

## 2023-08-09 DIAGNOSIS — K58.0 IRRITABLE BOWEL SYNDROME WITH DIARRHEA: Chronic | ICD-10-CM

## 2023-08-09 DIAGNOSIS — E11.9 TYPE 2 DIABETES MELLITUS WITHOUT COMPLICATION, WITH LONG-TERM CURRENT USE OF INSULIN: Primary | Chronic | ICD-10-CM

## 2023-08-09 DIAGNOSIS — E66.01 CLASS 2 SEVERE OBESITY DUE TO EXCESS CALORIES WITH SERIOUS COMORBIDITY AND BODY MASS INDEX (BMI) OF 36.0 TO 36.9 IN ADULT: ICD-10-CM

## 2023-08-09 DIAGNOSIS — R53.83 FATIGUE, UNSPECIFIED TYPE: ICD-10-CM

## 2023-08-09 DIAGNOSIS — E55.9 VITAMIN D DEFICIENCY: ICD-10-CM

## 2023-08-09 PROCEDURE — 4010F ACE/ARB THERAPY RXD/TAKEN: CPT | Mod: CPTII,,, | Performed by: FAMILY MEDICINE

## 2023-08-09 PROCEDURE — 3079F DIAST BP 80-89 MM HG: CPT | Mod: CPTII,,, | Performed by: FAMILY MEDICINE

## 2023-08-09 PROCEDURE — 3066F NEPHROPATHY DOC TX: CPT | Mod: CPTII,,, | Performed by: FAMILY MEDICINE

## 2023-08-09 PROCEDURE — 86140 C-REACTIVE PROTEIN: CPT | Mod: ,,, | Performed by: CLINICAL MEDICAL LABORATORY

## 2023-08-09 PROCEDURE — 80048 BASIC METABOLIC PANEL: ICD-10-PCS | Mod: ,,, | Performed by: CLINICAL MEDICAL LABORATORY

## 2023-08-09 PROCEDURE — 1159F MED LIST DOCD IN RCRD: CPT | Mod: CPTII,,, | Performed by: FAMILY MEDICINE

## 2023-08-09 PROCEDURE — 1159F PR MEDICATION LIST DOCUMENTED IN MEDICAL RECORD: ICD-10-PCS | Mod: CPTII,,, | Performed by: FAMILY MEDICINE

## 2023-08-09 PROCEDURE — 85651 SEDIMENTATION RATE, AUTOMATED: ICD-10-PCS | Mod: ,,, | Performed by: CLINICAL MEDICAL LABORATORY

## 2023-08-09 PROCEDURE — 99214 PR OFFICE/OUTPT VISIT, EST, LEVL IV, 30-39 MIN: ICD-10-PCS | Mod: ,,, | Performed by: FAMILY MEDICINE

## 2023-08-09 PROCEDURE — 3066F PR DOCUMENTATION OF TREATMENT FOR NEPHROPATHY: ICD-10-PCS | Mod: CPTII,,, | Performed by: FAMILY MEDICINE

## 2023-08-09 PROCEDURE — 3061F PR NEG MICROALBUMINURIA RESULT DOCUMENTED/REVIEW: ICD-10-PCS | Mod: CPTII,,, | Performed by: FAMILY MEDICINE

## 2023-08-09 PROCEDURE — 85651 RBC SED RATE NONAUTOMATED: CPT | Mod: ,,, | Performed by: CLINICAL MEDICAL LABORATORY

## 2023-08-09 PROCEDURE — 85025 CBC WITH DIFFERENTIAL: ICD-10-PCS | Mod: ,,, | Performed by: CLINICAL MEDICAL LABORATORY

## 2023-08-09 PROCEDURE — 86140 C-REACTIVE PROTEIN: ICD-10-PCS | Mod: ,,, | Performed by: CLINICAL MEDICAL LABORATORY

## 2023-08-09 PROCEDURE — 84443 ASSAY THYROID STIM HORMONE: CPT | Mod: ,,, | Performed by: CLINICAL MEDICAL LABORATORY

## 2023-08-09 PROCEDURE — 82607 VITAMIN B-12: CPT | Mod: ,,, | Performed by: CLINICAL MEDICAL LABORATORY

## 2023-08-09 PROCEDURE — 3051F HG A1C>EQUAL 7.0%<8.0%: CPT | Mod: CPTII,,, | Performed by: FAMILY MEDICINE

## 2023-08-09 PROCEDURE — 84439 ASSAY OF FREE THYROXINE: CPT | Mod: ,,, | Performed by: CLINICAL MEDICAL LABORATORY

## 2023-08-09 PROCEDURE — 3008F BODY MASS INDEX DOCD: CPT | Mod: CPTII,,, | Performed by: FAMILY MEDICINE

## 2023-08-09 PROCEDURE — 99214 OFFICE O/P EST MOD 30 MIN: CPT | Mod: ,,, | Performed by: FAMILY MEDICINE

## 2023-08-09 PROCEDURE — 3008F PR BODY MASS INDEX (BMI) DOCUMENTED: ICD-10-PCS | Mod: CPTII,,, | Performed by: FAMILY MEDICINE

## 2023-08-09 PROCEDURE — 82746 ASSAY OF FOLIC ACID SERUM: CPT | Mod: ,,, | Performed by: CLINICAL MEDICAL LABORATORY

## 2023-08-09 PROCEDURE — 3061F NEG MICROALBUMINURIA REV: CPT | Mod: CPTII,,, | Performed by: FAMILY MEDICINE

## 2023-08-09 PROCEDURE — 80048 BASIC METABOLIC PNL TOTAL CA: CPT | Mod: ,,, | Performed by: CLINICAL MEDICAL LABORATORY

## 2023-08-09 PROCEDURE — 82746 VITAMIN B12/FOLATE, SERUM PANEL: ICD-10-PCS | Mod: ,,, | Performed by: CLINICAL MEDICAL LABORATORY

## 2023-08-09 PROCEDURE — 82306 VITAMIN D 25 HYDROXY: CPT | Mod: ,,, | Performed by: CLINICAL MEDICAL LABORATORY

## 2023-08-09 PROCEDURE — 3075F SYST BP GE 130 - 139MM HG: CPT | Mod: CPTII,,, | Performed by: FAMILY MEDICINE

## 2023-08-09 PROCEDURE — 84439 THYROID PANEL: ICD-10-PCS | Mod: ,,, | Performed by: CLINICAL MEDICAL LABORATORY

## 2023-08-09 PROCEDURE — 82306 VITAMIN D: ICD-10-PCS | Mod: ,,, | Performed by: CLINICAL MEDICAL LABORATORY

## 2023-08-09 PROCEDURE — 85025 COMPLETE CBC W/AUTO DIFF WBC: CPT | Mod: ,,, | Performed by: CLINICAL MEDICAL LABORATORY

## 2023-08-09 PROCEDURE — 3051F PR MOST RECENT HEMOGLOBIN A1C LEVEL 7.0 - < 8.0%: ICD-10-PCS | Mod: CPTII,,, | Performed by: FAMILY MEDICINE

## 2023-08-09 PROCEDURE — 3075F PR MOST RECENT SYSTOLIC BLOOD PRESS GE 130-139MM HG: ICD-10-PCS | Mod: CPTII,,, | Performed by: FAMILY MEDICINE

## 2023-08-09 PROCEDURE — 3079F PR MOST RECENT DIASTOLIC BLOOD PRESSURE 80-89 MM HG: ICD-10-PCS | Mod: CPTII,,, | Performed by: FAMILY MEDICINE

## 2023-08-09 PROCEDURE — 84443 THYROID PANEL: ICD-10-PCS | Mod: ,,, | Performed by: CLINICAL MEDICAL LABORATORY

## 2023-08-09 PROCEDURE — 4010F PR ACE/ARB THEARPY RXD/TAKEN: ICD-10-PCS | Mod: CPTII,,, | Performed by: FAMILY MEDICINE

## 2023-08-09 PROCEDURE — 82607 VITAMIN B12/FOLATE, SERUM PANEL: ICD-10-PCS | Mod: ,,, | Performed by: CLINICAL MEDICAL LABORATORY

## 2023-08-09 NOTE — PATIENT INSTRUCTIONS
Tests to Keep You Healthy    Mammogram: Met on 10/17/2022  Eye Exam: DUE  Colon Cancer Screening: Met on 6/8/2020  Last Blood Pressure <= 139/89 (8/9/2023): Yes  Last HbA1c < 8 (07/24/2023): Yes

## 2023-08-09 NOTE — ASSESSMENT & PLAN NOTE
Patient states she is not feeling well.  She is been fatigued since recent bout with diarrhea.  Will evaluate with lab

## 2023-08-09 NOTE — Clinical Note
We need to check with prior authorization for transfer therapy outpatient.  She qualifies for one-month worth but is requiring prior authorization

## 2023-08-09 NOTE — ASSESSMENT & PLAN NOTE
Continued Pain in the left hip.  Patient is currently on physical therapy outpatient.  Awaiting prior authorization.

## 2023-08-09 NOTE — PROGRESS NOTES
"              Mack Harp IV, DO  [facility]  [address] [place] [address]  Phone: [phone]      Subjective     Name: Hayley Landis   Sex: female  YOB: 1970 (52 y.o.)  MRN: [id number]  Visit Date: 08/09/2023   Chief Complaint: Diarrhea (Pt had diarrhea all week last week, cant get her energy back)        HISTORY OF PRESENT ILLNESS:    Chief Complaint   Patient presents with    Diarrhea     Pt had diarrhea all week last week, cant get her energy back       HPI  See tests that keep you healthy and the problem oriented documentation below.    Tests to Keep You Healthy    Mammogram: Met on 10/17/2022  Eye Exam: DUE  Colon Cancer Screening: Met on 6/8/2020  Last Blood Pressure <= 139/89 (8/9/2023): Yes  Last HbA1c < 8 (07/24/2023): Yes      Portions of this note may have been created with voice recognition software. Occasional "wrong-word" or "sound-a-like" substitutions may have occurred due to the inherent limitations of voice recognition software. Please, read the note carefully and recognize, using context, where substitutions have occurred.     PAST MEDICAL HISTORY:  Significant Diagnoses - Patient  has a past medical history of Colon cancer, CVA (cerebral vascular accident), Diabetes, HTN (hypertension), and Seizure.  Medications - Patient has a current medication list which includes the following long-term medication(s): hydrochlorothiazide, insulin aspart u-100, insulin glargine, levetiracetam, and lisinopril.   Allergies - Patient is allergic to grape, pineapple, opioids - morphine analogues, aspirin, dicyclomine, gabapentin, hydrocodone, iron analogues, metformin, penicillins, and latex.  Surgeries - Patient  has a past surgical history that includes Hernia repair; Colon surgery; and Hysterectomy.  Family History - Patient family history includes No Known Problems in her brother, daughter, father, maternal aunt, maternal grandfather, maternal grandmother, " "maternal uncle, mother, paternal aunt, paternal grandfather, paternal grandmother, paternal uncle, sister, and son.      SOCIAL HISTORY:  Tobacco - Patient  reports that she has never smoked. She has never been exposed to tobacco smoke. She has never used smokeless tobacco.   Alcohol - Patient  reports no history of alcohol use.   Recreational Drugs - Patient  reports no history of drug use.       Review of Systems   All other systems reviewed and are negative.       Past Medical History:   Diagnosis Date    Colon cancer     CVA (cerebral vascular accident)     Diabetes     HTN (hypertension)     Seizure         Review of patient's allergies indicates:   Allergen Reactions    Grape Anaphylaxis    Pineapple Anaphylaxis    Opioids - morphine analogues Nausea And Vomiting    Aspirin      "Veins breakdown" per patient    Dicyclomine Diarrhea    Gabapentin      weakness    Hydrocodone      Headache      Iron analogues      Pt hx of hemachromatosis    Metformin      diarrhea    Penicillins     Latex Rash        Past Surgical History:   Procedure Laterality Date    COLON SURGERY      HERNIA REPAIR      HYSTERECTOMY          Family History   Problem Relation Age of Onset    No Known Problems Mother     No Known Problems Father     No Known Problems Sister     No Known Problems Brother     No Known Problems Daughter     No Known Problems Son     No Known Problems Maternal Aunt     No Known Problems Maternal Uncle     No Known Problems Paternal Aunt     No Known Problems Paternal Uncle     No Known Problems Maternal Grandmother     No Known Problems Maternal Grandfather     No Known Problems Paternal Grandmother     No Known Problems Paternal Grandfather        Current Outpatient Medications   Medication Instructions    ergocalciferol (ERGOCALCIFEROL) 50,000 Units, Oral, Every 7 days    hydroCHLOROthiazide (HYDRODIURIL) 12.5 MG Tab TAKE ONE TABLET BY MOUTH DAILY IN THE MORNING    insulin " "aspart U-100 (NOVOLOG) 100 unit/mL injection inject as directed by your physician per sliding scale    insulin glargine (LANTUS U-100 INSULIN) 80 Units, Subcutaneous, Daily    insulin syringe-needle U-100 1 mL 30 gauge x 5/16 Syrg Use as directed.    levETIRAcetam (KEPPRA) 500 mg, Oral, Daily    lisinopriL 10 mg, Oral, Daily    meloxicam (MOBIC) 7.5 mg, Oral, Daily    ondansetron (ZOFRAN-ODT) 4 mg, Oral, Every 8 hours PRN    peppermint oiL (IBGARD) 90 mg CECX 2 capsules, Oral, Daily    potassium chloride SA (K-DUR,KLOR-CON) 20 MEQ tablet TAKE ONE TABLET BY MOUTH EVERY DAY        Objective     /83   Pulse 103   Ht 4' 9" (1.448 m)   Wt 76.2 kg (168 lb)   BMI 36.35 kg/m²     Physical Exam  Constitutional:       General: She is not in acute distress.     Appearance: Normal appearance. She is not ill-appearing.   HENT:      Head: Normocephalic and atraumatic.      Right Ear: External ear normal.      Left Ear: External ear normal.   Eyes:      Extraocular Movements: Extraocular movements intact.      Conjunctiva/sclera: Conjunctivae normal.   Cardiovascular:      Rate and Rhythm: Normal rate.      Heart sounds: No murmur heard.  Pulmonary:      Effort: Pulmonary effort is normal.   Musculoskeletal:      Cervical back: Normal range of motion.   Skin:     General: Skin is warm and dry.      Coloration: Skin is not jaundiced.      Findings: No rash.   Neurological:      Mental Status: She is alert.   Psychiatric:         Mood and Affect: Mood normal.        All recently obtained labs have been reviewed and discussed in detail with the patient.   Assessment     1. Type 2 diabetes mellitus without complication, with long-term current use of insulin    2. Pain of left hip    3. Irritable bowel syndrome with diarrhea    4. Fatigue, unspecified type    5. Leukopenia, unspecified type    6. Vitamin D deficiency    7. Class 2 severe obesity due to excess calories with serious comorbidity and body mass index (BMI) " of 36.0 to 36.9 in adult         Plan        Problem List Items Addressed This Visit          Oncology    Leukopenia     Most recently noted on CBC.  Will repeat today for stratification         Relevant Orders    CBC Auto Differential    Vitamin B12 & Folate    Vitamin D    Path Review, Peripheral Smear       Endocrine    Type 2 diabetes mellitus without complication, with long-term current use of insulin - Primary (Chronic)     Patient states that her blood sugar was running about 190 today.         Relevant Orders    Vitamin B12 & Folate    Vitamin D    Vitamin D deficiency     History of vitamin-D deficiency with class 2 obesity.  Will check today for resolution         Relevant Orders    Vitamin D    Class 2 severe obesity due to excess calories with serious comorbidity and body mass index (BMI) of 36.0 to 36.9 in adult     This affects all aspects of health care will continue to monitor and treat as appropriate.            GI    Irritable bowel syndrome with diarrhea (Chronic)     Has resolved at This time.         Relevant Orders    Basic Metabolic Panel    Vitamin B12 & Folate    Vitamin D       Orthopedic    Pain of left hip     Continued Pain in the left hip.  Patient is currently on physical therapy outpatient.  Awaiting prior authorization.         Relevant Orders    Vitamin B12 & Folate    Vitamin D    C-Reactive Protein    Sedimentation Rate       Other    Fatigue     Patient states she is not feeling well.  She is been fatigued since recent bout with diarrhea.  Will evaluate with lab         Relevant Orders    Basic Metabolic Panel    Vitamin B12 & Folate    Vitamin D    Thyroid Panel    Path Review, Peripheral Smear       No follow-ups on file.    Patient advised that is symptoms worsen, they should call or report directly to local emergency department.    Mack Harp,   [facility]

## 2023-08-09 NOTE — Clinical Note
Could you check with [facility] last eye exam 6 months ago for problem but next routine exam is in sept. Hoping one is on file for last year...

## 2023-08-10 ENCOUNTER — PATIENT OUTREACH (OUTPATIENT)
Dept: ADMINISTRATIVE | Facility: HOSPITAL | Age: 53
End: 2023-08-10

## 2023-08-10 LAB
25(OH)D3 SERPL-MCNC: 51 NG/ML
ANION GAP SERPL CALCULATED.3IONS-SCNC: 14 MMOL/L (ref 7–16)
BASOPHILS # BLD AUTO: 0.04 K/UL (ref 0–0.2)
BASOPHILS NFR BLD AUTO: 0.6 % (ref 0–1)
BUN SERPL-MCNC: 10 MG/DL (ref 7–18)
BUN/CREAT SERPL: 12 (ref 6–20)
CALCIUM SERPL-MCNC: 9.1 MG/DL (ref 8.5–10.1)
CHLORIDE SERPL-SCNC: 101 MMOL/L (ref 98–107)
CO2 SERPL-SCNC: 25 MMOL/L (ref 21–32)
CREAT SERPL-MCNC: 0.86 MG/DL (ref 0.55–1.02)
CRP SERPL-MCNC: 1.47 MG/DL (ref 0–0.8)
DIFFERENTIAL METHOD BLD: ABNORMAL
EGFR (NO RACE VARIABLE) (RUSH/TITUS): 82 ML/MIN/1.73M2
EOSINOPHIL # BLD AUTO: 0.93 K/UL (ref 0–0.5)
EOSINOPHIL NFR BLD AUTO: 12.9 % (ref 1–4)
EOSINOPHIL NFR BLD MANUAL: 15 % (ref 1–4)
ERYTHROCYTE [DISTWIDTH] IN BLOOD BY AUTOMATED COUNT: 15.7 % (ref 11.5–14.5)
ERYTHROCYTE [SEDIMENTATION RATE] IN BLOOD BY WESTERGREN METHOD: 19 MM/HR (ref 0–30)
FOLATE SERPL-MCNC: 12.7 NG/ML (ref 3.1–17.5)
GLUCOSE SERPL-MCNC: 220 MG/DL (ref 74–106)
HCT VFR BLD AUTO: 43.8 % (ref 38–47)
HGB BLD-MCNC: 14.4 G/DL (ref 12–16)
IMM GRANULOCYTES # BLD AUTO: 0.08 K/UL (ref 0–0.04)
IMM GRANULOCYTES NFR BLD: 1.1 % (ref 0–0.4)
LYMPHOCYTES # BLD AUTO: 1.71 K/UL (ref 1–4.8)
LYMPHOCYTES NFR BLD AUTO: 23.8 % (ref 27–41)
LYMPHOCYTES NFR BLD MANUAL: 23 % (ref 27–41)
MCH RBC QN AUTO: 27.7 PG (ref 27–31)
MCHC RBC AUTO-ENTMCNC: 32.9 G/DL (ref 32–36)
MCV RBC AUTO: 84.2 FL (ref 80–96)
MONOCYTES # BLD AUTO: 0.48 K/UL (ref 0–0.8)
MONOCYTES NFR BLD AUTO: 6.7 % (ref 2–6)
MONOCYTES NFR BLD MANUAL: 7 % (ref 2–6)
MPC BLD CALC-MCNC: 12.8 FL (ref 9.4–12.4)
NEUTROPHILS # BLD AUTO: 3.95 K/UL (ref 1.8–7.7)
NEUTROPHILS NFR BLD AUTO: 54.9 % (ref 53–65)
NEUTS BAND NFR BLD MANUAL: 4 % (ref 1–5)
NEUTS SEG NFR BLD MANUAL: 51 % (ref 50–62)
NRBC # BLD AUTO: 0 X10E3/UL
NRBC, AUTO (.00): 0 %
PLATELET # BLD AUTO: 139 K/UL (ref 150–400)
PLATELET MORPHOLOGY: ABNORMAL
POTASSIUM SERPL-SCNC: 3.5 MMOL/L (ref 3.5–5.1)
RBC # BLD AUTO: 5.2 M/UL (ref 4.2–5.4)
RBC MORPH BLD: NORMAL
SODIUM SERPL-SCNC: 136 MMOL/L (ref 136–145)
T4 FREE SERPL-MCNC: 1.27 NG/DL (ref 0.76–1.46)
TSH SERPL DL<=0.005 MIU/L-ACNC: 1.24 UIU/ML (ref 0.36–3.74)
VIT B12 SERPL-MCNC: 648 PG/ML (ref 193–986)
WBC # BLD AUTO: 7.19 K/UL (ref 4.5–11)

## 2023-08-10 NOTE — LETTER
AUTHORIZATION FOR RELEASE OF   CONFIDENTIAL INFORMATION    Dear [facility],    We are seeing Hayley Landis, date of birth 1970, in the clinic at [facility]. Elkin Coombs MD is the patient's PCP. Hayley Landis has an outstanding lab/procedure at the time we reviewed her chart. In order to help keep her health information updated, she has authorized us to request the following medical record(s):        (  )  MAMMOGRAM                                      (  )  COLONOSCOPY      (  )  PAP SMEAR                                          (  )  OUTSIDE LAB RESULTS     (  )  DEXA SCAN                                          (XX  )  EYE EXAM            (  )  FOOT EXAM                                          (  )  ENTIRE RECORD     (  )  OUTSIDE IMMUNIZATIONS                 (  )  _______________         Please fax records to [facility], Watson, Andrew D., MD, [phone]     If you have any questions, please contact Ann Marie at [phone].           Patient Name: Hayley Landis  : 1970  Patient Phone #: [phone]

## 2023-08-11 LAB — PATH REV BLD -IMP: NORMAL

## 2023-08-25 ENCOUNTER — OFFICE VISIT (OUTPATIENT)
Dept: FAMILY MEDICINE | Facility: CLINIC | Age: 53
End: 2023-08-25
Payer: MEDICAID

## 2023-08-25 DIAGNOSIS — I10 PRIMARY HYPERTENSION: Chronic | ICD-10-CM

## 2023-08-25 DIAGNOSIS — Z85.038 HISTORY OF COLON CANCER: ICD-10-CM

## 2023-08-25 DIAGNOSIS — Z79.4 TYPE 2 DIABETES MELLITUS WITHOUT COMPLICATION, WITH LONG-TERM CURRENT USE OF INSULIN: Chronic | ICD-10-CM

## 2023-08-25 DIAGNOSIS — J10.1 INFLUENZA B: Primary | ICD-10-CM

## 2023-08-25 DIAGNOSIS — E11.9 TYPE 2 DIABETES MELLITUS WITHOUT COMPLICATION, WITH LONG-TERM CURRENT USE OF INSULIN: Chronic | ICD-10-CM

## 2023-08-25 DIAGNOSIS — Z20.828 VIRAL DISEASE EXPOSURE: ICD-10-CM

## 2023-08-25 LAB
CTP QC/QA: YES
FLUAV AG NPH QL: NEGATIVE
FLUBV AG NPH QL: POSITIVE
SARS-COV-2 AG RESP QL IA.RAPID: NEGATIVE

## 2023-08-25 PROCEDURE — 3066F NEPHROPATHY DOC TX: CPT | Mod: CPTII,,, | Performed by: FAMILY MEDICINE

## 2023-08-25 PROCEDURE — 3066F PR DOCUMENTATION OF TREATMENT FOR NEPHROPATHY: ICD-10-PCS | Mod: CPTII,,, | Performed by: FAMILY MEDICINE

## 2023-08-25 PROCEDURE — 3051F PR MOST RECENT HEMOGLOBIN A1C LEVEL 7.0 - < 8.0%: ICD-10-PCS | Mod: CPTII,,, | Performed by: FAMILY MEDICINE

## 2023-08-25 PROCEDURE — 1160F PR REVIEW ALL MEDS BY PRESCRIBER/CLIN PHARMACIST DOCUMENTED: ICD-10-PCS | Mod: CPTII,,, | Performed by: FAMILY MEDICINE

## 2023-08-25 PROCEDURE — 1160F RVW MEDS BY RX/DR IN RCRD: CPT | Mod: CPTII,,, | Performed by: FAMILY MEDICINE

## 2023-08-25 PROCEDURE — 99214 PR OFFICE/OUTPT VISIT, EST, LEVL IV, 30-39 MIN: ICD-10-PCS | Mod: ,,, | Performed by: FAMILY MEDICINE

## 2023-08-25 PROCEDURE — 4010F PR ACE/ARB THEARPY RXD/TAKEN: ICD-10-PCS | Mod: CPTII,,, | Performed by: FAMILY MEDICINE

## 2023-08-25 PROCEDURE — 1159F PR MEDICATION LIST DOCUMENTED IN MEDICAL RECORD: ICD-10-PCS | Mod: CPTII,,, | Performed by: FAMILY MEDICINE

## 2023-08-25 PROCEDURE — 99214 OFFICE O/P EST MOD 30 MIN: CPT | Mod: ,,, | Performed by: FAMILY MEDICINE

## 2023-08-25 PROCEDURE — 3061F NEG MICROALBUMINURIA REV: CPT | Mod: CPTII,,, | Performed by: FAMILY MEDICINE

## 2023-08-25 PROCEDURE — 3051F HG A1C>EQUAL 7.0%<8.0%: CPT | Mod: CPTII,,, | Performed by: FAMILY MEDICINE

## 2023-08-25 PROCEDURE — 1159F MED LIST DOCD IN RCRD: CPT | Mod: CPTII,,, | Performed by: FAMILY MEDICINE

## 2023-08-25 PROCEDURE — 3061F PR NEG MICROALBUMINURIA RESULT DOCUMENTED/REVIEW: ICD-10-PCS | Mod: CPTII,,, | Performed by: FAMILY MEDICINE

## 2023-08-25 PROCEDURE — 87428 SARSCOV & INF VIR A&B AG IA: CPT | Mod: RHCUB | Performed by: FAMILY MEDICINE

## 2023-08-25 PROCEDURE — 4010F ACE/ARB THERAPY RXD/TAKEN: CPT | Mod: CPTII,,, | Performed by: FAMILY MEDICINE

## 2023-08-25 RX ORDER — OSELTAMIVIR PHOSPHATE 75 MG/1
75 CAPSULE ORAL 2 TIMES DAILY
Qty: 10 CAPSULE | Refills: 0 | Status: SHIPPED | OUTPATIENT
Start: 2023-08-25 | End: 2023-08-25

## 2023-08-25 RX ORDER — OSELTAMIVIR PHOSPHATE 75 MG/1
75 CAPSULE ORAL 2 TIMES DAILY
Qty: 10 CAPSULE | Refills: 0 | Status: SHIPPED | OUTPATIENT
Start: 2023-08-25 | End: 2023-08-30

## 2023-08-25 NOTE — PROGRESS NOTES
Elkin Coombs MD   [facility]  MEDICAL GROUP [facility] [address] [address]  [phone]      PATIENT NAME: Hayley Landis  : 1970  DATE: 23  MRN: [id number]      Billing Provider: Elkin Coombs MD  Level of Service:   Patient PCP Information       Provider PCP Type    Elkin Coombs MD General            Reason for Visit / Chief Complaint: covid exposure, Headache, and Sore Throat (Started this am)       Update PCP  Update Chief Complaint         History of Present Illness / Problem Focused Workflow     Hayley Landis presents to the clinic with covid exposure, Headache, and Sore Throat (Started this am)       Lives with aunt who has COVID (is on paxlovid).  Patient symptoms < 24 hrs.  Has multiple risk factors including HTN, DM and h/o colorectal cancer c possible metastatic disease.  Has had to reschedule PET scan in [place].  Plans to do in 3 weeks.      Headache   Associated symptoms include a sore throat.   Sore Throat   Associated symptoms include headaches.       Review of Systems     Review of Systems   HENT:  Positive for sore throat.    Neurological:  Positive for headaches.        Medical / Social / Family History     Past Medical History:   Diagnosis Date    Colon cancer     CVA (cerebral vascular accident)     Diabetes     HTN (hypertension)     Seizure        Past Surgical History:   Procedure Laterality Date    COLON SURGERY      HERNIA REPAIR      HYSTERECTOMY         Social History    reports that she has never smoked. She has never been exposed to tobacco smoke. She has never used smokeless tobacco. She reports that she does not drink alcohol and does not use drugs.   Social History     Tobacco Use    Smoking status: Never     Passive exposure: Never    Smokeless tobacco: Never   Substance Use Topics    Alcohol use: Never    Drug use: Never       Family History  Family History   Problem  "Relation Age of Onset    No Known Problems Mother     No Known Problems Father     No Known Problems Sister     No Known Problems Brother     No Known Problems Daughter     No Known Problems Son     No Known Problems Maternal Aunt     No Known Problems Maternal Uncle     No Known Problems Paternal Aunt     No Known Problems Paternal Uncle     No Known Problems Maternal Grandmother     No Known Problems Maternal Grandfather     No Known Problems Paternal Grandmother     No Known Problems Paternal Grandfather        Medications and Allergies     Medications  No outpatient medications have been marked as taking for the 8/25/23 encounter (Office Visit) with Elkin Coombs MD.       Allergies  Review of patient's allergies indicates:   Allergen Reactions    Grape Anaphylaxis    Pineapple Anaphylaxis    Opioids - morphine analogues Nausea And Vomiting    Aspirin      "Veins breakdown" per patient    Dicyclomine Diarrhea    Gabapentin      weakness    Hydrocodone      Headache      Iron analogues      Pt hx of hemachromatosis    Metformin      diarrhea    Penicillins     Latex Rash       Physical Examination   There were no vitals filed for this visit.  Physical Exam  Vitals reviewed.   Constitutional:       Appearance: Normal appearance.   HENT:      Head: Normocephalic and atraumatic.   Eyes:      Extraocular Movements: Extraocular movements intact.      Conjunctiva/sclera: Conjunctivae normal.      Pupils: Pupils are equal, round, and reactive to light.   Pulmonary:      Effort: Pulmonary effort is normal.   Musculoskeletal:         General: Normal range of motion.   Skin:     General: Skin is warm and dry.   Neurological:      General: No focal deficit present.      Mental Status: She is alert and oriented to person, place, and time.   Psychiatric:         Mood and Affect: Mood normal.         Behavior: Behavior normal.      Office Visit on 08/25/2023   Component Date Value Ref Range Status "    SARS Coronavirus 2 Antigen 08/25/2023 Negative  Negative Final    Rapid Influenza A Ag 08/25/2023 Negative  Negative Final    Rapid Influenza B Ag 08/25/2023 Positive (A)  Negative Final     Acceptable 08/25/2023 Yes   Final         Assessment and Plan (including Health Maintenance)      Problem List  Smart Sets  Document Outside HM   :    Plan: symptomatic care discussed.  Continue COVID precautions and retest as needed.        Health Maintenance Due   Topic Date Due    Pneumococcal Vaccines (Age 0-64) (1 - PCV) Never done    Eye Exam  Never done    TETANUS VACCINE  Never done    Shingles Vaccine (1 of 2) Never done    COVID-19 Vaccine (4 - Moderna series) 04/12/2022    Mammogram  10/17/2023       Problem List Items Addressed This Visit    None  Visit Diagnoses       Influenza B    -  Primary    Relevant Medications    oseltamivir (TAMIFLU) 75 MG capsule    Viral disease exposure        Relevant Orders    POCT SARS-COV2 (COVID) with Flu Antigen (Completed)            Health Maintenance Topics with due status: Not Due       Topic Last Completion Date    Colorectal Cancer Screening 06/08/2020    Diabetes Urine Screening 02/03/2023    Foot Exam 02/03/2023    Lipid Panel 07/24/2023    Hemoglobin A1c 07/24/2023       Future Appointments   Date Time Provider Department Center   10/23/2023 11:30 AM [facility]            Signature:  MD RADHA Doyle [facility]  MEDICAL GROUP [facility]  [address] [place] [address] [place] [address]  [phone]    Date of encounter: 8/25/23

## 2023-09-14 ENCOUNTER — OFFICE VISIT (OUTPATIENT)
Dept: FAMILY MEDICINE | Facility: CLINIC | Age: 53
End: 2023-09-14
Payer: MEDICAID

## 2023-09-14 DIAGNOSIS — E11.9 TYPE 2 DIABETES MELLITUS WITHOUT COMPLICATION, WITH LONG-TERM CURRENT USE OF INSULIN: Chronic | ICD-10-CM

## 2023-09-14 DIAGNOSIS — Z85.038 HISTORY OF COLON CANCER: ICD-10-CM

## 2023-09-14 DIAGNOSIS — Z79.4 TYPE 2 DIABETES MELLITUS WITHOUT COMPLICATION, WITH LONG-TERM CURRENT USE OF INSULIN: Chronic | ICD-10-CM

## 2023-09-14 DIAGNOSIS — Z20.828 VIRAL DISEASE EXPOSURE: ICD-10-CM

## 2023-09-14 DIAGNOSIS — J06.9 UPPER RESPIRATORY TRACT INFECTION, UNSPECIFIED TYPE: Primary | ICD-10-CM

## 2023-09-14 DIAGNOSIS — I10 PRIMARY HYPERTENSION: Chronic | ICD-10-CM

## 2023-09-14 PROCEDURE — 3051F HG A1C>EQUAL 7.0%<8.0%: CPT | Mod: CPTII,,, | Performed by: FAMILY MEDICINE

## 2023-09-14 PROCEDURE — 1159F PR MEDICATION LIST DOCUMENTED IN MEDICAL RECORD: ICD-10-PCS | Mod: CPTII,,, | Performed by: FAMILY MEDICINE

## 2023-09-14 PROCEDURE — 87428 SARSCOV & INF VIR A&B AG IA: CPT | Mod: RHCUB | Performed by: FAMILY MEDICINE

## 2023-09-14 PROCEDURE — 3061F PR NEG MICROALBUMINURIA RESULT DOCUMENTED/REVIEW: ICD-10-PCS | Mod: CPTII,,, | Performed by: FAMILY MEDICINE

## 2023-09-14 PROCEDURE — 3061F NEG MICROALBUMINURIA REV: CPT | Mod: CPTII,,, | Performed by: FAMILY MEDICINE

## 2023-09-14 PROCEDURE — 3066F NEPHROPATHY DOC TX: CPT | Mod: CPTII,,, | Performed by: FAMILY MEDICINE

## 2023-09-14 PROCEDURE — 1160F RVW MEDS BY RX/DR IN RCRD: CPT | Mod: CPTII,,, | Performed by: FAMILY MEDICINE

## 2023-09-14 PROCEDURE — 3066F PR DOCUMENTATION OF TREATMENT FOR NEPHROPATHY: ICD-10-PCS | Mod: CPTII,,, | Performed by: FAMILY MEDICINE

## 2023-09-14 PROCEDURE — 4010F ACE/ARB THERAPY RXD/TAKEN: CPT | Mod: CPTII,,, | Performed by: FAMILY MEDICINE

## 2023-09-14 PROCEDURE — 3051F PR MOST RECENT HEMOGLOBIN A1C LEVEL 7.0 - < 8.0%: ICD-10-PCS | Mod: CPTII,,, | Performed by: FAMILY MEDICINE

## 2023-09-14 PROCEDURE — 1159F MED LIST DOCD IN RCRD: CPT | Mod: CPTII,,, | Performed by: FAMILY MEDICINE

## 2023-09-14 PROCEDURE — 99213 OFFICE O/P EST LOW 20 MIN: CPT | Mod: ,,, | Performed by: FAMILY MEDICINE

## 2023-09-14 PROCEDURE — 99213 PR OFFICE/OUTPT VISIT, EST, LEVL III, 20-29 MIN: ICD-10-PCS | Mod: ,,, | Performed by: FAMILY MEDICINE

## 2023-09-14 PROCEDURE — 1160F PR REVIEW ALL MEDS BY PRESCRIBER/CLIN PHARMACIST DOCUMENTED: ICD-10-PCS | Mod: CPTII,,, | Performed by: FAMILY MEDICINE

## 2023-09-14 PROCEDURE — 4010F PR ACE/ARB THEARPY RXD/TAKEN: ICD-10-PCS | Mod: CPTII,,, | Performed by: FAMILY MEDICINE

## 2023-09-14 NOTE — PROGRESS NOTES
Elkin Coombs MD   [facility]  MEDICAL GROUP [address] [address]  [phone]      PATIENT NAME: Hayley Landis  : 1970  DATE: 23  MRN: [id number]      Billing Provider: Elkin Coombs MD  Level of Service:   Patient PCP Information       Provider PCP Type    Elkin Coombs MD General            Reason for Visit / Chief Complaint: Cough and Headache       Update PCP  Update Chief Complaint         History of Present Illness / Problem Focused Workflow     Hayley Landis presents to the clinic with Cough and Headache       URI symptoms that started acutely.  Wants to be tested for COVID.  Has multiple risk factors for severe illness.  Has been fully vaccinated but is due for booster.      Cough  Associated symptoms include headaches.   Headache   Associated symptoms include coughing.       Review of Systems     Review of Systems   Constitutional:  Positive for fatigue.   Respiratory:  Positive for cough.    Neurological:  Positive for headaches.        Medical / Social / Family History     Past Medical History:   Diagnosis Date    Colon cancer     CVA (cerebral vascular accident)     Diabetes     HTN (hypertension)     Seizure        Past Surgical History:   Procedure Laterality Date    COLON SURGERY      HERNIA REPAIR      HYSTERECTOMY         Social History    reports that she has never smoked. She has never been exposed to tobacco smoke. She has never used smokeless tobacco. She reports that she does not drink alcohol and does not use drugs.   Social History     Tobacco Use    Smoking status: Never     Passive exposure: Never    Smokeless tobacco: Never   Substance Use Topics    Alcohol use: Never    Drug use: Never       Family History  Family History   Problem Relation Age of Onset    No Known Problems Mother     No Known Problems Father     No Known Problems Sister     No Known Problems Brother     No Known  "Problems Daughter     No Known Problems Son     No Known Problems Maternal Aunt     No Known Problems Maternal Uncle     No Known Problems Paternal Aunt     No Known Problems Paternal Uncle     No Known Problems Maternal Grandmother     No Known Problems Maternal Grandfather     No Known Problems Paternal Grandmother     No Known Problems Paternal Grandfather        Medications and Allergies     Medications  No outpatient medications have been marked as taking for the 9/14/23 encounter (Office Visit) with Elkin Coombs MD.       Allergies  Review of patient's allergies indicates:   Allergen Reactions    Grape Anaphylaxis    Pineapple Anaphylaxis    Opioids - morphine analogues Nausea And Vomiting    Aspirin      "Veins breakdown" per patient    Dicyclomine Diarrhea    Gabapentin      weakness    Hydrocodone      Headache      Iron analogues      Pt hx of hemachromatosis    Metformin      diarrhea    Penicillins     Latex Rash       Physical Examination   There were no vitals filed for this visit.  Physical Exam  Vitals reviewed.   Constitutional:       Appearance: Normal appearance.   HENT:      Head: Normocephalic and atraumatic.   Eyes:      Extraocular Movements: Extraocular movements intact.      Conjunctiva/sclera: Conjunctivae normal.      Pupils: Pupils are equal, round, and reactive to light.   Cardiovascular:      Rate and Rhythm: Normal rate.   Pulmonary:      Effort: Pulmonary effort is normal.   Musculoskeletal:         General: Normal range of motion.      Cervical back: Normal range of motion.   Skin:     General: Skin is warm and dry.   Neurological:      General: No focal deficit present.      Mental Status: She is alert and oriented to person, place, and time.   Psychiatric:         Mood and Affect: Mood normal.         Behavior: Behavior normal.        Office Visit on 09/14/2023   Component Date Value Ref Range Status    SARS Coronavirus 2 Antigen 09/14/2023 Negative  Negative Final    Rapid " Influenza A Ag 09/14/2023 Negative  Negative Final    Rapid Influenza B Ag 09/14/2023 Negative  Negative Final     Acceptable 09/14/2023 Yes   Final         Assessment and Plan (including Health Maintenance)      Problem List  Smart Sets  Document Outside HM   :    Plan: rest, fluids and symptomatic care.  Retest as needed.  Would likely benefit from paxlovid if ever positive due to comorbidities.        Health Maintenance Due   Topic Date Due    Pneumococcal Vaccines (Age 0-64) (1 - PCV) Never done    Eye Exam  Never done    TETANUS VACCINE  Never done    Shingles Vaccine (1 of 2) Never done    COVID-19 Vaccine (4 - Moderna series) 04/12/2022    Mammogram  10/17/2023       Problem List Items Addressed This Visit          Cardiac/Vascular    Primary hypertension (Chronic)       Oncology    History of colon cancer       Endocrine    Type 2 diabetes mellitus without complication, with long-term current use of insulin (Chronic)     Other Visit Diagnoses       Upper respiratory tract infection, unspecified type    -  Primary    Viral disease exposure        Relevant Orders    POCT SARS-COV2 (COVID) with Flu Antigen (Completed)            Health Maintenance Topics with due status: Not Due       Topic Last Completion Date    Colorectal Cancer Screening 06/08/2020    Diabetes Urine Screening 02/03/2023    Foot Exam 02/03/2023    Lipid Panel 07/24/2023    Hemoglobin A1c 07/24/2023       Future Appointments   Date Time Provider Department Center   10/23/2023 11:30 AM [facility]            Signature:  MD RADHA Doyle [facility]  MEDICAL GROUP [facility]  [address] [place] [id number]  [phone]    Date of encounter: 9/14/23

## 2023-09-25 ENCOUNTER — OFFICE VISIT (OUTPATIENT)
Dept: FAMILY MEDICINE | Facility: CLINIC | Age: 53
End: 2023-09-25
Payer: MEDICAID

## 2023-09-25 VITALS
SYSTOLIC BLOOD PRESSURE: 130 MMHG | DIASTOLIC BLOOD PRESSURE: 80 MMHG | BODY MASS INDEX: 36.46 KG/M2 | HEART RATE: 114 BPM | WEIGHT: 169 LBS | HEIGHT: 57 IN

## 2023-09-25 DIAGNOSIS — E87.6 HYPOKALEMIA: Primary | ICD-10-CM

## 2023-09-25 PROCEDURE — 3075F PR MOST RECENT SYSTOLIC BLOOD PRESS GE 130-139MM HG: ICD-10-PCS | Mod: CPTII,,, | Performed by: FAMILY MEDICINE

## 2023-09-25 PROCEDURE — 3061F NEG MICROALBUMINURIA REV: CPT | Mod: CPTII,,, | Performed by: FAMILY MEDICINE

## 2023-09-25 PROCEDURE — 99214 PR OFFICE/OUTPT VISIT, EST, LEVL IV, 30-39 MIN: ICD-10-PCS | Mod: ,,, | Performed by: FAMILY MEDICINE

## 2023-09-25 PROCEDURE — 3079F PR MOST RECENT DIASTOLIC BLOOD PRESSURE 80-89 MM HG: ICD-10-PCS | Mod: CPTII,,, | Performed by: FAMILY MEDICINE

## 2023-09-25 PROCEDURE — 3079F DIAST BP 80-89 MM HG: CPT | Mod: CPTII,,, | Performed by: FAMILY MEDICINE

## 2023-09-25 PROCEDURE — 3051F PR MOST RECENT HEMOGLOBIN A1C LEVEL 7.0 - < 8.0%: ICD-10-PCS | Mod: CPTII,,, | Performed by: FAMILY MEDICINE

## 2023-09-25 PROCEDURE — 80048 BASIC METABOLIC PANEL: ICD-10-PCS | Mod: ,,, | Performed by: CLINICAL MEDICAL LABORATORY

## 2023-09-25 PROCEDURE — 99214 OFFICE O/P EST MOD 30 MIN: CPT | Mod: ,,, | Performed by: FAMILY MEDICINE

## 2023-09-25 PROCEDURE — 80048 BASIC METABOLIC PNL TOTAL CA: CPT | Mod: ,,, | Performed by: CLINICAL MEDICAL LABORATORY

## 2023-09-25 PROCEDURE — 3008F BODY MASS INDEX DOCD: CPT | Mod: CPTII,,, | Performed by: FAMILY MEDICINE

## 2023-09-25 PROCEDURE — 3066F PR DOCUMENTATION OF TREATMENT FOR NEPHROPATHY: ICD-10-PCS | Mod: CPTII,,, | Performed by: FAMILY MEDICINE

## 2023-09-25 PROCEDURE — 3008F PR BODY MASS INDEX (BMI) DOCUMENTED: ICD-10-PCS | Mod: CPTII,,, | Performed by: FAMILY MEDICINE

## 2023-09-25 PROCEDURE — 3066F NEPHROPATHY DOC TX: CPT | Mod: CPTII,,, | Performed by: FAMILY MEDICINE

## 2023-09-25 PROCEDURE — 4010F PR ACE/ARB THEARPY RXD/TAKEN: ICD-10-PCS | Mod: CPTII,,, | Performed by: FAMILY MEDICINE

## 2023-09-25 PROCEDURE — 3061F PR NEG MICROALBUMINURIA RESULT DOCUMENTED/REVIEW: ICD-10-PCS | Mod: CPTII,,, | Performed by: FAMILY MEDICINE

## 2023-09-25 PROCEDURE — 3051F HG A1C>EQUAL 7.0%<8.0%: CPT | Mod: CPTII,,, | Performed by: FAMILY MEDICINE

## 2023-09-25 PROCEDURE — 4010F ACE/ARB THERAPY RXD/TAKEN: CPT | Mod: CPTII,,, | Performed by: FAMILY MEDICINE

## 2023-09-25 PROCEDURE — 3075F SYST BP GE 130 - 139MM HG: CPT | Mod: CPTII,,, | Performed by: FAMILY MEDICINE

## 2023-09-25 RX ORDER — POTASSIUM CHLORIDE 20 MEQ/15ML
20 SOLUTION ORAL DAILY
Qty: 473 ML | Refills: 5 | Status: SHIPPED | OUTPATIENT
Start: 2023-09-25 | End: 2024-03-23

## 2023-09-25 RX ORDER — CYCLOBENZAPRINE HCL 10 MG
10 TABLET ORAL 3 TIMES DAILY
COMMUNITY
Start: 2023-09-06

## 2023-09-25 NOTE — PROGRESS NOTES
"              Mack Harp IV, DO  [facility]  [address] [place] [address]  Phone: [phone]      Subjective     Name: Hayley Landis   Sex: female  YOB: 1970 (52 y.o.)  MRN: [id number]  Visit Date: 09/25/2023   Chief Complaint: Spasms (Pt c/o spasms around both ribs and radiates to back)        HISTORY OF PRESENT ILLNESS:    Chief Complaint   Patient presents with    Spasms     Pt c/o spasms around both ribs and radiates to back       HPI  See tests that keep you healthy and the problem oriented documentation below.    Tests to Keep You Healthy    Mammogram: Met on 10/17/2022  Eye Exam: DUE  Colon Cancer Screening: Met on 6/8/2020  Last Blood Pressure <= 139/89 (9/25/2023): Yes  Last HbA1c < 8 (07/24/2023): Yes      Portions of this note may have been created with voice recognition software. Occasional "wrong-word" or "sound-a-like" substitutions may have occurred due to the inherent limitations of voice recognition software. Please, read the note carefully and recognize, using context, where substitutions have occurred.     PAST MEDICAL HISTORY:  Significant Diagnoses - Patient  has a past medical history of Colon cancer, CVA (cerebral vascular accident), Diabetes, HTN (hypertension), and Seizure.  Medications - Patient has a current medication list which includes the following long-term medication(s): hydrochlorothiazide, insulin aspart u-100, insulin glargine, levetiracetam, and lisinopril.   Allergies - Patient is allergic to grape, pineapple, opioids - morphine analogues, aspirin, dicyclomine, gabapentin, hydrocodone, iron analogues, metformin, penicillins, and latex.  Surgeries - Patient  has a past surgical history that includes Hernia repair; Colon surgery; and Hysterectomy.  Family History - Patient family history includes No Known Problems in her brother, daughter, father, maternal aunt, maternal grandfather, maternal grandmother, maternal uncle, " "mother, paternal aunt, paternal grandfather, paternal grandmother, paternal uncle, sister, and son.      SOCIAL HISTORY:  Tobacco - Patient  reports that she has never smoked. She has never been exposed to tobacco smoke. She has never used smokeless tobacco.   Alcohol - Patient  reports no history of alcohol use.   Recreational Drugs - Patient  reports no history of drug use.       Review of Systems   All other systems reviewed and are negative.       Past Medical History:   Diagnosis Date    Colon cancer     CVA (cerebral vascular accident)     Diabetes     HTN (hypertension)     Seizure         Review of patient's allergies indicates:   Allergen Reactions    Grape Anaphylaxis    Pineapple Anaphylaxis    Opioids - morphine analogues Nausea And Vomiting    Aspirin      "Veins breakdown" per patient    Dicyclomine Diarrhea    Gabapentin      weakness    Hydrocodone      Headache      Iron analogues      Pt hx of hemachromatosis    Metformin      diarrhea    Penicillins     Latex Rash        Past Surgical History:   Procedure Laterality Date    COLON SURGERY      HERNIA REPAIR      HYSTERECTOMY          Family History   Problem Relation Age of Onset    No Known Problems Mother     No Known Problems Father     No Known Problems Sister     No Known Problems Brother     No Known Problems Daughter     No Known Problems Son     No Known Problems Maternal Aunt     No Known Problems Maternal Uncle     No Known Problems Paternal Aunt     No Known Problems Paternal Uncle     No Known Problems Maternal Grandmother     No Known Problems Maternal Grandfather     No Known Problems Paternal Grandmother     No Known Problems Paternal Grandfather        Current Outpatient Medications   Medication Instructions    cyclobenzaprine (FLEXERIL) 10 mg, Oral, 3 times daily    ergocalciferol (ERGOCALCIFEROL) 50,000 Units, Oral, Every 7 days    hydroCHLOROthiazide (HYDRODIURIL) 12.5 MG Tab TAKE ONE TABLET BY " "MOUTH DAILY IN THE MORNING    insulin aspart U-100 (NOVOLOG) 100 unit/mL injection inject as directed by your physician per sliding scale    insulin glargine (LANTUS U-100 INSULIN) 80 Units, Subcutaneous, Daily    insulin syringe-needle U-100 1 mL 30 gauge x 5/16 Syrg Use as directed.    levETIRAcetam (KEPPRA) 500 mg, Oral, Daily    lisinopriL 10 mg, Oral, Daily    peppermint oiL (IBGARD) 90 mg CECX 2 capsules, Oral, Daily    potassium chloride 10% (KAYCIEL) 20 mEq/15 mL oral solution 20 mEq, Oral, Daily        Objective     /80   Pulse (!) 114   Ht 4' 9" (1.448 m)   Wt 76.7 kg (169 lb)   BMI 36.57 kg/m²     Physical Exam  Constitutional:       General: She is not in acute distress.     Appearance: Normal appearance. She is not ill-appearing.   HENT:      Head: Normocephalic and atraumatic.      Right Ear: External ear normal.      Left Ear: External ear normal.   Eyes:      Extraocular Movements: Extraocular movements intact.      Conjunctiva/sclera: Conjunctivae normal.   Cardiovascular:      Rate and Rhythm: Normal rate.      Heart sounds: No murmur heard.  Pulmonary:      Effort: Pulmonary effort is normal.   Musculoskeletal:      Cervical back: Normal range of motion.   Skin:     General: Skin is warm and dry.      Coloration: Skin is not jaundiced.      Findings: No rash.   Neurological:      Mental Status: She is alert.   Psychiatric:         Mood and Affect: Mood normal.        All recently obtained labs have been reviewed and discussed in detail with the patient.   Assessment     1. Hypokalemia         Plan        Problem List Items Addressed This Visit          Renal/    Hypokalemia - Primary     Patient has a history of hypokalemia in his currently having systemic symptoms.  She states that she has not been able to take her potassium pill because it is too large.  I will change her to an or solution and check potassium today.  This is a chronic problem with exacerbation/progression.  " Prescription medication management done today.         Relevant Medications    potassium chloride 10% (KAYCIEL) 20 mEq/15 mL oral solution    Other Relevant Orders    Basic Metabolic Panel       No follow-ups on file.    Patient advised that is symptoms worsen, they should call or report directly to local emergency department.    Mack Harp DO  [facility]

## 2023-09-25 NOTE — ASSESSMENT & PLAN NOTE
Patient has a history of hypokalemia in his currently having systemic symptoms.  She states that she has not been able to take her potassium pill because it is too large.  I will change her to an or solution and check potassium today.  This is a chronic problem with exacerbation/progression.  Prescription medication management done today.

## 2023-09-26 LAB
ANION GAP SERPL CALCULATED.3IONS-SCNC: 17 MMOL/L (ref 7–16)
BUN SERPL-MCNC: 15 MG/DL (ref 7–18)
BUN/CREAT SERPL: 15 (ref 6–20)
CALCIUM SERPL-MCNC: 8.9 MG/DL (ref 8.5–10.1)
CHLORIDE SERPL-SCNC: 96 MMOL/L (ref 98–107)
CO2 SERPL-SCNC: 20 MMOL/L (ref 21–32)
CREAT SERPL-MCNC: 0.97 MG/DL (ref 0.55–1.02)
EGFR (NO RACE VARIABLE) (RUSH/TITUS): 71 ML/MIN/1.73M2
GLUCOSE SERPL-MCNC: 265 MG/DL (ref 74–106)
POTASSIUM SERPL-SCNC: 3.9 MMOL/L (ref 3.5–5.1)
SODIUM SERPL-SCNC: 129 MMOL/L (ref 136–145)

## 2023-10-03 ENCOUNTER — CLINICAL SUPPORT (OUTPATIENT)
Dept: REHABILITATION | Facility: HOSPITAL | Age: 53
End: 2023-10-03
Payer: MEDICAID

## 2023-10-03 DIAGNOSIS — M25.552 PAIN OF LEFT HIP: ICD-10-CM

## 2023-10-03 DIAGNOSIS — M25.652 DECREASED RANGE OF LEFT HIP MOVEMENT: Primary | ICD-10-CM

## 2023-10-03 DIAGNOSIS — R29.898 WEAKNESS OF LEFT LOWER EXTREMITY: ICD-10-CM

## 2023-10-03 PROCEDURE — 97010 HOT OR COLD PACKS THERAPY: CPT

## 2023-10-03 PROCEDURE — 97110 THERAPEUTIC EXERCISES: CPT

## 2023-10-03 PROCEDURE — 97530 THERAPEUTIC ACTIVITIES: CPT

## 2023-10-03 NOTE — PLAN OF CARE
[facility]  Physical Therapy Updated Plan of Care    Name: Hayley Landis  Clinic Number: [id number]    Therapy Diagnosis:   Encounter Diagnoses   Name Primary?    Decreased range of left hip movement Yes    Pain of left hip     Weakness of left lower extremity      Physician: Mack Harp IV, DO    Visit Date: 10/3/2023    Physician Orders: PT Eval and Treat  Medical Diagnosis from Referral: M25.552 (ICD-10-CM) - Pain of left hip  Evaluation Date: 2023  Authorization Period Expiration: 2023  Plan of Care Expiration: 11/10/2023  Visit # / Visits authorized:  (including initial evaluation)  PTA Visit #: 0    Time In: 1023  Time Out: 1101  Total Billable Time: 38 minutes    Precautions: Standard, Diabetes, and cancer (colon; in remission x 3 years)  Functional Level Prior to Evaluation: independent with all functional mobility without assistive device     Subjective     Pt reports: She was just notified recently that her insurance has approved her physical therapy sessions, even though her initial evaluation was ~2 months ago. .    She was not compliant with home exercise program.  Response to previous treatment: This is patient's first treatment since initial evaluation.     Pain: 3/10  Location: left hip       Objective     Hayley received therapeutic exercises to develop strength, endurance, ROM, flexibility, posture, and core stabilization for 18 minutes including:    NuStep: 3 minutes  Calf stretch on slant board: 2 minutes  Hamstring stretch on step: 3 x 20 second holds each  Single knee to chest stretch: 3 x 20 second holds each  Seated marchin lbs; x 20 reps each  Long arc quads: 2 lbs; x 20 reps each  Standing hip abduction: 2 lbs; x 20 reps    Hayley received the following manual therapy techniques for 0 minutes, including:    (not performed)    Hayley participated in neuromuscular re-education activities to improve: Balance, Coordination,  Proprioception, and Posture for 0 minutes. The following activities were included:    (not performed)    Hayley participated in dynamic functional therapeutic activities to improve functional performance for 10 minutes, including:    Sit to/from stand from low blue mat without upper extremity support: 2 x 10 reps  Forward step ups on a 6-inch step with bilateral upper extremity support: x 20 reps each    Hayley participated in gait training to improve functional mobility and safety for 0 minutes, including:    (not performed)    Hayley received hot pack for 10 minutes to the left hip in right sidelying post-treatment.    Home Exercises Provided and Patient Education Provided     Education provided: Patient educated on the importance of completing skilled physical therapy treatment and home exercise program as prescribed to maximize functional gains.     Written Home Exercises Provided: Patient instructed to cont prior HEP. Exercises were reviewed and Hayley was able to demonstrate them prior to the end of the session.  Hayley demonstrated good  understanding of the education provided.     See EMR under Patient Instructions for exercises provided prior visit.    Assessment     Patient with good effort throughout treatment. Patient with difficulty performing exercises in supine due to pain in ribs from an old injury. Moist heat applied to the left hip post-treatment to decrease pain/stiffness. Physical Therapist will continue to progress therapeutic exercise, neuromuscular re-education, therapeutic activities, and gait training as able with manual therapy and modalities utilized as needed.    Hayley isprogressing well towards her goals.   Pt prognosis is Good.     Pt will continue to benefit from skilled outpatient physical therapy to address the deficits listed in the problem list box on initial evaluation, provide pt/family education, and to maximize pt's level of independence in the home and community  environment.     Pt's spiritual, cultural, and educational needs considered and pt agreeable to plan of care and goals.     Anticipated barriers to physical therapy: compliance with home exercise program    Goals:    Short Term Goals:  Patient will demonstrate independence with home exercise program to ensure carryover of treatment. [Not met]  Patient will perform sit to/from stand with standby assist without upper extremity support to improve independence and safety with transfers. [Not met]  Patient will demonstrate 105 degrees of left hip flexion active range of motion in supine to improve functional use of the left lower extremity. [Not met]  Patient will report a reduction in left hip pain from 10/10 to 8/10 at worst to improve quality of life. [Not met]     Long Term Goals:  Patient will perform sit to/from stand with independence without upper extremity support to improve independence and safety with transfers. [Not met]  Patient will improve bilateral lower extremity strength to 4+/5 to improve independence and safety with bed mobility, transfers, and gait. [Not met]  Patient will report a reduction in left hip pain from 10/10 to 6/10 at worst to improve quality of life. [Not met]  Patient will ascend/descend 3 steps reciprocally with unilateral upper extremity support to improve independence and safety with stair negotiation. [Not met]    Reasons for Recertification of Therapy: Patient has not met goals. Patient has only attended 1 treatment session since initial evaluation.    Plan     Updated Certification Period: 10/3/2023 to 11/10/2023  Recommended Treatment Plan: 2 times per week for 5 weeks (9 visits): Gait Training, Manual Therapy, Neuromuscular Re-ed, Therapeutic Activities, and Therapeutic Exercise  Other Recommendations: N/A      Abram Collins, PT, DPT  10/3/2023      I CERTIFY THE NEED FOR THESE SERVICES FURNISHED UNDER THIS PLAN OF TREATMENT AND WHILE UNDER MY CARE.    Physician's  comments:

## 2023-10-10 ENCOUNTER — CLINICAL SUPPORT (OUTPATIENT)
Dept: REHABILITATION | Facility: HOSPITAL | Age: 53
End: 2023-10-10
Payer: MEDICAID

## 2023-10-10 DIAGNOSIS — M25.652 DECREASED RANGE OF LEFT HIP MOVEMENT: ICD-10-CM

## 2023-10-10 DIAGNOSIS — R29.898 WEAKNESS OF LEFT LOWER EXTREMITY: Primary | ICD-10-CM

## 2023-10-10 DIAGNOSIS — M25.552 PAIN OF LEFT HIP: ICD-10-CM

## 2023-10-10 PROCEDURE — 97530 THERAPEUTIC ACTIVITIES: CPT | Mod: CQ

## 2023-10-10 PROCEDURE — 97112 NEUROMUSCULAR REEDUCATION: CPT | Mod: CQ

## 2023-10-10 PROCEDURE — 97110 THERAPEUTIC EXERCISES: CPT | Mod: CQ

## 2023-10-10 NOTE — PROGRESS NOTES
[facility]  Physical Therapy Treatment Note    Name: Hayley Landis  Clinic Number: [id number]    Therapy Diagnosis:   Encounter Diagnoses   Name Primary?    Decreased range of left hip movement Yes    Pain of left hip     Weakness of left lower extremity      Physician: Mack Harp IV, DO    Visit Date: 10/3/2023    Physician Orders: PT Eval and Treat  Medical Diagnosis from Referral: M25.552 (ICD-10-CM) - Pain of left hip  Evaluation Date: 2023  Authorization Period Expiration: 2023  Plan of Care Expiration: 11/10/2023  Visit # / Visits authorized: 3/11 (including initial evaluation)  PTA Visit #: 1    Time In: 851  Time Out: 940  Total Billable Time: 49 minutes     Precautions: Standard, Diabetes, and cancer (colon; in remission x 3 years)  Functional Level Prior to Evaluation: independent with all functional mobility without assistive device     Received Plan of Care per Abram Collins PT     Subjective     Pt reports: no c/o pain; no pain meds taken  She was admittedly non compliant with home exercise program.  Response to previous treatment: no c/o after last session    Pain: 0/10  Location: left hip       Objective     Hayley received therapeutic exercises to develop strength, endurance, ROM, flexibility, posture, and core stabilization for 20 minutes including:  NuStep: 5 minutes  Calf stretch on slant board: 2 minutes  Hamstring stretch on step, bilaterally: 3 x 20 second holds each  Standing BLE exercises x 20 repetitions each:    Marching, mini squats, heel/toe raises, hip abduction, hip extension   Supine BLE stretches, 3x20 second hold each:    Single knee to chest, hamstring, hip external rotation, piriformis    (Not today)  Seated marchin lbs; x 20 reps each  Long arc quads: 2 lbs; x 20 reps each    Hayley received the following manual therapy techniques for 0 minutes, including:    Hayley participated in neuromuscular re-education  activities to improve: Balance, Coordination, Proprioception, and Posture for 19 minutes. The following activities were included:  Side stepping left and right, 15ft x 2 each way  Toe walking, 15ft x 2 each  Heel walking, 15ft x 2 each  Bridging x 20 repetitions   Hooklying hip abduction with blue band x 20 repetitions     Hayley participated in dynamic functional therapeutic activities to improve functional performance for 10 minutes, including:  Sit to/from stand without upper extremity support: 2 x 10 reps  Forward step ups on a 6-inch step with bilateral upper extremity support: x 10 reps each  Ascend/descend steps (not today)    Hayley participated in gait training to improve functional mobility and safety for 0 minutes, including:    Hayley received hot pack for 0 minutes to the left hip in right sidelying post-treatment.    Home Exercises Provided and Patient Education Provided     Education provided: continue current home exercise program, pain free    Written Home Exercises Provided: Patient instructed to cont prior HEP. Exercises were reviewed and Hayley was able to demonstrate them prior to the end of the session.  Hayley demonstrated good  understanding of the education provided.     See EMR under Patient Instructions for exercises provided during sessions prn.     Assessment     Improving strength/range of motion per exercises and stretches done today; treatment appropriate for goals; pain well managed at this time  Hayley is progressing well towards her goals.   Pt prognosis is Good.     Pt will continue to benefit from skilled outpatient physical therapy to address the deficits listed in the problem list box on initial evaluation, provide pt/family education, and to maximize pt's level of independence in the home and community environment.      Anticipated barriers to physical therapy: compliance with home exercise program    Goals:  Short Term Goals:  Patient will demonstrate independence  with home exercise program to ensure carryover of treatment. [Not met]  Patient will perform sit to/from stand with standby assist without upper extremity support to improve independence and safety with transfers. met  Patient will demonstrate 105 degrees of left hip flexion active range of motion in supine to improve functional use of the left lower extremity. [Not met]  Patient will report a reduction in left hip pain from 10/10 to 8/10 at worst to improve quality of life. met     Long Term Goals:  Patient will perform sit to/from stand with independence without upper extremity support to improve independence and safety with transfers. [Not met]  Patient will improve bilateral lower extremity strength to 4+/5 to improve independence and safety with bed mobility, transfers, and gait. [Not met]  Patient will report a reduction in left hip pain from 10/10 to 6/10 at worst to improve quality of life. [Not met]  Patient will ascend/descend 3 steps reciprocally with unilateral upper extremity support to improve independence and safety with stair negotiation. [Not met]    Plan     Updated Certification Period: 10/3/2023 to 11/10/2023    Recommended Treatment Plan: 2 times per week for 5 weeks (9 visits): Gait Training, Manual Therapy, Neuromuscular Re-ed, Therapeutic Activities, and Therapeutic Exercise    Continue per Plan of Care and progress as pt able   Ligia Triplett, PTA  10/10/2023

## 2023-10-16 ENCOUNTER — OFFICE VISIT (OUTPATIENT)
Dept: FAMILY MEDICINE | Facility: CLINIC | Age: 53
End: 2023-10-16
Payer: MEDICAID

## 2023-10-16 VITALS
SYSTOLIC BLOOD PRESSURE: 123 MMHG | HEART RATE: 101 BPM | DIASTOLIC BLOOD PRESSURE: 68 MMHG | HEIGHT: 57 IN | WEIGHT: 168.88 LBS | BODY MASS INDEX: 36.43 KG/M2

## 2023-10-16 DIAGNOSIS — K58.0 IRRITABLE BOWEL SYNDROME WITH DIARRHEA: Primary | Chronic | ICD-10-CM

## 2023-10-16 LAB — IGA SERPL-MCNC: 296 MG/DL (ref 61–356)

## 2023-10-16 PROCEDURE — 3061F NEG MICROALBUMINURIA REV: CPT | Mod: CPTII,,, | Performed by: FAMILY MEDICINE

## 2023-10-16 PROCEDURE — 82784 ASSAY IGA/IGD/IGG/IGM EACH: CPT | Mod: ,,, | Performed by: CLINICAL MEDICAL LABORATORY

## 2023-10-16 PROCEDURE — 3078F DIAST BP <80 MM HG: CPT | Mod: CPTII,,, | Performed by: FAMILY MEDICINE

## 2023-10-16 PROCEDURE — 3051F PR MOST RECENT HEMOGLOBIN A1C LEVEL 7.0 - < 8.0%: ICD-10-PCS | Mod: CPTII,,, | Performed by: FAMILY MEDICINE

## 2023-10-16 PROCEDURE — 3078F PR MOST RECENT DIASTOLIC BLOOD PRESSURE < 80 MM HG: ICD-10-PCS | Mod: CPTII,,, | Performed by: FAMILY MEDICINE

## 2023-10-16 PROCEDURE — 99214 PR OFFICE/OUTPT VISIT, EST, LEVL IV, 30-39 MIN: ICD-10-PCS | Mod: ,,, | Performed by: FAMILY MEDICINE

## 2023-10-16 PROCEDURE — 3074F PR MOST RECENT SYSTOLIC BLOOD PRESSURE < 130 MM HG: ICD-10-PCS | Mod: CPTII,,, | Performed by: FAMILY MEDICINE

## 2023-10-16 PROCEDURE — 99214 OFFICE O/P EST MOD 30 MIN: CPT | Mod: ,,, | Performed by: FAMILY MEDICINE

## 2023-10-16 PROCEDURE — 3008F PR BODY MASS INDEX (BMI) DOCUMENTED: ICD-10-PCS | Mod: CPTII,,, | Performed by: FAMILY MEDICINE

## 2023-10-16 PROCEDURE — 3066F PR DOCUMENTATION OF TREATMENT FOR NEPHROPATHY: ICD-10-PCS | Mod: CPTII,,, | Performed by: FAMILY MEDICINE

## 2023-10-16 PROCEDURE — 3074F SYST BP LT 130 MM HG: CPT | Mod: CPTII,,, | Performed by: FAMILY MEDICINE

## 2023-10-16 PROCEDURE — 4010F PR ACE/ARB THEARPY RXD/TAKEN: ICD-10-PCS | Mod: CPTII,,, | Performed by: FAMILY MEDICINE

## 2023-10-16 PROCEDURE — 82784 IGA: ICD-10-PCS | Mod: ,,, | Performed by: CLINICAL MEDICAL LABORATORY

## 2023-10-16 PROCEDURE — 3061F PR NEG MICROALBUMINURIA RESULT DOCUMENTED/REVIEW: ICD-10-PCS | Mod: CPTII,,, | Performed by: FAMILY MEDICINE

## 2023-10-16 PROCEDURE — 3066F NEPHROPATHY DOC TX: CPT | Mod: CPTII,,, | Performed by: FAMILY MEDICINE

## 2023-10-16 PROCEDURE — 4010F ACE/ARB THERAPY RXD/TAKEN: CPT | Mod: CPTII,,, | Performed by: FAMILY MEDICINE

## 2023-10-16 PROCEDURE — 3051F HG A1C>EQUAL 7.0%<8.0%: CPT | Mod: CPTII,,, | Performed by: FAMILY MEDICINE

## 2023-10-16 PROCEDURE — 3008F BODY MASS INDEX DOCD: CPT | Mod: CPTII,,, | Performed by: FAMILY MEDICINE

## 2023-10-16 RX ORDER — LOPERAMIDE HYDROCHLORIDE 2 MG/1
2 CAPSULE ORAL 2 TIMES DAILY
Qty: 60 CAPSULE | Refills: 0 | Status: SHIPPED | OUTPATIENT
Start: 2023-10-16 | End: 2023-11-15

## 2023-10-16 RX ORDER — ASPIRIN 325 MG
50000 TABLET, DELAYED RELEASE (ENTERIC COATED) ORAL
COMMUNITY
Start: 2023-10-06 | End: 2023-12-05 | Stop reason: SDUPTHER

## 2023-10-16 NOTE — ASSESSMENT & PLAN NOTE
Patient states she was on hyoscyamine previously for this problem.  She is not been on it in some time.  Is currently diarrhea predominant but she has had some constipation in the past.  She states that the hyoscyamine did make her diarrhea worse.  I am going to recommend we start loperamide as an initial treatment and we can consider a bile acid sequestrant if necessary.

## 2023-10-16 NOTE — PROGRESS NOTES
"              Mack Harp IV, DO  [facility]  [address] [place] [address]  Phone: [phone]      Subjective     Name: Hayley Landis   Sex: female  YOB: 1970 (53 y.o.)  MRN: [id number]  Visit Date: 10/16/2023   Chief Complaint: Irritable Bowel Syndrome        HISTORY OF PRESENT ILLNESS:    Chief Complaint   Patient presents with    Irritable Bowel Syndrome       HPI  See tests that keep you healthy and the problem oriented documentation below.    Tests to Keep You Healthy    Mammogram: Met on 10/17/2022  Eye Exam: DUE  Colon Cancer Screening: Met on 6/8/2020  Last Blood Pressure <= 139/89 (10/16/2023): Yes  Last HbA1c < 8 (07/24/2023): Yes      Portions of this note may have been created with voice recognition software. Occasional "wrong-word" or "sound-a-like" substitutions may have occurred due to the inherent limitations of voice recognition software. Please, read the note carefully and recognize, using context, where substitutions have occurred.     PAST MEDICAL HISTORY:  Significant Diagnoses - Patient  has a past medical history of Colon cancer, CVA (cerebral vascular accident), Diabetes, HTN (hypertension), and Seizure.  Medications - Patient has a current medication list which includes the following long-term medication(s): hydrochlorothiazide, insulin aspart u-100, insulin glargine, levetiracetam, and lisinopril.   Allergies - Patient is allergic to grape, pineapple, opioids - morphine analogues, aspirin, dicyclomine, gabapentin, hydrocodone, iron analogues, metformin, penicillins, and latex.  Surgeries - Patient  has a past surgical history that includes Hernia repair; Colon surgery; and Hysterectomy.  Family History - Patient family history includes No Known Problems in her brother, daughter, father, maternal aunt, maternal grandfather, maternal grandmother, maternal uncle, mother, paternal aunt, paternal grandfather, paternal grandmother, paternal " "uncle, sister, and son.      SOCIAL HISTORY:  Tobacco - Patient  reports that she has never smoked. She has never been exposed to tobacco smoke. She has never used smokeless tobacco.   Alcohol - Patient  reports no history of alcohol use.   Recreational Drugs - Patient  reports no history of drug use.       Review of Systems   All other systems reviewed and are negative.       Past Medical History:   Diagnosis Date    Colon cancer     CVA (cerebral vascular accident)     Diabetes     HTN (hypertension)     Seizure         Review of patient's allergies indicates:   Allergen Reactions    Grape Anaphylaxis    Pineapple Anaphylaxis    Opioids - morphine analogues Nausea And Vomiting    Aspirin      "Veins breakdown" per patient    Dicyclomine Diarrhea    Gabapentin      weakness    Hydrocodone      Headache      Iron analogues      Pt hx of hemachromatosis    Metformin      diarrhea    Penicillins     Latex Rash        Past Surgical History:   Procedure Laterality Date    COLON SURGERY      HERNIA REPAIR      HYSTERECTOMY          Family History   Problem Relation Age of Onset    No Known Problems Mother     No Known Problems Father     No Known Problems Sister     No Known Problems Brother     No Known Problems Daughter     No Known Problems Son     No Known Problems Maternal Aunt     No Known Problems Maternal Uncle     No Known Problems Paternal Aunt     No Known Problems Paternal Uncle     No Known Problems Maternal Grandmother     No Known Problems Maternal Grandfather     No Known Problems Paternal Grandmother     No Known Problems Paternal Grandfather        Current Outpatient Medications   Medication Instructions    cholecalciferol (vitamin D3) 50,000 Units, Oral, Every 7 days    cyclobenzaprine (FLEXERIL) 10 mg, Oral, 3 times daily    hydroCHLOROthiazide (HYDRODIURIL) 12.5 MG Tab TAKE ONE TABLET BY MOUTH DAILY IN THE MORNING    insulin aspart U-100 (NOVOLOG) 100 unit/mL " "injection inject as directed by your physician per sliding scale    insulin glargine (LANTUS U-100 INSULIN) 80 Units, Subcutaneous, Daily    insulin syringe-needle U-100 1 mL 30 gauge x 5/16 Syrg Use as directed.    levETIRAcetam (KEPPRA) 500 mg, Oral, Daily    lisinopriL 10 mg, Oral, Daily    loperamide (IMODIUM) 2 mg, Oral, 2 times daily    peppermint oiL (IBGARD) 90 mg CECX 2 capsules, Oral, Daily    potassium chloride 10% (KAYCIEL) 20 mEq/15 mL oral solution 20 mEq, Oral, Daily        Objective     /68   Pulse 101   Ht 4' 9" (1.448 m)   Wt 76.6 kg (168 lb 14.4 oz)   BMI 36.55 kg/m²     Physical Exam  Constitutional:       General: She is not in acute distress.     Appearance: Normal appearance. She is not ill-appearing.   HENT:      Head: Normocephalic and atraumatic.      Right Ear: External ear normal.      Left Ear: External ear normal.   Eyes:      Extraocular Movements: Extraocular movements intact.      Conjunctiva/sclera: Conjunctivae normal.   Cardiovascular:      Rate and Rhythm: Normal rate.      Heart sounds: No murmur heard.  Pulmonary:      Effort: Pulmonary effort is normal.   Musculoskeletal:      Cervical back: Normal range of motion.   Skin:     General: Skin is warm and dry.      Coloration: Skin is not jaundiced.      Findings: No rash.   Neurological:      Mental Status: She is alert.   Psychiatric:         Mood and Affect: Mood normal.        All recently obtained labs have been reviewed and discussed in detail with the patient.   Assessment     1. Irritable bowel syndrome with diarrhea         Plan        Problem List Items Addressed This Visit          GI    Irritable bowel syndrome with diarrhea - Primary (Chronic)     Patient states she was on hyoscyamine previously for this problem.  She is not been on it in some time.  Is currently diarrhea predominant but she has had some constipation in the past.  She states that the hyoscyamine did make her diarrhea worse.  I am " going to recommend we start loperamide as an initial treatment and we can consider a bile acid sequestrant if necessary.         Relevant Medications    loperamide (IMODIUM) 2 mg capsule    Other Relevant Orders    Calprotectin, Stool    Giardia antigen    IgA       No follow-ups on file.    Patient advised that is symptoms worsen, they should call or report directly to local emergency department.    Mack Harp DO  [facility]

## 2023-10-17 ENCOUNTER — CLINICAL SUPPORT (OUTPATIENT)
Dept: REHABILITATION | Facility: HOSPITAL | Age: 53
End: 2023-10-17
Payer: MEDICAID

## 2023-10-17 DIAGNOSIS — R29.898 WEAKNESS OF LEFT LOWER EXTREMITY: ICD-10-CM

## 2023-10-17 DIAGNOSIS — M25.652 DECREASED RANGE OF LEFT HIP MOVEMENT: Primary | ICD-10-CM

## 2023-10-17 DIAGNOSIS — M25.552 PAIN OF LEFT HIP: ICD-10-CM

## 2023-10-17 PROCEDURE — 97112 NEUROMUSCULAR REEDUCATION: CPT | Mod: CQ

## 2023-10-17 PROCEDURE — 97110 THERAPEUTIC EXERCISES: CPT | Mod: CQ

## 2023-10-17 PROCEDURE — 97530 THERAPEUTIC ACTIVITIES: CPT | Mod: CQ

## 2023-10-17 NOTE — PROGRESS NOTES
"[facility]  Physical Therapy Treatment Note    Name: Hayley Landis  Clinic Number: [id number]    Therapy Diagnosis: No diagnosis found.  Physician: Mack Harp IV, DO    Visit Date: 10/17/2023    Physician Orders: PT Eval and Treat  Medical Diagnosis from Referral: M25.552 (ICD-10-CM) - Pain of left hip  Evaluation Date: 8/1/2023  Authorization Period Expiration: 12/31/2023  Plan of Care Expiration: 11/10/2023  Visit # / Visits authorized: 4/11   PTA Visit #: 2    Time In: 0758  Time Out: 0844  Total Billable Time: 46 minutes    Precautions:  Standard, Diabetes, and cancer (colon; in remission x 3 years)    Subjective     Pt reports: "I am alright." Patient states she is doing ok because she is not having any muscle spasms.    She was compliant with home exercise program.    Pain: 0/10  Location: left hip      Objective     Hayley received therapeutic exercises to develop strength, endurance, and flexibility for 17 minutes including:  Nu step: 5 minutes   Calf stretch on slant board: 2 minutes   Hamstring stretch on step: 3 x 20 second holds each  Hip adduction squeezes: x 20 reps (performed in sitting)  Hip adduction: BTB; x 20 reps (performed in sitting)  Sitting marching: x 20 reps   Long arc quads: x 20 reps   Single knee to chest: 3 x 20 second holds each (patient refused due to not wanting to lay on back)     Hayley participated in neuromuscular re-education activities to improve: Balance for 15 minutes. The following activities were included:  Bridges: x 20 reps   Heel raises: x 20 reps   Standing hip flexion: x 20 reps   Standing hip abduction: x 20 reps   Side stepping in parallel bars: x 4 laps  Tandem walking in parallel bars: x 4 lap     Hayley participated in dynamic functional therapeutic activities to improve functional performance for 14 minutes, including:  Chair squats: x 20 reps   6 in step ups: x 20 reps     Hayley participated in gait " "training to improve functional mobility and safety for 0 minutes, including:  Not performed     Home Exercises Provided and Patient Education Provided     Education provided: no new exercises added this visit     Written Home Exercises Provided: Patient instructed to cont prior HEP. Exercises were reviewed and Hayley was able to demonstrate them prior to the end of the session.  Hayley demonstrated good  understanding of the education provided.     See EMR under Patient Instructions for exercises provided prior visit.    Assessment     Patient was unable to complete exercises while laying on back due to screaming out and reporting, "I can not do this laying on my back anymore". Patient was able to complete all other exercises with little to no complaint of pain. Patient with no other complaints and reported that "hopefully I will be feeling better for tomorrow's treatment".     Hayley isprogressing well towards her goals.   Pt prognosis is Good.     Pt will continue to benefit from skilled outpatient physical therapy to address the deficits listed in the problem list box on initial evaluation, provide pt/family education, and to maximize pt's level of independence in the home and community environment.     Pt's spiritual, cultural, and educational needs considered and pt agreeable to plan of care and goals.     Anticipated barriers to physical therapy: compliance with home exercise program    Goals:    Short Term Goals:  Patient will demonstrate independence with home exercise program to ensure carryover of treatment. [Not met]  Patient will perform sit to/from stand with standby assist without upper extremity support to improve independence and safety with transfers. met  Patient will demonstrate 105 degrees of left hip flexion active range of motion in supine to improve functional use of the left lower extremity. [Not met]  Patient will report a reduction in left hip pain from 10/10 to 8/10 at worst to " improve quality of life. met     Long Term Goals:  Patient will perform sit to/from stand with independence without upper extremity support to improve independence and safety with transfers. [Not met]  Patient will improve bilateral lower extremity strength to 4+/5 to improve independence and safety with bed mobility, transfers, and gait. [Not met]  Patient will report a reduction in left hip pain from 10/10 to 6/10 at worst to improve quality of life. [Not met]  Patient will ascend/descend 3 steps reciprocally with unilateral upper extremity support to improve independence and safety with stair negotiation. [Not met]    Plan     Ill continue with appropriate POC.       Ellyn Aponte, PTA  10/17/2023

## 2023-10-18 ENCOUNTER — CLINICAL SUPPORT (OUTPATIENT)
Dept: REHABILITATION | Facility: HOSPITAL | Age: 53
End: 2023-10-18
Payer: MEDICAID

## 2023-10-18 DIAGNOSIS — M25.552 PAIN OF LEFT HIP: ICD-10-CM

## 2023-10-18 DIAGNOSIS — R29.898 WEAKNESS OF LEFT LOWER EXTREMITY: ICD-10-CM

## 2023-10-18 DIAGNOSIS — M25.652 DECREASED RANGE OF LEFT HIP MOVEMENT: Primary | ICD-10-CM

## 2023-10-18 PROCEDURE — 97112 NEUROMUSCULAR REEDUCATION: CPT | Mod: CQ

## 2023-10-18 PROCEDURE — 97110 THERAPEUTIC EXERCISES: CPT | Mod: CQ

## 2023-10-18 PROCEDURE — 97530 THERAPEUTIC ACTIVITIES: CPT | Mod: CQ

## 2023-10-18 NOTE — PROGRESS NOTES
[facility]  Physical Therapy Treatment Note    Name: Hayley Landis  Clinic Number: [id number]    Therapy Diagnosis: No diagnosis found.  Physician: Mack Harp IV, DO    Visit Date: 10/18/2023    Physician Orders: PT Eval and Treat  Medical Diagnosis from Referral: M25.552 (ICD-10-CM) - Pain of left hip  Evaluation Date: 8/1/2023  Authorization Period Expiration: 12/31/2023  Plan of Care Expiration: 11/10/2023  Visit # / Visits authorized: 5/11   PTA Visit #: 3    Time In: 0843 (patient's treatment started before patient was checked in)   Time Out: 0925  Total Billable Time: 42 minutes    Precautions:  Standard, Diabetes, and cancer (colon; in remission x 3 years)    Subjective     Pt reports: Patient reports having 2/10 pain in her left hip this morning.     She was compliant with home exercise program.    Pain: 2/10  Location: left hip      Objective     Hayley received therapeutic exercises to develop strength, endurance, and flexibility for 17 minutes including:  Nu step: 5 minutes   Calf stretch on slant board: 2 minutes   Hamstring stretch on step: 3 x 20 second holds each  Hip adduction squeezes: x 20 reps (performed in sitting)  Hip adduction: BTB; x 20 reps (performed in sitting)  Sitting marching: x 20 reps   Long arc quads: x 20 reps   Single knee to chest: 3 x 20 second holds each (patient refused due to not wanting to lay on back)     Hayley participated in neuromuscular re-education activities to improve: Balance for 15 minutes. The following activities were included:  Bridges: x 20 reps (not performed)   Heel raises: x 20 reps   Standing hip flexion: x 20 reps   Standing hip abduction: x 20 reps   Side stepping in parallel bars: x 4 laps  Tandem walking in parallel bars: x 4 lap     Hayley participated in dynamic functional therapeutic activities to improve functional performance for 10 minutes, including:  Chair squats: x 20 reps   6 in step ups: x  "20 reps     Hayley participated in gait training to improve functional mobility and safety for 0 minutes, including:  Not performed     Home Exercises Provided and Patient Education Provided     Education provided: no new exercises added this visit     Written Home Exercises Provided: Patient instructed to cont prior HEP. Exercises were reviewed and Hayley was able to demonstrate them prior to the end of the session.  Hayley demonstrated good  understanding of the education provided.     See EMR under Patient Instructions for exercises provided prior visit.    Assessment     Patient was able to complete all exercise, but still was not able to lay on back to perform supine stretch and exercise. Patient with no complaint of increased pain and reported to be feeling "tired" and "worked out" following treatment today.     Hayley isprogressing well towards her goals.   Pt prognosis is Good.     Pt will continue to benefit from skilled outpatient physical therapy to address the deficits listed in the problem list box on initial evaluation, provide pt/family education, and to maximize pt's level of independence in the home and community environment.     Pt's spiritual, cultural, and educational needs considered and pt agreeable to plan of care and goals.     Anticipated barriers to physical therapy: compliance with home exercise program    Goals:    Short Term Goals:  Patient will demonstrate independence with home exercise program to ensure carryover of treatment. [Not met]  Patient will perform sit to/from stand with standby assist without upper extremity support to improve independence and safety with transfers. met  Patient will demonstrate 105 degrees of left hip flexion active range of motion in supine to improve functional use of the left lower extremity. [Not met]  Patient will report a reduction in left hip pain from 10/10 to 8/10 at worst to improve quality of life. met     Long Term Goals:  Patient will " perform sit to/from stand with independence without upper extremity support to improve independence and safety with transfers. [Not met]  Patient will improve bilateral lower extremity strength to 4+/5 to improve independence and safety with bed mobility, transfers, and gait. [Not met]  Patient will report a reduction in left hip pain from 10/10 to 6/10 at worst to improve quality of life. [Not met]  Patient will ascend/descend 3 steps reciprocally with unilateral upper extremity support to improve independence and safety with stair negotiation. [Not met]    Plan     Will continue with appropriate POC.       Ellyn Aponte, PTA  10/18/2023

## 2023-10-23 PROCEDURE — 83993 ASSAY FOR CALPROTECTIN FECAL: CPT | Mod: 90,,, | Performed by: CLINICAL MEDICAL LABORATORY

## 2023-10-23 PROCEDURE — 83993 CALPROTECTIN, STOOL: ICD-10-PCS | Mod: 90,,, | Performed by: CLINICAL MEDICAL LABORATORY

## 2023-10-26 ENCOUNTER — CLINICAL SUPPORT (OUTPATIENT)
Dept: REHABILITATION | Facility: HOSPITAL | Age: 53
End: 2023-10-26
Payer: MEDICAID

## 2023-10-26 DIAGNOSIS — R29.898 WEAKNESS OF LEFT LOWER EXTREMITY: ICD-10-CM

## 2023-10-26 DIAGNOSIS — M25.652 DECREASED RANGE OF LEFT HIP MOVEMENT: Primary | ICD-10-CM

## 2023-10-26 DIAGNOSIS — M25.552 PAIN OF LEFT HIP: ICD-10-CM

## 2023-10-26 PROCEDURE — 97110 THERAPEUTIC EXERCISES: CPT | Mod: CQ

## 2023-10-26 PROCEDURE — 97530 THERAPEUTIC ACTIVITIES: CPT | Mod: CQ

## 2023-10-26 PROCEDURE — 97112 NEUROMUSCULAR REEDUCATION: CPT | Mod: CQ

## 2023-10-26 NOTE — PROGRESS NOTES
"[facility]  Physical Therapy Treatment Note    Name: Hayley Landis  Clinic Number: [id number]    Therapy Diagnosis: No diagnosis found.  Physician: Mack Harp IV, DO    Visit Date: 10/26/2023    Physician Orders: PT Eval and Treat  Medical Diagnosis from Referral: M25.552 (ICD-10-CM) - Pain of left hip  Evaluation Date: 8/1/2023  Authorization Period Expiration: 12/31/2023  Plan of Care Expiration: 11/10/2023  Visit # / Visits authorized: 6/11   PTA Visit #: 4    Time In: 0800  Time Out: 0840  Total Billable Time: 40 minutes    Precautions:  Standard, Diabetes, and cancer (colon; in remission x 3 years)    Subjective     Pt reports: Patient reports having "a little bit of pain" in her hip this morning. Patient stated she was unable to make past therapy session due to having seizure.     She was compliant with home exercise program.    Pain: 2/10  Location: left hip      Objective     Hayley received therapeutic exercises to develop strength, endurance, and flexibility for 15 minutes including:  Nu step: 5 minutes   Calf stretch on slant board: 2 minutes   Hamstring stretch on step: 3 x 20 second holds each  Hip adduction squeezes: x 20 reps (performed in sitting) (not performed)  Hip adduction: BTB; x 20 reps (performed in sitting) (not performed)   Sitting marching: x 20 reps   Long arc quads: x 20 reps   Single knee to chest: 3 x 20 second holds each (patient refused due to not wanting to lay on back)     Hayley participated in neuromuscular re-education activities to improve: Balance for 15 minutes. The following activities were included:  Bridges: x 20 reps (not performed)   Heel raises: x 20 reps   Standing hip flexion: x 20 reps   Standing hip abduction: x 20 reps   Side stepping in parallel bars: x 4 laps  Tandem walking in parallel bars: x 4 lap     Hayley participated in dynamic functional therapeutic activities to improve functional performance for 10 " "minutes, including:  Chair squats: x 20 reps   6 in step ups: x 20 reps     Hayley participated in gait training to improve functional mobility and safety for 0 minutes, including:  Not performed     Home Exercises Provided and Patient Education Provided     Education provided: no new exercises added this visit     Written Home Exercises Provided: Patient instructed to cont prior HEP. Exercises were reviewed and Hayley was able to demonstrate them prior to the end of the session.  Hayley demonstrated good  understanding of the education provided.     See EMR under Patient Instructions for exercises provided prior visit.    Assessment     Patient with more difficulty completing exercises and required longer ret breaks this treatment due to being "fatigued" and "weak". Patient reported having seizures earlier this week and feeling "week": the rest of the week so far. Patient with no other complaints following treatment today.     Hayley isprogressing well towards her goals.   Pt prognosis is Good.     Pt will continue to benefit from skilled outpatient physical therapy to address the deficits listed in the problem list box on initial evaluation, provide pt/family education, and to maximize pt's level of independence in the home and community environment.     Pt's spiritual, cultural, and educational needs considered and pt agreeable to plan of care and goals.     Anticipated barriers to physical therapy: compliance with home exercise program    Goals:    Short Term Goals:  Patient will demonstrate independence with home exercise program to ensure carryover of treatment. [Not met]  Patient will perform sit to/from stand with standby assist without upper extremity support to improve independence and safety with transfers. met  Patient will demonstrate 105 degrees of left hip flexion active range of motion in supine to improve functional use of the left lower extremity. [Not met]  Patient will report a reduction " in left hip pain from 10/10 to 8/10 at worst to improve quality of life. met     Long Term Goals:  Patient will perform sit to/from stand with independence without upper extremity support to improve independence and safety with transfers. [Not met]  Patient will improve bilateral lower extremity strength to 4+/5 to improve independence and safety with bed mobility, transfers, and gait. [Not met]  Patient will report a reduction in left hip pain from 10/10 to 6/10 at worst to improve quality of life. [Not met]  Patient will ascend/descend 3 steps reciprocally with unilateral upper extremity support to improve independence and safety with stair negotiation. [Not met]    Plan     Will continue with appropriate POC.       Ellyn Aponte, PTA  10/26/2023

## 2023-10-30 LAB — CALPROTECTIN STL-MCNT: <50 MCG/G

## 2023-11-01 ENCOUNTER — CLINICAL SUPPORT (OUTPATIENT)
Dept: REHABILITATION | Facility: HOSPITAL | Age: 53
End: 2023-11-01
Payer: MEDICAID

## 2023-11-01 DIAGNOSIS — M25.652 DECREASED RANGE OF LEFT HIP MOVEMENT: Primary | ICD-10-CM

## 2023-11-01 DIAGNOSIS — M25.552 PAIN OF LEFT HIP: ICD-10-CM

## 2023-11-01 DIAGNOSIS — R29.898 WEAKNESS OF LEFT LOWER EXTREMITY: ICD-10-CM

## 2023-11-01 PROCEDURE — 97110 THERAPEUTIC EXERCISES: CPT | Mod: CQ

## 2023-11-01 PROCEDURE — 97530 THERAPEUTIC ACTIVITIES: CPT | Mod: CQ

## 2023-11-01 PROCEDURE — 97112 NEUROMUSCULAR REEDUCATION: CPT | Mod: CQ

## 2023-11-01 NOTE — PROGRESS NOTES
[facility]  Physical Therapy Treatment Note    Name: Hayley Landis  Clinic Number: [id number]    Therapy Diagnosis: No diagnosis found.  Physician: Mack Harp IV, DO    Visit Date: 11/1/2023    Physician Orders: PT Eval and Treat  Medical Diagnosis from Referral: M25.552 (ICD-10-CM) - Pain of left hip  Evaluation Date: 8/1/2023  Authorization Period Expiration: 12/31/2023  Plan of Care Expiration: 11/10/2023  Visit # / Visits authorized: 7/11   PTA Visit #: 5    Time In: 1228  Time Out: 1306  Total Billable Time: 38 minutes    Precautions:  Standard, Diabetes, and cancer (colon; in remission x 3 years)    Subjective     Pt reports: Patient reports having some GI issues, but no pain.     She was compliant with home exercise program.    Pain: 0/10  Location: left hip      Objective     Patient unable to lay on back due to increased pain while in supine.     Hayley received therapeutic exercises to develop strength, endurance, and flexibility for 15 minutes including:  Nu step: 5 minutes   Calf stretch on slant board: 2 minutes   Hamstring stretch on step: 3 x 20 second holds each  Sitting marching: x 20 reps   Long arc quads: x 20 reps   Standing hamstring curls: x 20 reps     Hayley participated in neuromuscular re-education activities to improve: Balance for 15 minutes. The following activities were included:  Heel raises: x 20 reps   Standing hip flexion: x 20 reps   Standing hip abduction: x 20 reps   Side stepping in parallel bars: x 4 laps  Tandem walking in parallel bars: x 4 lap     Hayley participated in dynamic functional therapeutic activities to improve functional performance for 8 minutes, including:  Chair squats: x 20 reps   6 in step ups: x 20 reps     Hayley participated in gait training to improve functional mobility and safety for 0 minutes, including:  Not performed     Home Exercises Provided and Patient Education Provided     Education  "provided: no new exercises added this visit     Written Home Exercises Provided: Patient instructed to cont prior HEP. Exercises were reviewed and Hayley was able to demonstrate them prior to the end of the session.  Hayley demonstrated good  understanding of the education provided.     See EMR under Patient Instructions for exercises provided prior visit.    Assessment     Patient reported feeling no pain but having a pull in her left hip following treatment that she feels "is coming from my colon". Patient with no new complaints to follow session.     Hayley isprogressing well towards her goals.   Pt prognosis is Good.     Pt will continue to benefit from skilled outpatient physical therapy to address the deficits listed in the problem list box on initial evaluation, provide pt/family education, and to maximize pt's level of independence in the home and community environment.     Pt's spiritual, cultural, and educational needs considered and pt agreeable to plan of care and goals.     Anticipated barriers to physical therapy: compliance with home exercise program    Goals:    Short Term Goals:  Patient will demonstrate independence with home exercise program to ensure carryover of treatment. [Not met]  Patient will perform sit to/from stand with standby assist without upper extremity support to improve independence and safety with transfers. met  Patient will demonstrate 105 degrees of left hip flexion active range of motion in supine to improve functional use of the left lower extremity. [Not met]  Patient will report a reduction in left hip pain from 10/10 to 8/10 at worst to improve quality of life. met     Long Term Goals:  Patient will perform sit to/from stand with independence without upper extremity support to improve independence and safety with transfers. [Not met]  Patient will improve bilateral lower extremity strength to 4+/5 to improve independence and safety with bed mobility, transfers, and " gait. [Not met]  Patient will report a reduction in left hip pain from 10/10 to 6/10 at worst to improve quality of life. [Not met]  Patient will ascend/descend 3 steps reciprocally with unilateral upper extremity support to improve independence and safety with stair negotiation. [Not met]    Plan     Will continue with appropriate POC.       Ellyn Aponte, PTA  11/1/2023

## 2023-11-02 ENCOUNTER — CLINICAL SUPPORT (OUTPATIENT)
Dept: REHABILITATION | Facility: HOSPITAL | Age: 53
End: 2023-11-02
Payer: MEDICAID

## 2023-11-02 DIAGNOSIS — M25.652 DECREASED RANGE OF LEFT HIP MOVEMENT: Primary | ICD-10-CM

## 2023-11-02 DIAGNOSIS — R29.898 WEAKNESS OF LEFT LOWER EXTREMITY: ICD-10-CM

## 2023-11-02 DIAGNOSIS — M25.552 PAIN OF LEFT HIP: ICD-10-CM

## 2023-11-02 PROCEDURE — 97112 NEUROMUSCULAR REEDUCATION: CPT | Mod: CQ

## 2023-11-02 PROCEDURE — 97110 THERAPEUTIC EXERCISES: CPT | Mod: CQ

## 2023-11-02 PROCEDURE — 97530 THERAPEUTIC ACTIVITIES: CPT | Mod: CQ

## 2023-11-02 NOTE — PROGRESS NOTES
[facility]  Physical Therapy Treatment Note    Name: Hayley Landis  Clinic Number: [id number]    Therapy Diagnosis:   Encounter Diagnoses   Name Primary?    Decreased range of left hip movement Yes    Pain of left hip     Weakness of left lower extremity      Physician: Mack Harp IV, DO    Visit Date: 11/2/2023    Physician Orders: PT Eval and Treat  Medical Diagnosis from Referral: M25.552 (ICD-10-CM) - Pain of left hip  Evaluation Date: 8/1/2023  Authorization Period Expiration: 12/31/2023  Plan of Care Expiration: 11/10/2023  Visit # / Visits authorized: 8/11   PTA Visit #: 6 (co treatment with Karina Valverde, PT)    Time In: 0800  Time Out: 0843  Total Billable Time: 43 minutes    Precautions:  Standard, Diabetes, and cancer (colon; in remission x 3 years)    Subjective     Pt reports: Patient reports having some GI issues, but no pain.     She was compliant with home exercise program.    Pain: 0/10  Location: left hip      Objective     Patient unable to lay on back due to increased pain while in supine.     Hayley received therapeutic exercises to develop strength, endurance, and flexibility for 15 minutes including:  Nu step: 5 minutes (not performed)  Calf stretch on slant board: 2 minutes   Hamstring stretch on step: 3 x 20 second holds each  Sitting marching: 3 lb; x 20 reps   Long arc quads: 3 lb; x 20 reps   Standing hamstring curls: x 20 reps (not performed)    Hayley participated in neuromuscular re-education activities to improve: Balance for 15 minutes. The following activities were included:  Heel raises: x 20 reps   Standing hip flexion: x 20 reps   Standing hip abduction: x 20 reps   Side stepping in parallel bars: x 4 laps  Tandem walking in parallel bars: x 4 lap     Hayley participated in dynamic functional therapeutic activities to improve functional performance for 8 minutes, including:  Chair squats: x 20 reps   4 in step ups with march:  3 lb weights; x 20 reps (performed with physical therapist)   4 in step downs: x 20 reps (performed with physical therapist)     Hayley participated in gait training to improve functional mobility and safety for 0 minutes, including:  Not performed     Home Exercises Provided and Patient Education Provided     Education provided: no new exercises added this visit     Written Home Exercises Provided: Patient instructed to cont prior HEP. Exercises were reviewed and Hayley was able to demonstrate them prior to the end of the session.  Hayley demonstrated good  understanding of the education provided.     See EMR under Patient Instructions for exercises provided prior visit.    Assessment     Patient reported having no issues following treatment today. Patient with 3 therapy treatments remaining.     Hayley isprogressing well towards her goals.   Pt prognosis is Good.     Pt will continue to benefit from skilled outpatient physical therapy to address the deficits listed in the problem list box on initial evaluation, provide pt/family education, and to maximize pt's level of independence in the home and community environment.     Pt's spiritual, cultural, and educational needs considered and pt agreeable to plan of care and goals.     Anticipated barriers to physical therapy: compliance with home exercise program    Goals:    Short Term Goals:  Patient will demonstrate independence with home exercise program to ensure carryover of treatment. met  Patient will perform sit to/from stand with standby assist without upper extremity support to improve independence and safety with transfers. met  Patient will demonstrate 105 degrees of left hip flexion active range of motion in supine to improve functional use of the left lower extremity. [Not met]  Patient will report a reduction in left hip pain from 10/10 to 8/10 at worst to improve quality of life. met     Long Term Goals:  Patient will perform sit to/from stand  with independence without upper extremity support to improve independence and safety with transfers. [Not met]  Patient will improve bilateral lower extremity strength to 4+/5 to improve independence and safety with bed mobility, transfers, and gait. [Not met]  Patient will report a reduction in left hip pain from 10/10 to 6/10 at worst to improve quality of life. met  Patient will ascend/descend 3 steps reciprocally with unilateral upper extremity support to improve independence and safety with stair negotiation. [Not met]    Plan     Will continue with appropriate POC.       Ellyn Aponte, PTA  11/2/2023

## 2023-11-02 NOTE — PLAN OF CARE
[facility]  Physical Therapy Updated Plan of Care    Name: Hayley Landis  Clinic Number: [id number]    Therapy Diagnosis:   Encounter Diagnoses   Name Primary?    Decreased range of left hip movement Yes    Pain of left hip     Weakness of left lower extremity      Physician: Mack Harp IV, DO    Visit Date: 11/2/2023    Physician Orders: PT Eval and Treat  Medical Diagnosis from Referral: M25.552 (ICD-10-CM) - Pain of left hip  Evaluation Date: 8/1/2023  Authorization Period Expiration: 12/31/2023  Plan of Care Expiration: 11/17/2023  Visit # / Visits authorized: 8/11   PTA Visit #: 0    Time In: 0815  Time Out: 0830  Total Billable Time: 15 minutes    Precautions: Standard, Diabetes, and cancer (colon; in remission x 3 years)    Subjective     Pt reports: Patient reports having some GI issues, but no pain.     She was compliant with home exercise program.    Pain: 0/10  Location: left hip      Objective     Hayley received therapeutic exercises to develop strength, endurance, and flexibility for 0 minutes including:    Completed with DAV Dey:  Calf stretch on slant board: 2 minutes   Hamstring stretch on step: 3 x 20 second holds each  Sitting marching: 3 lb; x 20 reps   Long arc quads: 3 lb; x 20 reps     NuStep: 5 minutes (not performed)  Standing hamstring curls: x 20 reps (not performed)    Hayley participated in neuromuscular re-education activities to improve: Balance for 0 minutes. The following activities were included:    Completed with DAV Dey:  Heel raises: x 20 reps   Standing hip flexion: x 20 reps   Standing hip abduction: x 20 reps   Side stepping in parallel bars: x 4 laps  Tandem walking in parallel bars: x 4 lap     Hayley participated in dynamic functional therapeutic activities to improve functional performance for 15 minutes, including:    Forward step ups with opposite hip march: 3 lbs; x 20 reps each  Forward step downs  with bilateral upper extremity support with emphasis on maintenance of neutral trunk alignment: x 20 reps each  Up/down steps reciprocally with unilateral upper extremity support with emphasis on attempting to maintain neutral trunk alignment  Plan of care education    Completed with DAV Dey:  Chair squats: x 20 reps     Hayley participated in gait training to improve functional mobility and safety for 0 minutes, including:    (not performed)     Home Exercises Provided and Patient Education Provided     Education provided: Patient educated on the importance of completing skilled physical therapy treatment and home exercise program as prescribed to maximize functional gains.     Written Home Exercises Provided: Patient instructed to cont prior HEP. Exercises were reviewed and Hayley was able to demonstrate them prior to the end of the session.  Hayley demonstrated good  understanding of the education provided.     See EMR under Patient Instructions for exercises provided prior visit.    Assessment     Patient with good effort throughout treatment. Patient with a tendency to rotate her trunk to the left while descending steps with unilateral upper extremity support but able to control better with bilateral upper extremity support. Patient with 3 physical therapy treatment sessions remaining. Physical Therapist will continue to progress therapeutic exercise, neuromuscular re-education, therapeutic activities, and gait training as able with manual therapy and modalities utilized as needed.     Hayley isprogressing well towards her goals.   Pt prognosis is Good.     Pt will continue to benefit from skilled outpatient physical therapy to address the deficits listed in the problem list box on initial evaluation, provide pt/family education, and to maximize pt's level of independence in the home and community environment.     Pt's spiritual, cultural, and educational needs considered and pt agreeable to  plan of care and goals.     Anticipated barriers to physical therapy: compliance with home exercise program    Goals:    Short Term Goals:  Patient will demonstrate independence with home exercise program to ensure carryover of treatment. [Met]  Patient will perform sit to/from stand with standby assist without upper extremity support to improve independence and safety with transfers. [Met]  Patient will demonstrate 105 degrees of left hip flexion active range of motion in supine to improve functional use of the left lower extremity. [Not met]  Patient will report a reduction in left hip pain from 10/10 to 8/10 at worst to improve quality of life. [Met]     Long Term Goals:  Patient will perform sit to/from stand with independence without upper extremity support to improve independence and safety with transfers. [Met]  Patient will improve bilateral lower extremity strength to 4+/5 to improve independence and safety with bed mobility, transfers, and gait. [Not met: grossly 4/5]  Patient will report a reduction in left hip pain from 10/10 to 6/10 at worst to improve quality of life. [Met]  Patient will ascend/descend 3 steps reciprocally with unilateral upper extremity support to improve independence and safety with stair negotiation. [Met; however, trunk rotation to the left with descending]    Reasons for Recertification of Therapy: Patient has not met all goals.     Plan     Updated Certification Period: 11/2/2023 to 11/17/2023  Recommended Treatment Plan: 2 times per week for 2 weeks (3 visits): Gait Training, Manual Therapy, Moist Heat/ Ice, Neuromuscular Re-ed, Patient Education, Therapeutic Activities, and Therapeutic Exercise  Other Recommendations: N/A      Abram Collins, PT, DPT  11/2/2023

## 2023-11-06 DIAGNOSIS — G40.909 SEIZURE DISORDER: Chronic | ICD-10-CM

## 2023-11-06 RX ORDER — LEVETIRACETAM 500 MG/1
500 TABLET ORAL
Qty: 90 TABLET | Refills: 1 | Status: SHIPPED | OUTPATIENT
Start: 2023-11-06

## 2023-11-08 ENCOUNTER — CLINICAL SUPPORT (OUTPATIENT)
Dept: REHABILITATION | Facility: HOSPITAL | Age: 53
End: 2023-11-08
Payer: MEDICAID

## 2023-11-08 DIAGNOSIS — R29.898 WEAKNESS OF LEFT LOWER EXTREMITY: ICD-10-CM

## 2023-11-08 DIAGNOSIS — M25.552 PAIN OF LEFT HIP: ICD-10-CM

## 2023-11-08 DIAGNOSIS — M25.652 DECREASED RANGE OF LEFT HIP MOVEMENT: Primary | ICD-10-CM

## 2023-11-08 PROCEDURE — 97530 THERAPEUTIC ACTIVITIES: CPT | Mod: CQ

## 2023-11-08 PROCEDURE — 97112 NEUROMUSCULAR REEDUCATION: CPT | Mod: CQ

## 2023-11-08 PROCEDURE — 97110 THERAPEUTIC EXERCISES: CPT | Mod: CQ

## 2023-11-08 NOTE — PROGRESS NOTES
[facility]  Physical Therapy Treatment Note    Name: Hayley Landis  Clinic Number: [id number]    Therapy Diagnosis:   No diagnosis found.    Physician: Mack Harp IV, DO    Visit Date: 11/8/2023    Physician Orders: PT Eval and Treat  Medical Diagnosis from Referral: M25.552 (ICD-10-CM) - Pain of left hip  Evaluation Date: 8/1/2023  Authorization Period Expiration: 12/31/2023  Plan of Care Expiration: 11/10/2023  Visit # / Visits authorized: 9/11   PTA Visit #: 1    Time In: 0929  Time Out: 1008  Total Billable Time: 39 minutes    Precautions:  Standard, Diabetes, and cancer (colon; in remission x 3 years)    Subjective     Pt reports: Patient reports having 2/10 pain in her left hip.     She was compliant with home exercise program.    Pain: 2/10  Location: left hip      Objective     Patient unable to lay on back due to increased pain while in supine from stomach spasms.     Hayley received therapeutic exercises to develop strength, endurance, and flexibility for 15 minutes including:  Nu step: 5 minutes   Calf stretch on slant board: 2 minutes   Hamstring stretch on step: 3 x 20 second holds each  Sitting marching: 3 lb; x 20 reps   Long arc quads: 3 lb; x 20 reps   Standing hamstring curls: x 20 reps     Hayley participated in neuromuscular re-education activities to improve: Balance for 14 minutes. The following activities were included:  Heel raises: x 20 reps   Standing hip flexion: 3 lb; x 20 reps   Standing hip abduction: 3 lb; x 20 reps   Side stepping in parallel bars: x 4 laps  Tandem walking in parallel bars: x 4 lap     Hayley participated in dynamic functional therapeutic activities to improve functional performance for 10 minutes, including:  Chair squats: x 20 reps   4 in step ups with march: 3 lb weights; x 20 reps  4 in step downs: x 20 reps (vocal cues in order to not turn body while stepping down)     Hayley participated in gait training to  improve functional mobility and safety for 0 minutes, including:  Not performed     Home Exercises Provided and Patient Education Provided     Education provided: no new exercises added this visit     Written Home Exercises Provided: Patient instructed to cont prior HEP. Exercises were reviewed and Hayley was able to demonstrate them prior to the end of the session.  Hayley demonstrated good  understanding of the education provided.     See EMR under Patient Instructions for exercises provided prior visit.    Assessment     Patient reported having no issues following treatment today. Patient with 2 therapy treatments remaining.     Hayley isprogressing well towards her goals.   Pt prognosis is Good.     Pt will continue to benefit from skilled outpatient physical therapy to address the deficits listed in the problem list box on initial evaluation, provide pt/family education, and to maximize pt's level of independence in the home and community environment.     Pt's spiritual, cultural, and educational needs considered and pt agreeable to plan of care and goals.     Anticipated barriers to physical therapy: compliance with home exercise program    Goals:    Short Term Goals:  Patient will demonstrate independence with home exercise program to ensure carryover of treatment. met  Patient will perform sit to/from stand with standby assist without upper extremity support to improve independence and safety with transfers. met  Patient will demonstrate 105 degrees of left hip flexion active range of motion in supine to improve functional use of the left lower extremity. [Not met]  Patient will report a reduction in left hip pain from 10/10 to 8/10 at worst to improve quality of life. met     Long Term Goals:  Patient will perform sit to/from stand with independence without upper extremity support to improve independence and safety with transfers. [Not met]  Patient will improve bilateral lower extremity strength to  4+/5 to improve independence and safety with bed mobility, transfers, and gait. [Not met]  Patient will report a reduction in left hip pain from 10/10 to 6/10 at worst to improve quality of life. met  Patient will ascend/descend 3 steps reciprocally with unilateral upper extremity support to improve independence and safety with stair negotiation. [Not met]    Plan     Will continue with appropriate POC.       Ellyn Aponte, PTA  11/8/2023

## 2023-11-13 ENCOUNTER — CLINICAL SUPPORT (OUTPATIENT)
Dept: REHABILITATION | Facility: HOSPITAL | Age: 53
End: 2023-11-13
Payer: MEDICAID

## 2023-11-13 DIAGNOSIS — M25.552 PAIN OF LEFT HIP: Primary | ICD-10-CM

## 2023-11-13 PROCEDURE — 97112 NEUROMUSCULAR REEDUCATION: CPT

## 2023-11-13 PROCEDURE — 97110 THERAPEUTIC EXERCISES: CPT

## 2023-11-13 PROCEDURE — 97530 THERAPEUTIC ACTIVITIES: CPT

## 2023-11-13 NOTE — PROGRESS NOTES
[facility]  Physical Therapy Treatment Note    Name: Hayley Landis  Clinic Number: [id number]    Therapy Diagnosis:   No diagnosis found.    Physician: Mack Harp IV, DO    Visit Date: 11/13/2023    Physician Orders: PT Eval and Treat  Medical Diagnosis from Referral: M25.552 (ICD-10-CM) - Pain of left hip  Evaluation Date: 8/1/2023  Authorization Period Expiration: 12/31/2023  Plan of Care Expiration: 11/10/2023  Visit # / Visits authorized: 9/11   PTA Visit #: 1    Time In: 1015  Time Out: 1100  Total Billable Time: 45 minutes    Precautions:  Standard, Diabetes, and cancer (colon; in remission x 3 years)    Subjective     Pt reports: Patient reports having 2/10 pain in her left hip.     She was compliant with home exercise program.    Pain: 2/10  Location: left hip      Objective     Patient unable to lay on back due to increased pain while in supine from stomach spasms.     Hayley received therapeutic exercises to develop strength, endurance, and flexibility for 15 minutes including:  Nu step: 5 minutes   Calf stretch on slant board: 2 minutes   Hamstring stretch on step: 3 x 20 second holds each  Sitting marching: 3 lb; x 20 reps   Long arc quads: 3 lb; x 20 reps   Standing hamstring curls: x 20 reps     Hayley participated in neuromuscular re-education activities to improve: Balance for 15 minutes. The following activities were included:  Heel raises: x 20 reps   Standing hip flexion: 3 lb; x 20 reps   Standing hip abduction: 3 lb; x 20 reps   Side stepping in parallel bars: x 4 laps on foam  Tandem walking in parallel bars: x 4 lap on foam  SLS on foam eyes open/ closed x 1 minute each    Hayley participated in dynamic functional therapeutic activities to improve functional performance for 10 minutes, including:  Chair squats: x 20 reps   4 in step ups with march: 3 lb weights; x 20 reps (not performed today)  4 in step downs: x 20 reps (vocal cues in order  to not turn body while stepping down)         Hayley participated in gait training to improve functional mobility and safety for 0 minutes, including:  Not performed     Home Exercises Provided and Patient Education Provided     Education provided: no new exercises added this visit     Written Home Exercises Provided: Patient instructed to cont prior HEP. Exercises were reviewed and Hayley was able to demonstrate them prior to the end of the session.  Hayley demonstrated good  understanding of the education provided.     See EMR under Patient Instructions for exercises provided prior visit.    Assessment     Patient is progressing well with strengthening ex as long as she is not lying in supine (causes rib cramping).  Patient performed balance ex without difficulty on foam.  Patient has 1 visit remaining and will discharge then.     Hayley isprogressing well towards her goals.   Pt prognosis is Good.     Pt will continue to benefit from skilled outpatient physical therapy to address the deficits listed in the problem list box on initial evaluation, provide pt/family education, and to maximize pt's level of independence in the home and community environment.     Pt's spiritual, cultural, and educational needs considered and pt agreeable to plan of care and goals.     Anticipated barriers to physical therapy: compliance with home exercise program    Goals:    Short Term Goals:  Patient will demonstrate independence with home exercise program to ensure carryover of treatment. met  Patient will perform sit to/from stand with standby assist without upper extremity support to improve independence and safety with transfers. met  Patient will demonstrate 105 degrees of left hip flexion active range of motion in supine to improve functional use of the left lower extremity. [Not met]  Patient will report a reduction in left hip pain from 10/10 to 8/10 at worst to improve quality of life. met     Long Term  Goals:  Patient will perform sit to/from stand with independence without upper extremity support to improve independence and safety with transfers. [Not met]  Patient will improve bilateral lower extremity strength to 4+/5 to improve independence and safety with bed mobility, transfers, and gait. [Not met]  Patient will report a reduction in left hip pain from 10/10 to 6/10 at worst to improve quality of life. met  Patient will ascend/descend 3 steps reciprocally with unilateral upper extremity support to improve independence and safety with stair negotiation. [Not met]    Plan     Will continue with appropriate POC.       Merari Torres, PT  11/13/2023

## 2023-11-17 ENCOUNTER — CLINICAL SUPPORT (OUTPATIENT)
Dept: REHABILITATION | Facility: HOSPITAL | Age: 53
End: 2023-11-17
Payer: MEDICAID

## 2023-11-17 DIAGNOSIS — M25.552 PAIN OF LEFT HIP: ICD-10-CM

## 2023-11-17 DIAGNOSIS — R29.898 WEAKNESS OF LEFT LOWER EXTREMITY: ICD-10-CM

## 2023-11-17 DIAGNOSIS — M25.652 DECREASED RANGE OF LEFT HIP MOVEMENT: Primary | ICD-10-CM

## 2023-11-17 PROCEDURE — 97110 THERAPEUTIC EXERCISES: CPT | Mod: CQ

## 2023-11-17 PROCEDURE — 97112 NEUROMUSCULAR REEDUCATION: CPT | Mod: CQ

## 2023-11-17 PROCEDURE — 97530 THERAPEUTIC ACTIVITIES: CPT | Mod: CQ

## 2023-11-17 NOTE — PROGRESS NOTES
[facility] OUTPATIENT REHABILITATION  Physical Therapy Treatment Note    Name: Hayley Landis  Clinic Number: [id number]    Therapy Diagnosis:   Encounter Diagnoses   Name Primary?    Decreased range of left hip movement Yes    Pain of left hip     Weakness of left lower extremity        Physician: Mack Harp IV, DO    Visit Date: 11/17/2023    Physician Orders: PT Eval and Treat  Medical Diagnosis from Referral: M25.552 (ICD-10-CM) - Pain of left hip  Evaluation Date: 8/1/2023  Authorization Period Expiration: 12/31/2023  Plan of Care Expiration: 11/10/2023  Visit # / Visits authorized: 11/11   PTA Visit #: 1    Time In: 1103 (patient treatment started prior to being checked in)  Time Out: 1145  Total Billable Time: 42 minutes    Precautions:  Standard, Diabetes, and cancer (colon; in remission x 3 years)    Subjective     Pt reports: her hip is doing well today with no pain noted    She was compliant with home exercise program.    Pain: 0/10  Location: left hip      Objective     Patient unable to lay on back due to increased pain while in supine from stomach spasms.     Hayley received therapeutic exercises to develop strength, endurance, and flexibility for 17 minutes including:    Nu step: 5 minutes   Calf stretch on slant board: 2 minutes   Hamstring stretch on step: 3 x 20 second holds each  Sitting marching: 3 LB; x 20 reps   Long arc quads: 3 LB; x 20 reps   Standing hamstring curls: x 20 reps     Hayley participated in neuromuscular re-education activities to improve: Balance for 17 minutes. The following activities were included:    Heel raises: x 20 reps   Standing hip flexion: green theraband; x 20 reps each  Standing hip abduction: green theraband; x 20 reps each  Standing hip extension: green theraband; x 20 reps each  Side stepping in parallel bars: x 4 laps on foam  Tandem walking in parallel bars: x 4 lap on foam  SLS on foam eyes open/ closed x 1 minute each    Hayley  participated in dynamic functional therapeutic activities to improve functional performance for 8 minutes, including:    Chair squats: x 20 reps   4 in step downs: x 15 reps (vocal cues in order to not turn body while stepping down)     4 in step ups with march: 3 lb weights; x 20 reps (not performed today)    Hayley participated in gait training to improve functional mobility and safety for 0 minutes, including:  Not performed     Home Exercises Provided and Patient Education Provided     Education provided: updated home exercises program    Written Home Exercises Provided: yes. Exercises were reviewed and Hayley was able to demonstrate them prior to the end of the session.  Hayley demonstrated good  understanding of the education provided.     See EMR under Patient Instructions for exercises provided  11/17/2023 .    Assessment     Patient able to perform all exercises with no complaints of increase in pain. Patient with no complaints following treatment. Patient to discharge with updated home exercises program.    Hayley isprogressing well towards her goals.   Pt prognosis is Good.     Pt will continue to benefit from skilled outpatient physical therapy to address the deficits listed in the problem list box on initial evaluation, provide pt/family education, and to maximize pt's level of independence in the home and community environment.     Pt's spiritual, cultural, and educational needs considered and pt agreeable to plan of care and goals.     Anticipated barriers to physical therapy: compliance with home exercise program    Goals:    Short Term Goals:  Patient will demonstrate independence with home exercise program to ensure carryover of treatment. met  Patient will perform sit to/from stand with standby assist without upper extremity support to improve independence and safety with transfers. met  Patient will demonstrate 105 degrees of left hip flexion active range of motion in supine to improve  functional use of the left lower extremity. [Not met]  Patient will report a reduction in left hip pain from 10/10 to 8/10 at worst to improve quality of life. met     Long Term Goals:  Patient will perform sit to/from stand with independence without upper extremity support to improve independence and safety with transfers. [Not met]  Patient will improve bilateral lower extremity strength to 4+/5 to improve independence and safety with bed mobility, transfers, and gait. [Not met]  Patient will report a reduction in left hip pain from 10/10 to 6/10 at worst to improve quality of life. met  Patient will ascend/descend 3 steps reciprocally with unilateral upper extremity support to improve independence and safety with stair negotiation. [Not met]    Plan     Patient to discharge with updated home exercises program      Josue Pulido PTA  11/17/2023

## 2023-11-20 ENCOUNTER — OFFICE VISIT (OUTPATIENT)
Dept: FAMILY MEDICINE | Facility: CLINIC | Age: 53
End: 2023-11-20
Payer: MEDICAID

## 2023-11-20 VITALS
WEIGHT: 169 LBS | SYSTOLIC BLOOD PRESSURE: 142 MMHG | BODY MASS INDEX: 36.46 KG/M2 | DIASTOLIC BLOOD PRESSURE: 84 MMHG | HEIGHT: 57 IN | HEART RATE: 99 BPM

## 2023-11-20 DIAGNOSIS — K29.70 GASTRITIS, HELICOBACTER PYLORI: Primary | ICD-10-CM

## 2023-11-20 DIAGNOSIS — G25.81 RESTLESS LEGS SYNDROME: ICD-10-CM

## 2023-11-20 DIAGNOSIS — B96.81 GASTRITIS, HELICOBACTER PYLORI: Primary | ICD-10-CM

## 2023-11-20 PROBLEM — E66.01 CLASS 2 SEVERE OBESITY DUE TO EXCESS CALORIES WITH SERIOUS COMORBIDITY AND BODY MASS INDEX (BMI) OF 36.0 TO 36.9 IN ADULT: Chronic | Status: ACTIVE | Noted: 2023-08-09

## 2023-11-20 PROCEDURE — 3077F SYST BP >= 140 MM HG: CPT | Mod: CPTII,,, | Performed by: FAMILY MEDICINE

## 2023-11-20 PROCEDURE — 3066F NEPHROPATHY DOC TX: CPT | Mod: CPTII,,, | Performed by: FAMILY MEDICINE

## 2023-11-20 PROCEDURE — 3077F PR MOST RECENT SYSTOLIC BLOOD PRESSURE >= 140 MM HG: ICD-10-PCS | Mod: CPTII,,, | Performed by: FAMILY MEDICINE

## 2023-11-20 PROCEDURE — 3051F PR MOST RECENT HEMOGLOBIN A1C LEVEL 7.0 - < 8.0%: ICD-10-PCS | Mod: CPTII,,, | Performed by: FAMILY MEDICINE

## 2023-11-20 PROCEDURE — 3008F BODY MASS INDEX DOCD: CPT | Mod: CPTII,,, | Performed by: FAMILY MEDICINE

## 2023-11-20 PROCEDURE — 3061F PR NEG MICROALBUMINURIA RESULT DOCUMENTED/REVIEW: ICD-10-PCS | Mod: CPTII,,, | Performed by: FAMILY MEDICINE

## 2023-11-20 PROCEDURE — 99213 OFFICE O/P EST LOW 20 MIN: CPT | Mod: ,,, | Performed by: FAMILY MEDICINE

## 2023-11-20 PROCEDURE — 3066F PR DOCUMENTATION OF TREATMENT FOR NEPHROPATHY: ICD-10-PCS | Mod: CPTII,,, | Performed by: FAMILY MEDICINE

## 2023-11-20 PROCEDURE — 4010F PR ACE/ARB THEARPY RXD/TAKEN: ICD-10-PCS | Mod: CPTII,,, | Performed by: FAMILY MEDICINE

## 2023-11-20 PROCEDURE — 3079F PR MOST RECENT DIASTOLIC BLOOD PRESSURE 80-89 MM HG: ICD-10-PCS | Mod: CPTII,,, | Performed by: FAMILY MEDICINE

## 2023-11-20 PROCEDURE — 1159F MED LIST DOCD IN RCRD: CPT | Mod: CPTII,,, | Performed by: FAMILY MEDICINE

## 2023-11-20 PROCEDURE — 3051F HG A1C>EQUAL 7.0%<8.0%: CPT | Mod: CPTII,,, | Performed by: FAMILY MEDICINE

## 2023-11-20 PROCEDURE — 3061F NEG MICROALBUMINURIA REV: CPT | Mod: CPTII,,, | Performed by: FAMILY MEDICINE

## 2023-11-20 PROCEDURE — 3008F PR BODY MASS INDEX (BMI) DOCUMENTED: ICD-10-PCS | Mod: CPTII,,, | Performed by: FAMILY MEDICINE

## 2023-11-20 PROCEDURE — 99213 PR OFFICE/OUTPT VISIT, EST, LEVL III, 20-29 MIN: ICD-10-PCS | Mod: ,,, | Performed by: FAMILY MEDICINE

## 2023-11-20 PROCEDURE — 4010F ACE/ARB THERAPY RXD/TAKEN: CPT | Mod: CPTII,,, | Performed by: FAMILY MEDICINE

## 2023-11-20 PROCEDURE — 1159F PR MEDICATION LIST DOCUMENTED IN MEDICAL RECORD: ICD-10-PCS | Mod: CPTII,,, | Performed by: FAMILY MEDICINE

## 2023-11-20 PROCEDURE — 3079F DIAST BP 80-89 MM HG: CPT | Mod: CPTII,,, | Performed by: FAMILY MEDICINE

## 2023-11-20 NOTE — PROGRESS NOTES
"              Mack Harp IV, DO  [facility]  [address] [place] [address]  Phone: [phone]      Subjective     Name: Hayley Landis   Sex: female  YOB: 1970 (53 y.o.)  MRN: [id number]  Visit Date: 11/20/2023   Chief Complaint: Follow-up        HISTORY OF PRESENT ILLNESS:    Chief Complaint   Patient presents with    Follow-up       HPI  See tests that keep you healthy and the problem oriented documentation below.    Tests to Keep You Healthy    Mammogram: SCHEDULED  Eye Exam: Met on 10/23/2023  Colon Cancer Screening: Met on 6/8/2020  Last Blood Pressure <= 139/89 (11/20/2023): Yes  Last HbA1c < 8 (07/24/2023): Yes      Portions of this note may have been created with voice recognition software. Occasional "wrong-word" or "sound-a-like" substitutions may have occurred due to the inherent limitations of voice recognition software. Please, read the note carefully and recognize, using context, where substitutions have occurred.     PAST MEDICAL HISTORY:  Significant Diagnoses - Patient  has a past medical history of Colon cancer, CVA (cerebral vascular accident), Diabetes, HTN (hypertension), and Seizure.  Medications - Patient has a current medication list which includes the following long-term medication(s): hydrochlorothiazide, insulin aspart u-100, insulin glargine, levetiracetam, and lisinopril.   Allergies - Patient is allergic to grape, morphine sulfate, nutritional supplements, pineapple, opioids - morphine analogues, aspirin, biguanides, dicyclomine, dihydrocodeine, gabapentin, hydrocodone, iron, iron analogues, metformin, penicillins, and latex.  Surgeries - Patient  has a past surgical history that includes Hernia repair; Colon surgery; and Hysterectomy.  Family History - Patient family history includes No Known Problems in her brother, daughter, father, maternal aunt, maternal grandfather, maternal grandmother, maternal uncle, mother, paternal aunt, " "paternal grandfather, paternal grandmother, paternal uncle, sister, and son.      SOCIAL HISTORY:  Tobacco - Patient  reports that she has never smoked. She has never been exposed to tobacco smoke. She has never used smokeless tobacco.   Alcohol - Patient  reports no history of alcohol use.   Recreational Drugs - Patient  reports no history of drug use.       Review of Systems   All other systems reviewed and are negative.       Past Medical History:   Diagnosis Date    Colon cancer     CVA (cerebral vascular accident)     Diabetes     HTN (hypertension)     Seizure         Review of patient's allergies indicates:   Allergen Reactions    Grape Anaphylaxis     Other reaction(s): Not available    Morphine sulfate Swelling    Nutritional supplements Anaphylaxis     Highly allergic to Cooked grapes    Pineapple Anaphylaxis    Opioids - morphine analogues Nausea And Vomiting    Aspirin      "Veins breakdown" per patient    Biguanides Diarrhea and Other (See Comments)    Dicyclomine Diarrhea    Dihydrocodeine Other (See Comments)     Bad headache in the top of head    Gabapentin Other (See Comments)     weakness  Other reaction(s): Not available  Seizures      Hydrocodone Other (See Comments)     Headache      Iron      Hemochromatosis    Iron analogues      Pt hx of hemachromatosis    Metformin      diarrhea    Penicillins Other (See Comments)     Family hx       Latex Rash and Other (See Comments)        Past Surgical History:   Procedure Laterality Date    COLON SURGERY      HERNIA REPAIR      HYSTERECTOMY          Family History   Problem Relation Age of Onset    No Known Problems Mother     No Known Problems Father     No Known Problems Sister     No Known Problems Brother     No Known Problems Daughter     No Known Problems Son     No Known Problems Maternal Aunt     No Known Problems Maternal Uncle     No Known Problems Paternal Aunt     No Known Problems Paternal Uncle     No " "Known Problems Maternal Grandmother     No Known Problems Maternal Grandfather     No Known Problems Paternal Grandmother     No Known Problems Paternal Grandfather        Current Outpatient Medications   Medication Instructions    cholecalciferol (vitamin D3) 50,000 Units, Oral, Every 7 days    cyclobenzaprine (FLEXERIL) 10 mg, Oral, 3 times daily    hydroCHLOROthiazide (HYDRODIURIL) 12.5 MG Tab TAKE ONE TABLET BY MOUTH DAILY IN THE MORNING    insulin aspart U-100 (NOVOLOG) 100 unit/mL injection inject as directed by your physician per sliding scale    insulin glargine (LANTUS U-100 INSULIN) 80 Units, Subcutaneous, Daily    insulin syringe-needle U-100 1 mL 30 gauge x 5/16 Syrg Use as directed.    levETIRAcetam (KEPPRA) 500 mg, Oral    lisinopriL 10 mg, Oral, Daily    peppermint oiL (IBGARD) 90 mg CECX 2 capsules, Oral, Daily    potassium chloride 10% (KAYCIEL) 20 mEq/15 mL oral solution 20 mEq, Oral, Daily        Objective     BP (!) 142/84   Pulse 99   Ht 4' 9" (1.448 m)   Wt 76.7 kg (169 lb)   BMI 36.57 kg/m²     Physical Exam  Constitutional:       General: She is not in acute distress.     Appearance: Normal appearance. She is not ill-appearing.   HENT:      Head: Normocephalic and atraumatic.      Right Ear: External ear normal.      Left Ear: External ear normal.   Eyes:      Extraocular Movements: Extraocular movements intact.      Conjunctiva/sclera: Conjunctivae normal.   Cardiovascular:      Rate and Rhythm: Normal rate.      Heart sounds: No murmur heard.  Pulmonary:      Effort: Pulmonary effort is normal.   Musculoskeletal:      Cervical back: Normal range of motion.   Skin:     General: Skin is warm and dry.      Coloration: Skin is not jaundiced.      Findings: No rash.   Neurological:      Mental Status: She is alert.   Psychiatric:         Mood and Affect: Mood normal.        All recently obtained labs have been reviewed and discussed in detail with the patient.   Assessment "     1. Gastritis, Helicobacter pylori    2. Restless legs syndrome         Plan        Problem List Items Addressed This Visit          Neuro    Restless legs syndrome     Over-the-counter recommendations made today.          Other Visit Diagnoses       Gastritis, Helicobacter pylori    -  Primary            No follow-ups on file.    Patient advised that is symptoms worsen, they should call or report directly to local emergency department.    Mack Harp,   [facility]

## 2023-11-30 ENCOUNTER — OFFICE VISIT (OUTPATIENT)
Dept: FAMILY MEDICINE | Facility: CLINIC | Age: 53
End: 2023-11-30
Payer: MEDICAID

## 2023-11-30 VITALS
HEIGHT: 57 IN | BODY MASS INDEX: 35.95 KG/M2 | DIASTOLIC BLOOD PRESSURE: 70 MMHG | HEART RATE: 99 BPM | SYSTOLIC BLOOD PRESSURE: 128 MMHG | WEIGHT: 166.63 LBS

## 2023-11-30 DIAGNOSIS — Z85.038 HISTORY OF COLON CANCER: Chronic | ICD-10-CM

## 2023-11-30 DIAGNOSIS — E55.9 VITAMIN D DEFICIENCY: Chronic | ICD-10-CM

## 2023-11-30 DIAGNOSIS — K21.9 GASTROESOPHAGEAL REFLUX DISEASE, UNSPECIFIED WHETHER ESOPHAGITIS PRESENT: ICD-10-CM

## 2023-11-30 DIAGNOSIS — E11.9 TYPE 2 DIABETES MELLITUS WITHOUT COMPLICATION, WITH LONG-TERM CURRENT USE OF INSULIN: Primary | Chronic | ICD-10-CM

## 2023-11-30 DIAGNOSIS — I10 PRIMARY HYPERTENSION: Chronic | ICD-10-CM

## 2023-11-30 DIAGNOSIS — Z79.4 TYPE 2 DIABETES MELLITUS WITHOUT COMPLICATION, WITH LONG-TERM CURRENT USE OF INSULIN: Primary | Chronic | ICD-10-CM

## 2023-11-30 PROCEDURE — 1160F PR REVIEW ALL MEDS BY PRESCRIBER/CLIN PHARMACIST DOCUMENTED: ICD-10-PCS | Mod: CPTII,,, | Performed by: FAMILY MEDICINE

## 2023-11-30 PROCEDURE — 3078F PR MOST RECENT DIASTOLIC BLOOD PRESSURE < 80 MM HG: ICD-10-PCS | Mod: CPTII,,, | Performed by: FAMILY MEDICINE

## 2023-11-30 PROCEDURE — 3061F PR NEG MICROALBUMINURIA RESULT DOCUMENTED/REVIEW: ICD-10-PCS | Mod: CPTII,,, | Performed by: FAMILY MEDICINE

## 2023-11-30 PROCEDURE — 3061F NEG MICROALBUMINURIA REV: CPT | Mod: CPTII,,, | Performed by: FAMILY MEDICINE

## 2023-11-30 PROCEDURE — 3008F BODY MASS INDEX DOCD: CPT | Mod: CPTII,,, | Performed by: FAMILY MEDICINE

## 2023-11-30 PROCEDURE — 4010F ACE/ARB THERAPY RXD/TAKEN: CPT | Mod: CPTII,,, | Performed by: FAMILY MEDICINE

## 2023-11-30 PROCEDURE — 3074F PR MOST RECENT SYSTOLIC BLOOD PRESSURE < 130 MM HG: ICD-10-PCS | Mod: CPTII,,, | Performed by: FAMILY MEDICINE

## 2023-11-30 PROCEDURE — 3066F PR DOCUMENTATION OF TREATMENT FOR NEPHROPATHY: ICD-10-PCS | Mod: CPTII,,, | Performed by: FAMILY MEDICINE

## 2023-11-30 PROCEDURE — 4010F PR ACE/ARB THEARPY RXD/TAKEN: ICD-10-PCS | Mod: CPTII,,, | Performed by: FAMILY MEDICINE

## 2023-11-30 PROCEDURE — 99214 PR OFFICE/OUTPT VISIT, EST, LEVL IV, 30-39 MIN: ICD-10-PCS | Mod: ,,, | Performed by: FAMILY MEDICINE

## 2023-11-30 PROCEDURE — 3078F DIAST BP <80 MM HG: CPT | Mod: CPTII,,, | Performed by: FAMILY MEDICINE

## 2023-11-30 PROCEDURE — 1159F PR MEDICATION LIST DOCUMENTED IN MEDICAL RECORD: ICD-10-PCS | Mod: CPTII,,, | Performed by: FAMILY MEDICINE

## 2023-11-30 PROCEDURE — 1159F MED LIST DOCD IN RCRD: CPT | Mod: CPTII,,, | Performed by: FAMILY MEDICINE

## 2023-11-30 PROCEDURE — 99214 OFFICE O/P EST MOD 30 MIN: CPT | Mod: ,,, | Performed by: FAMILY MEDICINE

## 2023-11-30 PROCEDURE — 1160F RVW MEDS BY RX/DR IN RCRD: CPT | Mod: CPTII,,, | Performed by: FAMILY MEDICINE

## 2023-11-30 PROCEDURE — 3066F NEPHROPATHY DOC TX: CPT | Mod: CPTII,,, | Performed by: FAMILY MEDICINE

## 2023-11-30 PROCEDURE — 3008F PR BODY MASS INDEX (BMI) DOCUMENTED: ICD-10-PCS | Mod: CPTII,,, | Performed by: FAMILY MEDICINE

## 2023-11-30 PROCEDURE — 3051F PR MOST RECENT HEMOGLOBIN A1C LEVEL 7.0 - < 8.0%: ICD-10-PCS | Mod: CPTII,,, | Performed by: FAMILY MEDICINE

## 2023-11-30 PROCEDURE — 3074F SYST BP LT 130 MM HG: CPT | Mod: CPTII,,, | Performed by: FAMILY MEDICINE

## 2023-11-30 PROCEDURE — 3051F HG A1C>EQUAL 7.0%<8.0%: CPT | Mod: CPTII,,, | Performed by: FAMILY MEDICINE

## 2023-11-30 RX ORDER — INSULIN PUMP SYRINGE, 3 ML
EACH MISCELLANEOUS
Qty: 1 EACH | Refills: 0 | Status: SHIPPED | OUTPATIENT
Start: 2023-11-30 | End: 2024-11-29

## 2023-11-30 RX ORDER — OMEPRAZOLE 40 MG/1
40 CAPSULE, DELAYED RELEASE ORAL DAILY
Qty: 30 CAPSULE | Refills: 5 | Status: SHIPPED | OUTPATIENT
Start: 2023-11-30 | End: 2024-11-29

## 2023-11-30 RX ORDER — INSULIN PUMP SYRINGE, 3 ML
EACH MISCELLANEOUS
Qty: 1 EACH | Refills: 0 | Status: SHIPPED | OUTPATIENT
Start: 2023-11-30 | End: 2023-11-30 | Stop reason: SDUPTHER

## 2023-11-30 NOTE — PROGRESS NOTES
"   Elkin Coombs MD   [facility]  [facility] [address] [address]  [phone]      PATIENT NAME: Hayley Landis  : 1970  DATE: 23  MRN: [id number]      Billing Provider: Elkin Coombs MD  Level of Service: [place] OFFICE/OUTPT VISIT, EST, LEVL IV, 30-39 MIN  Patient PCP Information       Provider PCP Type    Elkin Coombs MD General            Reason for Visit / Chief Complaint: Medication Refill       Update PCP  Update Chief Complaint         History of Present Illness / Problem Focused Workflow     Hayley Landis presents to the clinic with Medication Refill       52 yo WF with DM.  Needs new rx for glucometer and supplies.  She also has a h/o colon cancer.  Is followed by Dr. Ryan at [facility].  There is some concern for recurrent disease.  She is scheduled to have PET scan done.  CEA has recently risen.  Has had some biopsies done which were benign.  Per pt, she recently had a TIA while in clinic seeing Dr. Harp.  I have not seen any documentation of this.        Review of Systems     Review of Systems   Constitutional:  Negative for activity change, chills and fever.   HENT:  Negative for sore throat.    Eyes:  Negative for pain.   Respiratory:  Negative for cough, chest tightness and shortness of breath.    Cardiovascular:  Negative for chest pain and palpitations.   Gastrointestinal:  Negative for abdominal pain.        "Upset stomach" today   Neurological:  Negative for dizziness, syncope and weakness.   Psychiatric/Behavioral:  Negative for confusion.         Medical / Social / Family History     Past Medical History:   Diagnosis Date    Colon cancer     CVA (cerebral vascular accident)     Diabetes     HTN (hypertension)     Seizure        Past Surgical History:   Procedure Laterality Date    COLON SURGERY      HERNIA REPAIR      HYSTERECTOMY         Social History    reports that she has never smoked. " "She has never been exposed to tobacco smoke. She has never used smokeless tobacco. She reports that she does not drink alcohol and does not use drugs.   Social History     Tobacco Use    Smoking status: Never     Passive exposure: Never    Smokeless tobacco: Never   Substance Use Topics    Alcohol use: Never    Drug use: Never       Family History  Family History   Problem Relation Age of Onset    No Known Problems Mother     No Known Problems Father     No Known Problems Sister     No Known Problems Brother     No Known Problems Daughter     No Known Problems Son     No Known Problems Maternal Aunt     No Known Problems Maternal Uncle     No Known Problems Paternal Aunt     No Known Problems Paternal Uncle     No Known Problems Maternal Grandmother     No Known Problems Maternal Grandfather     No Known Problems Paternal Grandmother     No Known Problems Paternal Grandfather        Medications and Allergies     Medications  No outpatient medications have been marked as taking for the 11/30/23 encounter (Office Visit) with Elkin Coombs MD.       Allergies  Review of patient's allergies indicates:   Allergen Reactions    Grape Anaphylaxis     Other reaction(s): Not available    Morphine sulfate Swelling    Nutritional supplements Anaphylaxis     Highly allergic to Cooked grapes    Pineapple Anaphylaxis    Opioids - morphine analogues Nausea And Vomiting    Aspirin      "Veins breakdown" per patient    Biguanides Diarrhea and Other (See Comments)    Dicyclomine Diarrhea    Dihydrocodeine Other (See Comments)     Bad headache in the top of head    Gabapentin Other (See Comments)     weakness  Other reaction(s): Not available  Seizures      Hydrocodone Other (See Comments)     Headache      Iron      Hemochromatosis    Iron analogues      Pt hx of hemachromatosis    Metformin      diarrhea    Penicillins Other (See Comments)     Family hx       Latex Rash and Other (See Comments)       Physical Examination "     Vitals:    11/30/23 0953   BP: 128/70   Pulse: 99     Physical Exam  Vitals reviewed.   Constitutional:       General: She is not in acute distress.     Appearance: Normal appearance. She is not ill-appearing or toxic-appearing.   HENT:      Head: Normocephalic and atraumatic.   Eyes:      Extraocular Movements: Extraocular movements intact.      Conjunctiva/sclera: Conjunctivae normal.      Pupils: Pupils are equal, round, and reactive to light.   Cardiovascular:      Rate and Rhythm: Normal rate and regular rhythm.      Heart sounds: Normal heart sounds.   Pulmonary:      Effort: Pulmonary effort is normal.      Breath sounds: Normal breath sounds.   Musculoskeletal:         General: Normal range of motion.      Cervical back: Normal range of motion.   Skin:     General: Skin is warm and dry.   Neurological:      General: No focal deficit present.      Mental Status: She is alert and oriented to person, place, and time.        Ref Range & Units 2 wk ago    CEA <=5.00 ng/mL 254.00 High    Resulting Agency  [facility] DEPARTMENT OF PATHOLOGY   Narrative  Performed by [facility] DEPARTMENT OF PATHOLOGY  CEA NORMAL RANGES  SMOKERS     0-6 ng/mL  NON-SMOKERS 0-4 ng/mL    This test was performed by Roche Electrochemiluminescence Immunoassay. CEA values determined by different analytical methods do not correlate and should not be directly compared.    Specimen Collected: 11/16/23 10:23    Performed by: [facility] DEPARTMENT OF PATHOLOGY Last Resulted: 11/16/23 13:48   Received From: [facility]  Result Received: 11/17/23 11:09     Ref Range & Units 8 mo ago    CEA <=5.00 ng/mL 24.60 High    Resulting Agency  [facility] DEPARTMENT OF PATHOLOGY   Narrative  Performed by [facility] DEPARTMENT OF PATHOLOGY  CEA NORMAL RANGES  SMOKERS     0-6 ng/mL  NON-SMOKERS 0-4 ng/mL    Specimen Collected: 03/22/23 10:35 Last Resulted: 03/22/23 14:48   Received From: [facility]  Result Received: 05/30/23  12:47     Ref Range & Units 11 mo ago    CEA <=5.00 ng/mL 9.94 High    Resulting Agency  [facility] DEPARTMENT OF PATHOLOGY   Narrative  Performed by [facility] DEPARTMENT OF PATHOLOGY  CEA NORMAL RANGES  SMOKERS     0-6 ng/mL  NON-SMOKERS 0-4 ng/mL    Specimen Collected: 12/06/22 08:49 Last Resulted: 12/06/22 13:19   Received From: [facility]  Result Received: 02/20/23 15:06      Ref Range & Units 1 yr ago    CEA <=5.00 ng/mL 4.59   Resulting Agency  [facility] DEPARTMENT OF PATHOLOGY   Narrative  Performed by [facility] DEPARTMENT OF PATHOLOGY  CEA NORMAL RANGES  SMOKERS     0-5 ng/mL  NON-SMOKERS 0-3 ng/mL    Specimen Collected: 04/12/22 09:52 Last Resulted: 04/12/22 12:24   Received From: [facility]  Result Received: 04/28/22 13:57       Assessment and Plan (including Health Maintenance)      Problem List  Smart Sets  Document Outside HM   :    Plan: I stressed the importance of following up with Dr. Ryan and getting PET scan done and follow all recommendations for care        Health Maintenance Due   Topic Date Due    TETANUS VACCINE  Never done    Shingles Vaccine (1 of 2) Never done    COVID-19 Vaccine (4 - 2023-24 season) 09/01/2023    Mammogram  10/17/2023    Foot Exam  02/03/2024       Problem List Items Addressed This Visit          Cardiac/Vascular    Primary hypertension (Chronic)- well controlled.  Continue current care.         Oncology    History of colon cancer- followed by Dr. Ryan at [facility] oncology.  She has PET scan  and follow up scheduled in December       Endocrine    Type 2 diabetes mellitus without complication, with long-term current use of insulin - Primary (Chronic)    Relevant Medications    blood sugar diagnostic Strp    blood-glucose meter kit     Other Visit Diagnoses       Gastroesophageal reflux disease, unspecified whether esophagitis present        Relevant Medications    omeprazole (PRILOSEC) 40 MG capsule            Health Maintenance  Topics with due status: Not Due       Topic Last Completion Date    Colorectal Cancer Screening 06/08/2020    Diabetes Urine Screening 02/03/2023    Lipid Panel 07/24/2023    Hemoglobin A1c 07/24/2023    Eye Exam 10/23/2023       Future Appointments   Date Time Provider Department Center   4/24/2024  1:45 PM [facility]            Signature:  MD RADHA Doyle [facility]  MED[facility]  [address] [place] [id number]  [phone]    Date of encounter: 11/30/23

## 2023-12-05 RX ORDER — ASPIRIN 325 MG
50000 TABLET, DELAYED RELEASE (ENTERIC COATED) ORAL
Qty: 8 CAPSULE | Refills: 0 | Status: SHIPPED | OUTPATIENT
Start: 2023-12-05

## 2023-12-05 RX ORDER — LISINOPRIL 10 MG/1
10 TABLET ORAL DAILY
Qty: 90 TABLET | Refills: 2 | Status: SHIPPED | OUTPATIENT
Start: 2023-12-05

## 2023-12-05 RX ORDER — LISINOPRIL 10 MG/1
10 TABLET ORAL DAILY
Qty: 90 TABLET | Refills: 2 | Status: CANCELLED | OUTPATIENT
Start: 2023-12-05

## 2023-12-05 RX ORDER — ASPIRIN 325 MG
50000 TABLET, DELAYED RELEASE (ENTERIC COATED) ORAL
Status: CANCELLED | OUTPATIENT
Start: 2023-12-05

## 2023-12-15 ENCOUNTER — OFFICE VISIT (OUTPATIENT)
Dept: FAMILY MEDICINE | Facility: CLINIC | Age: 53
End: 2023-12-15
Payer: MEDICAID

## 2023-12-15 VITALS
WEIGHT: 166 LBS | SYSTOLIC BLOOD PRESSURE: 152 MMHG | DIASTOLIC BLOOD PRESSURE: 75 MMHG | OXYGEN SATURATION: 94 % | HEIGHT: 57 IN | BODY MASS INDEX: 35.81 KG/M2 | HEART RATE: 97 BPM

## 2023-12-15 DIAGNOSIS — Z79.4 TYPE 2 DIABETES MELLITUS WITHOUT COMPLICATION, WITH LONG-TERM CURRENT USE OF INSULIN: Chronic | ICD-10-CM

## 2023-12-15 DIAGNOSIS — E11.9 TYPE 2 DIABETES MELLITUS WITHOUT COMPLICATION, WITH LONG-TERM CURRENT USE OF INSULIN: Chronic | ICD-10-CM

## 2023-12-15 DIAGNOSIS — J06.9 UPPER RESPIRATORY TRACT INFECTION, UNSPECIFIED TYPE: Primary | ICD-10-CM

## 2023-12-15 DIAGNOSIS — I10 PRIMARY HYPERTENSION: ICD-10-CM

## 2023-12-15 DIAGNOSIS — Z85.038 HISTORY OF COLON CANCER: Chronic | ICD-10-CM

## 2023-12-15 DIAGNOSIS — E61.1 IRON DEFICIENCY: Chronic | ICD-10-CM

## 2023-12-15 DIAGNOSIS — R53.83 FATIGUE, UNSPECIFIED TYPE: ICD-10-CM

## 2023-12-15 PROCEDURE — 84443 ASSAY THYROID STIM HORMONE: CPT | Mod: ,,, | Performed by: CLINICAL MEDICAL LABORATORY

## 2023-12-15 PROCEDURE — 83540 IRON AND TIBC: ICD-10-PCS | Mod: ,,, | Performed by: CLINICAL MEDICAL LABORATORY

## 2023-12-15 PROCEDURE — 83036 HEMOGLOBIN GLYCOSYLATED A1C: CPT | Mod: ,,, | Performed by: CLINICAL MEDICAL LABORATORY

## 2023-12-15 PROCEDURE — 3051F HG A1C>EQUAL 7.0%<8.0%: CPT | Mod: CPTII,,, | Performed by: FAMILY MEDICINE

## 2023-12-15 PROCEDURE — 4010F PR ACE/ARB THEARPY RXD/TAKEN: ICD-10-PCS | Mod: CPTII,,, | Performed by: FAMILY MEDICINE

## 2023-12-15 PROCEDURE — 3078F PR MOST RECENT DIASTOLIC BLOOD PRESSURE < 80 MM HG: ICD-10-PCS | Mod: CPTII,,, | Performed by: FAMILY MEDICINE

## 2023-12-15 PROCEDURE — 3078F DIAST BP <80 MM HG: CPT | Mod: CPTII,,, | Performed by: FAMILY MEDICINE

## 2023-12-15 PROCEDURE — 83540 ASSAY OF IRON: CPT | Mod: ,,, | Performed by: CLINICAL MEDICAL LABORATORY

## 2023-12-15 PROCEDURE — 3008F PR BODY MASS INDEX (BMI) DOCUMENTED: ICD-10-PCS | Mod: CPTII,,, | Performed by: FAMILY MEDICINE

## 2023-12-15 PROCEDURE — 99214 PR OFFICE/OUTPT VISIT, EST, LEVL IV, 30-39 MIN: ICD-10-PCS | Mod: ,,, | Performed by: FAMILY MEDICINE

## 2023-12-15 PROCEDURE — 1160F RVW MEDS BY RX/DR IN RCRD: CPT | Mod: CPTII,,, | Performed by: FAMILY MEDICINE

## 2023-12-15 PROCEDURE — 83036 HEMOGLOBIN A1C: ICD-10-PCS | Mod: ,,, | Performed by: CLINICAL MEDICAL LABORATORY

## 2023-12-15 PROCEDURE — 80053 COMPREHENSIVE METABOLIC PANEL: ICD-10-PCS | Mod: ,,, | Performed by: CLINICAL MEDICAL LABORATORY

## 2023-12-15 PROCEDURE — 3066F PR DOCUMENTATION OF TREATMENT FOR NEPHROPATHY: ICD-10-PCS | Mod: CPTII,,, | Performed by: FAMILY MEDICINE

## 2023-12-15 PROCEDURE — 1159F MED LIST DOCD IN RCRD: CPT | Mod: CPTII,,, | Performed by: FAMILY MEDICINE

## 2023-12-15 PROCEDURE — 3077F PR MOST RECENT SYSTOLIC BLOOD PRESSURE >= 140 MM HG: ICD-10-PCS | Mod: CPTII,,, | Performed by: FAMILY MEDICINE

## 2023-12-15 PROCEDURE — 3061F NEG MICROALBUMINURIA REV: CPT | Mod: CPTII,,, | Performed by: FAMILY MEDICINE

## 2023-12-15 PROCEDURE — 3051F PR MOST RECENT HEMOGLOBIN A1C LEVEL 7.0 - < 8.0%: ICD-10-PCS | Mod: CPTII,,, | Performed by: FAMILY MEDICINE

## 2023-12-15 PROCEDURE — 83550 IRON AND TIBC: ICD-10-PCS | Mod: ,,, | Performed by: CLINICAL MEDICAL LABORATORY

## 2023-12-15 PROCEDURE — 3066F NEPHROPATHY DOC TX: CPT | Mod: CPTII,,, | Performed by: FAMILY MEDICINE

## 2023-12-15 PROCEDURE — 3077F SYST BP >= 140 MM HG: CPT | Mod: CPTII,,, | Performed by: FAMILY MEDICINE

## 2023-12-15 PROCEDURE — 3008F BODY MASS INDEX DOCD: CPT | Mod: CPTII,,, | Performed by: FAMILY MEDICINE

## 2023-12-15 PROCEDURE — 83550 IRON BINDING TEST: CPT | Mod: ,,, | Performed by: CLINICAL MEDICAL LABORATORY

## 2023-12-15 PROCEDURE — 1160F PR REVIEW ALL MEDS BY PRESCRIBER/CLIN PHARMACIST DOCUMENTED: ICD-10-PCS | Mod: CPTII,,, | Performed by: FAMILY MEDICINE

## 2023-12-15 PROCEDURE — 84443 TSH: ICD-10-PCS | Mod: ,,, | Performed by: CLINICAL MEDICAL LABORATORY

## 2023-12-15 PROCEDURE — 1159F PR MEDICATION LIST DOCUMENTED IN MEDICAL RECORD: ICD-10-PCS | Mod: CPTII,,, | Performed by: FAMILY MEDICINE

## 2023-12-15 PROCEDURE — 4010F ACE/ARB THERAPY RXD/TAKEN: CPT | Mod: CPTII,,, | Performed by: FAMILY MEDICINE

## 2023-12-15 PROCEDURE — 99214 OFFICE O/P EST MOD 30 MIN: CPT | Mod: ,,, | Performed by: FAMILY MEDICINE

## 2023-12-15 PROCEDURE — 80053 COMPREHEN METABOLIC PANEL: CPT | Mod: ,,, | Performed by: CLINICAL MEDICAL LABORATORY

## 2023-12-15 PROCEDURE — 3061F PR NEG MICROALBUMINURIA RESULT DOCUMENTED/REVIEW: ICD-10-PCS | Mod: CPTII,,, | Performed by: FAMILY MEDICINE

## 2023-12-15 NOTE — PROGRESS NOTES
"   Elkin Coombs MD   [facility]  MEDICAL GROUP [address] [address]  [phone]      PATIENT NAME: Hayley Landis  : 1970  DATE: 12/15/23  MRN: [id number]      Billing Provider: Elkin Coombs MD  Level of Service:   Patient PCP Information       Provider PCP Type    Elkin Coombs MD General            Reason for Visit / Chief Complaint: Chest Congestion, Shortness of Breath, Abdominal Pain, and Fatigue (X 1 week)       Update PCP  Update Chief Complaint         History of Present Illness / Problem Focused Workflow     Hayley Landis presents to the clinic with Chest Congestion, Shortness of Breath, Abdominal Pain, and Fatigue (X 1 week)       She has a history of colon cancer with concern for recurrent/metastatic disease.  I have reviewed her most recent oncology note 23 and associated labs.  She was scheduled to get a PET scan 23 and follow up with her oncologist afterward on 23 but says that she did not get scan or follow up  because she was "feeling too bad and throwing up"  the day the scan was scheduled.   She says that those appointments have been rescheduled.      Review of Systems     Review of Systems   Constitutional:  Positive for fever.   HENT:  Positive for nasal congestion.    Respiratory:  Positive for cough and shortness of breath.    Gastrointestinal:  Positive for nausea and vomiting.        Medical / Social / Family History     Past Medical History:   Diagnosis Date    Colon cancer     CVA (cerebral vascular accident)     Diabetes     HTN (hypertension)     Seizure        Past Surgical History:   Procedure Laterality Date    COLON SURGERY      HERNIA REPAIR      HYSTERECTOMY         Social History    reports that she has never smoked. She has never been exposed to tobacco smoke. She has never used smokeless tobacco. She reports that she does not drink alcohol and does not use " "drugs.   Social History     Tobacco Use    Smoking status: Never     Passive exposure: Never    Smokeless tobacco: Never   Substance Use Topics    Alcohol use: Never    Drug use: Never       Family History  Family History   Problem Relation Age of Onset    No Known Problems Mother     No Known Problems Father     No Known Problems Sister     No Known Problems Brother     No Known Problems Daughter     No Known Problems Son     No Known Problems Maternal Aunt     No Known Problems Maternal Uncle     No Known Problems Paternal Aunt     No Known Problems Paternal Uncle     No Known Problems Maternal Grandmother     No Known Problems Maternal Grandfather     No Known Problems Paternal Grandmother     No Known Problems Paternal Grandfather        Medications and Allergies     Medications  No outpatient medications have been marked as taking for the 12/15/23 encounter (Office Visit) with Elkin Coombs MD.       Allergies  Review of patient's allergies indicates:   Allergen Reactions    Grape Anaphylaxis     Other reaction(s): Not available    Morphine sulfate Swelling    Nutritional supplements Anaphylaxis     Highly allergic to Cooked grapes    Pineapple Anaphylaxis    Opioids - morphine analogues Nausea And Vomiting    Aspirin      "Veins breakdown" per patient    Biguanides Diarrhea and Other (See Comments)    Dicyclomine Diarrhea    Dihydrocodeine Other (See Comments)     Bad headache in the top of head    Gabapentin Other (See Comments)     weakness  Other reaction(s): Not available  Seizures      Hydrocodone Other (See Comments)     Headache      Iron      Hemochromatosis    Iron analogues      Pt hx of hemachromatosis    Metformin      diarrhea    Penicillins Other (See Comments)     Family hx       Latex Rash and Other (See Comments)       Physical Examination     Vitals:    12/15/23 1449   BP: (!) 152/75   Pulse: 97     Physical Exam  Vitals reviewed.   Constitutional:       " General: She is not in acute distress.     Appearance: Normal appearance. She is not ill-appearing or toxic-appearing.   HENT:      Head: Normocephalic and atraumatic.   Eyes:      Extraocular Movements: Extraocular movements intact.      Conjunctiva/sclera: Conjunctivae normal.      Pupils: Pupils are equal, round, and reactive to light.   Cardiovascular:      Rate and Rhythm: Normal rate and regular rhythm.      Heart sounds: Normal heart sounds.   Pulmonary:      Effort: Pulmonary effort is normal.      Breath sounds: Normal breath sounds.   Musculoskeletal:         General: Normal range of motion.      Cervical back: Normal range of motion.   Skin:     General: Skin is warm and dry.   Neurological:      General: No focal deficit present.      Mental Status: She is alert and oriented to person, place, and time.   Psychiatric:         Mood and Affect: Mood normal.         Behavior: Behavior normal.        Assessment and Plan (including Health Maintenance)      Problem List  [employer]  Document Outside HM   :    Plan:         Health Maintenance Due   Topic Date Due    TETANUS VACCINE  Never done    High Dose Statin  Never done    Shingles Vaccine (1 of 2) Never done    COVID-19 Vaccine (4 - 2023-24 season) 09/01/2023    Mammogram  10/17/2023    Foot Exam  02/03/2024       Problem List Items Addressed This Visit          Cardiac/Vascular    Primary hypertension (Chronic)    Relevant Orders    CBC Auto Differential    Comprehensive Metabolic Panel    TSH       Oncology    History of colon cancer       Endocrine    Type 2 diabetes mellitus without complication, with long-term current use of insulin (Chronic)    Relevant Orders    Hemoglobin A1C       Other    Fatigue     Other Visit Diagnoses       Upper respiratory tract infection, unspecified type    -  Primary    Iron deficiency  (Chronic)       Relevant Orders    Iron and TIBC            Health Maintenance Topics with due status: Not Due       Topic Last  Completion Date    Colorectal Cancer Screening 06/08/2020    Diabetes Urine Screening 02/03/2023    Lipid Panel 07/24/2023    Hemoglobin A1c 07/24/2023    Eye Exam 10/23/2023       Future Appointments   Date Time Provider Department Center   4/24/2024  1:45 PM [facility]            Signature:  MD SH[facility]  MEDICAL GROUP [facility] - FAMILY MEDICINE  [address] [place] [id number]  [phone]    Date of encounter: 12/15/23

## 2023-12-18 LAB
ALBUMIN SERPL BCP-MCNC: 3.6 G/DL (ref 3.5–5)
ALBUMIN/GLOB SERPL: 0.9 {RATIO}
ALP SERPL-CCNC: 215 U/L (ref 41–108)
ALT SERPL W P-5'-P-CCNC: 73 U/L (ref 13–56)
ANION GAP SERPL CALCULATED.3IONS-SCNC: 13 MMOL/L (ref 7–16)
AST SERPL W P-5'-P-CCNC: 40 U/L (ref 15–37)
BILIRUB SERPL-MCNC: 0.7 MG/DL (ref ?–1.2)
BUN SERPL-MCNC: 10 MG/DL (ref 7–18)
BUN/CREAT SERPL: 12 (ref 6–20)
CALCIUM SERPL-MCNC: 9.2 MG/DL (ref 8.5–10.1)
CHLORIDE SERPL-SCNC: 100 MMOL/L (ref 98–107)
CO2 SERPL-SCNC: 26 MMOL/L (ref 21–32)
CREAT SERPL-MCNC: 0.81 MG/DL (ref 0.55–1.02)
EGFR (NO RACE VARIABLE) (RUSH/TITUS): 87 ML/MIN/1.73M2
EST. AVERAGE GLUCOSE BLD GHB EST-MCNC: 148 MG/DL
GLOBULIN SER-MCNC: 4.1 G/DL (ref 2–4)
GLUCOSE SERPL-MCNC: 271 MG/DL (ref 74–106)
HBA1C MFR BLD HPLC: 6.8 % (ref 4.5–6.6)
IRON SATN MFR SERPL: 15 % (ref 14–50)
IRON SERPL-MCNC: 49 ΜG/DL (ref 50–170)
POTASSIUM SERPL-SCNC: 3.9 MMOL/L (ref 3.5–5.1)
PROT SERPL-MCNC: 7.7 G/DL (ref 6.4–8.2)
SODIUM SERPL-SCNC: 135 MMOL/L (ref 136–145)
TIBC SERPL-MCNC: 333 ΜG/DL (ref 250–450)
TSH SERPL DL<=0.005 MIU/L-ACNC: 1.22 UIU/ML (ref 0.36–3.74)

## 2023-12-22 ENCOUNTER — OFFICE VISIT (OUTPATIENT)
Dept: FAMILY MEDICINE | Facility: CLINIC | Age: 53
End: 2023-12-22
Payer: MEDICAID

## 2023-12-22 VITALS
HEIGHT: 57 IN | BODY MASS INDEX: 35.81 KG/M2 | WEIGHT: 166 LBS | SYSTOLIC BLOOD PRESSURE: 126 MMHG | DIASTOLIC BLOOD PRESSURE: 79 MMHG | HEART RATE: 101 BPM

## 2023-12-22 DIAGNOSIS — M54.16 LUMBAR RADICULOPATHY, RIGHT: Chronic | ICD-10-CM

## 2023-12-22 DIAGNOSIS — E11.9 TYPE 2 DIABETES MELLITUS WITHOUT COMPLICATION, WITH LONG-TERM CURRENT USE OF INSULIN: Chronic | ICD-10-CM

## 2023-12-22 DIAGNOSIS — Z79.4 TYPE 2 DIABETES MELLITUS WITHOUT COMPLICATION, WITH LONG-TERM CURRENT USE OF INSULIN: Chronic | ICD-10-CM

## 2023-12-22 DIAGNOSIS — Z85.038 HISTORY OF COLON CANCER: ICD-10-CM

## 2023-12-22 DIAGNOSIS — J06.9 UPPER RESPIRATORY TRACT INFECTION, UNSPECIFIED TYPE: ICD-10-CM

## 2023-12-22 DIAGNOSIS — M25.552 PAIN OF LEFT HIP: ICD-10-CM

## 2023-12-22 DIAGNOSIS — M25.572 ACUTE LEFT ANKLE PAIN: ICD-10-CM

## 2023-12-22 DIAGNOSIS — M25.562 ACUTE PAIN OF LEFT KNEE: Primary | ICD-10-CM

## 2023-12-22 DIAGNOSIS — I10 PRIMARY HYPERTENSION: Chronic | ICD-10-CM

## 2023-12-22 PROCEDURE — 3074F SYST BP LT 130 MM HG: CPT | Mod: CPTII,,, | Performed by: FAMILY MEDICINE

## 2023-12-22 PROCEDURE — 4010F ACE/ARB THERAPY RXD/TAKEN: CPT | Mod: CPTII,,, | Performed by: FAMILY MEDICINE

## 2023-12-22 PROCEDURE — 4010F PR ACE/ARB THEARPY RXD/TAKEN: ICD-10-PCS | Mod: CPTII,,, | Performed by: FAMILY MEDICINE

## 2023-12-22 PROCEDURE — 3066F NEPHROPATHY DOC TX: CPT | Mod: CPTII,,, | Performed by: FAMILY MEDICINE

## 2023-12-22 PROCEDURE — 3008F PR BODY MASS INDEX (BMI) DOCUMENTED: ICD-10-PCS | Mod: CPTII,,, | Performed by: FAMILY MEDICINE

## 2023-12-22 PROCEDURE — 3061F NEG MICROALBUMINURIA REV: CPT | Mod: CPTII,,, | Performed by: FAMILY MEDICINE

## 2023-12-22 PROCEDURE — 1160F PR REVIEW ALL MEDS BY PRESCRIBER/CLIN PHARMACIST DOCUMENTED: ICD-10-PCS | Mod: CPTII,,, | Performed by: FAMILY MEDICINE

## 2023-12-22 PROCEDURE — 99214 OFFICE O/P EST MOD 30 MIN: CPT | Mod: ,,, | Performed by: FAMILY MEDICINE

## 2023-12-22 PROCEDURE — 3066F PR DOCUMENTATION OF TREATMENT FOR NEPHROPATHY: ICD-10-PCS | Mod: CPTII,,, | Performed by: FAMILY MEDICINE

## 2023-12-22 PROCEDURE — 3061F PR NEG MICROALBUMINURIA RESULT DOCUMENTED/REVIEW: ICD-10-PCS | Mod: CPTII,,, | Performed by: FAMILY MEDICINE

## 2023-12-22 PROCEDURE — 3044F HG A1C LEVEL LT 7.0%: CPT | Mod: CPTII,,, | Performed by: FAMILY MEDICINE

## 2023-12-22 PROCEDURE — 3078F DIAST BP <80 MM HG: CPT | Mod: CPTII,,, | Performed by: FAMILY MEDICINE

## 2023-12-22 PROCEDURE — 3078F PR MOST RECENT DIASTOLIC BLOOD PRESSURE < 80 MM HG: ICD-10-PCS | Mod: CPTII,,, | Performed by: FAMILY MEDICINE

## 2023-12-22 PROCEDURE — 3074F PR MOST RECENT SYSTOLIC BLOOD PRESSURE < 130 MM HG: ICD-10-PCS | Mod: CPTII,,, | Performed by: FAMILY MEDICINE

## 2023-12-22 PROCEDURE — 1159F PR MEDICATION LIST DOCUMENTED IN MEDICAL RECORD: ICD-10-PCS | Mod: CPTII,,, | Performed by: FAMILY MEDICINE

## 2023-12-22 PROCEDURE — 1160F RVW MEDS BY RX/DR IN RCRD: CPT | Mod: CPTII,,, | Performed by: FAMILY MEDICINE

## 2023-12-22 PROCEDURE — 3044F PR MOST RECENT HEMOGLOBIN A1C LEVEL <7.0%: ICD-10-PCS | Mod: CPTII,,, | Performed by: FAMILY MEDICINE

## 2023-12-22 PROCEDURE — 3008F BODY MASS INDEX DOCD: CPT | Mod: CPTII,,, | Performed by: FAMILY MEDICINE

## 2023-12-22 PROCEDURE — 99214 PR OFFICE/OUTPT VISIT, EST, LEVL IV, 30-39 MIN: ICD-10-PCS | Mod: ,,, | Performed by: FAMILY MEDICINE

## 2023-12-22 PROCEDURE — 1159F MED LIST DOCD IN RCRD: CPT | Mod: CPTII,,, | Performed by: FAMILY MEDICINE

## 2023-12-22 RX ORDER — METRONIDAZOLE 500 MG/1
500 TABLET ORAL EVERY 12 HOURS
Qty: 14 TABLET | Refills: 0 | Status: SHIPPED | OUTPATIENT
Start: 2023-12-22 | End: 2024-01-31 | Stop reason: ALTCHOICE

## 2023-12-22 RX ORDER — INSULIN GLARGINE 100 [IU]/ML
80 INJECTION, SOLUTION SUBCUTANEOUS DAILY
Qty: 30 ML | Refills: 5 | Status: SHIPPED | OUTPATIENT
Start: 2023-12-22

## 2023-12-22 RX ORDER — INSULIN ASPART 100 [IU]/ML
INJECTION, SOLUTION INTRAVENOUS; SUBCUTANEOUS
Qty: 10 ML | Refills: 5 | Status: SHIPPED | OUTPATIENT
Start: 2023-12-22

## 2023-12-22 RX ORDER — DICLOFENAC SODIUM 10 MG/G
2 GEL TOPICAL 4 TIMES DAILY
Qty: 300 G | Refills: 0 | Status: SHIPPED | OUTPATIENT
Start: 2023-12-22

## 2023-12-22 NOTE — PROGRESS NOTES
Elkin Coombs MD   [facility]  MEDICAL GROUP [facility] [address] [address]  [phone]      PATIENT NAME: Hayley Landis  : 1970  DATE: 23  MRN: [id number]      Billing Provider: Elkin Coombs MD  Level of Service:   Patient PCP Information       Provider PCP Type    Elkin Coombs MD General            Reason for Visit / Chief Complaint: Leg Pain, Knee Pain (States fell yesterday over a table at [employer]/Twisted left knee, swelling knee and foot), and Hip Pain       Update PCP  Update Chief Complaint         History of Present Illness / Problem Focused Workflow     Hayley Landis presents to the clinic with Leg Pain, Knee Pain (States fell yesterday over a table at [employer]/Twisted left knee, swelling knee and foot), and Hip Pain       Says that she had a fall yesterday and is complaining of left hip, knee and ankle pain.      Had labs done 12/15/23 and will discuss results with her today.  Is scheduled for PET scan in January and oncology follow up in February for h/o colorectal cancer.  I have reviewed the most recent documentation from Dr. Ryan at [facility] (2023).  Patient missed most recent scheduled PET scan and therefore did not follow up with oncology.  I HAVE AGAIN STRESSED HOW IMPORTANT IT IS THAT SHE GET SCAN DONE AND FOLLOW UP WITH ONCOLOGY.      Review of Systems     Review of Systems   Constitutional:  Negative for activity change, chills and fever.   HENT:  Negative for sore throat.    Eyes:  Negative for pain.   Respiratory:  Negative for cough, chest tightness and shortness of breath.    Cardiovascular:  Negative for chest pain and palpitations.   Gastrointestinal:  Positive for nausea. Negative for abdominal pain.   Musculoskeletal:  Positive for arthralgias and leg pain.   Neurological:  Negative for dizziness, syncope and weakness.   Psychiatric/Behavioral:  Negative for  "confusion.         Medical / Social / Family History     Past Medical History:   Diagnosis Date    Colon cancer     CVA (cerebral vascular accident)     Diabetes     HTN (hypertension)     Seizure        Past Surgical History:   Procedure Laterality Date    COLON SURGERY      HERNIA REPAIR      HYSTERECTOMY         Social History    reports that she has never smoked. She has never been exposed to tobacco smoke. She has never used smokeless tobacco. She reports that she does not drink alcohol and does not use drugs.   Social History     Tobacco Use    Smoking status: Never     Passive exposure: Never    Smokeless tobacco: Never   Substance Use Topics    Alcohol use: Never    Drug use: Never       Family History  Family History   Problem Relation Age of Onset    No Known Problems Mother     No Known Problems Father     No Known Problems Sister     No Known Problems Brother     No Known Problems Daughter     No Known Problems Son     No Known Problems Maternal Aunt     No Known Problems Maternal Uncle     No Known Problems Paternal Aunt     No Known Problems Paternal Uncle     No Known Problems Maternal Grandmother     No Known Problems Maternal Grandfather     No Known Problems Paternal Grandmother     No Known Problems Paternal Grandfather        Medications and Allergies     Medications  No outpatient medications have been marked as taking for the 12/22/23 encounter (Office Visit) with Elkin Coombs MD.       Allergies  Review of patient's allergies indicates:   Allergen Reactions    Grape Anaphylaxis     Other reaction(s): Not available    Morphine sulfate Swelling    Nutritional supplements Anaphylaxis     Highly allergic to Cooked grapes    Pineapple Anaphylaxis    Opioids - morphine analogues Nausea And Vomiting    Aspirin      "Veins breakdown" per patient    Biguanides Diarrhea and Other (See Comments)    Dicyclomine Diarrhea    Dihydrocodeine Other (See Comments)     " Bad headache in the top of head    Gabapentin Other (See Comments)     weakness  Other reaction(s): Not available  Seizures      Hydrocodone Other (See Comments)     Headache      Iron      Hemochromatosis    Iron analogues      Pt hx of hemachromatosis    Metformin      diarrhea    Penicillins Other (See Comments)     Family hx       Latex Rash and Other (See Comments)       Physical Examination     Vitals:    12/22/23 1359   BP: 126/79   Pulse: 101     Physical Exam  Vitals reviewed.   Constitutional:       Appearance: Normal appearance.   HENT:      Head: Normocephalic and atraumatic.   Eyes:      Extraocular Movements: Extraocular movements intact.      Conjunctiva/sclera: Conjunctivae normal.      Pupils: Pupils are equal, round, and reactive to light.   Cardiovascular:      Rate and Rhythm: Normal rate and regular rhythm.      Heart sounds: Normal heart sounds.   Pulmonary:      Effort: Pulmonary effort is normal.      Breath sounds: Normal breath sounds.   Musculoskeletal:         General: Tenderness present. No swelling or deformity. Normal range of motion.      Cervical back: Normal range of motion.   Skin:     General: Skin is warm and dry.      Findings: No bruising.   Neurological:      General: No focal deficit present.      Mental Status: She is alert and oriented to person, place, and time.      Gait: Gait normal.   Psychiatric:         Mood and Affect: Mood normal.         Behavior: Behavior normal.        Office Visit on 12/15/2023   Component Date Value Ref Range Status    Sodium 12/15/2023 135 (L)  136 - 145 mmol/L Final    Potassium 12/15/2023 3.9  3.5 - 5.1 mmol/L Final    Chloride 12/15/2023 100  98 - 107 mmol/L Final    CO2 12/15/2023 26  21 - 32 mmol/L Final    Anion Gap 12/15/2023 13  7 - 16 mmol/L Final    Glucose 12/15/2023 271 (H)  74 - 106 mg/dL Final    BUN 12/15/2023 10  7 - 18 mg/dL Final    Creatinine 12/15/2023 0.81  0.55 - 1.02 mg/dL Final    BUN/Creatinine Ratio  12/15/2023 12  6 - 20 Final    Calcium 12/15/2023 9.2  8.5 - 10.1 mg/dL Final    Total Protein 12/15/2023 7.7  6.4 - 8.2 g/dL Final    Albumin 12/15/2023 3.6  3.5 - 5.0 g/dL Final    Globulin 12/15/2023 4.1 (H)  2.0 - 4.0 g/dL Final    A/G Ratio 12/15/2023 0.9   Final    Bilirubin, Total 12/15/2023 0.7  >0.0 - 1.2 mg/dL Final    Alk Phos 12/15/2023 215 (H)  41 - 108 U/L Final    ALT 12/15/2023 73 (H)  13 - 56 U/L Final    AST 12/15/2023 40 (H)  15 - 37 U/L Final    eGFR 12/15/2023 87  >=60 mL/min/1.73m2 Final    Hemoglobin A1C 12/15/2023 6.8 (H)  4.5 - 6.6 % Final      Normal:               <5.7%  Pre-Diabetic:       5.7% to 6.4%  Diabetic:             >6.4%  Diabetic Goal:     <7%    Estimated Average Glucose 12/15/2023 148  mg/dL Final    TSH 12/15/2023 1.220  0.358 - 3.740 uIU/mL Final    Iron 12/15/2023 49 (L)  50 - 170 µg/dL Final    Iron Saturation 12/15/2023 15  14 - 50 % Final    TIBC 12/15/2023 333  250 - 450 µg/dL Final         Assessment and Plan (including Health Maintenance)      Problem List  [employer]  Document Outside HM   :    Plan: DISCUSSED RESULTS WITH PATIENT.  LIVER ENZYMES ARE ELEVATED.        Health Maintenance Due   Topic Date Due    TETANUS VACCINE  Never done    High Dose Statin  Never done    Shingles Vaccine (1 of 2) Never done    COVID-19 Vaccine (4 - 2023-24 season) 09/01/2023    Mammogram  10/17/2023    Foot Exam  02/03/2024       Problem List Items Addressed This Visit          Neuro    Lumbar radiculopathy, right       Cardiac/Vascular    Primary hypertension (Chronic)       Oncology    History of colon cancer       Endocrine    Type 2 diabetes mellitus without complication, with long-term current use of insulin (Chronic)    Relevant Medications    insulin glargine (LANTUS U-100 INSULIN) 100 unit/mL injection    insulin aspart U-100 (NOVOLOG) 100 unit/mL injection       Orthopedic    Pain of left hip     Other Visit Diagnoses       Acute pain of left knee    -   Primary    Relevant Medications    diclofenac sodium (VOLTAREN) 1 % Gel    Upper respiratory tract infection, unspecified type        Acute left ankle pain                Health Maintenance Topics with due status: Not Due       Topic Last Completion Date    Colorectal Cancer Screening 06/08/2020    Diabetes Urine Screening 02/03/2023    Lipid Panel 07/24/2023    Eye Exam 10/23/2023    Hemoglobin A1c 12/15/2023       Future Appointments   Date Time Provider Department Center   4/24/2024  1:45 PM [facility]            Signature:  MD [facility]  MEDICAL GROUP [facility] - FAMILY MEDICINE  [address] [place] [id number]  [phone]    Date of encounter: 12/22/23

## 2023-12-22 NOTE — PATIENT INSTRUCTIONS
Your liver enzymes are elevated.  Be sure to get your PET scan and any other tests done that have been scheduled and be SURE to follow up with Dr. Ryan as scheduled.

## 2023-12-29 ENCOUNTER — DOCUMENTATION ONLY (OUTPATIENT)
Dept: REHABILITATION | Facility: HOSPITAL | Age: 53
End: 2023-12-29
Payer: MEDICAID

## 2023-12-29 PROBLEM — M25.552 PAIN OF LEFT HIP: Status: RESOLVED | Noted: 2023-07-24 | Resolved: 2023-12-29

## 2023-12-29 PROBLEM — R29.898 WEAKNESS OF LEFT LOWER EXTREMITY: Status: RESOLVED | Noted: 2023-08-01 | Resolved: 2023-12-29

## 2023-12-29 PROBLEM — M25.652 DECREASED RANGE OF LEFT HIP MOVEMENT: Status: RESOLVED | Noted: 2023-08-01 | Resolved: 2023-12-29

## 2023-12-29 NOTE — PROGRESS NOTES
[facility]  Physical Therapy Discharge Summary    Name: Hayley Landis  Clinic Number: [id number]    Therapy Diagnosis:        Encounter Diagnoses   Name Primary?    Decreased range of left hip movement Yes    Pain of left hip      Weakness of left lower extremity        Physician: Mack Harp IV, DO     Physician Orders: PT Eval and Treat  Medical Diagnosis from Referral: M25.552 (ICD-10-CM) - Pain of left hip  Evaluation Date: 8/1/2023    Date of Last visit: 11/17/2023  Total Visits Received: 11    Assessment      Goals:    Short Term Goals:  Patient will demonstrate independence with home exercise program to ensure carryover of treatment. [Met]  Patient will perform sit to/from stand with standby assist without upper extremity support to improve independence and safety with transfers. [Met]  Patient will demonstrate 105 degrees of left hip flexion active range of motion in supine to improve functional use of the left lower extremity. [Not met]  Patient will report a reduction in left hip pain from 10/10 to 8/10 at worst to improve quality of life. [Met]     Long Term Goals:  Patient will perform sit to/from stand with independence without upper extremity support to improve independence and safety with transfers. [Met]  Patient will improve bilateral lower extremity strength to 4+/5 to improve independence and safety with bed mobility, transfers, and gait. [Not met]  Patient will report a reduction in left hip pain from 10/10 to 6/10 at worst to improve quality of life. [Met]  Patient will ascend/descend 3 steps reciprocally with unilateral upper extremity support to improve independence and safety with stair negotiation. [Met]    Discharge reason: Patient has completed allowable visits authorized by insurance.    Plan     This patient is discharged from Physical Therapy.      Abram Collins, PT, DPT  12/29/2023

## 2024-01-01 ENCOUNTER — HOSPITAL ENCOUNTER (EMERGENCY)
Facility: HOSPITAL | Age: 54
Discharge: HOME | End: 2024-08-16
Attending: EMERGENCY MEDICINE
Payer: MEDICAID

## 2024-01-01 VITALS
HEIGHT: 57 IN | OXYGEN SATURATION: 100 % | DIASTOLIC BLOOD PRESSURE: 38 MMHG | BODY MASS INDEX: 28.05 KG/M2 | SYSTOLIC BLOOD PRESSURE: 87 MMHG | WEIGHT: 130 LBS | TEMPERATURE: 99 F | RESPIRATION RATE: 19 BRPM | HEART RATE: 76 BPM

## 2024-01-01 DIAGNOSIS — Z51.5 ENCOUNTER FOR END OF LIFE CARE: ICD-10-CM

## 2024-01-01 DIAGNOSIS — R53.1 GENERALIZED WEAKNESS: Primary | ICD-10-CM

## 2024-01-01 DIAGNOSIS — D64.9 ANEMIA, UNSPECIFIED TYPE: ICD-10-CM

## 2024-01-01 LAB
ABO + RH BLD: NORMAL
ABORH RETYPE: NORMAL
ALBUMIN SERPL BCP-MCNC: 1 G/DL (ref 3.5–5)
ALBUMIN/GLOB SERPL: 0.3 {RATIO}
ALP SERPL-CCNC: 8174 U/L (ref 41–108)
ALT SERPL W P-5'-P-CCNC: 57 U/L (ref 13–56)
AMMONIA PLAS-SCNC: 12 ΜMOL/L (ref 11–32)
ANION GAP SERPL CALCULATED.3IONS-SCNC: 15 MMOL/L (ref 7–16)
ANISOCYTOSIS BLD QL SMEAR: ABNORMAL
ANTIBODY IDENTIFICATION: NORMAL
APTT PPP: 53.7 SECONDS (ref 25.2–37.3)
AST SERPL W P-5'-P-CCNC: 224 U/L (ref 15–37)
BASOPHILS # BLD AUTO: 0.04 K/UL (ref 0–0.2)
BASOPHILS NFR BLD AUTO: 0.1 % (ref 0–1)
BILIRUB SERPL-MCNC: 21 MG/DL (ref ?–1.2)
BLD PROD TYP BPU: NORMAL
BLOOD UNIT EXPIRATION DATE: NORMAL
BLOOD UNIT TYPE CODE: 5100
BUN SERPL-MCNC: 41 MG/DL (ref 7–18)
BUN/CREAT SERPL: 21 (ref 6–20)
CALCIUM SERPL-MCNC: 8.3 MG/DL (ref 8.5–10.1)
CHLORIDE SERPL-SCNC: 98 MMOL/L (ref 98–107)
CO2 SERPL-SCNC: 22 MMOL/L (ref 21–32)
CREAT SERPL-MCNC: 1.99 MG/DL (ref 0.55–1.02)
CROSSMATCH INTERPRETATION: NORMAL
DIFFERENTIAL METHOD BLD: ABNORMAL
DISPENSE STATUS: NORMAL
EGFR (NO RACE VARIABLE) (RUSH/TITUS): 30 ML/MIN/1.73M2
EOSINOPHIL # BLD AUTO: 0.13 K/UL (ref 0–0.5)
EOSINOPHIL NFR BLD AUTO: 0.4 % (ref 1–4)
EOSINOPHIL NFR BLD MANUAL: 1 % (ref 1–4)
ERYTHROCYTE [DISTWIDTH] IN BLOOD BY AUTOMATED COUNT: 15.2 % (ref 11.5–14.5)
GLOBULIN SER-MCNC: 3.8 G/DL (ref 2–4)
GLUCOSE SERPL-MCNC: 126 MG/DL (ref 74–106)
HCT VFR BLD AUTO: 10.4 % (ref 38–47)
HGB BLD-MCNC: 3.2 G/DL (ref 12–16)
HYPOCHROMIA BLD QL SMEAR: ABNORMAL
IMM GRANULOCYTES # BLD AUTO: 1.48 K/UL (ref 0–0.04)
IMM GRANULOCYTES NFR BLD: 4.8 % (ref 0–0.4)
INDIRECT COOMBS: ABNORMAL
INR BLD: 6.36
LACTATE SERPL-SCNC: 1.7 MMOL/L (ref 0.4–2)
LIPASE SERPL-CCNC: 36 U/L (ref 16–77)
LYMPHOCYTES # BLD AUTO: 2.07 K/UL (ref 1–4.8)
LYMPHOCYTES NFR BLD AUTO: 6.6 % (ref 27–41)
LYMPHOCYTES NFR BLD MANUAL: 7 % (ref 27–41)
MAGNESIUM SERPL-MCNC: 2.7 MG/DL (ref 1.7–2.3)
MCH RBC QN AUTO: 27.6 PG (ref 27–31)
MCHC RBC AUTO-ENTMCNC: 30.8 G/DL (ref 32–36)
MCV RBC AUTO: 89.7 FL (ref 80–96)
MICROCYTES BLD QL SMEAR: ABNORMAL
MONOCYTES # BLD AUTO: 0.85 K/UL (ref 0–0.8)
MONOCYTES NFR BLD AUTO: 2.7 % (ref 2–6)
MONOCYTES NFR BLD MANUAL: 3 % (ref 2–6)
MPC BLD CALC-MCNC: 11.3 FL (ref 9.4–12.4)
NEUTROPHILS # BLD AUTO: 26.58 K/UL (ref 1.8–7.7)
NEUTROPHILS NFR BLD AUTO: 85.4 % (ref 53–65)
NEUTS BAND NFR BLD MANUAL: 5 % (ref 1–5)
NEUTS SEG NFR BLD MANUAL: 84 % (ref 50–62)
NRBC # BLD AUTO: 0.06 X10E3/UL
NRBC, AUTO (.00): 0.2 %
PLATELET # BLD AUTO: 224 K/UL (ref 150–400)
PLATELET MORPHOLOGY: ABNORMAL
POLYCHROMASIA BLD QL SMEAR: ABNORMAL
POTASSIUM SERPL-SCNC: 4.1 MMOL/L (ref 3.5–5.1)
PROT SERPL-MCNC: 4.8 G/DL (ref 6.4–8.2)
PROTHROMBIN TIME: 55 SECONDS (ref 11.7–14.7)
RBC # BLD AUTO: 1.16 M/UL (ref 4.2–5.4)
RH BLD: ABNORMAL
SODIUM SERPL-SCNC: 131 MMOL/L (ref 136–145)
SPECIMEN OUTDATE: ABNORMAL
TROPONIN I SERPL DL<=0.01 NG/ML-MCNC: 5.2 PG/ML
UNIT NUMBER: NORMAL
WBC # BLD AUTO: 31.15 K/UL (ref 4.5–11)

## 2024-01-01 PROCEDURE — 93010 ELECTROCARDIOGRAM REPORT: CPT | Mod: ,,, | Performed by: STUDENT IN AN ORGANIZED HEALTH CARE EDUCATION/TRAINING PROGRAM

## 2024-01-01 PROCEDURE — 86870 RBC ANTIBODY IDENTIFICATION: CPT | Performed by: EMERGENCY MEDICINE

## 2024-01-01 PROCEDURE — 96376 TX/PRO/DX INJ SAME DRUG ADON: CPT

## 2024-01-01 PROCEDURE — P9016 RBC LEUKOCYTES REDUCED: HCPCS | Performed by: EMERGENCY MEDICINE

## 2024-01-01 PROCEDURE — 86850 RBC ANTIBODY SCREEN: CPT | Performed by: EMERGENCY MEDICINE

## 2024-01-01 PROCEDURE — 93005 ELECTROCARDIOGRAM TRACING: CPT

## 2024-01-01 PROCEDURE — 83690 ASSAY OF LIPASE: CPT | Performed by: EMERGENCY MEDICINE

## 2024-01-01 PROCEDURE — 86905 BLOOD TYPING RBC ANTIGENS: CPT | Mod: 91 | Performed by: EMERGENCY MEDICINE

## 2024-01-01 PROCEDURE — 36430 TRANSFUSION BLD/BLD COMPNT: CPT

## 2024-01-01 PROCEDURE — 84484 ASSAY OF TROPONIN QUANT: CPT | Performed by: EMERGENCY MEDICINE

## 2024-01-01 PROCEDURE — 25000003 PHARM REV CODE 250: Performed by: EMERGENCY MEDICINE

## 2024-01-01 PROCEDURE — 36415 COLL VENOUS BLD VENIPUNCTURE: CPT | Performed by: EMERGENCY MEDICINE

## 2024-01-01 PROCEDURE — 85610 PROTHROMBIN TIME: CPT | Performed by: EMERGENCY MEDICINE

## 2024-01-01 PROCEDURE — 82140 ASSAY OF AMMONIA: CPT | Performed by: EMERGENCY MEDICINE

## 2024-01-01 PROCEDURE — 63600175 PHARM REV CODE 636 W HCPCS: Performed by: EMERGENCY MEDICINE

## 2024-01-01 PROCEDURE — 96374 THER/PROPH/DIAG INJ IV PUSH: CPT

## 2024-01-01 PROCEDURE — 85025 COMPLETE CBC W/AUTO DIFF WBC: CPT | Performed by: EMERGENCY MEDICINE

## 2024-01-01 PROCEDURE — 86900 BLOOD TYPING SEROLOGIC ABO: CPT | Performed by: EMERGENCY MEDICINE

## 2024-01-01 PROCEDURE — 83605 ASSAY OF LACTIC ACID: CPT | Performed by: EMERGENCY MEDICINE

## 2024-01-01 PROCEDURE — 96361 HYDRATE IV INFUSION ADD-ON: CPT

## 2024-01-01 PROCEDURE — 86922 COMPATIBILITY TEST ANTIGLOB: CPT | Performed by: EMERGENCY MEDICINE

## 2024-01-01 PROCEDURE — 99284 EMERGENCY DEPT VISIT MOD MDM: CPT | Mod: 25

## 2024-01-01 PROCEDURE — 80053 COMPREHEN METABOLIC PANEL: CPT | Performed by: EMERGENCY MEDICINE

## 2024-01-01 PROCEDURE — 83735 ASSAY OF MAGNESIUM: CPT | Performed by: EMERGENCY MEDICINE

## 2024-01-01 PROCEDURE — 85730 THROMBOPLASTIN TIME PARTIAL: CPT | Performed by: EMERGENCY MEDICINE

## 2024-01-01 RX ORDER — PANTOPRAZOLE SODIUM 40 MG/1
40 TABLET, DELAYED RELEASE ORAL DAILY
COMMUNITY
Start: 2024-01-01

## 2024-01-01 RX ORDER — HYDROCODONE BITARTRATE AND ACETAMINOPHEN 500; 5 MG/1; MG/1
TABLET ORAL
Status: DISCONTINUED | OUTPATIENT
Start: 2024-01-01 | End: 2024-01-01 | Stop reason: HOSPADM

## 2024-01-01 RX ORDER — HYDROMORPHONE HYDROCHLORIDE 2 MG/ML
1 INJECTION, SOLUTION INTRAMUSCULAR; INTRAVENOUS; SUBCUTANEOUS
Status: DISCONTINUED | OUTPATIENT
Start: 2024-01-01 | End: 2024-01-01 | Stop reason: HOSPADM

## 2024-01-01 RX ORDER — FENTANYL CITRATE 50 UG/ML
50 INJECTION, SOLUTION INTRAMUSCULAR; INTRAVENOUS
Status: COMPLETED | OUTPATIENT
Start: 2024-01-01 | End: 2024-01-01

## 2024-01-01 RX ADMIN — SODIUM CHLORIDE: 9 INJECTION, SOLUTION INTRAVENOUS at 09:08

## 2024-01-01 RX ADMIN — SODIUM CHLORIDE, POTASSIUM CHLORIDE, SODIUM LACTATE AND CALCIUM CHLORIDE 1000 ML: 600; 310; 30; 20 INJECTION, SOLUTION INTRAVENOUS at 01:08

## 2024-01-01 RX ADMIN — FENTANYL CITRATE 50 MCG: 50 INJECTION, SOLUTION INTRAMUSCULAR; INTRAVENOUS at 02:08

## 2024-01-01 RX ADMIN — FENTANYL CITRATE 50 MCG: 50 INJECTION, SOLUTION INTRAMUSCULAR; INTRAVENOUS at 05:08

## 2024-02-01 ENCOUNTER — OFFICE VISIT (OUTPATIENT)
Dept: FAMILY MEDICINE | Facility: CLINIC | Age: 54
End: 2024-02-01
Payer: MEDICAID

## 2024-02-01 ENCOUNTER — APPOINTMENT (OUTPATIENT)
Dept: RADIOLOGY | Facility: CLINIC | Age: 54
End: 2024-02-01
Attending: FAMILY MEDICINE
Payer: MEDICAID

## 2024-02-01 VITALS
SYSTOLIC BLOOD PRESSURE: 137 MMHG | WEIGHT: 162.81 LBS | DIASTOLIC BLOOD PRESSURE: 89 MMHG | HEART RATE: 93 BPM | BODY MASS INDEX: 35.12 KG/M2 | HEIGHT: 57 IN

## 2024-02-01 DIAGNOSIS — M16.0 PRIMARY OSTEOARTHRITIS OF BOTH HIPS: Chronic | ICD-10-CM

## 2024-02-01 DIAGNOSIS — M25.559 HIP PAIN, UNSPECIFIED LATERALITY: ICD-10-CM

## 2024-02-01 DIAGNOSIS — Z85.038 HISTORY OF COLON CANCER: Chronic | ICD-10-CM

## 2024-02-01 DIAGNOSIS — M25.559 HIP PAIN, UNSPECIFIED LATERALITY: Primary | ICD-10-CM

## 2024-02-01 PROCEDURE — 4010F ACE/ARB THERAPY RXD/TAKEN: CPT | Mod: CPTII,,, | Performed by: FAMILY MEDICINE

## 2024-02-01 PROCEDURE — 3079F DIAST BP 80-89 MM HG: CPT | Mod: CPTII,,, | Performed by: FAMILY MEDICINE

## 2024-02-01 PROCEDURE — 1160F RVW MEDS BY RX/DR IN RCRD: CPT | Mod: CPTII,,, | Performed by: FAMILY MEDICINE

## 2024-02-01 PROCEDURE — 3075F SYST BP GE 130 - 139MM HG: CPT | Mod: CPTII,,, | Performed by: FAMILY MEDICINE

## 2024-02-01 PROCEDURE — 96372 THER/PROPH/DIAG INJ SC/IM: CPT | Mod: ,,, | Performed by: FAMILY MEDICINE

## 2024-02-01 PROCEDURE — 73502 X-RAY EXAM HIP UNI 2-3 VIEWS: CPT | Mod: TC,RHCUB,FY,LT | Performed by: FAMILY MEDICINE

## 2024-02-01 PROCEDURE — 1159F MED LIST DOCD IN RCRD: CPT | Mod: CPTII,,, | Performed by: FAMILY MEDICINE

## 2024-02-01 PROCEDURE — 3008F BODY MASS INDEX DOCD: CPT | Mod: CPTII,,, | Performed by: FAMILY MEDICINE

## 2024-02-01 PROCEDURE — 99214 OFFICE O/P EST MOD 30 MIN: CPT | Mod: 25,,, | Performed by: FAMILY MEDICINE

## 2024-02-01 RX ORDER — MELOXICAM 7.5 MG/1
7.5 TABLET ORAL DAILY
Qty: 30 TABLET | Refills: 0 | Status: SHIPPED | OUTPATIENT
Start: 2024-02-01 | End: 2024-03-06 | Stop reason: SDUPTHER

## 2024-02-01 RX ORDER — KETOROLAC TROMETHAMINE 30 MG/ML
60 INJECTION, SOLUTION INTRAMUSCULAR; INTRAVENOUS
Status: COMPLETED | OUTPATIENT
Start: 2024-02-01 | End: 2024-02-01

## 2024-02-01 RX ADMIN — KETOROLAC TROMETHAMINE 60 MG: 30 INJECTION, SOLUTION INTRAMUSCULAR; INTRAVENOUS at 01:02

## 2024-02-01 NOTE — PATIENT INSTRUCTIONS
EXAMINATION:  XR HIP WITH PELVIS WHEN PERFORMED, 2 OR 3 VIEWS LEFT     CLINICAL HISTORY:  Pain in unspecified hip     TECHNIQUE:  AP view of the pelvis and frog leg lateral view of the left hip were performed.     COMPARISON:  06/24/2022     FINDINGS:  No fracture or dislocation.  Mild degenerative changes are seen of the left hip as well as of the right hip.     Impression:     Mild osteoarthritic change.        Electronically signed by: Henrique Armenta  Date:                                            02/01/2024  Time:                                           13:28           Exam Ended: 02/01/24 13:23 CST Last Resulted: 02/01/24 13:28 CST
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
## 2024-02-09 DIAGNOSIS — I10 PRIMARY HYPERTENSION: Primary | Chronic | ICD-10-CM

## 2024-02-09 DIAGNOSIS — E11.9 TYPE 2 DIABETES MELLITUS WITHOUT COMPLICATION, WITH LONG-TERM CURRENT USE OF INSULIN: Chronic | ICD-10-CM

## 2024-02-09 DIAGNOSIS — Z79.4 TYPE 2 DIABETES MELLITUS WITHOUT COMPLICATION, WITH LONG-TERM CURRENT USE OF INSULIN: Chronic | ICD-10-CM

## 2024-02-09 RX ORDER — ATORVASTATIN CALCIUM 10 MG/1
10 TABLET, FILM COATED ORAL DAILY
Qty: 90 TABLET | Refills: 3 | Status: SHIPPED | OUTPATIENT
Start: 2024-02-09 | End: 2024-04-08

## 2024-03-06 ENCOUNTER — OFFICE VISIT (OUTPATIENT)
Dept: FAMILY MEDICINE | Facility: CLINIC | Age: 54
End: 2024-03-06
Payer: MEDICAID

## 2024-03-06 VITALS
HEIGHT: 57 IN | WEIGHT: 162 LBS | DIASTOLIC BLOOD PRESSURE: 76 MMHG | HEART RATE: 99 BPM | BODY MASS INDEX: 34.95 KG/M2 | SYSTOLIC BLOOD PRESSURE: 126 MMHG

## 2024-03-06 DIAGNOSIS — M25.559 HIP PAIN, UNSPECIFIED LATERALITY: ICD-10-CM

## 2024-03-06 DIAGNOSIS — Z20.828 VIRAL DISEASE EXPOSURE: Primary | ICD-10-CM

## 2024-03-06 DIAGNOSIS — M16.0 PRIMARY OSTEOARTHRITIS OF BOTH HIPS: Chronic | ICD-10-CM

## 2024-03-06 DIAGNOSIS — I10 PRIMARY HYPERTENSION: Chronic | ICD-10-CM

## 2024-03-06 DIAGNOSIS — Z85.038 HISTORY OF COLON CANCER: Chronic | ICD-10-CM

## 2024-03-06 LAB
CTP QC/QA: YES
POC MOLECULAR INFLUENZA A AGN: NEGATIVE
POC MOLECULAR INFLUENZA B AGN: NEGATIVE

## 2024-03-06 PROCEDURE — 99214 OFFICE O/P EST MOD 30 MIN: CPT | Mod: ,,, | Performed by: FAMILY MEDICINE

## 2024-03-06 PROCEDURE — 3008F BODY MASS INDEX DOCD: CPT | Mod: CPTII,,, | Performed by: FAMILY MEDICINE

## 2024-03-06 PROCEDURE — 1160F RVW MEDS BY RX/DR IN RCRD: CPT | Mod: CPTII,,, | Performed by: FAMILY MEDICINE

## 2024-03-06 PROCEDURE — 87502 INFLUENZA DNA AMP PROBE: CPT | Mod: RHCUB | Performed by: FAMILY MEDICINE

## 2024-03-06 PROCEDURE — 3078F DIAST BP <80 MM HG: CPT | Mod: CPTII,,, | Performed by: FAMILY MEDICINE

## 2024-03-06 PROCEDURE — 3074F SYST BP LT 130 MM HG: CPT | Mod: CPTII,,, | Performed by: FAMILY MEDICINE

## 2024-03-06 PROCEDURE — 4010F ACE/ARB THERAPY RXD/TAKEN: CPT | Mod: CPTII,,, | Performed by: FAMILY MEDICINE

## 2024-03-06 PROCEDURE — 1159F MED LIST DOCD IN RCRD: CPT | Mod: CPTII,,, | Performed by: FAMILY MEDICINE

## 2024-03-06 RX ORDER — MELOXICAM 7.5 MG/1
7.5 TABLET ORAL DAILY
Qty: 30 TABLET | Refills: 2 | Status: SHIPPED | OUTPATIENT
Start: 2024-03-06 | End: 2024-06-19 | Stop reason: CLARIF

## 2024-03-06 NOTE — PATIENT INSTRUCTIONS
Per EHR ([facility])  Plan of Treatment  - documented as of this encounter  Plan of Treatment - Upcoming Encounters  Upcoming Encounters  Date Type Department Care Team Description   04/15/2024 1:00 PM CDT Appointment [facility] Nuclear Medicine   [address] [place] [address]   [phone]  Mily Ryan MD   [address] [place] [address], [place] [address]   [phone] (Work)   [phone] (Fax)      04/15/2024 2:00 PM CDT Appointment [facility] Nuclear Medicine   [address] [place] [address]   [phone]  Mily Ryan MD   [address] [place] [address], [place] [address]   [phone] (Work)   [phone] (Fax)      04/23/2024 9:30 AM CDT Lab [facility]   [address]    [address]   [place] [address]   [phone]        04/23/2024 10:00 AM CDT Office Visit [facility] [place]   [address] [facility]   [address]   [place] [address]   [phone]  Mily Ryan MD   [address] [place] [address], [place] [address]   [phone] (Work)   [phone] (Fax)      05/07/2024 2:30 PM CDT Office Visit [facility] [place] [facility] Surgical Oncology   [address] [facility]   [address]   [place] [address]   [phone]  Schuyler Doty III, MD   [address] [place] [address], [place] [address]   [phone] (Work)   [phone] (Fax)

## 2024-03-06 NOTE — PROGRESS NOTES
Elkin Coombs MD   [facility]  [facility] GROUP [address] [address]  [phone]      PATIENT NAME: Hayley Landis  : 1970  DATE: 3/6/24  MRN: [id number]      Billing Provider: Elkin Coombs MD  Level of Service:   Patient PCP Information       Provider PCP Type    Elkin Coombs MD General            Reason for Visit / Chief Complaint: Cough, Fatigue, Diarrhea, Nasal Congestion, Generalized Body Aches, Shortness of Breath, Sore Throat, and Headache       Update PCP  Update Chief Complaint         History of Present Illness / Problem Focused Workflow     Hayley Landis presents to the clinic with Cough, Fatigue, Diarrhea, Nasal Congestion, Generalized Body Aches, Shortness of Breath, Sore Throat, and Headache       Flu-like symptoms x 1 week.  Says that symptoms change from day to day.  She has imodium that she can take for diarrhea.  Is requesting refill on mobic for her leg pain, which she states has been worse with onset of other symptoms.  She has again postponed getting her PET scan done and is now scheduled for mid April.      Cough  Associated symptoms include a fever, headaches, a sore throat and shortness of breath.   Fatigue  Associated symptoms include coughing, fatigue, a fever, headaches, nausea, a sore throat and vomiting.   Diarrhea   Associated symptoms include coughing, a fever, headaches and vomiting.   Shortness of Breath  Associated symptoms include a fever, headaches, a sore throat and vomiting.   Sore Throat   Associated symptoms include coughing, diarrhea, headaches, shortness of breath and vomiting.   Headache   Associated symptoms include coughing, a fever, nausea, a sore throat and vomiting.       Review of Systems     Review of Systems   Constitutional:  Positive for fatigue and fever.   HENT:  Positive for sore throat.    Respiratory:  Positive for cough and shortness of breath.     Gastrointestinal:  Positive for diarrhea, nausea and vomiting.   Neurological:  Positive for headaches.        Medical / Social / Family History     Past Medical History:   Diagnosis Date    Colon cancer     CVA (cerebral vascular accident)     Diabetes     HTN (hypertension)     Seizure        Past Surgical History:   Procedure Laterality Date    COLON SURGERY      HERNIA REPAIR      HYSTERECTOMY         Social History    reports that she has never smoked. She has never been exposed to tobacco smoke. She has never used smokeless tobacco. She reports that she does not drink alcohol and does not use drugs.   Social History     Tobacco Use    Smoking status: Never     Passive exposure: Never    Smokeless tobacco: Never   Substance Use Topics    Alcohol use: Never    Drug use: Never       Family History  Family History   Problem Relation Age of Onset    No Known Problems Mother     No Known Problems Father     No Known Problems Sister     No Known Problems Brother     No Known Problems Daughter     No Known Problems Son     No Known Problems Maternal Aunt     No Known Problems Maternal Uncle     No Known Problems Paternal Aunt     No Known Problems Paternal Uncle     No Known Problems Maternal Grandmother     No Known Problems Maternal Grandfather     No Known Problems Paternal Grandmother     No Known Problems Paternal Grandfather        Medications and Allergies     Medications  Outpatient Medications Marked as Taking for the 3/6/24 encounter (Office Visit) with Elkin Coombs MD   Medication Sig Dispense Refill    atorvastatin (LIPITOR) 10 MG tablet Take 1 tablet (10 mg total) by mouth once daily. 90 tablet 3    blood sugar diagnostic Strp To check BG bid times daily, to use with insurance preferred meter 100 strip 2    blood-glucose meter kit To check BG BID times daily, to use with insurance preferred meter 1 each 0    cholecalciferol, vitamin D3, 1,250 mcg (50,000 unit) capsule Take 1 capsule (50,000 Units  "total) by mouth every 7 days. 8 capsule 0    cyclobenzaprine (FLEXERIL) 10 MG tablet Take 10 mg by mouth 3 (three) times daily.      diclofenac sodium (VOLTAREN) 1 % Gel Apply 2 g topically 4 (four) times daily. To knee 300 g 0    hydroCHLOROthiazide (HYDRODIURIL) 12.5 MG Tab TAKE ONE TABLET BY MOUTH DAILY IN THE MORNING 90 tablet 3    insulin aspart U-100 (NOVOLOG) 100 unit/mL injection inject as directed by your physician per sliding scale 10 mL 5    insulin glargine (LANTUS U-100 INSULIN) 100 unit/mL injection Inject 80 Units into the skin once daily. 30 mL 5    insulin syringe-needle U-100 1 mL 30 gauge x 5/16 Syrg Use as directed. 100 each 5    levETIRAcetam (KEPPRA) 500 MG Tab TAKE ONE TABLET BY MOUTH ONCE DAILY 90 tablet 1    lisinopriL 10 MG tablet Take 1 tablet (10 mg total) by mouth once daily. 90 tablet 2    omeprazole (PRILOSEC) 40 MG capsule Take 1 capsule (40 mg total) by mouth once daily. 30 capsule 5    peppermint oiL (IBGARD) 90 mg CECX Take 2 capsules by mouth once daily. 48 each 2    potassium chloride 10% (KAYCIEL) 20 mEq/15 mL oral solution Take 15 mLs (20 mEq total) by mouth once daily. 473 mL 5    [DISCONTINUED] meloxicam (MOBIC) 7.5 MG tablet Take 1 tablet (7.5 mg total) by mouth once daily. 30 tablet 0       Allergies  Review of patient's allergies indicates:   Allergen Reactions    Grape Anaphylaxis     Other reaction(s): Not available    Morphine sulfate Swelling    Nutritional supplements Anaphylaxis     Highly allergic to Cooked grapes    Pineapple Anaphylaxis    Opioids - morphine analogues Nausea And Vomiting    Aspirin      "Veins breakdown" per patient    Biguanides Diarrhea and Other (See Comments)    Dicyclomine Diarrhea    Dihydrocodeine Other (See Comments)     Bad headache in the top of head    Gabapentin Other (See Comments)     weakness  Other reaction(s): Not available  Seizures      Hydrocodone Other (See Comments)     Headache      Iron      Hemochromatosis    Iron " analogues      Pt hx of hemachromatosis    Metformin      diarrhea    Penicillins Other (See Comments)     Family hx       Latex Rash and Other (See Comments)       Physical Examination     Vitals:    03/06/24 1453   BP: 126/76   Pulse: 99     Physical Exam  Vitals reviewed.   Constitutional:       General: She is not in acute distress.     Appearance: Normal appearance. She is not toxic-appearing.   HENT:      Head: Normocephalic and atraumatic.   Eyes:      Extraocular Movements: Extraocular movements intact.      Conjunctiva/sclera: Conjunctivae normal.      Pupils: Pupils are equal, round, and reactive to light.   Cardiovascular:      Rate and Rhythm: Normal rate and regular rhythm.      Heart sounds: Normal heart sounds.   Pulmonary:      Effort: Pulmonary effort is normal.      Breath sounds: Normal breath sounds.   Skin:     General: Skin is warm and dry.   Neurological:      General: No focal deficit present.      Mental Status: She is alert and oriented to person, place, and time.   Psychiatric:         Mood and Affect: Mood normal.         Behavior: Behavior normal.       Office Visit on 03/06/2024   Component Date Value Ref Range Status    POC Molecular Influenza A Ag 03/06/2024 Negative  Negative, Not Reported Final    POC Molecular Influenza B Ag 03/06/2024 Negative  Negative, Not Reported Final     Acceptable 03/06/2024 Yes   Final          Assessment and Plan (including Health Maintenance)      Problem List  Smart Sets  Document Outside HM   :    Plan: I again stressed how important it is for her to get her PET scan done and have suggested that if she is unable to go to [place] to get it done, maybe she can get it done in [place] at [place].  She says that she plans to get it done as scheduled in [place].    For current complaints I recommend rest and symptomatic care        Health Maintenance Due   Topic Date Due    TETANUS VACCINE  Never done    Shingles Vaccine (1 of 2) Never done     COVID-19 Vaccine (4 - 2023-24 season) 09/01/2023    Mammogram  10/17/2023    Foot Exam  02/03/2024    Diabetes Urine Screening  02/03/2024       Problem List Items Addressed This Visit          Cardiac/Vascular    Primary hypertension (Chronic)       Oncology    History of colon cancer     Other Visit Diagnoses       Viral disease exposure    -  Primary    Relevant Orders    POCT Influenza A/B Molecular (Completed)    Hip pain, unspecified laterality        Relevant Medications    meloxicam (MOBIC) 7.5 MG tablet    Primary osteoarthritis of both hips  (Chronic)       Relevant Medications    meloxicam (MOBIC) 7.5 MG tablet            Health Maintenance Topics with due status: Not Due       Topic Last Completion Date    Colorectal Cancer Screening 06/08/2020    Lipid Panel 07/24/2023    Eye Exam 10/23/2023    Hemoglobin A1c 12/15/2023       Future Appointments   Date Time Provider Department Center   4/24/2024  1:40 PM [facility]            Signature:  MD RADHA Doyle [facility]  MEDICAL GROUP [facility] - FAMILY MEDICINE  [address] [place] [id number]  [phone]    Date of encounter: 3/6/24

## 2024-04-08 ENCOUNTER — HOSPITAL ENCOUNTER (EMERGENCY)
Facility: HOSPITAL | Age: 54
Discharge: ANOTHER HOSPITAL | End: 2024-04-08
Payer: MEDICAID

## 2024-04-08 VITALS
TEMPERATURE: 98 F | RESPIRATION RATE: 18 BRPM | WEIGHT: 151 LBS | BODY MASS INDEX: 32.58 KG/M2 | HEART RATE: 81 BPM | HEIGHT: 57 IN | OXYGEN SATURATION: 95 % | SYSTOLIC BLOOD PRESSURE: 147 MMHG | DIASTOLIC BLOOD PRESSURE: 71 MMHG

## 2024-04-08 DIAGNOSIS — R93.5 ABNORMAL CT OF THE ABDOMEN: ICD-10-CM

## 2024-04-08 DIAGNOSIS — R74.8 ELEVATED LIVER ENZYMES: Primary | ICD-10-CM

## 2024-04-08 DIAGNOSIS — E86.0 DEHYDRATION: ICD-10-CM

## 2024-04-08 DIAGNOSIS — R19.7 DIARRHEA, UNSPECIFIED TYPE: ICD-10-CM

## 2024-04-08 DIAGNOSIS — R17 JAUNDICE: ICD-10-CM

## 2024-04-08 LAB
ALBUMIN SERPL BCP-MCNC: 2.1 G/DL (ref 3.5–5)
ALBUMIN/GLOB SERPL: 0.5 {RATIO}
ALP SERPL-CCNC: 1687 U/L (ref 41–108)
ALT SERPL W P-5'-P-CCNC: 339 U/L (ref 13–56)
ANION GAP SERPL CALCULATED.3IONS-SCNC: 13 MMOL/L (ref 7–16)
ANISOCYTOSIS BLD QL SMEAR: ABNORMAL
AST SERPL W P-5'-P-CCNC: 342 U/L (ref 15–37)
BACTERIA #/AREA URNS HPF: ABNORMAL /HPF
BASOPHILS # BLD AUTO: 0.05 K/UL (ref 0–0.2)
BASOPHILS NFR BLD AUTO: 0.5 % (ref 0–1)
BILIRUB SERPL-MCNC: 14.8 MG/DL (ref ?–1.2)
BILIRUB UR QL STRIP: ABNORMAL
BUN SERPL-MCNC: 14 MG/DL (ref 7–18)
BUN/CREAT SERPL: 23 (ref 6–20)
CALCIUM SERPL-MCNC: 9 MG/DL (ref 8.5–10.1)
CHLORIDE SERPL-SCNC: 96 MMOL/L (ref 98–107)
CLARITY UR: ABNORMAL
CO2 SERPL-SCNC: 25 MMOL/L (ref 21–32)
COLOR UR: ABNORMAL
CREAT SERPL-MCNC: 0.6 MG/DL (ref 0.55–1.02)
DIFFERENTIAL METHOD BLD: ABNORMAL
EGFR (NO RACE VARIABLE) (RUSH/TITUS): 108 ML/MIN/1.73M2
EOSINOPHIL # BLD AUTO: 1.01 K/UL (ref 0–0.5)
EOSINOPHIL NFR BLD AUTO: 10.6 % (ref 1–4)
ERYTHROCYTE [DISTWIDTH] IN BLOOD BY AUTOMATED COUNT: 17.4 % (ref 11.5–14.5)
GLOBULIN SER-MCNC: 4.3 G/DL (ref 2–4)
GLUCOSE SERPL-MCNC: 124 MG/DL (ref 74–106)
GLUCOSE SERPL-MCNC: 71 MG/DL (ref 70–105)
GLUCOSE UR STRIP-MCNC: 100 MG/DL
HCT VFR BLD AUTO: 39 % (ref 38–47)
HGB BLD-MCNC: 12.8 G/DL (ref 12–16)
HYPOCHROMIA BLD QL SMEAR: ABNORMAL
KETONES UR STRIP-SCNC: NEGATIVE MG/DL
LEUKOCYTE ESTERASE UR QL STRIP: NEGATIVE
LIPASE SERPL-CCNC: 21 U/L (ref 16–77)
LYMPHOCYTES # BLD AUTO: 1.2 K/UL (ref 1–4.8)
LYMPHOCYTES NFR BLD AUTO: 12.6 % (ref 27–41)
MCH RBC QN AUTO: 27.6 PG (ref 27–31)
MCHC RBC AUTO-ENTMCNC: 32.8 G/DL (ref 32–36)
MCV RBC AUTO: 84.1 FL (ref 80–96)
MONOCYTES # BLD AUTO: 0.97 K/UL (ref 0–0.8)
MONOCYTES NFR BLD AUTO: 10.2 % (ref 2–6)
MPC BLD CALC-MCNC: 12.6 FL (ref 9.4–12.4)
MUCOUS THREADS #/AREA URNS HPF: ABNORMAL /HPF
NEUTROPHILS # BLD AUTO: 6.3 K/UL (ref 1.8–7.7)
NEUTROPHILS NFR BLD AUTO: 66.1 % (ref 53–65)
NITRITE UR QL STRIP: NEGATIVE
PH UR STRIP: 6 PH UNITS
PLATELET # BLD AUTO: 224 K/UL (ref 150–400)
PLATELET MORPHOLOGY: ABNORMAL
POTASSIUM SERPL-SCNC: 3.1 MMOL/L (ref 3.5–5.1)
PROT SERPL-MCNC: 6.4 G/DL (ref 6.4–8.2)
PROT UR QL STRIP: 30
RBC # BLD AUTO: 4.64 M/UL (ref 4.2–5.4)
RBC # UR STRIP: NEGATIVE /UL
RBC #/AREA URNS HPF: ABNORMAL /HPF
SODIUM SERPL-SCNC: 131 MMOL/L (ref 136–145)
SP GR UR STRIP: >=1.03
SQUAMOUS #/AREA URNS LPF: ABNORMAL /LPF
STOMATOCYTES BLD QL SMEAR: ABNORMAL
UROBILINOGEN UR STRIP-ACNC: 1 MG/DL
WBC # BLD AUTO: 9.53 K/UL (ref 4.5–11)
WBC #/AREA URNS HPF: ABNORMAL /HPF

## 2024-04-08 PROCEDURE — 25500020 PHARM REV CODE 255: Performed by: NURSE PRACTITIONER

## 2024-04-08 PROCEDURE — 80053 COMPREHEN METABOLIC PANEL: CPT | Performed by: NURSE PRACTITIONER

## 2024-04-08 PROCEDURE — 82962 GLUCOSE BLOOD TEST: CPT

## 2024-04-08 PROCEDURE — 99284 EMERGENCY DEPT VISIT MOD MDM: CPT | Mod: ,,, | Performed by: NURSE PRACTITIONER

## 2024-04-08 PROCEDURE — 85025 COMPLETE CBC W/AUTO DIFF WBC: CPT | Performed by: NURSE PRACTITIONER

## 2024-04-08 PROCEDURE — 25000003 PHARM REV CODE 250: Performed by: NURSE PRACTITIONER

## 2024-04-08 PROCEDURE — 87086 URINE CULTURE/COLONY COUNT: CPT | Performed by: NURSE PRACTITIONER

## 2024-04-08 PROCEDURE — 99285 EMERGENCY DEPT VISIT HI MDM: CPT | Mod: 25

## 2024-04-08 PROCEDURE — 63600175 PHARM REV CODE 636 W HCPCS: Performed by: NURSE PRACTITIONER

## 2024-04-08 PROCEDURE — 83690 ASSAY OF LIPASE: CPT | Performed by: NURSE PRACTITIONER

## 2024-04-08 PROCEDURE — 81001 URINALYSIS AUTO W/SCOPE: CPT | Performed by: NURSE PRACTITIONER

## 2024-04-08 PROCEDURE — 96361 HYDRATE IV INFUSION ADD-ON: CPT

## 2024-04-08 PROCEDURE — 96374 THER/PROPH/DIAG INJ IV PUSH: CPT

## 2024-04-08 RX ORDER — ONDANSETRON HYDROCHLORIDE 2 MG/ML
4 INJECTION, SOLUTION INTRAVENOUS
Status: COMPLETED | OUTPATIENT
Start: 2024-04-08 | End: 2024-04-08

## 2024-04-08 RX ADMIN — SODIUM CHLORIDE 1000 ML: 9 INJECTION, SOLUTION INTRAVENOUS at 05:04

## 2024-04-08 RX ADMIN — IOPAMIDOL 100 ML: 755 INJECTION, SOLUTION INTRAVENOUS at 06:04

## 2024-04-08 RX ADMIN — ONDANSETRON 4 MG: 2 INJECTION INTRAMUSCULAR; INTRAVENOUS at 05:04

## 2024-04-08 NOTE — ED PROVIDER NOTES
"Encounter Date: 4/8/2024       History     Chief Complaint   Patient presents with    Diarrhea     Gas and 1 episode of vomiting has been going on for 10 days     Patient presents to the ED with complaints of diarrhea, some nausea and vomiting but mainly diarrhea for the past 10 days. Reports she has a history of colon cancer. Denies vague abdominal pain.     The history is provided by the patient.     Review of patient's allergies indicates:   Allergen Reactions    Grape Anaphylaxis     Other reaction(s): Not available    Morphine sulfate Swelling    Nutritional supplements Anaphylaxis     Highly allergic to Cooked grapes    Pineapple Anaphylaxis    Opioids - morphine analogues Nausea And Vomiting    Aspirin      "Veins breakdown" per patient    Biguanides Diarrhea and Other (See Comments)    Dicyclomine Diarrhea    Dihydrocodeine Other (See Comments)     Bad headache in the top of head    Gabapentin Other (See Comments)     weakness  Other reaction(s): Not available  Seizures      Hydrocodone Other (See Comments)     Headache      Iron      Hemochromatosis    Iron analogues      Pt hx of hemachromatosis    Metformin      diarrhea    Penicillins Other (See Comments)     Family hx       Latex Rash and Other (See Comments)     Past Medical History:   Diagnosis Date    Colon cancer     CVA (cerebral vascular accident)     Diabetes     HTN (hypertension)     Seizure      Past Surgical History:   Procedure Laterality Date    COLON SURGERY      HERNIA REPAIR      HYSTERECTOMY       Family History   Problem Relation Age of Onset    No Known Problems Mother     No Known Problems Father     No Known Problems Sister     No Known Problems Brother     No Known Problems Daughter     No Known Problems Son     No Known Problems Maternal Aunt     No Known Problems Maternal Uncle     No Known Problems Paternal Aunt     No Known Problems Paternal Uncle     No Known Problems Maternal Grandmother     No Known Problems Maternal " Grandfather     No Known Problems Paternal Grandmother     No Known Problems Paternal Grandfather      Social History     Tobacco Use    Smoking status: Never     Passive exposure: Never    Smokeless tobacco: Never   Substance Use Topics    Alcohol use: Never    Drug use: Never     Review of Systems   Constitutional: Negative.    Respiratory: Negative.     Cardiovascular: Negative.    Gastrointestinal:  Positive for abdominal pain (generalized), diarrhea, nausea and vomiting.   Musculoskeletal: Negative.    Skin: Negative.    Neurological: Negative.    Psychiatric/Behavioral: Negative.     All other systems reviewed and are negative.      Physical Exam     Initial Vitals   BP Pulse Resp Temp SpO2   04/08/24 1659 04/08/24 1659 04/08/24 1659 04/08/24 1705 04/08/24 1659   (!) 153/79 84 18 98.6 °F (37 °C) 96 %      MAP       --                Physical Exam    Vitals reviewed.  Constitutional: She appears well-developed and well-nourished. She appears toxic.   Eyes:   jaundiced   Cardiovascular:  Normal rate, regular rhythm, normal heart sounds and intact distal pulses.           Pulmonary/Chest: Breath sounds normal.   Abdominal: Abdomen is soft. Bowel sounds are normal. There is abdominal tenderness (generalized).   Musculoskeletal:         General: Normal range of motion.     Neurological: She is alert and oriented to person, place, and time. She has normal strength. GCS score is 15. GCS eye subscore is 4. GCS verbal subscore is 5. GCS motor subscore is 6.   Skin: Skin is warm and dry. Capillary refill takes less than 2 seconds.   Jaundiced   Psychiatric: She has a normal mood and affect. Her behavior is normal. Judgment and thought content normal.         Medical Screening Exam   See Full Note    ED Course   Procedures  Labs Reviewed   COMPREHENSIVE METABOLIC PANEL - Abnormal; Notable for the following components:       Result Value    Sodium 131 (*)     Potassium 3.1 (*)     Chloride 96 (*)     Glucose 124 (*)      BUN/Creatinine Ratio 23 (*)     Albumin 2.1 (*)     Globulin 4.3 (*)     Bilirubin, Total 14.8 (*)     Alk Phos 1,687 (*)      (*)      (*)     All other components within normal limits   URINALYSIS, REFLEX TO URINE CULTURE - Abnormal; Notable for the following components:    Protein, UA 30 (*)     Glucose,  (*)     Bilirubin, UA Large (*)     Specific Gravity, UA >=1.030 (*)     All other components within normal limits   CBC WITH DIFFERENTIAL - Abnormal; Notable for the following components:    RDW 17.4 (*)     MPV 12.6 (*)     Neutrophils % 66.1 (*)     Lymphocytes % 12.6 (*)     Monocytes % 10.2 (*)     Eosinophils % 10.6 (*)     Monocytes, Absolute 0.97 (*)     Eosinophils, Absolute 1.01 (*)     All other components within normal limits   CBC MORPHOLOGY - Abnormal; Notable for the following components:    Platelet Morphology Large & Giant Platelets (*)     All other components within normal limits   URINALYSIS, MICROSCOPIC - Abnormal; Notable for the following components:    WBC, UA 5-10 (*)     Bacteria, UA Moderate (*)     Squamous Epithelial Cells, UA Few (*)     Mucus, UA Moderate (*)     All other components within normal limits   LIPASE - Normal   CULTURE, URINE   CBC W/ AUTO DIFFERENTIAL    Narrative:     The following orders were created for panel order CBC W/ AUTO DIFFERENTIAL.  Procedure                               Abnormality         Status                     ---------                               -----------         ------                     CBC with Differential[[id number]]       Abnormal            Final result                 Please view results for these tests on the individual orders.   POCT GLUCOSE MONITORING CONTINUOUS          Imaging Results              CT Abdomen Pelvis With IV Contrast NO Oral Contrast (Final result)  Result time 04/08/24 19:00:32      Final result by Arjun Blackmon DO (04/08/24 19:00:32)                   Impression:      Interval progression  of metastatic disease including innumerable bibasilar pulmonary nodules, multiple hepatic lesions, and adenopathy. Suspect gallbladder wall thickening. The gallbladder lumen is somewhat hyperdense. Consider gallbladder ultrasound for further evaluation.  Other/detailed findings as above.    The CT exam was performed using one or more of the following dose    reduction techniques- Automated exposure control, adjustment of the mA    and/or kV according to patient size, and/or use of iterative    reconstructed technique.    Point of Service: [facility]      Electronically signed by: Arjun Blackmon  Date:    04/08/2024  Time:    19:00               Narrative:    EXAMINATION:  CT ABDOMEN PELVIS WITH IV CONTRAST    CLINICAL HISTORY:  Abdominal pain, acute, nonlocalized;    COMPARISON:  CT abdomen pelvis July 17, 2023    TECHNIQUE:  Multiple axial tomographic images of the abdomen and pelvis were obtained after administration of 100 cc Isovue 370 intravenous contrast.    FINDINGS:  Innumerable nodular densities demonstrated within the lung bases highly suspicious for metastatic disease.  Representative within the dorsal basal left lower lobe measures up to 2 cm in axial dimension.    Multiple hypoattenuating foci within the liver suspicious for metastatic disease.  Representative within the right lobe of liver measures up to 2.8 cm in axial dimension.  Suspect gallbladder wall thickening.  The gallbladder lumen is somewhat hyperdense.  Consider gallbladder ultrasound for further evaluation.  Visualized pancreas appears unremarkable.  Spleen grossly unremarkable.    Bilateral adrenal glands grossly unremarkable.  Bilateral kidneys appear grossly unremarkable.  Urinary bladder incompletely distended.  Prior hysterectomy.  Anastomotic suture material noted at the rectal level with presacral/perirectal scarring.  Suspicious small perirectal lymph nodes present.  Anastomotic suture material demonstrated within the  small bowel of the right ventral abdomen.  No evidence of gastrointestinal obstruction.  Suspicious multiple mildly prominent retroperitoneal lymph nodes.                                       Medications   sodium chloride 0.9% bolus 1,000 mL 1,000 mL (0 mLs Intravenous Stopped 4/8/24 1841)   ondansetron injection 4 mg (4 mg Intravenous Given 4/8/24 1742)   iopamidoL (ISOVUE-370) injection 100 mL (100 mLs Intravenous Given 4/8/24 1844)     Medical Decision Making  [facility]    Patient presents for emergent evaluation of acute diarrhea with some N/V that poses a threat to life and/or bodily function.    In the ED patient found to have acute elevated liver enzymes, jaundice, dehydration, abnormal CT abdomen.    I ordered labs and personally reviewed them.  Labs significant for WBC 9.53, H/H 12.8/39.0, Platelets 224, Na 131, K 3.1, Cl 96, ALP 1687, Albumin 2.1, Total Bilirubin 14.8, , , Large bilirubin in urine.    I ordered CT scan and personally reviewed it and reviewed the radiologist interpretation.  CT abdomen significant for Interval progression of metastatic disease including innumerable bibasilar pulmonary nodules, multiple hepatic lesions, and adenopathy. Suspect gallbladder wall thickening. The gallbladder lumen is somewhat hyperdense. Consider gallbladder ultrasound for further evaluation.  Other/detailed findings as above..      Discharge [facility]  I discussed the patient presentation labs, CT findings with the consultant [facility] tele-emergency, Dr. Alexander, he agreed with treatment plan and requested patient come to [facility] ED tonight for admission to be further evaluated and worked up for possible metastatic cancer of the liver and lungs.   Patient was managed in the ED with IV fluids and Zofran.    The response to treatment was good.      Amount and/or Complexity of Data Reviewed  Labs: ordered.  Radiology: ordered.    Risk  Prescription drug management.                                      Clinical  Impression:   Final diagnoses:  [R74.8] Elevated liver enzymes (Primary)  [R93.5] Abnormal CT of the abdomen  [R19.7] Diarrhea, unspecified type  [R17] Jaundice  [E86.0] Dehydration        ED Disposition Condition    Transfer to Another Facility Stable                Shasha Calix, [facility]  04/08/24 2133       Shasha Calix, [facility]  04/08/24 2139

## 2024-04-11 LAB — UA COMPLETE W REFLEX CULTURE PNL UR: ABNORMAL

## 2024-04-16 ENCOUNTER — OFFICE VISIT (OUTPATIENT)
Dept: FAMILY MEDICINE | Facility: CLINIC | Age: 54
End: 2024-04-16
Payer: MEDICAID

## 2024-04-16 VITALS
BODY MASS INDEX: 32.25 KG/M2 | SYSTOLIC BLOOD PRESSURE: 106 MMHG | HEART RATE: 103 BPM | HEIGHT: 57 IN | WEIGHT: 149.5 LBS | DIASTOLIC BLOOD PRESSURE: 69 MMHG

## 2024-04-16 DIAGNOSIS — Z85.038 HISTORY OF COLON CANCER: ICD-10-CM

## 2024-04-16 DIAGNOSIS — I10 PRIMARY HYPERTENSION: Chronic | ICD-10-CM

## 2024-04-16 DIAGNOSIS — Z79.4 TYPE 2 DIABETES MELLITUS WITHOUT COMPLICATION, WITH LONG-TERM CURRENT USE OF INSULIN: Chronic | ICD-10-CM

## 2024-04-16 DIAGNOSIS — C18.9 METASTATIC COLON CANCER TO LIVER: ICD-10-CM

## 2024-04-16 DIAGNOSIS — M54.16 LUMBAR RADICULOPATHY, RIGHT: Primary | Chronic | ICD-10-CM

## 2024-04-16 DIAGNOSIS — C78.7 METASTATIC COLON CANCER TO LIVER: ICD-10-CM

## 2024-04-16 DIAGNOSIS — E11.9 TYPE 2 DIABETES MELLITUS WITHOUT COMPLICATION, WITH LONG-TERM CURRENT USE OF INSULIN: Chronic | ICD-10-CM

## 2024-04-16 PROCEDURE — 3078F DIAST BP <80 MM HG: CPT | Mod: CPTII,,, | Performed by: FAMILY MEDICINE

## 2024-04-16 PROCEDURE — G2211 COMPLEX E/M VISIT ADD ON: HCPCS | Mod: ,,, | Performed by: FAMILY MEDICINE

## 2024-04-16 PROCEDURE — 3008F BODY MASS INDEX DOCD: CPT | Mod: CPTII,,, | Performed by: FAMILY MEDICINE

## 2024-04-16 PROCEDURE — 1160F RVW MEDS BY RX/DR IN RCRD: CPT | Mod: CPTII,,, | Performed by: FAMILY MEDICINE

## 2024-04-16 PROCEDURE — 1159F MED LIST DOCD IN RCRD: CPT | Mod: CPTII,,, | Performed by: FAMILY MEDICINE

## 2024-04-16 PROCEDURE — 3044F HG A1C LEVEL LT 7.0%: CPT | Mod: CPTII,,, | Performed by: FAMILY MEDICINE

## 2024-04-16 PROCEDURE — 3074F SYST BP LT 130 MM HG: CPT | Mod: CPTII,,, | Performed by: FAMILY MEDICINE

## 2024-04-16 PROCEDURE — 4010F ACE/ARB THERAPY RXD/TAKEN: CPT | Mod: CPTII,,, | Performed by: FAMILY MEDICINE

## 2024-04-16 PROCEDURE — 99215 OFFICE O/P EST HI 40 MIN: CPT | Mod: ,,, | Performed by: FAMILY MEDICINE

## 2024-04-16 RX ORDER — TRAMADOL HYDROCHLORIDE 50 MG/1
50 TABLET ORAL EVERY 12 HOURS PRN
Qty: 15 TABLET | Refills: 0 | Status: SHIPPED | OUTPATIENT
Start: 2024-04-16 | End: 2024-06-19 | Stop reason: CLARIF

## 2024-04-16 NOTE — PROGRESS NOTES
Elkin Coombs MD   [facility]  [facility] [address] [address]  [phone]      PATIENT NAME: Hayley Landis  : 1970  DATE: 24  MRN: [id number]      Billing Provider: Elkin Coombs MD  Level of Service:   Patient PCP Information       Provider PCP Type    Elkin Coombs MD General            Reason for Visit / Chief Complaint: Other (Discuss medications )       Update PCP  Update Chief Complaint         History of Present Illness / Problem Focused Workflow     Hayley Landis presents to the clinic with Other (Discuss medications )       52 yo WF here for complaint of back pain and muscle spasms.  Also complains of cough.      She had recently presented to ED for complaint of diarrhea and jaundice.  Was transferred to [facility].  She has a history of colon cancer.  During course of her stay she was diagnosed with metastatic cancer.  She underwent ERCP  with stent placed.  Oncologist is Dr. Ryan.  I have reviewed her medical records from [facility], including imaging and lab results.  She has follow up scheduled.  She        CT Abdomen W / Pelvis W IV Contrast    Result Date: 2024  Interval progression of metastatic disease including innumerable bibasilar pulmonary nodules, multiple hepatic lesions, and adenopathy. Suspect gallbladder wall thickening. The gallbladder lumen is somewhat hyperdense. Consider gallbladder ultrasound for further evaluation. Other/detailed findings as above. The CT exam was performed using one or more of the following dose reduction techniques- Automated exposure control, adjustment of the mA and/or kV according to patient size, and/or use of iterative reconstructed technique. Point of Service: [facility] Electronically signed by: Arjun Blackmon Date: 2024 Time: 19:00           Review of Systems     Review of Systems   Respiratory:  Positive for cough.     Gastrointestinal:  Positive for diarrhea (improved).   Musculoskeletal:  Positive for arthralgias, back pain and myalgias.        Medical / Social / Family History     Past Medical History:   Diagnosis Date    Colon cancer     CVA (cerebral vascular accident)     Diabetes     HTN (hypertension)     Seizure        Past Surgical History:   Procedure Laterality Date    COLON SURGERY      HERNIA REPAIR      HYSTERECTOMY         Social History    reports that she has never smoked. She has never been exposed to tobacco smoke. She has never used smokeless tobacco. She reports that she does not drink alcohol and does not use drugs.   Social History     Tobacco Use    Smoking status: Never     Passive exposure: Never    Smokeless tobacco: Never   Substance Use Topics    Alcohol use: Never    Drug use: Never       Family History  Family History   Problem Relation Name Age of Onset    No Known Problems Mother      No Known Problems Father      No Known Problems Sister      No Known Problems Brother      No Known Problems Daughter      No Known Problems Son      No Known Problems Maternal Aunt      No Known Problems Maternal Uncle      No Known Problems Paternal Aunt      No Known Problems Paternal Uncle      No Known Problems Maternal Grandmother      No Known Problems Maternal Grandfather      No Known Problems Paternal Grandmother      No Known Problems Paternal Grandfather         Medications and Allergies     Medications  Current Outpatient Medications   Medication Sig Dispense Refill    blood sugar diagnostic Strp To check BG bid times daily, to use with insurance preferred meter 100 strip 2    blood-glucose meter kit To check BG BID times daily, to use with insurance preferred meter 1 each 0    cholecalciferol, vitamin D3, 1,250 mcg (50,000 unit) capsule Take 1 capsule (50,000 Units total) by mouth every 7 days. 8 capsule 0    cyclobenzaprine (FLEXERIL) 10 MG tablet Take 10 mg by mouth 3 (three) times daily.       "hydroCHLOROthiazide (HYDRODIURIL) 12.5 MG Tab TAKE ONE TABLET BY MOUTH DAILY IN THE MORNING 90 tablet 3    insulin aspart U-100 (NOVOLOG) 100 unit/mL injection inject as directed by your physician per sliding scale 10 mL 5    insulin glargine (LANTUS U-100 INSULIN) 100 unit/mL injection Inject 80 Units into the skin once daily. 30 mL 5    insulin syringe-needle U-100 1 mL 30 gauge x 5/16 Syrg Use as directed. 100 each 5    levETIRAcetam (KEPPRA) 500 MG Tab TAKE ONE TABLET BY MOUTH ONCE DAILY 90 tablet 1    lisinopriL 10 MG tablet Take 1 tablet (10 mg total) by mouth once daily. 90 tablet 2    meloxicam (MOBIC) 7.5 MG tablet Take 1 tablet (7.5 mg total) by mouth once daily. 30 tablet 2    omeprazole (PRILOSEC) 40 MG capsule Take 1 capsule (40 mg total) by mouth once daily. 30 capsule 5    peppermint oiL (IBGARD) 90 mg CECX Take 2 capsules by mouth once daily. 48 each 2     No current facility-administered medications for this visit.       Allergies  Review of patient's allergies indicates:   Allergen Reactions    Grape Anaphylaxis     Other reaction(s): Not available    Morphine sulfate Swelling    Nutritional supplements Anaphylaxis     Highly allergic to Cooked grapes    Pineapple Anaphylaxis    Opioids - morphine analogues Nausea And Vomiting    Aspirin      "Veins breakdown" per patient    Biguanides Diarrhea and Other (See Comments)    Dicyclomine Diarrhea    Dihydrocodeine Other (See Comments)     Bad headache in the top of head    Gabapentin Other (See Comments)     weakness  Other reaction(s): Not available  Seizures      Hydrocodone Other (See Comments)     Headache      Iron      Hemochromatosis    Iron analogues      Pt hx of hemachromatosis    Metformin      diarrhea    Penicillins Other (See Comments)     Family hx       Latex Rash and Other (See Comments)       Physical Examination     Vitals:    04/16/24 1430   BP: 106/69   Pulse: 103     Physical Exam  Vitals reviewed.   Constitutional:       General: " She is not in acute distress.     Appearance: Normal appearance. She is not ill-appearing or toxic-appearing.   HENT:      Head: Normocephalic and atraumatic.   Eyes:      Extraocular Movements: Extraocular movements intact.      Conjunctiva/sclera: Conjunctivae normal.      Pupils: Pupils are equal, round, and reactive to light.   Cardiovascular:      Rate and Rhythm: Normal rate and regular rhythm.      Heart sounds: Normal heart sounds.   Pulmonary:      Effort: Pulmonary effort is normal.      Breath sounds: Normal breath sounds.   Musculoskeletal:         General: Normal range of motion.      Cervical back: Normal range of motion.   Skin:     General: Skin is warm and dry.      Coloration: Skin is not jaundiced.   Neurological:      General: No focal deficit present.      Mental Status: She is alert and oriented to person, place, and time.   Psychiatric:         Mood and Affect: Mood normal.         Behavior: Behavior normal.        CT Abdomen Pelvis With IV Contrast NO Oral Contrast  Narrative: EXAMINATION:  CT ABDOMEN PELVIS WITH IV CONTRAST    CLINICAL HISTORY:  Abdominal pain, acute, nonlocalized;    COMPARISON:  CT abdomen pelvis July 17, 2023    TECHNIQUE:  Multiple axial tomographic images of the abdomen and pelvis were obtained after administration of 100 cc Isovue 370 intravenous contrast.    FINDINGS:  Innumerable nodular densities demonstrated within the lung bases highly suspicious for metastatic disease.  Representative within the dorsal basal left lower lobe measures up to 2 cm in axial dimension.    Multiple hypoattenuating foci within the liver suspicious for metastatic disease.  Representative within the right lobe of liver measures up to 2.8 cm in axial dimension.  Suspect gallbladder wall thickening.  The gallbladder lumen is somewhat hyperdense.  Consider gallbladder ultrasound for further evaluation.  Visualized pancreas appears unremarkable.  Spleen grossly unremarkable.    Bilateral  adrenal glands grossly unremarkable.  Bilateral kidneys appear grossly unremarkable.  Urinary bladder incompletely distended.  Prior hysterectomy.  Anastomotic suture material noted at the rectal level with presacral/perirectal scarring.  Suspicious small perirectal lymph nodes present.  Anastomotic suture material demonstrated within the small bowel of the right ventral abdomen.  No evidence of gastrointestinal obstruction.  Suspicious multiple mildly prominent retroperitoneal lymph nodes.  Impression: Interval progression of metastatic disease including innumerable bibasilar pulmonary nodules, multiple hepatic lesions, and adenopathy. Suspect gallbladder wall thickening. The gallbladder lumen is somewhat hyperdense. Consider gallbladder ultrasound for further evaluation.  Other/detailed findings as above.    The CT exam was performed using one or more of the following dose    reduction techniques- Automated exposure control, adjustment of the mA    and/or kV according to patient size, and/or use of iterative    reconstructed technique.    Point of Service: [facility]    Electronically signed by: Arjun Blackmon  Date:    04/08/2024  Time:    19:00    US Abdomen Limited_Gallbladder  Order: [id number]  Impression    IMPRESSION:  1.  Echogenic debris within the gallbladder suspicious for sludge with suspected mild irregular wall thickening. Given the negative Camargo's sign, findings are somewhat equivocal for acute cholecystitis. Clinical correlation is recommended. Given the patient's history, consider follow-up MRI/MRCP or HIDA scan for further evaluation.    RESIDENT RADIOLOGIST: Oc Bhandari MD    ATTENDING RADIOLOGIST: Brandon Aponte D.O.    I have personally reviewed the image(s) and the resident's interpretations, performed any necessary editing, and agree with the findings of this report.  Narrative    RADIOLOGIC EXAM: US GALLBLADDER    DATE AND TIME OF EXAMINATION: 4/9/2024 3:20 AM    CLINICAL  HISTORY: jaundice, elevated LFTs, gallbladder thickening, metastatic rectal cancer RUQ US w/ gallbladder;  Other    COMPARISON: CT abdomen and pelvis 2024    TECHNIQUE: Ultrasound images of the gallbladder and biliary tract were obtained.    FINDINGS:  Gallbladder is normal in size and position. There is some echogenic material within the gallbladder. There are suspected areas of irregular gallbladder wall thickening measuring up to 4 to 5 mm. No pericholecystic fluid is appreciated. Sonographic Camargo sign was reported as negative. Common bile duct is within normal limits at 4.5 mm.  Exam End: 24 04:40    Specimen Collected: 24 05:59 Last Resulted: 24 06:24   Received From: [facility]  Result Received: 24 14:10      Significant Imagin2024 - MRCP - Extensive metastatic disease throughout the liver with innumerable enhancing lesions occupying the majority of the right hepatic lobe. Extensive metastatic disease to the chest with innumerable enhancing noncalcified pulmonary nodules and infrahilar lymphadenopathy. Metastatic lymphadenopathy in the kyaw hepatis, portal caval region and retroperitoneum. Assymetric gallbladder wall thickening measuring up to 1.2 cm concerning for possible metastatic involvement versus a second primary gallbladder cancer. Diffuse T2 hypointense signal throughout the gallbladder lumen likely represents sludge. 1.0 cm filling defect within the common bile duct concerning for choledocholithiasis with moderate left intrahepatic biliary ductal dilatation.     RENAL FUNCTION PANEL  Order: [id number]   Ref Range & Units 10 d ago   Albumin 3.5 - 5.5 g/dL 2.9 Low    Calcium 8.6 - 10.2 mg/dL 8.6   Phosphorus 2.7 - 4.5 mg/dL 2.3 Low    BUN 6.0 - 20.0 mg/dL 10.1   Sodium 136 - 145 mmol/L 133 Low    Potassium 3.4 - 4.5 mmol/L 4.1   Chloride 98 - 107 mmol/L 95 Low    CO2 22 - 29 mmol/L 26   Anion Gap 6.0 - 14.0 mmol/L 12.0   Glucose 74 -  106 mg/dL 140 High    Creatinine 0.51 - 0.95 mg/dL 0.44 Low    Comment: Elevated levels of N-acetylcysteine and N-acetyl-p-benzoquinone imine can cause falsely decreased values.   eGFR (from Creatinine) ml/min/1.73m² >=60   Resulting Agency  [facility] DEPARTMENT OF PATHOLOGY   Narrative  Performed by [facility] DEPARTMENT OF PATHOLOGY  AVERAGE GFR FOR 50-59 YEARS OLD: 93 ml/min/1.73 m²    CHRONIC KIDNEY DISEASE: <60 ml/min/1.73 m²  KIDNEY FAILURE: <15 ml/min/1.73 m²    The use of nonindexed eGFR values (mL/min) should be considered for drug dosing decisions.    Calculated using the CKD-EPI 2021 formula for IDMS-traceable methods.    Specimen Collected: 04/13/24 05:14    Performed by: [facility] DEPARTMENT OF PATHOLOGY Last Resulted: 04/13/24 06:57     HEPATIC FUNCTION PANEL  Order: [id number]   Ref Range & Units 10 d ago   Total Bilirubin 0.15 - 1.00 mg/dL 8.97 High    Bilirubin Direct 0.00 - 0.30 mg/dL 6.67 High    Alkaline Phosphatase 35 - 104 U/L 1,391 High    AST (SGOT) 0 - 32 U/L 76 High    ALT (SGPT) 0 - 33 U/L 132 High    Albumin 3.5 - 5.5 g/dL 2.9 Low    Total Protein 6.6 - 8.7 g/dL 6.1 Low    Resulting Agency  [facility] DEPARTMENT OF PATHOLOGY     Specimen Collected: 04/13/24 05:14    Performed by: [facility] DEPARTMENT OF PATHOLOGY Last Resulted: 04/13/24 06:56   Received From: [facility]  Result Received: 04/16/24 14:10     Assessment and Plan (including Health Maintenance)      Problem List  Smart Sets  Document Outside HM   :    Plan: she does not want to take mobic any longer but needs something for back and hip pain.  Will give rx for tramadol thatshe will take on a PRN basis.          Health Maintenance Due   Topic Date Due    TETANUS VACCINE  Never done    Shingles Vaccine (1 of 2) Never done    COVID-19 Vaccine (4 - 2023-24 season) 09/01/2023    Mammogram  10/17/2023    Diabetes Urine Screening  02/03/2024    Foot Exam  02/03/2024       Problem List Items Addressed This Visit    None      Health  Maintenance Topics with due status: Not Due       Topic Last Completion Date    Colorectal Cancer Screening 06/08/2020    Lipid Panel 07/24/2023    Eye Exam 10/23/2023    Hemoglobin A1c 04/09/2024       Future Appointments   Date Time Provider Department Center   4/24/2024  1:40 PM [facility]            Signature:  MD RADHA Doyle [facility]  MEDICAL GROUP [facility] - [facility]  [address] [place] [id number]  [phone]    Date of encounter: 4/16/24

## 2024-04-24 ENCOUNTER — HOSPITAL ENCOUNTER (OUTPATIENT)
Dept: RADIOLOGY | Facility: HOSPITAL | Age: 54
Discharge: HOME | End: 2024-04-24
Attending: FAMILY MEDICINE
Payer: MEDICAID

## 2024-04-24 DIAGNOSIS — Z12.31 OTHER SCREENING MAMMOGRAM: ICD-10-CM

## 2024-04-24 PROCEDURE — 77067 SCR MAMMO BI INCL CAD: CPT | Mod: TC

## 2024-05-03 ENCOUNTER — OFFICE VISIT (OUTPATIENT)
Dept: FAMILY MEDICINE | Facility: CLINIC | Age: 54
End: 2024-05-03
Payer: MEDICAID

## 2024-05-03 DIAGNOSIS — J39.2 THROAT IRRITATION: ICD-10-CM

## 2024-05-03 DIAGNOSIS — R05.2 SUBACUTE COUGH: Primary | ICD-10-CM

## 2024-05-03 PROCEDURE — 99441 PR PHYSICIAN TELEPHONE EVALUATION 5-10 MIN: CPT | Mod: GT,,, | Performed by: FAMILY MEDICINE

## 2024-05-03 PROCEDURE — 3044F HG A1C LEVEL LT 7.0%: CPT | Mod: CPTII,,, | Performed by: FAMILY MEDICINE

## 2024-05-03 PROCEDURE — 4010F ACE/ARB THERAPY RXD/TAKEN: CPT | Mod: CPTII,,, | Performed by: FAMILY MEDICINE

## 2024-05-03 PROCEDURE — 1159F MED LIST DOCD IN RCRD: CPT | Mod: CPTII,,, | Performed by: FAMILY MEDICINE

## 2024-05-03 PROCEDURE — 1160F RVW MEDS BY RX/DR IN RCRD: CPT | Mod: CPTII,,, | Performed by: FAMILY MEDICINE

## 2024-05-03 RX ORDER — LIDOCAINE HYDROCHLORIDE 20 MG/ML
SOLUTION OROPHARYNGEAL EVERY 6 HOURS
Qty: 100 ML | Refills: 0 | Status: SHIPPED | OUTPATIENT
Start: 2024-05-03

## 2024-05-03 NOTE — PROGRESS NOTES
"Established Patient - Audio Only Telehealth Visit     The patient location is: home  The chief complaint leading to consultation is: cough/throat irritation  Visit type: Virtual visit with audio only (telephone)  Total time spent with patient: 6 minutes        The reason for the audio only service rather than synchronous audio and video virtual visit was related to technical difficulties or patient preference/necessity.     Each patient to whom I provide medical services by telemedicine is:  (1) informed of the relationship between the physician and patient and the respective role of any other health care provider with respect to management of the patient; and (2) notified that they may decline to receive medical services by telemedicine and may withdraw from such care at any time. Patient verbally consented to receive this service via voice-only telephone call.        HPI: 52 yo WF with metastatic colon cancer.  She is starting xeloda therapy.  Is complaining of "really bad cough."  Has been taking DM but is not helping.  Says that her cough is due mainly to throat irritation and is requesting something to help with that.      Assessment and plan:   cough/throat irritation- I am prescribing viscous lidocaine and advised her to mix with equal parts of maalox or mylanta to be gargled and swallowed every 6 hours prn.                                  This service was not originating from a related E/M service provided within the previous 7 days nor will  to an E/M service or procedure within the next 24 hours or my soonest available appointment.  Prevailing standard of care was able to be met in this audio-only visit.    "

## 2024-05-06 ENCOUNTER — HOSPITAL ENCOUNTER (EMERGENCY)
Facility: HOSPITAL | Age: 54
Discharge: HOME | End: 2024-05-06
Payer: MEDICAID

## 2024-05-06 VITALS
SYSTOLIC BLOOD PRESSURE: 139 MMHG | HEIGHT: 57 IN | DIASTOLIC BLOOD PRESSURE: 82 MMHG | BODY MASS INDEX: 30.85 KG/M2 | HEART RATE: 95 BPM | TEMPERATURE: 98 F | RESPIRATION RATE: 16 BRPM | WEIGHT: 143 LBS | OXYGEN SATURATION: 96 %

## 2024-05-06 DIAGNOSIS — J18.9 PNEUMONIA DUE TO INFECTIOUS ORGANISM, UNSPECIFIED LATERALITY, UNSPECIFIED PART OF LUNG: Primary | ICD-10-CM

## 2024-05-06 DIAGNOSIS — R05.9 COUGH: ICD-10-CM

## 2024-05-06 PROCEDURE — 99283 EMERGENCY DEPT VISIT LOW MDM: CPT | Mod: 25

## 2024-05-06 PROCEDURE — 99284 EMERGENCY DEPT VISIT MOD MDM: CPT | Mod: ,,, | Performed by: NURSE PRACTITIONER

## 2024-05-06 RX ORDER — LEVOFLOXACIN 750 MG/1
750 TABLET ORAL DAILY
Qty: 7 TABLET | Refills: 0 | Status: SHIPPED | OUTPATIENT
Start: 2024-05-06 | End: 2024-05-13

## 2024-05-06 RX ORDER — CAPECITABINE 150 MG/1
150 TABLET, FILM COATED ORAL 3 TIMES DAILY
COMMUNITY

## 2024-05-06 NOTE — ED PROVIDER NOTES
"Encounter Date: 5/6/2024       History     Chief Complaint   Patient presents with    Shortness of Breath     Pt presents with c/o SOB related to recent lung cancer diagnosis. Pt reports having a cough for approx one year.      Patient presents to the ED with complaints of shortness of breath and cough. Patient reports she has seen her PCP for this and reports it has been ongoing for at least a month. Patient also reports she has terminal cancer and is on oral chemo. Denies any fever.    The history is provided by the patient.     Review of patient's allergies indicates:   Allergen Reactions    Grape Anaphylaxis     Other reaction(s): Not available    Morphine sulfate Swelling    Nutritional supplements Anaphylaxis     Highly allergic to Cooked grapes    Pineapple Anaphylaxis    Opioids - morphine analogues Nausea And Vomiting    Aspirin      "Veins breakdown" per patient    Biguanides Diarrhea and Other (See Comments)    Dicyclomine Diarrhea    Dihydrocodeine Other (See Comments)     Bad headache in the top of head    Gabapentin Other (See Comments)     weakness  Other reaction(s): Not available  Seizures      Hydrocodone Other (See Comments)     Headache      Iron      Hemochromatosis    Iron analogues      Pt hx of hemachromatosis    Metformin      diarrhea    Penicillins Other (See Comments)     Family hx       Latex Rash and Other (See Comments)     Past Medical History:   Diagnosis Date    Colon cancer     CVA (cerebral vascular accident)     Diabetes     HTN (hypertension)     Lung cancer     Seizure      Past Surgical History:   Procedure Laterality Date    COLON SURGERY      HERNIA REPAIR      HYSTERECTOMY       Family History   Problem Relation Name Age of Onset    No Known Problems Mother      No Known Problems Father      No Known Problems Sister      No Known Problems Brother      No Known Problems Daughter      No Known Problems Son      No Known Problems Maternal Aunt      No Known Problems Maternal " Uncle      No Known Problems Paternal Aunt      No Known Problems Paternal Uncle      No Known Problems Maternal Grandmother      No Known Problems Maternal Grandfather      No Known Problems Paternal Grandmother      No Known Problems Paternal Grandfather       Social History     Tobacco Use    Smoking status: Never     Passive exposure: Never    Smokeless tobacco: Never   Substance Use Topics    Alcohol use: Never    Drug use: Never     Review of Systems   Constitutional: Negative.    Respiratory:  Positive for cough and shortness of breath.    Cardiovascular: Negative.    Musculoskeletal: Negative.    Skin: Negative.    Neurological: Negative.    Psychiatric/Behavioral: Negative.     All other systems reviewed and are negative.      Physical Exam     Initial Vitals [05/06/24 1008]   BP Pulse Resp Temp SpO2   (!) 160/75 109 (!) 22 98.3 °F (36.8 °C) (!) 92 %      MAP       --         Physical Exam    Vitals reviewed.  Constitutional: She appears well-developed and well-nourished.   Cardiovascular:  Normal rate, regular rhythm, normal heart sounds and intact distal pulses.           Pulmonary/Chest: She has rhonchi.   Musculoskeletal:         General: Normal range of motion.     Neurological: She is alert and oriented to person, place, and time. She has normal strength. GCS score is 15. GCS eye subscore is 4. GCS verbal subscore is 5. GCS motor subscore is 6.   Skin: Skin is warm and dry. Capillary refill takes less than 2 seconds.   Psychiatric: She has a normal mood and affect. Her behavior is normal. Judgment and thought content normal.         Medical Screening Exam   See Full Note    ED Course   Procedures  Labs Reviewed - No data to display       Imaging Results              X-Ray Chest AP Portable (Final result)  Result time 05/06/24 10:50:14      Final result by Arjun Blackmon DO (05/06/24 10:50:14)                   Impression:      As above.    Point of Service: [facility]      Electronically signed by: Arjun Blackmon  Date:    05/06/2024  Time:    10:50               Narrative:    EXAMINATION:  XR CHEST AP PORTABLE    CLINICAL HISTORY:  Cough, unspecified    COMPARISON:  Chest x-ray June 24, 2022    TECHNIQUE:  Frontal view/views of the chest.    FINDINGS:  Borderline cardiomegaly.  Diffuse nodularity and patchy opacification of the lungs.  Underlying metastatic disease and/or pneumonia may be present.  Visualized osseous and surrounding soft tissue structures appear grossly unchanged.                                       Medications - No data to display  Medical Decision Making  MDM    Patient presents for emergent evaluation of acute cough and SOB that poses a threat to life and/or bodily function.    In the ED patient found to have acute community acquired pneumonia.    I ordered X-rays and personally reviewed them and reviewed the radiologist interpretation.  Chest Xray significant for Borderline cardiomegaly.  Diffuse nodularity and patchy opacification of the lungs.  Underlying metastatic disease and/or pneumonia may be present.  Visualized osseous and surrounding soft tissue structures appear grossly unchanged..    Patient was seen in ED in last month and was transferred to [facility] after her CT showed an innumerable amount of tumors in her liver and lungs. Patient confirmed this and also reports she has gallbladder cancer that was discovered with the the other tumors on the MRI. Patient reports she is doing the oral chem and may do IV chemo but missed her appointment last week. States her next appointment is next week.      Discharge MDM  I discussed the treatment and discharge plan with the patient, will discharge home on Levaquin and have her follow up with PCP.   Patient was discharged in stable condition.  Detailed return precautions discussed.    Amount and/or Complexity of Data Reviewed  Radiology: ordered.    Risk  Prescription drug management.                                       Clinical Impression:   Final diagnoses:  [R05.9] Cough  [J18.9] Pneumonia due to infectious organism, unspecified laterality, unspecified part of lung (Primary)        ED Disposition Condition    Discharge Stable          ED Prescriptions       Medication Sig Dispense Start Date End Date Auth. Provider    levoFLOXacin (LEVAQUIN) 750 MG tablet Take 1 tablet (750 mg total) by mouth once daily. for 7 days 7 tablet 5/6/2024 5/13/2024 Shasha Calix FNP          Follow-up Information    None          Shasha Calix FNP  05/06/24 1220

## 2024-05-10 ENCOUNTER — OFFICE VISIT (OUTPATIENT)
Dept: FAMILY MEDICINE | Facility: CLINIC | Age: 54
End: 2024-05-10
Payer: MEDICAID

## 2024-05-10 VITALS
HEART RATE: 104 BPM | WEIGHT: 143 LBS | HEIGHT: 57 IN | DIASTOLIC BLOOD PRESSURE: 81 MMHG | SYSTOLIC BLOOD PRESSURE: 150 MMHG | OXYGEN SATURATION: 93 % | BODY MASS INDEX: 30.85 KG/M2

## 2024-05-10 DIAGNOSIS — K59.03 DRUG-INDUCED CONSTIPATION: ICD-10-CM

## 2024-05-10 DIAGNOSIS — I10 PRIMARY HYPERTENSION: Chronic | ICD-10-CM

## 2024-05-10 DIAGNOSIS — R05.2 SUBACUTE COUGH: ICD-10-CM

## 2024-05-10 DIAGNOSIS — J18.9 COMMUNITY ACQUIRED PNEUMONIA, UNSPECIFIED LATERALITY: Primary | ICD-10-CM

## 2024-05-10 DIAGNOSIS — C78.7 METASTATIC COLON CANCER TO LIVER: Chronic | ICD-10-CM

## 2024-05-10 DIAGNOSIS — C18.9 METASTATIC COLON CANCER TO LIVER: Chronic | ICD-10-CM

## 2024-05-10 PROCEDURE — 99214 OFFICE O/P EST MOD 30 MIN: CPT | Mod: ,,, | Performed by: FAMILY MEDICINE

## 2024-05-10 PROCEDURE — 1160F RVW MEDS BY RX/DR IN RCRD: CPT | Mod: CPTII,,, | Performed by: FAMILY MEDICINE

## 2024-05-10 PROCEDURE — 3077F SYST BP >= 140 MM HG: CPT | Mod: CPTII,,, | Performed by: FAMILY MEDICINE

## 2024-05-10 PROCEDURE — 3008F BODY MASS INDEX DOCD: CPT | Mod: CPTII,,, | Performed by: FAMILY MEDICINE

## 2024-05-10 PROCEDURE — G2211 COMPLEX E/M VISIT ADD ON: HCPCS | Mod: ,,, | Performed by: FAMILY MEDICINE

## 2024-05-10 PROCEDURE — 1159F MED LIST DOCD IN RCRD: CPT | Mod: CPTII,,, | Performed by: FAMILY MEDICINE

## 2024-05-10 PROCEDURE — 4010F ACE/ARB THERAPY RXD/TAKEN: CPT | Mod: CPTII,,, | Performed by: FAMILY MEDICINE

## 2024-05-10 PROCEDURE — 3079F DIAST BP 80-89 MM HG: CPT | Mod: CPTII,,, | Performed by: FAMILY MEDICINE

## 2024-05-10 PROCEDURE — 3044F HG A1C LEVEL LT 7.0%: CPT | Mod: CPTII,,, | Performed by: FAMILY MEDICINE

## 2024-05-10 RX ORDER — POLYETHYLENE GLYCOL 3350 17 G/17G
17 POWDER, FOR SOLUTION ORAL DAILY
Start: 2024-05-10 | End: 2024-05-17

## 2024-05-10 NOTE — PROGRESS NOTES
Elkin Coombs MD   [facility]  [facility]ICAL GROUP [address] [address]  [phone]      PATIENT NAME: Hayley Landis  : 1970  DATE: 5/10/24  MRN: [id number]      Billing Provider: Elkin Coombs MD  Level of Service: AK OFFICE/OUTPT VISIT, EST, LEVL IV, 30-39 MIN  Patient PCP Information       Provider PCP Type    Elkin Coombs MD General            Reason for Visit / Chief Complaint: Cough and Constipation       Update PCP  Update Chief Complaint         History of Present Illness / Problem Focused Workflow     Hayley Landis presents to the clinic with Cough and Constipation       54 yo WF who was recently seen at ED for cough and diagnosed with CAP.  Has been taking levaquin.  She says that her cough is much improved but she has been constipated since starting her antibiotic.  She does have metastatic CRC.  Will start treatment soon at [facility].  I have reviewed her ED documentation from 24 at [facility] and oncology visit.      Cough  Pertinent negatives include no chest pain, chills, fever, sore throat or shortness of breath.   Constipation  Pertinent negatives include no abdominal pain or fever.       Review of Systems     Review of Systems   Constitutional:  Negative for activity change, chills and fever.   HENT:  Negative for sore throat.    Eyes:  Negative for pain.   Respiratory:  Positive for cough. Negative for chest tightness and shortness of breath.    Cardiovascular:  Negative for chest pain and palpitations.   Gastrointestinal:  Positive for constipation. Negative for abdominal pain.   Neurological:  Negative for dizziness, syncope and weakness.   Psychiatric/Behavioral:  Negative for confusion.         Medical / Social / Family History     Past Medical History:   Diagnosis Date    Colon cancer     CVA (cerebral vascular accident)     Diabetes     HTN (hypertension)     Lung cancer      "Seizure        Past Surgical History:   Procedure Laterality Date    COLON SURGERY      HERNIA REPAIR      HYSTERECTOMY         Social History    reports that she has never smoked. She has never been exposed to tobacco smoke. She has never used smokeless tobacco. She reports that she does not drink alcohol and does not use drugs.   Social History     Tobacco Use    Smoking status: Never     Passive exposure: Never    Smokeless tobacco: Never   Substance Use Topics    Alcohol use: Never    Drug use: Never       Family History  Family History   Problem Relation Name Age of Onset    No Known Problems Mother      No Known Problems Father      No Known Problems Sister      No Known Problems Brother      No Known Problems Daughter      No Known Problems Son      No Known Problems Maternal Aunt      No Known Problems Maternal Uncle      No Known Problems Paternal Aunt      No Known Problems Paternal Uncle      No Known Problems Maternal Grandmother      No Known Problems Maternal Grandfather      No Known Problems Paternal Grandmother      No Known Problems Paternal Grandfather         Medications and Allergies     Medications  No outpatient medications have been marked as taking for the 5/10/24 encounter (Office Visit) with Elkin Coombs MD.       Allergies  Review of patient's allergies indicates:   Allergen Reactions    Grape Anaphylaxis     Other reaction(s): Not available    Morphine sulfate Swelling    Nutritional supplements Anaphylaxis     Highly allergic to Cooked grapes    Pineapple Anaphylaxis    Opioids - morphine analogues Nausea And Vomiting    Aspirin      "Veins breakdown" per patient    Biguanides Diarrhea and Other (See Comments)    Dicyclomine Diarrhea    Dihydrocodeine Other (See Comments)     Bad headache in the top of head    Gabapentin Other (See Comments)     weakness  Other reaction(s): Not available  Seizures      Hydrocodone Other (See Comments)     Headache      Iron      Hemochromatosis    " Iron analogues      Pt hx of hemachromatosis    Metformin      diarrhea    Penicillins Other (See Comments)     Family hx       Latex Rash and Other (See Comments)       Physical Examination     Vitals:    05/10/24 1027   BP: (!) 150/81   Pulse: 104     Physical Exam  Vitals reviewed.   Constitutional:       Appearance: Normal appearance.   HENT:      Head: Normocephalic and atraumatic.   Eyes:      Extraocular Movements: Extraocular movements intact.      Conjunctiva/sclera: Conjunctivae normal.      Pupils: Pupils are equal, round, and reactive to light.   Cardiovascular:      Rate and Rhythm: Normal rate and regular rhythm.      Heart sounds: Normal heart sounds.   Pulmonary:      Effort: Pulmonary effort is normal. No respiratory distress.      Breath sounds: Normal breath sounds. No wheezing, rhonchi or rales.   Musculoskeletal:         General: Normal range of motion.      Cervical back: Normal range of motion.   Skin:     General: Skin is warm and dry.   Neurological:      General: No focal deficit present.      Mental Status: She is alert and oriented to person, place, and time.   Psychiatric:         Mood and Affect: Mood normal.         Behavior: Behavior normal.        X-Ray Chest AP Portable  Narrative: EXAMINATION:  XR CHEST AP PORTABLE    CLINICAL HISTORY:  Cough, unspecified    COMPARISON:  Chest x-ray June 24, 2022    TECHNIQUE:  Frontal view/views of the chest.    FINDINGS:  Borderline cardiomegaly.  Diffuse nodularity and patchy opacification of the lungs.  Underlying metastatic disease and/or pneumonia may be present.  Visualized osseous and surrounding soft tissue structures appear grossly unchanged.  Impression: As above.    Point of Service: [facility]    Electronically signed by: Arjun Blackmon  Date:    05/06/2024  Time:    10:50    No visits with results within 1 Week(s) from this visit.   Latest known visit with results is:   Admission on 04/08/2024, Discharged on 04/08/2024    Component Date Value Ref Range Status    Sodium 04/08/2024 131 (L)  136 - 145 mmol/L Final    Potassium 04/08/2024 3.1 (L)  3.5 - 5.1 mmol/L Final    Chloride 04/08/2024 96 (L)  98 - 107 mmol/L Final    CO2 04/08/2024 25  21 - 32 mmol/L Final    Anion Gap 04/08/2024 13  7 - 16 mmol/L Final    Glucose 04/08/2024 124 (H)  74 - 106 mg/dL Final    BUN 04/08/2024 14  7 - 18 mg/dL Final    Creatinine 04/08/2024 0.60  0.55 - 1.02 mg/dL Final    BUN/Creatinine Ratio 04/08/2024 23 (H)  6 - 20 Final    Calcium 04/08/2024 9.0  8.5 - 10.1 mg/dL Final    Total Protein 04/08/2024 6.4  6.4 - 8.2 g/dL Final    Albumin 04/08/2024 2.1 (L)  3.5 - 5.0 g/dL Final    Globulin 04/08/2024 4.3 (H)  2.0 - 4.0 g/dL Final    A/G Ratio 04/08/2024 0.5   Final    Bilirubin, Total 04/08/2024 14.8 (H)  >0.0 - 1.2 mg/dL Final    Alk Phos 04/08/2024 1,687 (H)  41 - 108 U/L Final    ALT 04/08/2024 339 (H)  13 - 56 U/L Final    AST 04/08/2024 342 (H)  15 - 37 U/L Final    eGFR 04/08/2024 108  >=60 mL/min/1.73m2 Final    Lipase 04/08/2024 21  16 - 77 U/L Final    Color, UA 04/08/2024 Dark Yellow  Colorless, Straw, Yellow, Light Yellow, Dark Yellow Final    Clarity, UA 04/08/2024 Cloudy  Clear Final    pH, UA 04/08/2024 6.0  5.0 to 8.0 pH Units Final    Leukocytes, UA 04/08/2024 Negative  Negative Final    Nitrites, UA 04/08/2024 Negative  Negative Final    Protein, UA 04/08/2024 30 (A)  Negative Final    Glucose, UA 04/08/2024 100 (A)  Normal mg/dL Final    Ketones, UA 04/08/2024 Negative  Negative mg/dL Final    Urobilinogen, UA 04/08/2024 1.0  0.2, 1.0, Normal mg/dL Final    Bilirubin, UA 04/08/2024 Large (A)  Negative Final    Blood, UA 04/08/2024 Negative  Negative Final    Specific Gravity, UA 04/08/2024 >=1.030 (A)  <=1.005, 1.010, 1.015, 1.020, 1.025, 1.030 Final    WBC 04/08/2024 9.53  4.50 - 11.00 K/uL Final    RBC 04/08/2024 4.64  4.20 - 5.40 M/uL Final    Hemoglobin 04/08/2024 12.8  12.0 - 16.0 g/dL Final    Hematocrit 04/08/2024 39.0  38.0  - 47.0 % Final    MCV 04/08/2024 84.1  80.0 - 96.0 fL Final    MCH 04/08/2024 27.6  27.0 - 31.0 pg Final    MCHC 04/08/2024 32.8  32.0 - 36.0 g/dL Final    RDW 04/08/2024 17.4 (H)  11.5 - 14.5 % Final    Platelet Count 04/08/2024 224  150 - 400 K/uL Final    MPV 04/08/2024 12.6 (H)  9.4 - 12.4 fL Final    Neutrophils % 04/08/2024 66.1 (H)  53.0 - 65.0 % Final    Lymphocytes % 04/08/2024 12.6 (L)  27.0 - 41.0 % Final    Neutrophils, Abs 04/08/2024 6.30  1.80 - 7.70 K/uL Final    Lymphocytes, Absolute 04/08/2024 1.20  1.00 - 4.80 K/uL Final    Diff Type 04/08/2024 Scan Smear   Final    Monocytes % 04/08/2024 10.2 (H)  2.0 - 6.0 % Final    Eosinophils % 04/08/2024 10.6 (H)  1.0 - 4.0 % Final    Basophils % 04/08/2024 0.5  0.0 - 1.0 % Final    Monocytes, Absolute 04/08/2024 0.97 (H)  0.00 - 0.80 K/uL Final    Eosinophils, Absolute 04/08/2024 1.01 (H)  0.00 - 0.50 K/uL Final    Basophils, Absolute 04/08/2024 0.05  0.00 - 0.20 K/uL Final    Platelet Morphology 04/08/2024 Large & Giant Platelets (A)  Normal Final    Anisocytosis 04/08/2024 1+   Final    Hypochromic 04/08/2024 1+   Final    Stomatocytes 04/08/2024 1+   Final    WBC, UA 04/08/2024 5-10 (A)  None Seen, 0-5 /hpf Final    RBC, UA 04/08/2024 0-3  None Seen, 0-3 /hpf Final    Bacteria, UA 04/08/2024 Moderate (A)  None Seen /hpf Final    Squamous Epithelial Cells, UA 04/08/2024 Few (A)  None Seen, Rare, None Seen To Occasional /lpf Final    Mucus, UA 04/08/2024 Moderate (A)  None Seen /hpf Final    Culture, Urine 04/08/2024 35,000 Klebsiella pneumoniae (A)   Final    POC Glucose 04/08/2024 71  70 - 105 mg/dL Final       Assessment and Plan (including Health Maintenance)      Problem List  Smart Sets  Document Outside HM   :    Plan: miralax for constipation.  Complete course of levaquin.  Follow up with oncology as scheduled.    This encounter included increased complexity inherent to the evaluation and management associated with medical care services that serve  as the continuing focal point for all needed health care services and/or with medical care services that are part of ongoing care related to a patients single, serious condition or a complex condition.  Previous labs and encounters have been reviewed during the course of this visit in order to make medical decisions related to ongoing care of the patient.         Health Maintenance Due   Topic Date Due    TETANUS VACCINE  Never done    Shingles Vaccine (1 of 2) Never done    COVID-19 Vaccine (4 - 2023-24 season) 09/01/2023    Diabetes Urine Screening  02/03/2024    Foot Exam  02/03/2024       Problem List Items Addressed This Visit          Cardiac/Vascular    Primary hypertension (Chronic)     Other Visit Diagnoses       Community acquired pneumonia, unspecified laterality    -  Primary    Subacute cough        Metastatic colon cancer to liver  (Chronic)       Drug-induced constipation        Relevant Medications    polyethylene glycol (GLYCOLAX) 17 gram/dose powder            Health Maintenance Topics with due status: Not Due       Topic Last Completion Date    Colorectal Cancer Screening 06/08/2020    Lipid Panel 07/24/2023    Eye Exam 10/23/2023    Low Dose Statin 02/09/2024    Hemoglobin A1c 04/09/2024    Mammogram 04/24/2024       Future Appointments   Date Time Provider Department Center   4/30/2025  2:00 PM [facility]            Signature:  MD RADHA Doyle [facility]  MEDICAL GROUP [facility] - FAMILY MEDICINE  [address] [place] [id number]  [phone]    Date of encounter: 5/10/24

## 2024-05-16 ENCOUNTER — OFFICE VISIT (OUTPATIENT)
Dept: FAMILY MEDICINE | Facility: CLINIC | Age: 54
End: 2024-05-16
Payer: MEDICAID

## 2024-05-16 DIAGNOSIS — M25.552 BILATERAL HIP PAIN: ICD-10-CM

## 2024-05-16 DIAGNOSIS — M25.551 BILATERAL HIP PAIN: ICD-10-CM

## 2024-05-16 DIAGNOSIS — M54.16 LUMBAR RADICULOPATHY, RIGHT: Primary | ICD-10-CM

## 2024-05-16 DIAGNOSIS — C78.7 METASTATIC COLON CANCER TO LIVER: ICD-10-CM

## 2024-05-16 DIAGNOSIS — C18.9 METASTATIC COLON CANCER TO LIVER: ICD-10-CM

## 2024-05-16 DIAGNOSIS — R19.7 DIARRHEA, UNSPECIFIED TYPE: ICD-10-CM

## 2024-05-16 PROCEDURE — 1160F RVW MEDS BY RX/DR IN RCRD: CPT | Mod: CPTII,,, | Performed by: FAMILY MEDICINE

## 2024-05-16 PROCEDURE — 4010F ACE/ARB THERAPY RXD/TAKEN: CPT | Mod: CPTII,,, | Performed by: FAMILY MEDICINE

## 2024-05-16 PROCEDURE — 1159F MED LIST DOCD IN RCRD: CPT | Mod: CPTII,,, | Performed by: FAMILY MEDICINE

## 2024-05-16 PROCEDURE — 3044F HG A1C LEVEL LT 7.0%: CPT | Mod: CPTII,,, | Performed by: FAMILY MEDICINE

## 2024-05-16 PROCEDURE — 99441 PR PHYSICIAN TELEPHONE EVALUATION 5-10 MIN: CPT | Mod: ,,, | Performed by: FAMILY MEDICINE

## 2024-05-17 RX ORDER — HYDROCODONE BITARTRATE AND ACETAMINOPHEN 5; 325 MG/1; MG/1
1 TABLET ORAL EVERY 6 HOURS PRN
Qty: 10 TABLET | Refills: 0 | Status: SHIPPED | OUTPATIENT
Start: 2024-05-17 | End: 2024-06-19 | Stop reason: CLARIF

## 2024-05-17 NOTE — PROGRESS NOTES
Established Patient - Audio Only Telehealth Visit     The patient location is: home  The chief complaint leading to consultation is: pain  Visit type: Virtual visit with audio only (telephone)  Total time spent with patient: 8 minutes        The reason for the audio only service rather than synchronous audio and video virtual visit was related to technical difficulties or patient preference/necessity.     Each patient to whom I provide medical services by telemedicine is:  (1) informed of the relationship between the physician and patient and the respective role of any other health care provider with respect to management of the patient; and (2) notified that they may decline to receive medical services by telemedicine and may withdraw from such care at any time. Patient verbally consented to receive this service via voice-only telephone call.        HPI: 52 yo WF with metastatic colon cancer.  Is having a lot of arthritic pain.  Recently started chemo and says is also having diarrhea now.      Assessment and plan:   Pain- will provide rx for low dose norco and patient is advised that she can half tablets if necessary.                                 This service was not originating from a related E/M service provided within the previous 7 days nor will  to an E/M service or procedure within the next 24 hours or my soonest available appointment.  Prevailing standard of care was able to be met in this audio-only visit.

## 2024-06-10 DIAGNOSIS — E11.9 TYPE 2 DIABETES MELLITUS WITHOUT COMPLICATION, WITH LONG-TERM CURRENT USE OF INSULIN: Chronic | ICD-10-CM

## 2024-06-10 DIAGNOSIS — Z79.4 TYPE 2 DIABETES MELLITUS WITHOUT COMPLICATION, WITH LONG-TERM CURRENT USE OF INSULIN: Chronic | ICD-10-CM

## 2024-06-18 PROCEDURE — 82962 GLUCOSE BLOOD TEST: CPT

## 2024-06-19 ENCOUNTER — HOSPITAL ENCOUNTER (EMERGENCY)
Facility: HOSPITAL | Age: 54
Discharge: ANOTHER HOSPITAL | End: 2024-06-19
Payer: MEDICAID

## 2024-06-19 VITALS
BODY MASS INDEX: 29.99 KG/M2 | HEIGHT: 57 IN | RESPIRATION RATE: 20 BRPM | WEIGHT: 139 LBS | SYSTOLIC BLOOD PRESSURE: 108 MMHG | HEART RATE: 89 BPM | TEMPERATURE: 98 F | OXYGEN SATURATION: 95 % | DIASTOLIC BLOOD PRESSURE: 67 MMHG

## 2024-06-19 DIAGNOSIS — N39.0 URINARY TRACT INFECTION WITHOUT HEMATURIA, SITE UNSPECIFIED: ICD-10-CM

## 2024-06-19 DIAGNOSIS — E16.2 HYPOGLYCEMIA: ICD-10-CM

## 2024-06-19 DIAGNOSIS — R47.81 SLURRED SPEECH: ICD-10-CM

## 2024-06-19 DIAGNOSIS — E87.1 HYPONATREMIA: ICD-10-CM

## 2024-06-19 DIAGNOSIS — C78.7 RECTAL ADENOCARCINOMA METASTATIC TO LIVER: ICD-10-CM

## 2024-06-19 DIAGNOSIS — R00.2 PALPITATIONS: ICD-10-CM

## 2024-06-19 DIAGNOSIS — E87.6 HYPOKALEMIA: ICD-10-CM

## 2024-06-19 DIAGNOSIS — C20 RECTAL ADENOCARCINOMA METASTATIC TO LIVER: ICD-10-CM

## 2024-06-19 DIAGNOSIS — G45.9 TIA (TRANSIENT ISCHEMIC ATTACK): Primary | ICD-10-CM

## 2024-06-19 LAB
ALBUMIN SERPL BCP-MCNC: 2.6 G/DL (ref 3.5–5)
ALBUMIN/GLOB SERPL: 0.6 {RATIO}
ALP SERPL-CCNC: 929 U/L (ref 41–108)
ALT SERPL W P-5'-P-CCNC: 49 U/L (ref 13–56)
AMPHET UR QL SCN: NEGATIVE
ANION GAP SERPL CALCULATED.3IONS-SCNC: 12 MMOL/L (ref 7–16)
APTT PPP: 21.8 SECONDS (ref 25.2–37.3)
AST SERPL W P-5'-P-CCNC: 91 U/L (ref 15–37)
BACTERIA #/AREA URNS HPF: ABNORMAL /HPF
BARBITURATES UR QL SCN: NEGATIVE
BASOPHILS # BLD AUTO: 0.03 K/UL (ref 0–0.2)
BASOPHILS NFR BLD AUTO: 0.3 % (ref 0–1)
BENZODIAZ METAB UR QL SCN: NEGATIVE
BILIRUB SERPL-MCNC: 1.1 MG/DL (ref ?–1.2)
BILIRUB UR QL STRIP: ABNORMAL
BUN SERPL-MCNC: 12 MG/DL (ref 7–18)
BUN/CREAT SERPL: 20 (ref 6–20)
CALCIUM SERPL-MCNC: 8.7 MG/DL (ref 8.5–10.1)
CANNABINOIDS UR QL SCN: NEGATIVE
CHLORIDE SERPL-SCNC: 91 MMOL/L (ref 98–107)
CLARITY UR: CLEAR
CO2 SERPL-SCNC: 30 MMOL/L (ref 21–32)
COCAINE UR QL SCN: NEGATIVE
COLOR UR: YELLOW
CREAT SERPL-MCNC: 0.61 MG/DL (ref 0.55–1.02)
DIFFERENTIAL METHOD BLD: ABNORMAL
EGFR (NO RACE VARIABLE) (RUSH/TITUS): 108 ML/MIN/1.73M2
EOSINOPHIL # BLD AUTO: 0.09 K/UL (ref 0–0.5)
EOSINOPHIL NFR BLD AUTO: 0.8 % (ref 1–4)
ERYTHROCYTE [DISTWIDTH] IN BLOOD BY AUTOMATED COUNT: 19 % (ref 11.5–14.5)
ETHANOL, BLOOD (CATEGORY): NOT DETECTED
GLOBULIN SER-MCNC: 4.7 G/DL (ref 2–4)
GLUCOSE SERPL-MCNC: 118 MG/DL (ref 70–105)
GLUCOSE SERPL-MCNC: 37 MG/DL (ref 74–106)
GLUCOSE SERPL-MCNC: 58 MG/DL (ref 70–105)
GLUCOSE SERPL-MCNC: 66 MG/DL (ref 70–105)
GLUCOSE UR STRIP-MCNC: NEGATIVE MG/DL
HCT VFR BLD AUTO: 31.7 % (ref 38–47)
HGB BLD-MCNC: 10.8 G/DL (ref 12–16)
IMM GRANULOCYTES # BLD AUTO: 0.06 K/UL (ref 0–0.04)
IMM GRANULOCYTES NFR BLD: 0.5 % (ref 0–0.4)
INR BLD: 1.02
KETONES UR STRIP-SCNC: ABNORMAL MG/DL
LEUKOCYTE ESTERASE UR QL STRIP: ABNORMAL
LYMPHOCYTES # BLD AUTO: 1.24 K/UL (ref 1–4.8)
LYMPHOCYTES NFR BLD AUTO: 10.6 % (ref 27–41)
MAGNESIUM SERPL-MCNC: 2.1 MG/DL (ref 1.7–2.3)
MCH RBC QN AUTO: 30.1 PG (ref 27–31)
MCHC RBC AUTO-ENTMCNC: 34.1 G/DL (ref 32–36)
MCV RBC AUTO: 88.3 FL (ref 80–96)
MONOCYTES # BLD AUTO: 1.31 K/UL (ref 0–0.8)
MONOCYTES NFR BLD AUTO: 11.2 % (ref 2–6)
MPC BLD CALC-MCNC: 12 FL (ref 9.4–12.4)
MUCOUS THREADS #/AREA URNS HPF: ABNORMAL /HPF
NEUTROPHILS # BLD AUTO: 8.98 K/UL (ref 1.8–7.7)
NEUTROPHILS NFR BLD AUTO: 76.6 % (ref 53–65)
NITRITE UR QL STRIP: NEGATIVE
NRBC # BLD AUTO: 0 X10E3/UL
NRBC, AUTO (.00): 0 %
NT-PROBNP SERPL-MCNC: 146 PG/ML (ref 1–125)
OPIATES UR QL SCN: NEGATIVE
PCP UR QL SCN: NEGATIVE
PH UR STRIP: 6.5 PH UNITS
PLATELET # BLD AUTO: 331 K/UL (ref 150–400)
POTASSIUM SERPL-SCNC: 3.4 MMOL/L (ref 3.5–5.1)
PROT SERPL-MCNC: 7.3 G/DL (ref 6.4–8.2)
PROT UR QL STRIP: 30
PROTHROMBIN TIME: 14 SECONDS (ref 11.7–14.7)
RBC # BLD AUTO: 3.59 M/UL (ref 4.2–5.4)
RBC # UR STRIP: NEGATIVE /UL
RBC #/AREA URNS HPF: ABNORMAL /HPF
SARS-COV-2 RDRP RESP QL NAA+PROBE: NEGATIVE
SODIUM SERPL-SCNC: 130 MMOL/L (ref 136–145)
SP GR UR STRIP: 1.01
SQUAMOUS #/AREA URNS LPF: ABNORMAL /LPF
TRANS CELLS #/AREA URNS LPF: ABNORMAL /LPF
TROPONIN I SERPL DL<=0.01 NG/ML-MCNC: 5.7 PG/ML
UROBILINOGEN UR STRIP-ACNC: 1 MG/DL
WBC # BLD AUTO: 11.71 K/UL (ref 4.5–11)
WBC #/AREA URNS HPF: ABNORMAL /HPF

## 2024-06-19 PROCEDURE — 83735 ASSAY OF MAGNESIUM: CPT

## 2024-06-19 PROCEDURE — 84484 ASSAY OF TROPONIN QUANT: CPT

## 2024-06-19 PROCEDURE — 99285 EMERGENCY DEPT VISIT HI MDM: CPT | Mod: ,,,

## 2024-06-19 PROCEDURE — 63600175 PHARM REV CODE 636 W HCPCS

## 2024-06-19 PROCEDURE — 80053 COMPREHEN METABOLIC PANEL: CPT

## 2024-06-19 PROCEDURE — 82077 ASSAY SPEC XCP UR&BREATH IA: CPT

## 2024-06-19 PROCEDURE — 85730 THROMBOPLASTIN TIME PARTIAL: CPT

## 2024-06-19 PROCEDURE — 81003 URINALYSIS AUTO W/O SCOPE: CPT

## 2024-06-19 PROCEDURE — 83880 ASSAY OF NATRIURETIC PEPTIDE: CPT

## 2024-06-19 PROCEDURE — 93010 ELECTROCARDIOGRAM REPORT: CPT | Mod: ,,, | Performed by: HOSPITALIST

## 2024-06-19 PROCEDURE — 80307 DRUG TEST PRSMV CHEM ANLYZR: CPT

## 2024-06-19 PROCEDURE — 93005 ELECTROCARDIOGRAM TRACING: CPT

## 2024-06-19 PROCEDURE — 87077 CULTURE AEROBIC IDENTIFY: CPT

## 2024-06-19 PROCEDURE — 25000003 PHARM REV CODE 250

## 2024-06-19 PROCEDURE — 85610 PROTHROMBIN TIME: CPT

## 2024-06-19 PROCEDURE — 87086 URINE CULTURE/COLONY COUNT: CPT

## 2024-06-19 PROCEDURE — 96365 THER/PROPH/DIAG IV INF INIT: CPT

## 2024-06-19 PROCEDURE — 85025 COMPLETE CBC W/AUTO DIFF WBC: CPT

## 2024-06-19 PROCEDURE — 99446 NTRPROF PH1/NTRNET/EHR 5-10: CPT | Mod: ,,, | Performed by: PSYCHIATRY & NEUROLOGY

## 2024-06-19 PROCEDURE — 82962 GLUCOSE BLOOD TEST: CPT

## 2024-06-19 PROCEDURE — 36415 COLL VENOUS BLD VENIPUNCTURE: CPT

## 2024-06-19 PROCEDURE — 87635 SARS-COV-2 COVID-19 AMP PRB: CPT

## 2024-06-19 PROCEDURE — 99285 EMERGENCY DEPT VISIT HI MDM: CPT | Mod: 25

## 2024-06-19 PROCEDURE — 96375 TX/PRO/DX INJ NEW DRUG ADDON: CPT

## 2024-06-19 RX ORDER — DEXTROSE MONOHYDRATE, SODIUM CHLORIDE, AND POTASSIUM CHLORIDE 50; 1.49; 4.5 G/1000ML; G/1000ML; G/1000ML
INJECTION, SOLUTION INTRAVENOUS
Status: DISCONTINUED | OUTPATIENT
Start: 2024-06-19 | End: 2024-06-19

## 2024-06-19 RX ORDER — DEXTROSE MONOHYDRATE, SODIUM CHLORIDE, AND POTASSIUM CHLORIDE 50; 1.49; 9 G/1000ML; G/1000ML; G/1000ML
INJECTION, SOLUTION INTRAVENOUS CONTINUOUS
Status: DISCONTINUED | OUTPATIENT
Start: 2024-06-19 | End: 2024-06-19 | Stop reason: HOSPADM

## 2024-06-19 RX ORDER — DIPHENHYDRAMINE HCL 25 MG
50 CAPSULE ORAL
Status: DISCONTINUED | OUTPATIENT
Start: 2024-06-19 | End: 2024-06-19

## 2024-06-19 RX ORDER — DIPHENHYDRAMINE HYDROCHLORIDE 50 MG/ML
25 INJECTION INTRAMUSCULAR; INTRAVENOUS
Status: COMPLETED | OUTPATIENT
Start: 2024-06-19 | End: 2024-06-19

## 2024-06-19 RX ORDER — LEVOFLOXACIN 5 MG/ML
500 INJECTION, SOLUTION INTRAVENOUS ONCE
Status: COMPLETED | OUTPATIENT
Start: 2024-06-19 | End: 2024-06-19

## 2024-06-19 RX ADMIN — DEXTROSE MONOHYDRATE 12.5 G: 25 INJECTION, SOLUTION INTRAVENOUS at 05:06

## 2024-06-19 RX ADMIN — DIPHENHYDRAMINE HYDROCHLORIDE 25 MG: 50 INJECTION INTRAMUSCULAR; INTRAVENOUS at 05:06

## 2024-06-19 RX ADMIN — CEFTRIAXONE SODIUM 1 G: 1 INJECTION, POWDER, FOR SOLUTION INTRAMUSCULAR; INTRAVENOUS at 05:06

## 2024-06-19 RX ADMIN — POTASSIUM CHLORIDE, DEXTROSE MONOHYDRATE AND SODIUM CHLORIDE 100 ML/HR: 150; 5; 900 INJECTION, SOLUTION INTRAVENOUS at 05:06

## 2024-06-19 RX ADMIN — LEVOFLOXACIN 500 MG: 500 INJECTION, SOLUTION INTRAVENOUS at 06:06

## 2024-06-19 NOTE — TELEMEDICINE CONSULT
[facility]  Vascular Neurology  Comprehensive Stroke Center  TeleVascular Neurology Interprofessional Consult Note           Consult Information  Inpatient Consult Tele-Vascular Neurology  Consult performed by: Dong Jackson MD  Consult ordered by: Filiberto Flores FNP          Consulting Provider: FILIBERTO FLORES   Patient Location:  [facility] EMERGENCY DEPART*     Summary of patient's symptoms:   NIH is 1, reports multiple TIA in the past. Terminally ill with multiple cancers. Glucose 58 but ED has already treated. Symptoms have all subsided at this point except for slurred speech which gave her the one point on her NIH. CT head still pending. Allergic to ASA.       Imaging personally reviewed & interpreted:  CT Brain:  small area of hypodensity in R posterior limb of the internal capsule, unclear if chronic or subacute  CTA Head & Neck: Not performed at time of consultation       Assessment and plan:  Nonfocal symptoms - unclear if stroke underlying presentation, however CT showing some mild change in R internal capsule that needs further workup and evaluation.  OK to start asa until wworkup comlpeted.  MRI to evaluate and r/o infarct  CTA H&N to evaluate and clear vasculature from high risk lesions       I spent approximately 5 minutes on this encounter. More than half of that time was spent communicating with the consulting provider and coordinating patient care.     Signature  Dong Jackson MD        This encounter was conducted as an interprofessional communication between providers at the [facility] and vascular neurologist. The interaction was completed over the phone or via secure messaging (electronic medical record - [facility] Secure Chat).     Once this note was completed, a written copy was sent back to the provider via fax or electronic medical record.

## 2024-06-19 NOTE — ED TRIAGE NOTES
Last night at about 2200 she reports she had about 20 minutes where she had extreme weakness and slurred speech, reports it pretty well resolved on its own

## 2024-06-19 NOTE — ED PROVIDER NOTES
Encounter Date: 6/19/2024       History     Chief Complaint   Patient presents with    Palpitations     Patient is a 53-year-old female who presents to the emergency department with complaints of palpitations and slurred speech.  She has a complex past medical history of CVA, TIA, diabetes, hypertension, hemochromatosis, seizures, hypokalemia, liver cancer, colon cancer, and lung cancer.  She reports that she was currently undergoing chemo.  She reports that her diagnoses has been described as terminal.  She states that around 10:00 p.m. last night she began experiencing slurred speech and a generalized weakness.  She reports that the incident resolved around 10:30 p.m. last night except for the slurred speech.  She denies any unilateral weakness or headache during the episode.  She also reports intermittent palpitations throughout the day but reports that they are improved at current.  She does state that she was not had her potassium replacement in 2 or 3 days but has been compliant with her medication otherwise.  States she does not currently follow up with Neurology but has been evaluated in [facility] on multiple occasions.  Reports she is currently followed by Dr. Ryan at [facility] for oncology.  Denies chest pain or shortness of breath but appears dyspneic on exertion.  She reports she does have frequent diarrhea but attributes this to her current chemo regimen and reports that it was unchanged.  Reports that her intake has been normal and she was eating throughout the day today.  Blood pressure 158/90, temperature 97.6°, heart rate 90, respirations 20, and oxygen saturation 97% on room air.     The history is provided by the patient.     Review of patient's allergies indicates:   Allergen Reactions    Grape Anaphylaxis     Other reaction(s): Not available    Morphine sulfate Swelling    Nutritional supplements Anaphylaxis     Highly allergic to Cooked grapes    Pineapple Anaphylaxis    Opioids -  "morphine analogues Nausea And Vomiting    Rocephin [ceftriaxone] Itching    Aspirin      "Veins breakdown" per patient    Biguanides Diarrhea and Other (See Comments)    Dicyclomine Diarrhea    Dihydrocodeine Other (See Comments)     Bad headache in the top of head    Gabapentin Other (See Comments)     weakness  Other reaction(s): Not available  Seizures      Hydrocodone Other (See Comments)     Headache      Iron      Hemochromatosis    Iron analogues      Pt hx of hemachromatosis    Metformin      diarrhea    Penicillins Other (See Comments)     Family hx       Latex Rash and Other (See Comments)     Past Medical History:   Diagnosis Date    Colon cancer     CVA (cerebral vascular accident)     Diabetes     HTN (hypertension)     Lung cancer     Seizure      Past Surgical History:   Procedure Laterality Date    COLON SURGERY      HERNIA REPAIR      HYSTERECTOMY       Family History   Problem Relation Name Age of Onset    No Known Problems Mother      No Known Problems Father      No Known Problems Sister      No Known Problems Brother      No Known Problems Daughter      No Known Problems Son      No Known Problems Maternal Aunt      No Known Problems Maternal Uncle      No Known Problems Paternal Aunt      No Known Problems Paternal Uncle      No Known Problems Maternal Grandmother      No Known Problems Maternal Grandfather      No Known Problems Paternal Grandmother      No Known Problems Paternal Grandfather       Social History     Tobacco Use    Smoking status: Never     Passive exposure: Never    Smokeless tobacco: Never   Substance Use Topics    Alcohol use: Never    Drug use: Never     Review of Systems   Constitutional:  Positive for fatigue. Negative for activity change, appetite change, chills and fever.   HENT:  Negative for congestion, sore throat and trouble swallowing.    Eyes: Negative.    Respiratory:  Negative for cough, shortness of breath, wheezing and stridor.    Cardiovascular:  Positive " for palpitations. Negative for chest pain and leg swelling.   Gastrointestinal:  Negative for abdominal distention, abdominal pain, diarrhea, nausea and vomiting.   Endocrine: Negative.    Genitourinary:  Negative for difficulty urinating, dysuria and frequency.   Musculoskeletal:  Negative for arthralgias, back pain, neck pain and neck stiffness.   Skin:  Negative for color change, pallor and rash.   Allergic/Immunologic: Negative.    Neurological:  Positive for dizziness, speech difficulty (slurred speech) and weakness (currently improved). Negative for seizures, syncope, facial asymmetry, light-headedness, numbness and headaches.   Hematological:  Does not bruise/bleed easily.   Psychiatric/Behavioral:  Negative for confusion. The patient is not nervous/anxious.    All other systems reviewed and are negative.      Physical Exam     Initial Vitals [06/19/24 1559]   BP Pulse Resp Temp SpO2   (!) 158/90 90 20 97.6 °F (36.4 °C) 97 %      MAP       --         Physical Exam    Nursing note and vitals reviewed.  Constitutional: She appears well-developed and well-nourished. She is not diaphoretic. No distress.   HENT:   Head: Normocephalic and atraumatic.   Mouth/Throat: Oropharynx is clear and moist.   Eyes: Conjunctivae and EOM are normal. Pupils are equal, round, and reactive to light.   Neck: Neck supple.   Normal range of motion.  Cardiovascular:  Normal rate, regular rhythm and normal heart sounds.           Pulmonary/Chest: Breath sounds normal. No respiratory distress. She has no wheezes. She has no rhonchi. She has no rales. She exhibits no tenderness.   Abdominal: Abdomen is soft. Bowel sounds are normal. She exhibits no distension. There is no abdominal tenderness. There is no rebound and no guarding.   Musculoskeletal:         General: Normal range of motion.      Cervical back: Normal range of motion and neck supple.     Neurological: She is alert and oriented to person, place, and time. She has normal  strength. No cranial nerve deficit or sensory deficit. Coordination and gait normal. GCS score is 15. GCS eye subscore is 4. GCS verbal subscore is 5. GCS motor subscore is 6.   Skin: Skin is warm and dry. Capillary refill takes less than 2 seconds.   Psychiatric: She has a normal mood and affect. Her behavior is normal. Judgment and thought content normal.         Medical Screening Exam   See Full Note    ED Course   Procedures  Labs Reviewed   COMPREHENSIVE METABOLIC PANEL - Abnormal; Notable for the following components:       Result Value    Sodium 130 (*)     Potassium 3.4 (*)     Chloride 91 (*)     Glucose 37 (*)     Albumin 2.6 (*)     Globulin 4.7 (*)     Alk Phos 929 (*)     AST 91 (*)     All other components within normal limits   APTT - Abnormal; Notable for the following components:    PTT 21.8 (*)     All other components within normal limits   NT-PRO NATRIURETIC PEPTIDE - Abnormal; Notable for the following components:    ProBNP 146 (*)     All other components within normal limits   URINALYSIS, REFLEX TO URINE CULTURE - Abnormal; Notable for the following components:    Leukocytes, UA Trace (*)     Protein, UA 30 (*)     Bilirubin, UA Small (*)     All other components within normal limits   CBC WITH DIFFERENTIAL - Abnormal; Notable for the following components:    WBC 11.71 (*)     RBC 3.59 (*)     Hemoglobin 10.8 (*)     Hematocrit 31.7 (*)     RDW 19.0 (*)     Neutrophils % 76.6 (*)     Lymphocytes % 10.6 (*)     Monocytes % 11.2 (*)     Eosinophils % 0.8 (*)     Immature Granulocytes % 0.5 (*)     Neutrophils, Abs 8.98 (*)     Monocytes, Absolute 1.31 (*)     Immature Granulocytes, Absolute 0.06 (*)     All other components within normal limits   URINALYSIS, MICROSCOPIC - Abnormal; Notable for the following components:    WBC, UA 5-10 (*)     Bacteria, UA Moderate (*)     Squamous Epithelial Cells, UA Moderate (*)     Transitional Epithelial Cells, UA Occasional (*)     Mucus, UA Many (*)      All other components within normal limits   POCT GLUCOSE MONITORING CONTINUOUS - Abnormal; Notable for the following components:    POC Glucose 58 (*)     All other components within normal limits   POCT GLUCOSE MONITORING CONTINUOUS - Abnormal; Notable for the following components:    POC Glucose 66 (*)     All other components within normal limits   TROPONIN I - Normal   MAGNESIUM - Normal   PROTIME-INR - Normal   SARS-COV-2 RNA AMPLIFICATION, QUAL - Normal    Narrative:     Negative SARS-CoV results should not be used as the sole basis for treatment or patient management decisions; negative results should be considered in the context of a patient's recent exposures, history and the presene of clinical signs and symptoms consistent with COVID-19.  Negative results should be treated as presumptive and confirmed by molecular assay, if necessary for patient management.   DRUG SCREEN, URINE (BEAKER) - Normal    Narrative:     The results of screening tests should be considered presumptive. Confirmatory testing is available upon request.    Cutoff Points:  PCP:         25ng/mL  AMPH:        500ng/mL  SUZETTE:        200ng/mL  RONALD:        200ng/mL  THC:         50ng/mL  REI:         300ng/mL  OPI:         2000ng/mL   CULTURE, URINE   CBC W/ AUTO DIFFERENTIAL    Narrative:     The following orders were created for panel order CBC auto differential.  Procedure                               Abnormality         Status                     ---------                               -----------         ------                     CBC with Differential[[id number]]       Abnormal            Final result                 Please view results for these tests on the individual orders.   ALCOHOL,MEDICAL (ETHANOL)   POCT GLUCOSE MONITORING CONTINUOUS          Imaging Results              X-Ray Chest AP Portable (Final result)  Result time 06/19/24 16:58:13      Final result by Arjun Blackmon DO (06/19/24 16:58:13)                    Impression:      Continued diffuse nodularity and patchy opacities throughout the highly suspicious for metastatic disease.  Similar appearance to prior.    Point of Service: [facility]      Electronically signed by: Arjun Blackmon  Date:    06/19/2024  Time:    16:58               Narrative:    EXAMINATION:  XR CHEST AP PORTABLE    CLINICAL HISTORY:  palpitations;    COMPARISON:  Chest x-ray May 6, 2024    TECHNIQUE:  Frontal view/views of the chest.    FINDINGS:  The cardiomediastinal silhouette is stable in configuration.  Continued diffuse nodularity and patchy opacities throughout the highly suspicious for metastatic disease.  Presumed common duct stent projects over the right upper quadrant.  Visualized osseous and surrounding soft tissue structures appear grossly unchanged.                                       CT Head Without Contrast (Final result)  Result time 06/19/24 16:50:28      Final result by Lonnie Nino MD (06/19/24 16:50:28)                   Impression:      No acute intracranial process    Chronic left MCA distribution ischemia      Electronically signed by: Lonnie Nino  Date:    06/19/2024  Time:    16:50               Narrative:    EXAMINATION:  CT head without contrast    CLINICAL HISTORY:  Neuro deficit, acute, stroke suspected;    TECHNIQUE:  Transaxial CT sections were obtained through the brain without contrast.    The CT examination was performed using one or more of the following dose reduction techniques: Automated exposure control, adjustment of the mA and kV according to patient's size, use of acute or iterative reconstruction techniques.    COMPARISON:  July 24, 2022    FINDINGS:  The ventricles are midline in position without evidence of hydrocephalus. There is no mass or area of parenchymal hemorrhage. There is no gross CT evidence of acute cortical stroke.  There is a small chronic wedge-shaped area of encephalomalacia in the left frontoparietal convexity  "area without change.  There is no extra-axial hematoma. The partially visualized sinuses are generally clear. There is no obvious skull fracture.                                       Medications   dextrose 5 % and 0.9 % NaCl with KCl 20 mEq infusion (100 mL/hr Intravenous New Bag 6/19/24 1738)   levoFLOXacin 500 mg/100 mL IVPB 500 mg (500 mg Intravenous New Bag 6/19/24 1837)   cefTRIAXone (Rocephin) 1 g in dextrose 5 % in water (D5W) 100 mL IVPB (MB+) (0 g Intravenous Stopped 6/19/24 1809)   dextrose 50% injection 12.5 g (12.5 g Intravenous Given 6/19/24 1724)   diphenhydrAMINE injection 25 mg (25 mg Intravenous Given 6/19/24 1755)     Medical Decision Making  Patient presents to the ED for evaluation of palpitations and concern for a TIA event last night that began at 10:00 p.m. and resolved at 10:30 p.m. except for the slurred speech which she continues to report now.  She was alert and oriented x4.  GCS 15.  Hypertensive with a blood pressure 158/90 but vital signs are within normal limits otherwise.  She was afebrile with a temperature 97.6°.  Point of care glucose was obtained and noted to be 58.  She was awake, alert, and swallowing without difficulty so we will treat with orange juice and repeat.  She does not report a decreased appetite, vomiting, or a chronic history of hypoglycemia.  Her current NIH is 1 for mild dysarthria.  She has no unilateral weakness, headache, loss of sensation, or any other neurologic deficits on exam.  EKG shows sinus rhythm with PVC.  No STEMI criteria.  We will initiate CVA workup along with telemedicine Neurology consult.  [facility] declined due to current capacity.    CT head read by radiology as no acute process with chronic left MCA distribution ischemia.  Vascular Neurology on-call mentioned "small hypodensity in right posterior limb internal capsule may be acute/subacute and subtle." Patient refused aspirin due to "family history of aspirin allergy." Urinalysis does show UTI " and the patient reports family history allergy to penicillin drugs but denies any allergic reaction to Rocephin.  She was uncertain if she was ever had this medication but denies any prior history of drug anaphylaxis.  Risks versus benefits were discussed in detail and she was agreeable to receiving Rocephin 1 g IV for treatment of the urinary tract infection.  Dr. Jackson's recommendations for transfer to higher level of care with Neurology and MRI capability were also discussed in detail with the patient and she verbalizes understanding and is in agreement.    17:12 lab reported glucose of 37.  Repeat glucose was obtained at this time and noted to be 66.  We will provide half an amp of D50 and began D5NS with 20 of KCl at 100 mL/hour due to her hyponatremia, decreased chloride, and hypokalemia.     17:50 patient reports itching in her ears after beginning the Rocephin.  Rocephin stopped.  She remains alert and oriented.  Breath sounds are equal and clear hospitalization.  Speaking in full sentences.  Denies shortness of breath, difficulty breathing, difficulty swallowing and no stridor or wheezing on exam.  We will administer Benadryl 25 mg IV for the itching and monitor closely.       18:19 patient reports improvement in her itching after the Benadryl.  Has tolerated Levaquin in the past.  We will provide Levaquin 500 mg IV for urinary tract infection due to her itching reaction after the Rocephin.    18:30 case was discussed with Dr. Barreto at [facility] along with the Tele-Neurology recommendations for further evaluation with MRI of the brain.  He was also informed of our lack of capability to perform MRI or CTA at our facility.  He has agreed to accept to their facility for an ED to ED transfer.  [facility] was updated and we will arrange transport.  Blood sugar is holding steady at 118 on the current D5 normal saline with 20 of KCl.  She continues to have some mild slurred speech and no  other focal neurological deficit at this time.    Amount and/or Complexity of Data Reviewed  Independent Historian:      Details: Patient is a 53-year-old female who presents to the emergency department with complaints of palpitations and slurred speech.  She has a complex past medical history of CVA, TIA, diabetes, hypertension, hemochromatosis, seizures, hypokalemia, liver cancer, colon cancer, and lung cancer.  She reports that she was currently undergoing chemo.  She reports that her diagnoses has been described as terminal.  She states that around 10:00 p.m. last night she began experiencing slurred speech and a generalized weakness.  She reports that the incident resolved around 10:30 p.m. last night except for the slurred speech.  She denies any unilateral weakness or headache during the episode.  She also reports intermittent palpitations throughout the day but reports that they are improved at current.  She does state that she was not had her potassium replacement in 2 or 3 days but has been compliant with her medication otherwise.  States she does not currently follow up with Neurology but has been evaluated in [facility] on multiple occasions.  Reports she is currently followed by Dr. Ryan at [facility] for oncology.  Denies chest pain or shortness of breath but appears dyspneic on exertion.  She reports she does have frequent diarrhea but attributes this to her current chemo regimen and reports that it was unchanged.  Reports that her intake has been normal and she was eating throughout the day today.  Blood pressure 158/90, temperature 97.6°, heart rate 90, respirations 20, and oxygen saturation 97% on room air.  Labs: ordered.     Details: Point of care glucose 58.  Urinalysis shows trace leukocytes, protein of 30, and small bilirubin but otherwise unremarkable.  Pro BNP mildly elevated at 146.  Troponin negative at 5.7, magnesium normal at 2.1, ETOH shows not detected.  Microscopic urinalysis  shows 5-10 WBC, moderate bacteria, occasional transitional epithelial cells, and many mucus.  Urine culture sent and pending.  Urine drug screen negative.  COVID negative.  CBC shows a WBC of 11.71, hemoglobin of 10.8, hematocrit 31.7, and platelet count 331.  CMP shows a sodium of 130, potassium at 3.4, chloride of 91, albumin of 2.6, globulin of 4.7, alk-phos of 929, AST of 91, and a glucose of 37. (repeat glucose obtained at 17:12 and noted to be 66).  PTT 21.8.  PT and INR within normal limits.  Radiology: ordered.     Details: Chest x-ray shows continued diffuse nodularity and patchy opacities throughout the highly suspicious for metastatic disease.  Similar appearance to prior. (patient has known lung cancer).    CT head without contrast shows no acute intracranial process.  Chronic left MCA distribution ischemia.  ECG/medicine tests: ordered.     Details: EKG shows normal sinus rhythm with a PVC.  No STEMI.  Discussion of management or test interpretation with external provider(s): Tele neuro consult performed with Dr. Jackson.  Case was discussed in detail.  He reports no convincing current concerns for acute neurovascular syndrome.  We did discuss possibility of transfer for MRI due to our inability to perform CTA head and neck and he is in agreement with this plan.    CT head without contrast was reviewed by Dr. Jackson.  Reports small hypodensity in right posterior limb internal capsule may be acute/subacute and subtle.  Reports to continue with previous recommendations.    Risk  Risk Details: All historical, clinical, radiographic, and laboratory findings were reviewed with the patient/family in detail along with the indications for transport to the facility in [place], [place] in order to receive further evaluation and treatment.  All remaining questions and concerns were addressed at this time and the patient/family communicates understanding and agrees to proceed accordingly.  Similarly, all pertinent details  of the encounter were discussed with Dr. Barreto who agrees to receive the patient at [facility] in [place], [place] for further care as outlined above.  The patient will be transferred by [facility] ambulance services secondary to a need for ongoing hemodynamic monitoring en route.  JUAN TANNER  4:42 PM      Additional MDM:     NIH Stroke Scale:   Interval = baseline (upon arrival/admit)  Level of consciousness = 0 - alert  LOC questions = 0 - answers both correctly  LOC commands = 0 - performs both correctly  Best gaze = 0 - normal  Visual = 0 - no visual loss  Facial palsy = 0 - normal  Motor left arm =  0 - no drift  Motor right arm =  0 - no drift  Motor left leg = 0 - no drift  Motor right leg =  0 - no drift  Limb ataxia = 0 - absent  Sensory = 0 - normal  Best language = 0 - no aphasia  Dysarthria = 1 - mild to moderate dysarthria  Extinction and inattention = 0 - no neglect  NIH Stroke Scale Total = 1                                     Clinical Impression:   Final diagnoses:  [R00.2] Palpitations  [R47.81] Slurred speech  [E87.6] Hypokalemia  [N39.0] Urinary tract infection without hematuria, site unspecified  [E87.1] Hyponatremia  [E16.2] Hypoglycemia  [C20, C78.7] Rectal adenocarcinoma metastatic to liver  [G45.9] TIA (transient ischemic attack) (Primary)               Hunter Flores FNP  06/19/24 [id number]

## 2024-06-22 LAB — UA COMPLETE W REFLEX CULTURE PNL UR: ABNORMAL

## 2024-06-23 LAB
OHS QRS DURATION: 76 MS
OHS QTC CALCULATION: 491 MS

## 2024-06-24 ENCOUNTER — HOSPITAL ENCOUNTER (INPATIENT)
Facility: HOSPITAL | Age: 54
LOS: 3 days | Discharge: HOME | End: 2024-06-27
Attending: EMERGENCY MEDICINE | Admitting: SURGERY
Payer: MEDICAID

## 2024-06-24 DIAGNOSIS — R10.9 ABDOMINAL PAIN: ICD-10-CM

## 2024-06-24 DIAGNOSIS — R07.9 CHEST PAIN: ICD-10-CM

## 2024-06-24 DIAGNOSIS — R10.11 RIGHT UPPER QUADRANT ABDOMINAL PAIN: Primary | ICD-10-CM

## 2024-06-24 DIAGNOSIS — K81.0 ACUTE ACALCULOUS CHOLECYSTITIS: ICD-10-CM

## 2024-06-24 PROBLEM — C20: Status: ACTIVE | Noted: 2024-06-24

## 2024-06-24 PROBLEM — K83.1 BILIARY OBSTRUCTION DUE TO MALIGNANT NEOPLASM: Status: ACTIVE | Noted: 2024-06-24

## 2024-06-24 PROBLEM — C80.1 BILIARY OBSTRUCTION DUE TO MALIGNANT NEOPLASM: Status: ACTIVE | Noted: 2024-06-24

## 2024-06-24 PROBLEM — C79.51: Status: ACTIVE | Noted: 2024-06-24

## 2024-06-24 LAB
ALBUMIN SERPL BCP-MCNC: 2.7 G/DL (ref 3.5–5)
ALBUMIN/GLOB SERPL: 0.6 {RATIO}
ALP SERPL-CCNC: 1063 U/L (ref 41–108)
ALT SERPL W P-5'-P-CCNC: 108 U/L (ref 13–56)
ANION GAP SERPL CALCULATED.3IONS-SCNC: 15 MMOL/L (ref 7–16)
APTT PPP: 27.7 SECONDS (ref 25.2–37.3)
AST SERPL W P-5'-P-CCNC: 97 U/L (ref 15–37)
BASOPHILS # BLD AUTO: 0.04 K/UL (ref 0–0.2)
BASOPHILS NFR BLD AUTO: 0.3 % (ref 0–1)
BILIRUB SERPL-MCNC: 2.1 MG/DL (ref ?–1.2)
BILIRUB UR QL STRIP: NEGATIVE
BUN SERPL-MCNC: 7 MG/DL (ref 7–18)
BUN/CREAT SERPL: 11 (ref 6–20)
CALCIUM SERPL-MCNC: 9.3 MG/DL (ref 8.5–10.1)
CHLORIDE SERPL-SCNC: 92 MMOL/L (ref 98–107)
CLARITY UR: CLEAR
CO2 SERPL-SCNC: 29 MMOL/L (ref 21–32)
COLOR UR: YELLOW
CREAT SERPL-MCNC: 0.65 MG/DL (ref 0.55–1.02)
DIFFERENTIAL METHOD BLD: ABNORMAL
EGFR (NO RACE VARIABLE) (RUSH/TITUS): 106 ML/MIN/1.73M2
EOSINOPHIL # BLD AUTO: 0.01 K/UL (ref 0–0.5)
EOSINOPHIL NFR BLD AUTO: 0.1 % (ref 1–4)
ERYTHROCYTE [DISTWIDTH] IN BLOOD BY AUTOMATED COUNT: 19.4 % (ref 11.5–14.5)
GLOBULIN SER-MCNC: 4.2 G/DL (ref 2–4)
GLUCOSE SERPL-MCNC: 227 MG/DL (ref 70–105)
GLUCOSE SERPL-MCNC: 236 MG/DL (ref 74–106)
GLUCOSE UR STRIP-MCNC: 100 MG/DL
HCT VFR BLD AUTO: 39.3 % (ref 38–47)
HGB BLD-MCNC: 12.6 G/DL (ref 12–16)
IMM GRANULOCYTES # BLD AUTO: 0.18 K/UL (ref 0–0.04)
IMM GRANULOCYTES NFR BLD: 1.2 % (ref 0–0.4)
INR BLD: 1.1
KETONES UR STRIP-SCNC: NEGATIVE MG/DL
LEUKOCYTE ESTERASE UR QL STRIP: NEGATIVE
LYMPHOCYTES # BLD AUTO: 0.76 K/UL (ref 1–4.8)
LYMPHOCYTES NFR BLD AUTO: 5.1 % (ref 27–41)
MAGNESIUM SERPL-MCNC: 2.1 MG/DL (ref 1.7–2.3)
MCH RBC QN AUTO: 29.9 PG (ref 27–31)
MCHC RBC AUTO-ENTMCNC: 32.1 G/DL (ref 32–36)
MCV RBC AUTO: 93.3 FL (ref 80–96)
MONOCYTES # BLD AUTO: 0.83 K/UL (ref 0–0.8)
MONOCYTES NFR BLD AUTO: 5.6 % (ref 2–6)
MPC BLD CALC-MCNC: 11.6 FL (ref 9.4–12.4)
NEUTROPHILS # BLD AUTO: 13.02 K/UL (ref 1.8–7.7)
NEUTROPHILS NFR BLD AUTO: 87.7 % (ref 53–65)
NITRITE UR QL STRIP: NEGATIVE
NRBC # BLD AUTO: 0 X10E3/UL
NRBC, AUTO (.00): 0 %
PH UR STRIP: 6 PH UNITS
PLATELET # BLD AUTO: 375 K/UL (ref 150–400)
POTASSIUM SERPL-SCNC: 3.2 MMOL/L (ref 3.5–5.1)
PROT SERPL-MCNC: 6.9 G/DL (ref 6.4–8.2)
PROT UR QL STRIP: 10
PROTHROMBIN TIME: 14.1 SECONDS (ref 11.7–14.7)
RBC # BLD AUTO: 4.21 M/UL (ref 4.2–5.4)
RBC # UR STRIP: NEGATIVE /UL
SODIUM SERPL-SCNC: 133 MMOL/L (ref 136–145)
SP GR UR STRIP: 1.05
UROBILINOGEN UR STRIP-ACNC: NORMAL MG/DL
WBC # BLD AUTO: 14.84 K/UL (ref 4.5–11)

## 2024-06-24 PROCEDURE — 85730 THROMBOPLASTIN TIME PARTIAL: CPT | Performed by: EMERGENCY MEDICINE

## 2024-06-24 PROCEDURE — 99285 EMERGENCY DEPT VISIT HI MDM: CPT | Mod: 25

## 2024-06-24 PROCEDURE — 82962 GLUCOSE BLOOD TEST: CPT

## 2024-06-24 PROCEDURE — 99222 1ST HOSP IP/OBS MODERATE 55: CPT | Mod: ,,, | Performed by: SURGERY

## 2024-06-24 PROCEDURE — 99223 1ST HOSP IP/OBS HIGH 75: CPT | Mod: ,,, | Performed by: INTERNAL MEDICINE

## 2024-06-24 PROCEDURE — 25500020 PHARM REV CODE 255: Performed by: EMERGENCY MEDICINE

## 2024-06-24 PROCEDURE — 63600175 PHARM REV CODE 636 W HCPCS: Performed by: INTERNAL MEDICINE

## 2024-06-24 PROCEDURE — 85610 PROTHROMBIN TIME: CPT | Performed by: EMERGENCY MEDICINE

## 2024-06-24 PROCEDURE — 85025 COMPLETE CBC W/AUTO DIFF WBC: CPT | Performed by: EMERGENCY MEDICINE

## 2024-06-24 PROCEDURE — 11000001 HC ACUTE MED/SURG PRIVATE ROOM

## 2024-06-24 PROCEDURE — 96374 THER/PROPH/DIAG INJ IV PUSH: CPT

## 2024-06-24 PROCEDURE — 25000003 PHARM REV CODE 250: Performed by: INTERNAL MEDICINE

## 2024-06-24 PROCEDURE — 80053 COMPREHEN METABOLIC PANEL: CPT | Performed by: EMERGENCY MEDICINE

## 2024-06-24 PROCEDURE — 63600175 PHARM REV CODE 636 W HCPCS: Performed by: EMERGENCY MEDICINE

## 2024-06-24 PROCEDURE — 63600175 PHARM REV CODE 636 W HCPCS

## 2024-06-24 PROCEDURE — 83735 ASSAY OF MAGNESIUM: CPT | Performed by: EMERGENCY MEDICINE

## 2024-06-24 PROCEDURE — 96361 HYDRATE IV INFUSION ADD-ON: CPT

## 2024-06-24 PROCEDURE — A9537 TC99M MEBROFENIN: HCPCS | Performed by: EMERGENCY MEDICINE

## 2024-06-24 PROCEDURE — 96375 TX/PRO/DX INJ NEW DRUG ADDON: CPT

## 2024-06-24 PROCEDURE — 63600175 PHARM REV CODE 636 W HCPCS: Performed by: NURSE PRACTITIONER

## 2024-06-24 PROCEDURE — 25000003 PHARM REV CODE 250: Performed by: EMERGENCY MEDICINE

## 2024-06-24 PROCEDURE — 81003 URINALYSIS AUTO W/O SCOPE: CPT | Performed by: EMERGENCY MEDICINE

## 2024-06-24 RX ORDER — LEVETIRACETAM 500 MG/5ML
1000 INJECTION, SOLUTION, CONCENTRATE INTRAVENOUS
Status: COMPLETED | OUTPATIENT
Start: 2024-06-24 | End: 2024-06-24

## 2024-06-24 RX ORDER — HEPARIN SODIUM 5000 [USP'U]/ML
5000 INJECTION, SOLUTION INTRAVENOUS; SUBCUTANEOUS EVERY 8 HOURS
Status: DISCONTINUED | OUTPATIENT
Start: 2024-06-24 | End: 2024-06-27 | Stop reason: HOSPADM

## 2024-06-24 RX ORDER — CYCLOBENZAPRINE HCL 10 MG
10 TABLET ORAL 3 TIMES DAILY PRN
COMMUNITY
Start: 2024-03-15 | End: 2024-06-24

## 2024-06-24 RX ORDER — IBUPROFEN 200 MG
24 TABLET ORAL
Status: DISCONTINUED | OUTPATIENT
Start: 2024-06-24 | End: 2024-06-27 | Stop reason: HOSPADM

## 2024-06-24 RX ORDER — POTASSIUM CHLORIDE 7.45 MG/ML
10 INJECTION INTRAVENOUS
Status: DISCONTINUED | OUTPATIENT
Start: 2024-06-24 | End: 2024-06-24

## 2024-06-24 RX ORDER — HYDROMORPHONE HYDROCHLORIDE 2 MG/ML
0.5 INJECTION, SOLUTION INTRAMUSCULAR; INTRAVENOUS; SUBCUTANEOUS
Status: DISCONTINUED | OUTPATIENT
Start: 2024-06-24 | End: 2024-06-25

## 2024-06-24 RX ORDER — LEVOFLOXACIN 500 MG/1
750 TABLET, FILM COATED ORAL
COMMUNITY
Start: 2024-04-13 | End: 2024-06-24

## 2024-06-24 RX ORDER — PROCHLORPERAZINE EDISYLATE 5 MG/ML
5 INJECTION INTRAMUSCULAR; INTRAVENOUS EVERY 6 HOURS PRN
Status: DISCONTINUED | OUTPATIENT
Start: 2024-06-24 | End: 2024-06-27 | Stop reason: HOSPADM

## 2024-06-24 RX ORDER — GLUCAGON 1 MG
1 KIT INJECTION
Status: DISCONTINUED | OUTPATIENT
Start: 2024-06-24 | End: 2024-06-27 | Stop reason: HOSPADM

## 2024-06-24 RX ORDER — KIT FOR THE PREPARATION OF TECHNETIUM TC 99M MEBROFENIN 45 MG/10ML
4.9 INJECTION, POWDER, LYOPHILIZED, FOR SOLUTION INTRAVENOUS
Status: COMPLETED | OUTPATIENT
Start: 2024-06-24 | End: 2024-06-24

## 2024-06-24 RX ORDER — HYDROMORPHONE HYDROCHLORIDE 2 MG/ML
0.5 INJECTION, SOLUTION INTRAMUSCULAR; INTRAVENOUS; SUBCUTANEOUS
Status: COMPLETED | OUTPATIENT
Start: 2024-06-24 | End: 2024-06-24

## 2024-06-24 RX ORDER — INSULIN GLARGINE 100 [IU]/ML
40 INJECTION, SOLUTION SUBCUTANEOUS DAILY
Status: DISCONTINUED | OUTPATIENT
Start: 2024-06-25 | End: 2024-06-27 | Stop reason: HOSPADM

## 2024-06-24 RX ORDER — ONDANSETRON HYDROCHLORIDE 2 MG/ML
4 INJECTION, SOLUTION INTRAVENOUS EVERY 8 HOURS PRN
Status: DISCONTINUED | OUTPATIENT
Start: 2024-06-24 | End: 2024-06-24

## 2024-06-24 RX ORDER — CLOPIDOGREL BISULFATE 75 MG/1
75 TABLET ORAL DAILY
COMMUNITY
Start: 2024-06-20

## 2024-06-24 RX ORDER — CAPECITABINE 500 MG/1
TABLET, FILM COATED ORAL
COMMUNITY
Start: 2024-04-23

## 2024-06-24 RX ORDER — ACETAMINOPHEN 325 MG/1
650 TABLET ORAL EVERY 6 HOURS PRN
Status: DISCONTINUED | OUTPATIENT
Start: 2024-06-24 | End: 2024-06-24

## 2024-06-24 RX ORDER — SODIUM CHLORIDE 0.9 % (FLUSH) 0.9 %
10 SYRINGE (ML) INJECTION EVERY 12 HOURS PRN
Status: DISCONTINUED | OUTPATIENT
Start: 2024-06-24 | End: 2024-06-24

## 2024-06-24 RX ORDER — CIPROFLOXACIN 2 MG/ML
400 INJECTION, SOLUTION INTRAVENOUS
Status: DISCONTINUED | OUTPATIENT
Start: 2024-06-24 | End: 2024-06-27

## 2024-06-24 RX ORDER — ACETAMINOPHEN 325 MG/1
650 TABLET ORAL EVERY 4 HOURS PRN
Status: DISCONTINUED | OUTPATIENT
Start: 2024-06-24 | End: 2024-06-26

## 2024-06-24 RX ORDER — SODIUM CHLORIDE 9 MG/ML
INJECTION, SOLUTION INTRAVENOUS CONTINUOUS
Status: DISCONTINUED | OUTPATIENT
Start: 2024-06-24 | End: 2024-06-25

## 2024-06-24 RX ORDER — LEVETIRACETAM 500 MG/5ML
INJECTION, SOLUTION, CONCENTRATE INTRAVENOUS
Status: COMPLETED
Start: 2024-06-24 | End: 2024-06-24

## 2024-06-24 RX ORDER — LEVETIRACETAM 500 MG/1
500 TABLET ORAL DAILY
Status: DISCONTINUED | OUTPATIENT
Start: 2024-06-25 | End: 2024-06-27 | Stop reason: HOSPADM

## 2024-06-24 RX ORDER — METRONIDAZOLE 500 MG/100ML
500 INJECTION, SOLUTION INTRAVENOUS
Status: DISCONTINUED | OUTPATIENT
Start: 2024-06-24 | End: 2024-06-27

## 2024-06-24 RX ORDER — ONDANSETRON HYDROCHLORIDE 2 MG/ML
4 INJECTION, SOLUTION INTRAVENOUS EVERY 8 HOURS PRN
Status: DISCONTINUED | OUTPATIENT
Start: 2024-06-24 | End: 2024-06-27 | Stop reason: HOSPADM

## 2024-06-24 RX ORDER — SODIUM CHLORIDE, SODIUM LACTATE, POTASSIUM CHLORIDE, CALCIUM CHLORIDE 600; 310; 30; 20 MG/100ML; MG/100ML; MG/100ML; MG/100ML
INJECTION, SOLUTION INTRAVENOUS CONTINUOUS
Status: DISCONTINUED | OUTPATIENT
Start: 2024-06-24 | End: 2024-06-25

## 2024-06-24 RX ORDER — TALC
6 POWDER (GRAM) TOPICAL NIGHTLY PRN
Status: DISCONTINUED | OUTPATIENT
Start: 2024-06-24 | End: 2024-06-27 | Stop reason: HOSPADM

## 2024-06-24 RX ORDER — HYDROMORPHONE HYDROCHLORIDE 2 MG/ML
0.5 INJECTION, SOLUTION INTRAMUSCULAR; INTRAVENOUS; SUBCUTANEOUS
Status: DISPENSED | OUTPATIENT
Start: 2024-06-24 | End: 2024-06-24

## 2024-06-24 RX ORDER — METRONIDAZOLE 500 MG/1
500 TABLET ORAL 3 TIMES DAILY
COMMUNITY
Start: 2024-04-13 | End: 2024-06-24

## 2024-06-24 RX ORDER — INSULIN ASPART 100 [IU]/ML
0-5 INJECTION, SOLUTION INTRAVENOUS; SUBCUTANEOUS EVERY 6 HOURS PRN
Status: DISCONTINUED | OUTPATIENT
Start: 2024-06-24 | End: 2024-06-27 | Stop reason: HOSPADM

## 2024-06-24 RX ORDER — SODIUM CHLORIDE 0.9 % (FLUSH) 0.9 %
10 SYRINGE (ML) INJECTION
Status: DISCONTINUED | OUTPATIENT
Start: 2024-06-24 | End: 2024-06-27 | Stop reason: HOSPADM

## 2024-06-24 RX ORDER — IBUPROFEN 200 MG
16 TABLET ORAL
Status: DISCONTINUED | OUTPATIENT
Start: 2024-06-24 | End: 2024-06-27 | Stop reason: HOSPADM

## 2024-06-24 RX ADMIN — SODIUM CHLORIDE, POTASSIUM CHLORIDE, SODIUM LACTATE AND CALCIUM CHLORIDE: 600; 310; 30; 20 INJECTION, SOLUTION INTRAVENOUS at 06:06

## 2024-06-24 RX ADMIN — IOPAMIDOL 100 ML: 755 INJECTION, SOLUTION INTRAVENOUS at 08:06

## 2024-06-24 RX ADMIN — CIPROFLOXACIN 400 MG: 2 INJECTION, SOLUTION INTRAVENOUS at 10:06

## 2024-06-24 RX ADMIN — LEVETIRACETAM 1000 MG: 500 INJECTION, SOLUTION, CONCENTRATE INTRAVENOUS at 06:06

## 2024-06-24 RX ADMIN — POTASSIUM BICARBONATE 40 MEQ: 782 TABLET, EFFERVESCENT ORAL at 10:06

## 2024-06-24 RX ADMIN — HYDROMORPHONE HYDROCHLORIDE 0.5 MG: 2 INJECTION INTRAMUSCULAR; INTRAVENOUS; SUBCUTANEOUS at 07:06

## 2024-06-24 RX ADMIN — SODIUM CHLORIDE 1000 ML: 9 INJECTION, SOLUTION INTRAVENOUS at 06:06

## 2024-06-24 RX ADMIN — HYDROMORPHONE HYDROCHLORIDE 0.5 MG: 2 INJECTION INTRAMUSCULAR; INTRAVENOUS; SUBCUTANEOUS at 10:06

## 2024-06-24 RX ADMIN — MEBROFENIN 4.9 MILLICURIE: 45 INJECTION, POWDER, LYOPHILIZED, FOR SOLUTION INTRAVENOUS at 12:06

## 2024-06-24 RX ADMIN — SODIUM CHLORIDE: 9 INJECTION, SOLUTION INTRAVENOUS at 10:06

## 2024-06-24 RX ADMIN — HYDROMORPHONE HYDROCHLORIDE 0.5 MG: 2 INJECTION INTRAMUSCULAR; INTRAVENOUS; SUBCUTANEOUS at 06:06

## 2024-06-24 NOTE — ED NOTES
Discussed with Dr Carrero that patient is going to go for HIDA scan at 1230 and that all pain medication has to be held until after HIDA scan.  Dr Carrero in agreement to hold pain medication at this time in order to do required studies.

## 2024-06-24 NOTE — ED NOTES
Assumed care of patient, resting in bed quietly without distress.  Patient complains of abdominal pain at present.  New orders noted for CT and XRAY.
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
## 2024-06-24 NOTE — ED NOTES
"Pt states "I'm fixing to have a seizure I can hear you, but I cannot see you" pt states she has a hx of seizures and that she is being followed by Neurology for them. She also stated that she takes 1G of Kepra daily and her last dose was 6/23.   "

## 2024-06-24 NOTE — PHARMACY MED REC
"Admission Medication History     The home medication history was taken by Pari Cervantes.    You may go to "Admission" then "Reconcile Home Medications" tabs to review and/or act upon these items.     The home medication list has been updated by the Pharmacy department.   Please read ALL comments highlighted in yellow.   Please address this information as you see fit.    Feel free to contact us if you have any questions or require assistance.  Medications Updated:  Xeloda 150 mg to Xeloda 500 mg  Takes three tablets twice a day for seven days on and then seven days off      The medications listed below were removed from the home medication list. Please reorder if appropriate:  Cyclobenzaprine 10 mg  Hydrochlorothiazide 12.5 mg  Levofloxacin 500 mg  Lidocaine viscous solution 2%  Metronidazole 500 mg    Medications listed below were obtained from: Patient/family and Analytic software- [email or web address]  (Not in a hospital admission)        Current Outpatient Medications on File Prior to Encounter   Medication Sig Dispense Refill Last Dose    capecitabine (XELODA) 500 MG Tab Take 3 tablets twice daily for 7 days on and 7 days off.   6/16/2024    cholecalciferol, vitamin D3, 1,250 mcg (50,000 unit) capsule Take 1 capsule (50,000 Units total) by mouth every 7 days. 8 capsule 0 Past Month    clopidogreL (PLAVIX) 75 mg tablet Take 75 mg by mouth once daily.   6/23/2024    insulin aspart U-100 (NOVOLOG) 100 unit/mL injection inject as directed by your physician per sliding scale 10 mL 5 6/23/2024    insulin glargine (LANTUS U-100 INSULIN) 100 unit/mL injection Inject 80 Units into the skin once daily. 30 mL 5 6/23/2024    levETIRAcetam (KEPPRA) 500 MG Tab TAKE ONE TABLET BY MOUTH ONCE DAILY 90 tablet 1 6/24/2024    lisinopriL 10 MG tablet Take 1 tablet (10 mg total) by mouth once daily. 90 tablet 2 6/23/2024    [DISCONTINUED] cyclobenzaprine (FLEXERIL) 10 MG tablet Take 10 mg by mouth 3 (three) times daily as needed.       " [DISCONTINUED] levoFLOXacin (LEVAQUIN) 500 MG tablet Take 750 mg by mouth.       [DISCONTINUED] metroNIDAZOLE (FLAGYL) 500 MG tablet Take 500 mg by mouth 3 (three) times daily.       blood sugar diagnostic (ONETOUCH ULTRA TEST) Strp check blood sugar TWICE DAILY 100 strip 2     insulin syringe-needle U-100 1 mL 30 gauge x 5/16 Syrg Use as directed. 100 each 5     [DISCONTINUED] blood-glucose meter kit To check BG BID times daily, to use with insurance preferred meter 1 each 0     [DISCONTINUED] capecitabine (XELODA) 150 MG tablet Take 150 mg by mouth 3 (three) times daily.       [DISCONTINUED] hydroCHLOROthiazide (HYDRODIURIL) 12.5 MG Tab TAKE ONE TABLET BY MOUTH DAILY IN THE MORNING 90 tablet 3     [DISCONTINUED] LIDOcaine viscous HCl 2% (XYLOCAINE) 2 % Soln Use as directed in the mouth or throat every 6 (six) hours. Mix 5 ml with equal parts of maalox or mylanta, swish and swallow as needed 100 mL 0          Potential issues to be addressed PRIOR TO DISCHARGE  No issues identified.    Pari Cervantes  Pharmacy [facility] Specialist - Medication History  EXT. [phone]      .

## 2024-06-24 NOTE — CONSULTS
[facility] - Emergency Department  General Surgery  Consult Note    Consults  Subjective:     Chief Complaint/Reason for Admission:  Abdominal pain    History of Present Illness:  53-year-old female who comes in with 1 day history of worsening right upper quadrant pain.  She always has it as she has some persistent liver metastatic disease after colon resection back in 2018.  Known to be with recurrent cancer unusually has some pain but not this bad.  Positive nausea this morning.    No current facility-administered medications on file prior to encounter.     Current Outpatient Medications on File Prior to Encounter   Medication Sig    clopidogreL (PLAVIX) 75 mg tablet Take 75 mg by mouth.    cyclobenzaprine (FLEXERIL) 10 MG tablet Take 10 mg by mouth 3 (three) times daily as needed.    levoFLOXacin (LEVAQUIN) 500 MG tablet Take 750 mg by mouth.    metroNIDAZOLE (FLAGYL) 500 MG tablet Take 500 mg by mouth 3 (three) times daily.    blood sugar diagnostic (ONETOUCH ULTRA TEST) Strp check blood sugar TWICE DAILY    blood-glucose meter kit To check BG BID times daily, to use with insurance preferred meter    capecitabine (XELODA) 150 MG tablet Take 150 mg by mouth 3 (three) times daily.    cholecalciferol, vitamin D3, 1,250 mcg (50,000 unit) capsule Take 1 capsule (50,000 Units total) by mouth every 7 days.    hydroCHLOROthiazide (HYDRODIURIL) 12.5 MG Tab TAKE ONE TABLET BY MOUTH DAILY IN THE MORNING    insulin aspart U-100 (NOVOLOG) 100 unit/mL injection inject as directed by your physician per sliding scale    insulin glargine (LANTUS U-100 INSULIN) 100 unit/mL injection Inject 80 Units into the skin once daily.    insulin syringe-needle U-100 1 mL 30 gauge x 5/16 Syrg Use as directed.    levETIRAcetam (KEPPRA) 500 MG Tab TAKE ONE TABLET BY MOUTH ONCE DAILY    LIDOcaine viscous HCl 2% (XYLOCAINE) 2 % Soln Use as directed in the mouth or throat every 6 (six) hours. Mix 5 ml with equal parts of maalox or  "mylanta, swish and swallow as needed    lisinopriL 10 MG tablet Take 1 tablet (10 mg total) by mouth once daily.       Review of patient's allergies indicates:   Allergen Reactions    Grape Anaphylaxis     Other reaction(s): Not available    Morphine sulfate Swelling    Nutritional supplements Anaphylaxis     Highly allergic to Cooked grapes    Pineapple Anaphylaxis    Opioids - morphine analogues Nausea And Vomiting    Rocephin [ceftriaxone] Itching    Aspirin      "Veins breakdown" per patient    Biguanides Diarrhea and Other (See Comments)    Dicyclomine Diarrhea    Dihydrocodeine Other (See Comments)     Bad headache in the top of head    Gabapentin Other (See Comments)     weakness  Other reaction(s): Not available  Seizures      Hydrocodone Other (See Comments)     Headache      Iron      Hemochromatosis    Iron analogues      Pt hx of hemachromatosis    Metformin      diarrhea    Penicillins Other (See Comments)     Family hx       Latex Rash and Other (See Comments)       Past Medical History:   Diagnosis Date    Colon cancer     CVA (cerebral vascular accident)     Diabetes     HTN (hypertension)     Hypokalemia     Liver cancer     Lung cancer     Malignant biliary obstruction     Obstruction of bile duct     Rectal cancer metastatic to bone     Seizure     TIA (transient ischemic attack)      Past Surgical History:   Procedure Laterality Date    COLON SURGERY      HERNIA REPAIR      HYSTERECTOMY       Family History       Problem Relation (Age of Onset)    No Known Problems Mother, Father, Sister, Brother, Daughter, Son, Maternal Aunt, Maternal Uncle, Paternal Aunt, Paternal Uncle, Maternal Grandmother, Maternal Grandfather, Paternal Grandmother, Paternal Grandfather          Tobacco Use    Smoking status: Never     Passive exposure: Never    Smokeless tobacco: Never   Substance and Sexual Activity    Alcohol use: Never    Drug use: Never    Sexual activity: Never     Review of Systems   Constitutional:  " "Negative for activity change, appetite change, fatigue and fever.   HENT:  Negative for trouble swallowing.    Respiratory:  Negative for cough and shortness of breath.    Cardiovascular:  Negative for chest pain and palpitations.   Gastrointestinal:  Positive for abdominal pain and nausea. Negative for abdominal distention, blood in stool, constipation and diarrhea.   Genitourinary:  Negative for flank pain.   Musculoskeletal:  Negative for neck pain and neck stiffness.   Neurological:  Negative for weakness.     Objective:     Vital Signs (Most Recent):  Temp: 98.1 °F (36.7 °C) (06/24/24 0541)  Pulse: 110 (06/24/24 0656)  Resp: 18 (06/24/24 0727)  BP: 136/64 (06/24/24 0656)  SpO2: 99 % (06/24/24 0656) Vital Signs (24h Range):  Temp:  [98.1 °F (36.7 °C)] 98.1 °F (36.7 °C)  Pulse:  [106-110] 110  Resp:  [18-20] 18  SpO2:  [99 %-100 %] 99 %  BP: (122-136)/(64-86) 136/64     Weight: 62.6 kg (138 lb)  Body mass index is 29.86 kg/m².      Intake/Output Summary (Last 24 hours) at 6/24/2024 1126  Last data filed at 6/24/2024 0708  Gross per 24 hour   Intake 1000 ml   Output --   Net 1000 ml       Physical Exam  Constitutional:       General: She is not in acute distress.  HENT:      Head: Normocephalic.   Cardiovascular:      Rate and Rhythm: Normal rate and regular rhythm.      Pulses: Normal pulses.   Pulmonary:      Effort: Pulmonary effort is normal. No respiratory distress.      Breath sounds: Normal breath sounds.   Abdominal:      General: Abdomen is flat. There is no distension.      Palpations: Abdomen is soft.      Tenderness: There is abdominal tenderness.   Musculoskeletal:         General: Normal range of motion.   Skin:     General: Skin is warm.   Neurological:      General: No focal deficit present.      Mental Status: She is oriented to person, place, and time.         Significant Labs:  Cardiac markers: No results for input(s): "CKMB", "CPKMB", "TROPONINT", "TROPONINI", "MYOGLOBIN" in the last 48 " hours.  CBC:   Recent Labs   Lab 06/24/24 0605   WBC 14.84*   RBC 4.21   HGB 12.6   HCT 39.3      MCV 93.3   MCH 29.9   MCHC 32.1     CMP:   Recent Labs   Lab 06/24/24 0605   *   CALCIUM 9.3   ALBUMIN 2.7*   PROT 6.9   *   K 3.2*   CO2 29   CL 92*   BUN 7   CREATININE 0.65   ALKPHOS 1,063*   *   AST 97*   BILITOT 2.1*     Coagulation:   Recent Labs   Lab 06/24/24  0605   INR 1.10   APTT 27.7       Significant Diagnostics:  CT: I have reviewed all pertinent results/findings within the past 24 hours.  Distended gallbladder with fluid around it as well as metastatic liver nodules    Assessment/Plan:     Active Diagnoses:    Diagnosis Date Noted POA    Right upper quadrant abdominal pain [R10.11] 06/24/2024 Unknown      Problems Resolved During this Admission:     HIDA scan ordered for about an hour.  Will see what it shows.  That is differentiate between intrinsic liver disease versus gallbladder issues.  Patient very high-risk for surgery with metastatic cancer, Mets in the area, she is on Plavix as well.  If it is positive for gallbladder disease would likely elected for percutaneous cholecystostomy tube placement in an individual with stage IV cancer.    Thank you for your consult. I will follow-up with patient. Please contact us if you have any additional questions.    Abner Vyas MD  General Surgery  [facility] - Emergency Department

## 2024-06-25 LAB
ALBUMIN SERPL BCP-MCNC: 1.6 G/DL (ref 3.5–5)
ALBUMIN/GLOB SERPL: 0.4 {RATIO}
ALP SERPL-CCNC: 612 U/L (ref 41–108)
ALT SERPL W P-5'-P-CCNC: 44 U/L (ref 13–56)
ANION GAP SERPL CALCULATED.3IONS-SCNC: 8 MMOL/L (ref 7–16)
AST SERPL W P-5'-P-CCNC: 27 U/L (ref 15–37)
BASOPHILS # BLD AUTO: 0.04 K/UL (ref 0–0.2)
BASOPHILS NFR BLD AUTO: 0.2 % (ref 0–1)
BILIRUB SERPL-MCNC: 4.5 MG/DL (ref ?–1.2)
BUN SERPL-MCNC: 7 MG/DL (ref 7–18)
BUN/CREAT SERPL: 9 (ref 6–20)
CALCIUM SERPL-MCNC: 8.2 MG/DL (ref 8.5–10.1)
CHLORIDE SERPL-SCNC: 93 MMOL/L (ref 98–107)
CO2 SERPL-SCNC: 32 MMOL/L (ref 21–32)
CREAT SERPL-MCNC: 0.78 MG/DL (ref 0.55–1.02)
DIFFERENTIAL METHOD BLD: ABNORMAL
EGFR (NO RACE VARIABLE) (RUSH/TITUS): 92 ML/MIN/1.73M2
EOSINOPHIL # BLD AUTO: 0.01 K/UL (ref 0–0.5)
EOSINOPHIL NFR BLD AUTO: 0.1 % (ref 1–4)
ERYTHROCYTE [DISTWIDTH] IN BLOOD BY AUTOMATED COUNT: 19.1 % (ref 11.5–14.5)
GLOBULIN SER-MCNC: 4 G/DL (ref 2–4)
GLUCOSE SERPL-MCNC: 119 MG/DL (ref 70–105)
GLUCOSE SERPL-MCNC: 123 MG/DL (ref 70–105)
GLUCOSE SERPL-MCNC: 127 MG/DL (ref 74–106)
GLUCOSE SERPL-MCNC: 154 MG/DL (ref 70–105)
GLUCOSE SERPL-MCNC: 180 MG/DL (ref 70–105)
GLUCOSE SERPL-MCNC: 200 MG/DL (ref 70–105)
GLUCOSE SERPL-MCNC: 94 MG/DL (ref 70–105)
GRAM STN SPEC: NORMAL
GRAM STN SPEC: NORMAL
HCT VFR BLD AUTO: 32.5 % (ref 38–47)
HGB BLD-MCNC: 10.3 G/DL (ref 12–16)
IMM GRANULOCYTES # BLD AUTO: 0.15 K/UL (ref 0–0.04)
IMM GRANULOCYTES NFR BLD: 0.8 % (ref 0–0.4)
LYMPHOCYTES # BLD AUTO: 1.01 K/UL (ref 1–4.8)
LYMPHOCYTES NFR BLD AUTO: 5.5 % (ref 27–41)
MCH RBC QN AUTO: 30.3 PG (ref 27–31)
MCHC RBC AUTO-ENTMCNC: 31.7 G/DL (ref 32–36)
MCV RBC AUTO: 95.6 FL (ref 80–96)
MONOCYTES # BLD AUTO: 0.99 K/UL (ref 0–0.8)
MONOCYTES NFR BLD AUTO: 5.4 % (ref 2–6)
MPC BLD CALC-MCNC: 11.3 FL (ref 9.4–12.4)
NEUTROPHILS # BLD AUTO: 16.21 K/UL (ref 1.8–7.7)
NEUTROPHILS NFR BLD AUTO: 88 % (ref 53–65)
NRBC # BLD AUTO: 0 X10E3/UL
NRBC, AUTO (.00): 0 %
PLATELET # BLD AUTO: 276 K/UL (ref 150–400)
POTASSIUM SERPL-SCNC: 4.8 MMOL/L (ref 3.5–5.1)
PROT SERPL-MCNC: 5.6 G/DL (ref 6.4–8.2)
RBC # BLD AUTO: 3.4 M/UL (ref 4.2–5.4)
SODIUM SERPL-SCNC: 128 MMOL/L (ref 136–145)
WBC # BLD AUTO: 18.41 K/UL (ref 4.5–11)

## 2024-06-25 PROCEDURE — 87205 SMEAR GRAM STAIN: CPT | Performed by: STUDENT IN AN ORGANIZED HEALTH CARE EDUCATION/TRAINING PROGRAM

## 2024-06-25 PROCEDURE — 87070 CULTURE OTHR SPECIMN AEROBIC: CPT | Performed by: STUDENT IN AN ORGANIZED HEALTH CARE EDUCATION/TRAINING PROGRAM

## 2024-06-25 PROCEDURE — 99232 SBSQ HOSP IP/OBS MODERATE 35: CPT | Mod: GC,,, | Performed by: FAMILY MEDICINE

## 2024-06-25 PROCEDURE — 36410 VNPNXR 3YR/> PHY/QHP DX/THER: CPT

## 2024-06-25 PROCEDURE — 76937 US GUIDE VASCULAR ACCESS: CPT

## 2024-06-25 PROCEDURE — 25000003 PHARM REV CODE 250: Performed by: SURGERY

## 2024-06-25 PROCEDURE — 85025 COMPLETE CBC W/AUTO DIFF WBC: CPT

## 2024-06-25 PROCEDURE — 25000003 PHARM REV CODE 250

## 2024-06-25 PROCEDURE — 63600175 PHARM REV CODE 636 W HCPCS

## 2024-06-25 PROCEDURE — 63600175 PHARM REV CODE 636 W HCPCS: Performed by: FAMILY MEDICINE

## 2024-06-25 PROCEDURE — 0F9430Z DRAINAGE OF GALLBLADDER WITH DRAINAGE DEVICE, PERCUTANEOUS APPROACH: ICD-10-PCS | Performed by: STUDENT IN AN ORGANIZED HEALTH CARE EDUCATION/TRAINING PROGRAM

## 2024-06-25 PROCEDURE — 80053 COMPREHEN METABOLIC PANEL: CPT

## 2024-06-25 PROCEDURE — 63600175 PHARM REV CODE 636 W HCPCS: Performed by: INTERNAL MEDICINE

## 2024-06-25 PROCEDURE — 25000003 PHARM REV CODE 250: Performed by: INTERNAL MEDICINE

## 2024-06-25 PROCEDURE — 82962 GLUCOSE BLOOD TEST: CPT

## 2024-06-25 PROCEDURE — 87077 CULTURE AEROBIC IDENTIFY: CPT | Performed by: STUDENT IN AN ORGANIZED HEALTH CARE EDUCATION/TRAINING PROGRAM

## 2024-06-25 PROCEDURE — 36415 COLL VENOUS BLD VENIPUNCTURE: CPT

## 2024-06-25 PROCEDURE — C1751 CATH, INF, PER/CENT/MIDLINE: HCPCS

## 2024-06-25 PROCEDURE — 11000001 HC ACUTE MED/SURG PRIVATE ROOM

## 2024-06-25 RX ORDER — HYDROMORPHONE HYDROCHLORIDE 2 MG/ML
1 INJECTION, SOLUTION INTRAMUSCULAR; INTRAVENOUS; SUBCUTANEOUS
Status: DISCONTINUED | OUTPATIENT
Start: 2024-06-25 | End: 2024-06-25

## 2024-06-25 RX ORDER — HYDROMORPHONE HYDROCHLORIDE 2 MG/ML
0.5 INJECTION, SOLUTION INTRAMUSCULAR; INTRAVENOUS; SUBCUTANEOUS EVERY 4 HOURS PRN
Status: DISCONTINUED | OUTPATIENT
Start: 2024-06-25 | End: 2024-06-27 | Stop reason: HOSPADM

## 2024-06-25 RX ORDER — GUAIFENESIN 100 MG/5ML
200 SOLUTION ORAL EVERY 4 HOURS PRN
Status: DISCONTINUED | OUTPATIENT
Start: 2024-06-25 | End: 2024-06-27 | Stop reason: HOSPADM

## 2024-06-25 RX ORDER — DIPHENHYDRAMINE HCL 25 MG
25 CAPSULE ORAL EVERY 6 HOURS PRN
Status: DISCONTINUED | OUTPATIENT
Start: 2024-06-25 | End: 2024-06-27 | Stop reason: HOSPADM

## 2024-06-25 RX ORDER — MUPIROCIN 20 MG/G
OINTMENT TOPICAL 2 TIMES DAILY
Status: DISCONTINUED | OUTPATIENT
Start: 2024-06-25 | End: 2024-06-27 | Stop reason: HOSPADM

## 2024-06-25 RX ORDER — SODIUM CHLORIDE, SODIUM LACTATE, POTASSIUM CHLORIDE, CALCIUM CHLORIDE 600; 310; 30; 20 MG/100ML; MG/100ML; MG/100ML; MG/100ML
INJECTION, SOLUTION INTRAVENOUS CONTINUOUS
Status: DISCONTINUED | OUTPATIENT
Start: 2024-06-25 | End: 2024-06-26

## 2024-06-25 RX ADMIN — METRONIDAZOLE 500 MG: 500 INJECTION, SOLUTION INTRAVENOUS at 10:06

## 2024-06-25 RX ADMIN — CIPROFLOXACIN 400 MG: 2 INJECTION, SOLUTION INTRAVENOUS at 09:06

## 2024-06-25 RX ADMIN — METRONIDAZOLE 500 MG: 500 INJECTION, SOLUTION INTRAVENOUS at 06:06

## 2024-06-25 RX ADMIN — HYDROMORPHONE HYDROCHLORIDE 0.5 MG: 2 INJECTION INTRAMUSCULAR; INTRAVENOUS; SUBCUTANEOUS at 04:06

## 2024-06-25 RX ADMIN — MUPIROCIN: 20 OINTMENT TOPICAL at 11:06

## 2024-06-25 RX ADMIN — GUAIFENESIN 200 MG: 200 SOLUTION ORAL at 04:06

## 2024-06-25 RX ADMIN — HYDROMORPHONE HYDROCHLORIDE 0.5 MG: 2 INJECTION INTRAMUSCULAR; INTRAVENOUS; SUBCUTANEOUS at 09:06

## 2024-06-25 RX ADMIN — HEPARIN SODIUM 5000 UNITS: 5000 INJECTION, SOLUTION INTRAVENOUS; SUBCUTANEOUS at 09:06

## 2024-06-25 RX ADMIN — POTASSIUM BICARBONATE 40 MEQ: 782 TABLET, EFFERVESCENT ORAL at 09:06

## 2024-06-25 RX ADMIN — POTASSIUM BICARBONATE 40 MEQ: 782 TABLET, EFFERVESCENT ORAL at 04:06

## 2024-06-25 RX ADMIN — INSULIN GLARGINE 40 UNITS: 100 INJECTION, SOLUTION SUBCUTANEOUS at 09:06

## 2024-06-25 RX ADMIN — SODIUM CHLORIDE, POTASSIUM CHLORIDE, SODIUM LACTATE AND CALCIUM CHLORIDE: 600; 310; 30; 20 INJECTION, SOLUTION INTRAVENOUS at 09:06

## 2024-06-25 RX ADMIN — DIPHENHYDRAMINE HYDROCHLORIDE 25 MG: 25 CAPSULE ORAL at 09:06

## 2024-06-25 RX ADMIN — LEVETIRACETAM 500 MG: 500 TABLET, FILM COATED ORAL at 09:06

## 2024-06-25 RX ADMIN — HEPARIN SODIUM 5000 UNITS: 5000 INJECTION, SOLUTION INTRAVENOUS; SUBCUTANEOUS at 02:06

## 2024-06-25 RX ADMIN — METRONIDAZOLE 500 MG: 500 INJECTION, SOLUTION INTRAVENOUS at 04:06

## 2024-06-25 RX ADMIN — METRONIDAZOLE 500 MG: 500 INJECTION, SOLUTION INTRAVENOUS at 12:06

## 2024-06-25 NOTE — PROCEDURES
CT guided gallbladder drain placement performed by Dr. Faith and assisted by Mark MCDONALD.  Patient was prepped with Betadine and draped in sterile fashion.  Formal time-out was called with all present in agreement.  8 cc of 1% lidocaine infused.  8.5 Salvadorean drain placed in the gallbladder with CT guidance.  Specimen sent to the lab for analysis.  Specimen transport to the lab by Justin Vera RN.  Catheter attached to the skin with the stay fix device.  Catheter connected to a constant suction device.  Patient tolerated procedure well with no immediate complication.

## 2024-06-25 NOTE — PROGRESS NOTES
Stable, no acute changes overnight. The patient still reports intermittent right upper quadrant pain.  Intermittent nausea without vomiting.  Plan for cholecystostomy tube placed in IR today.

## 2024-06-25 NOTE — PLAN OF CARE
[facility] - Orthopedic  Initial Discharge Assessment       Primary Care Provider: Elkin Coombs MD    Admission Diagnosis: Abdominal pain [R10.9]  Chest pain [R07.9]  Right upper quadrant abdominal pain [R10.11]    Admission Date: 6/24/2024  Expected Discharge Date: 6/28/2024    Transition of Care Barriers: None    Payor: [place]ICAID [place] / Plan: [place]ICAID [place] [employer] / Product Type: Managed Medicaid /     Extended Emergency Contact Information  Primary Emergency Contact: Chacha Landis  Mobile Phone: [phone]  Relation: Relative  Preferred language: English   needed? No    Discharge Plan A: Home with family  Discharge Plan B: Home with family      [facility] [address] [address]  Phone: [phone] Fax: [phone]    [facility] [address]  [address]  [address]  [facility] [address]  Phone: [phone] Fax: [phone]      Initial Assessment (most recent)       Adult Discharge Assessment - 06/25/24 1049          Discharge Assessment    Assessment Type Discharge Planning Assessment     Source of Information patient     Communicated LAWRENCE with patient/caregiver Yes     People in Home other relative(s)   lives with aunt chacha [phone]    Do you expect to return to your current living situation? Yes     Do you have help at home or someone to help you manage your care at home? Yes     Who are your caregiver(s) and their phone number(s)? aunt chacha [phone]     Prior to hospitilization cognitive status: Alert/Oriented     Current cognitive status: Alert/Oriented     Walking or Climbing Stairs Difficulty no     Dressing/Bathing Difficulty no     Equipment Currently Used at Home none;other (see comments)   has straight cane but states that she only uses it as needed and she can care for herself    Patient currently being followed by outpatient  case management? No     Do you currently have service(s) that help you manage your care at home? No     Do you take prescription medications? Yes     Do you have any problems affording any of your prescribed medications? No     Is the patient taking medications as prescribed? yes     Who is going to help you get home at discharge? family     How do you get to doctors appointments? family or friend will provide     Are you on dialysis? No     Do you take coumadin? No     Discharge Plan A Home with family     Discharge Plan B Home with family     DME Needed Upon Discharge  none     Discharge Plan discussed with: Patient     Transition of Care Barriers None                   Spoke with patient in her room. She lives with her aunt. She states that normally she can take care of herself. No home services. Uses no dme. Dc plan is home. Cm will follow. SDOH not completed as personnel came to take patient for procedure.

## 2024-06-25 NOTE — HPI
Patient is a very unfortunate 53-year-old female with a history of colon cancer with mets to the liver and lung status post partial colectomy followed by reanastomosis, rectal cancer with mets to bones, and malignant biliary obstruction status post stent placement, type 2 diabetes, essential hypertension, and partial seizure who is undergoing palliative chemotherapy.  Patient experience severe right upper quadrant pain upon awakening this morning.  She did vomit once yesterday but nothing today and was able to tolerate mashed potatoes without any problems.  Patient also complained of diarrhea but she attributed this to chemotherapy and denied any changes in frequency, quantity or quality of diarrhea.  Patient denied any fever chills chest pain palpitation PND orthopnea lower extremity edema dysuria hematuria flank pain hematemesis hematochezia or melena in association.    On presentation, patient was slightly tachycardic but vital signs were otherwise stable and patient was afebrile.  Workup was notable for presence of hyperbilirubinemia with total bilirubin of 2.1, alkaline phosphatase of 1063, ALT of 108, and AST of 97.  CT showed multiple pulmonary nodules scattered throughout the lower lobes and liver showed interval increase in multiple hypoattenuating lesions throughout the right hepatic lobe along with gallbladder wall thickening and fluid surrounding the gallbladder. Common duct bile duct stent was in place.  Subsequently HIDA scan was performed which shows nonvisualization of gallbladder which is indicative cholecystitis.  Dr. Vyas felt that patient will be too high of a risk risk for cholecystectomy and intents on placing a percutaneous cholecystotomy tube placement.  Patient will be admitted

## 2024-06-25 NOTE — ASSESSMENT & PLAN NOTE
Chronic, controlled. Latest blood pressure and vitals reviewed-     Temp:  [98.1 °F (36.7 °C)]   Pulse:  [106-112]   Resp:  [18-20]   BP: (101-136)/(57-86)   SpO2:  [99 %-100 %] .   Home meds for hypertension were reviewed and noted below.   Hypertension Medications               lisinopriL 10 MG tablet Take 1 tablet (10 mg total) by mouth once daily.          Will hold off on ACE-inhibitor while being hospitalized to minimize risk of renal failure    Will utilize p.r.n. blood pressure medication only if patient's blood pressure greater than 180/110 and she develops symptoms such as worsening chest pain or shortness of breath.

## 2024-06-25 NOTE — ASSESSMENT & PLAN NOTE
Common bile duct stent was patent on CT.  In addition total bilirubin level and AST and ALT were minimally elevated with the exception of alkaline phosphatase.  The latter could be due to bone mets from rectal cancer, rather than representing obstructive cholestasis.  HIDA scan:  Impression: Increased activity inferior right liver. Nonvisualization of gallbladder could indicate cholecystitis

## 2024-06-25 NOTE — ASSESSMENT & PLAN NOTE
Patient has hypokalemia which is Acute on Chronic and currently controlled. Most recent potassium levels reviewed-   Lab Results   Component Value Date    K 3.2 (L) 06/24/2024   . Will continue potassium replacement per protocol and recheck repeat levels after replacement completed.   - replacing with PO potasium.  - Potasium Bicarbonate 40 mEq TID for one day  - monitor serum potasium

## 2024-06-25 NOTE — CONSULTS
CC: abdominal pain    HPI 53 y.o. female with a history of colon cancer metastatic to liver and lung status post partial colectomy followed by reanastomosis, rectal cancer with mets to bones, and malignant biliary obstruction status post stent placement, type 2 diabetes, essential hypertension, and partial seizure who is undergoing palliative chemotherapy. Reported severe RUQ abdominal pain associated with vomiting and intolerance of food. Tbili 2.1, AP 1063, , AST 97. Two months ago Tbili 14.8, , , AP 1687. CT with gallbladder wall thickening and fluid surrounding gallbladder. Common duct bile duct stent was in place.  Subsequently HIDA scan was performed which showed nonvisualization of gallbladder indicative of cholecystitis. Not surgical candidate due to metastatic cancer. Had perc ivan tube and improved.     Past Medical History:   Diagnosis Date    Colon cancer     CVA (cerebral vascular accident)     Diabetes     HTN (hypertension)     Hypokalemia     Liver cancer     Lung cancer     Malignant biliary obstruction     Obstruction of bile duct     Rectal cancer metastatic to bone     Seizure     TIA (transient ischemic attack)      Past Surgical History:   Procedure Laterality Date    COLON SURGERY      HERNIA REPAIR      HYSTERECTOMY       Social History  Social History     Tobacco Use    Smoking status: Never     Passive exposure: Never    Smokeless tobacco: Never   Substance Use Topics    Alcohol use: Never    Drug use: Never     Family History   Problem Relation Name Age of Onset    No Known Problems Mother      No Known Problems Father      No Known Problems Sister      No Known Problems Brother      No Known Problems Daughter      No Known Problems Son      No Known Problems Maternal Aunt      No Known Problems Maternal Uncle      No Known Problems Paternal Aunt      No Known Problems Paternal Uncle      No Known Problems Maternal Grandmother      No Known Problems Maternal  "Grandfather      No Known Problems Paternal Grandmother      No Known Problems Paternal Grandfather       Physical Examination  /70   Pulse 107   Temp 98.4 °F (36.9 °C) (Oral)   Resp 16   Ht 4' 9" (1.448 m)   Wt 62.6 kg (138 lb)   SpO2 99%   BMI 29.86 kg/m²   General appearance: chronically ill appearing female, lying in bed, alert, cooperative, no distress  HENT: Normocephalic, atraumatic, neck symmetrical, no nasal discharge   Eyes: conjunctivae/corneas clear, PERRL, EOM's intact  Lungs: no labored breathing, symmetric chest wall expansion bilaterally  Heart: regular rate and rhythm without rub; no displacement of the PMI   Abdomen: soft, tender to palpation RUQ, cholecystostomy tube present and draining, bowel sounds normoactive; no organomegaly  Extremities: extremities symmetric; no clubbing, cyanosis, or edema  Integument: Skin color pale, texture, turgor normal; no rashes; hair distrubution normal  Neurologic: Alert, did not test gait  Psychiatric: no pressured speech; flat affect; no evidence of impaired cognition     Labs:  Lab Results   Component Value Date    WBC 14.84 (H) 06/24/2024    HGB 12.6 06/24/2024    HCT 39.3 06/24/2024    MCV 93.3 06/24/2024     06/24/2024     CMP  Sodium   Date Value Ref Range Status   06/24/2024 133 (L) 136 - 145 mmol/L Final     Potassium   Date Value Ref Range Status   06/24/2024 3.2 (L) 3.5 - 5.1 mmol/L Final     Chloride   Date Value Ref Range Status   06/24/2024 92 (L) 98 - 107 mmol/L Final     CO2   Date Value Ref Range Status   06/24/2024 29 21 - 32 mmol/L Final     Glucose   Date Value Ref Range Status   06/24/2024 236 (H) 74 - 106 mg/dL Final     BUN   Date Value Ref Range Status   06/24/2024 7 7 - 18 mg/dL Final     Creatinine   Date Value Ref Range Status   06/24/2024 0.65 0.55 - 1.02 mg/dL Final     Calcium   Date Value Ref Range Status   06/24/2024 9.3 8.5 - 10.1 mg/dL Final     Total Protein   Date Value Ref Range Status   06/24/2024 6.9 6.4 - " 8.2 g/dL Final     Albumin   Date Value Ref Range Status   06/24/2024 2.7 (L) 3.5 - 5.0 g/dL Final     Bilirubin, Total   Date Value Ref Range Status   06/24/2024 2.1 (H) >0.0 - 1.2 mg/dL Final     Alk Phos   Date Value Ref Range Status   06/24/2024 1,063 (H) 41 - 108 U/L Final     AST   Date Value Ref Range Status   06/24/2024 97 (H) 15 - 37 U/L Final     ALT   Date Value Ref Range Status   06/24/2024 108 (H) 13 - 56 U/L Final     Anion Gap   Date Value Ref Range Status   06/24/2024 15 7 - 16 mmol/L Final     eGFR   Date Value Ref Range Status   07/24/2022 81 >=60 mL/min/1.73m² Final       Imaging:  CT Guided Abscess Drainage With Catheter   Final Result      Successful placement of a cholecystostomy tube within the gallbladder.         Electronically signed by: Gomez Faith   Date:    06/25/2024   Time:    11:45      NM Hepatobiliary Scan (HIDA)   Final Result      Increased activity inferior right liver.  Nonvisualization of gallbladder could indicate cholecystitis.         Electronically signed by: Calderon Harden   Date:    06/24/2024   Time:    16:16      CT Abdomen Pelvis With IV Contrast NO Oral Contrast   Final Result      Gallbladder wall thickening and fluid surrounding the gallbladder, correlate with right upper quadrant pain.      Multiple pulmonary nodules scattered throughout the lower lobes have improved from comparison.  The largest pulmonary nodule is located with mild lingula measure and 9 mm.      Interval increase in multiple hypoattenuating lesions scattered throughout the right hepatic lobe with the largest measuring up to 6.3 x 5.4 cm.      Interval development of sclerotic lesions located within the left iliac bone with the largest measuring up to 0.8 cm, coronal image 79.      Interval increase in a metastasis located within the left iliopsoas muscle measuring 6.0 x 4.6 cm, previously measuring 5.1 x 54.1 cm.         Electronically signed by: Gomez Faith   Date:    06/24/2024    Time:    08:55      X-Ray Chest 1 View   Final Result      Findings suggest mild cardiac decompensation and / or pneumonitis.         Electronically signed by: Calderon Harden   Date:    06/24/2024   Time:    07:47        Independently reviewed    Assessment:   Acute cholecystitis  S/P percutaneous cholecystostomy tube placement  History of metastatic colon cancer to liver  History of rectal cancer metastatic to bone  Malignant biliary obstruction s/p biliary stent  On palliative chemo    Plan:   -S/P CT guided cholecystostomy tube today by radiology  -Monitor output  -Agree with antibiotics  -Monitor LFTs  -Diet as tolerates  -GI available if needed, please call with any questions    Fercho De León MD  [facility]

## 2024-06-25 NOTE — ASSESSMENT & PLAN NOTE
"Patient's FSGs are controlled on current medication regimen.  Last A1c reviewed-   Lab Results   Component Value Date    HGBA1C 6.7 (H) 04/09/2024     Most recent fingerstick glucose reviewed- No results for input(s): "POCTGLUCOSE" in the last 24 hours.  Current correctional scale  Low  Maintain anti-hyperglycemic dose as follows-   Antihyperglycemics (From admission, onward)    Start     Stop Route Frequency Ordered    06/25/24 0900  insulin glargine U-100 (Lantus) injection 40 Units         -- SubQ Daily 06/24/24 2033 06/24/24 2226  insulin aspart U-100 injection 0-5 Units         -- SubQ Every 6 hours PRN 06/24/24 2128        Hold Oral hypoglycemics while patient is in the hospital.  "

## 2024-06-25 NOTE — ASSESSMENT & PLAN NOTE
"Patient's FSGs are controlled on current medication regimen.  Last A1c reviewed-   Lab Results   Component Value Date    HGBA1C 6.7 (H) 04/09/2024     Most recent fingerstick glucose reviewed- No results for input(s): "POCTGLUCOSE" in the last 24 hours.  Current correctional scale  Low  Maintain anti-hyperglycemic dose as follows-   Antihyperglycemics (From admission, onward)      Start     Stop Route Frequency Ordered    06/25/24 0900  insulin glargine U-100 (Lantus) injection 40 Units         -- SubQ Daily 06/24/24 2033          Hold Oral hypoglycemics while patient is in the hospital.  "

## 2024-06-25 NOTE — ASSESSMENT & PLAN NOTE
CT of abdomen pelvis and HIDA scan was suggestive of acute acalculous cholecystitis.  However given patient's stage IV cancer status, surgery would be too risky.  Plan is to proceed with placement of percutaneous cholecystotomy tube placement.  As per primary service

## 2024-06-25 NOTE — PROGRESS NOTES
Interventional radiology consulted for a gallbladder drain placement.  Informed consent has been obtained.  History and physical has been reviewed.

## 2024-06-25 NOTE — SUBJECTIVE & OBJECTIVE
Interval History: Patient was lying in the bed at the time of encounter, she was in acute distress and was still having pain in the RUQ. She denied any vomiting episodes overnight. Patient requested for some more medications for pain. Patient was updated on his latest labs and imaging results. She was febrile at time of encounter.  Patient sees an oncologist in [place], [place] for her colon cancer. She gets chemo every 2 weeks and is due for next one tomorrow.    Review of Systems   Constitutional:  Negative for chills and fever.   HENT:  Negative for postnasal drip and rhinorrhea.    Eyes:  Negative for itching.   Respiratory:  Negative for shortness of breath.    Cardiovascular:  Negative for chest pain, palpitations and leg swelling.   Gastrointestinal:  Positive for abdominal pain. Negative for abdominal distention, diarrhea, nausea and vomiting.        Right upper quadrant abdominal pain   Endocrine: Negative for cold intolerance, heat intolerance, polydipsia, polyphagia and polyuria.   Genitourinary:  Negative for dysuria and hematuria.   Musculoskeletal:  Negative for arthralgias, joint swelling, myalgias, neck pain and neck stiffness.   Skin:  Negative for pallor and rash.   Allergic/Immunologic: Negative for environmental allergies, food allergies and immunocompromised state.   Neurological:  Negative for dizziness, seizures, facial asymmetry, light-headedness and numbness.     Objective:     Vital Signs (Most Recent):  Temp: 98.4 °F (36.9 °C) (06/25/24 0720)  Pulse: 108 (06/25/24 0720)  Resp: 16 (06/25/24 0720)  BP: 118/75 (06/25/24 0720)  SpO2: 97 % (06/25/24 0720) Vital Signs (24h Range):  Temp:  [98.4 °F (36.9 °C)-100.8 °F (38.2 °C)] 98.4 °F (36.9 °C)  Pulse:  [104-112] 108  Resp:  [16-20] 16  SpO2:  [93 %-98 %] 97 %  BP: (101-122)/(57-79) 118/75     Weight: 62.6 kg (138 lb)  Body mass index is 29.86 kg/m².    Intake/Output Summary (Last 24 hours) at 6/25/2024 0814  Last data filed at 6/25/2024 0722  Gross  per 24 hour   Intake --   Output 350 ml   Net -350 ml         Physical Exam  Vitals reviewed.   Constitutional:       General: She is in acute distress.      Appearance: She is ill-appearing.   HENT:      Head: Normocephalic and atraumatic.      Right Ear: External ear normal.      Left Ear: External ear normal.   Eyes:      Extraocular Movements: Extraocular movements intact.      Pupils: Pupils are equal, round, and reactive to light.   Cardiovascular:      Rate and Rhythm: Regular rhythm. Tachycardia present.      Pulses: Normal pulses.      Heart sounds: Normal heart sounds. No murmur heard.  Pulmonary:      Effort: Pulmonary effort is normal. No respiratory distress.      Breath sounds: Normal breath sounds. No wheezing.   Chest:      Chest wall: No tenderness.   Abdominal:      General: There is distension.      Palpations: There is no mass.      Tenderness: There is no abdominal tenderness. There is no right CVA tenderness or left CVA tenderness.      Comments: Right upper quadrant tenderness without rebound tenderness was noted   Musculoskeletal:         General: No swelling or tenderness. Normal range of motion.   Skin:     General: Skin is warm and dry.      Capillary Refill: Capillary refill takes less than 2 seconds.   Neurological:      General: No focal deficit present.      Mental Status: She is alert and oriented to person, place, and time. Mental status is at baseline.   Psychiatric:         Mood and Affect: Mood normal.         Thought Content: Thought content normal.             Significant Labs: All pertinent labs within the past 24 hours have been reviewed.    Significant Imaging: I have reviewed all pertinent imaging results/findings within the past 24 hours.

## 2024-06-25 NOTE — ED NOTES
Called [facility] and gave report to MAHENDRA Bruno. Told it might take a while before we could bring patient up. Requested if we could bring patient up on a hospital bed.

## 2024-06-25 NOTE — CONSULTS
[facility]  Adult Nutrition  Consult Note         Reason for Assessment  Reason For Assessment: consult, identified at risk by screening criteria (weight loss, MST5)   Nutrition Risk Screen: unintentional loss of 10 lbs or more in the past 2 months    Assessment and Plan  Consult received and appreciated. Consult for weight loss. Patient was also identified at risk by screening criteria with MST5.     Patient is a 52yo female admitted 6/24 for acute acalculous cholecystitis. She has a PMH of colon cancer with mets to liver and lung, rectal cancer with mets to bones, and malignant biliary obstruction. She is currently undergoing palliative chemotherapy.     Chart review reveals a 14.8% weight loss x 5 months. Weight loss and poor PO intakes <50% for greater than one month very likely related to cancer diagnoses and n/v related to chemotherapy.    Per ASPEN guidelines, patient meets criteria for severe protein-calorie malnutrition secondary to chronic catabolic illness and inadequate calorie and protein intakes as evidenced by 14.8% significant weight loss, intakes <50% for greater than one month, and chronic illness.     Patient is currently NPO pending procedure. Recommend advance to a Regular diet as soon as medically appropriate and tolerated with addition of Boost Plus with meals to improve kcal/protein intakes to better meet estimated nutritional needs. If unable to advance diet x 2-3 days, recommend consider alternate route of nutrition depending on goals of care.     Last BM 6/23 per flowsheet.    Medications/labs reviewed. RD following.      Learning Needs/Social Determinants of Health  Learning Assessment       06/25/2024 0755 [facility] (6/24/2024 - Present)   Created by Nohemy Sharma, RN - RN (Nurse) Status: Complete                 PRIMARY LEARNER     Primary Learner Name:  Hayley Landis RW - 06/25/2024 0755    Relationship:  Patient RW - 06/25/2024  0755    Does the primary learner have any barriers to learning?:  No Barriers RW - 06/25/2024 0755    What is the preferred language of the primary learner?:  English RW - 06/25/2024 0755    Is an  required?:  No RW - 06/25/2024 0755    How does the primary learner prefer to learn new concepts?:  Listening RW - 06/25/2024 0755    How often do you need to have someone help you read instructions, pamphlets, or written material from your doctor or pharmacy?:  Never RW - 06/25/2024 0755        CO-LEARNER #1     No question answered        CO-LEARNER #2     No question answered        SPECIAL TOPICS     No question answered        ANSWERED BY:     -:  Patient RW - 06/25/2024 0755        Comments         Edit History       Nohemy Sharma RN - RN (Nurse)   06/25/2024 0755                           Social Determinants of Health     Tobacco Use: Low Risk  (6/24/2024)    Patient History     Smoking Tobacco Use: Never     Smokeless Tobacco Use: Never     Passive Exposure: Never   Alcohol Use: Not At Risk (4/10/2024)    Received from [facility]    AUDIT-C     Frequency of Alcohol Consumption: Never     Average Number of Drinks: Patient does not drink     Frequency of Binge Drinking: Never   Financial Resource Strain: Low Risk  (6/25/2024)    Overall Financial Resource Strain (CARDIA)     Difficulty of Paying Living Expenses: Not hard at all   Food Insecurity: No Food Insecurity (6/25/2024)    Hunger Vital Sign     Worried About Running Out of Food in the Last Year: Never true     Ran Out of Food in the Last Year: Never true   Transportation Needs: No Transportation Needs (6/25/2024)    TRANSPORTATION NEEDS     Transportation : No   Physical Activity: Insufficiently Active (4/10/2024)    Received from [facility]    Exercise Vital Sign     Days of Exercise per Week: 3 days     Minutes of Exercise per Session: 10 min   Stress: No Stress Concern Present  (6/25/2024)    [employer] [place] [facility] [employer] - Occupational Stress Questionnaire     Feeling of Stress : Not at all   Recent Concern: Stress - Stress Concern Present (4/10/2024)    Received from [facility] [place] of [facility] - Occupational Stress Questionnaire     Feeling of Stress : To some extent   Housing Stability: Low Risk  (6/25/2024)    Housing Stability Vital Sign     Unable to Pay for Housing in the Last Year: No     Homeless in the Last Year: No   Depression: Low Risk  (2/1/2024)    Depression     Last PHQ-4: Flowsheet Data: 0   Utilities: Not At Risk (6/25/2024)    [facility] Utilities     Threatened with loss of utilities: No   Health Literacy: Adequate Health Literacy (6/25/2024)     Health Literacy     Frequency of need for help with medical instructions: Never   Social Isolation: Not on file            Malnutrition  Is Patient Malnourished: Yes Malnutrition Assessment  Malnutrition Context: chronic illness  Malnutrition Level: severe          Weight Loss (Malnutrition): greater than 10% in 6 months  Energy Intake (Malnutrition): less than or equal to 50% for greater than or equal to 1 month  Subcutaneous Fat (Malnutrition): moderate depletion  Muscle Mass (Malnutrition): moderate depletion                         Nutrition Diagnosis  Malnutrition (Severe) related to Catabolic illness, Chronic illness, Inadequate Caloric intake, and Inadequate Protein intake as evidenced by 14.8% significant weight loss x 5 months, intakes <50% for greater than one month  Comments: advance diet as soon as medically appropriate    Recent Labs   Lab 06/24/24  0605 06/24/24  0617 06/25/24  0615   *  --   --    POCGLU  --    < > 123*    < > = values in this interval not displayed.     Comments on Glucose: Glucose elevated. PMH DM2.      Nutrition Prescription / Recommendations  Recommendation/Intervention: Recommend resume Regular diet as soon as  medically appropriate and tolerated with addition of Boost Plus with meals to improve kcal/protein intakes. if unable to advance diet x 2-3 days, recommend consider alternate route of nutrition depending on goals of care.  Goals: Diet advancement x 2-3 days, weight maintenance during admission  Nutrition Goal Status: new  Current Diet Order: NPO  Chewing or Swallowing Difficulty?: No Chewing or swallowing difficulty  Recommended Diet: Regular  Recommended Oral Supplement: Boost Plus [360kcals, 14g Protein, 45g Carbs] with meals  Is Nutrition Support Recommended: [facility] Support: No  Is Nutrition Education Recommended: No    Monitor and Evaluation  % current Intake: NPO  % intake to meet estimated needs: P.O. + Supplements  Food and Nutrient Intake: food and beverage intake  Food and Nutrient Adminstration: diet order  Anthropometric Measurements: weight change, weight  Biochemical Data, Medical Tests and Procedures: electrolyte and renal panel, gastrointestinal profile, inflammatory profile, glucose/endocrine profile, lipid profile       Current Medical Diagnosis and Past Medical History     Past Medical History:   Diagnosis Date    Colon cancer     CVA (cerebral vascular accident)     Diabetes     HTN (hypertension)     Hypokalemia     Liver cancer     Lung cancer     Malignant biliary obstruction     Obstruction of bile duct     Rectal cancer metastatic to bone     Seizure     TIA (transient ischemic attack)        Nutrition/Diet History       Lab/Procedures/Meds  Recent Labs   Lab 06/24/24  0605   *   K 3.2*   BUN 7   CREATININE 0.65   CALCIUM 9.3   ALBUMIN 2.7*   CL 92*   *   AST 97*   Note: Na+, K+, Cl- low. Admitted with n/v and poor PO intakes. Alb low, PMH cancer and poor PO intakes. ALT/AST elevated.  Last A1c:   Lab Results   Component Value Date    HGBA1C 6.7 (H) 04/09/2024   Note: HbA1c elevated. PMH DM2    Lab Results   Component Value Date    RBC 4.21 06/24/2024    HGB 12.6  "06/24/2024    HCT 39.3 06/24/2024    MCV 93.3 06/24/2024    MCH 29.9 06/24/2024    MCHC 32.1 06/24/2024    TIBC 333 12/15/2023     Pertinent Labs Reviewed: reviewed  Pertinent Medications Reviewed: reviewed  Scheduled Meds:   ciprofloxacin  400 mg Intravenous Q12H    heparin (porcine)  5,000 Units Subcutaneous Q8H    insulin glargine U-100 (Lantus)  40 Units Subcutaneous Daily    levETIRAcetam  500 mg Oral Daily    metronidazole  500 mg Intravenous Q8H    potassium bicarbonate  40 mEq Oral TID     Continuous Infusions:   0.9% NaCl   Intravenous Continuous 125 mL/hr at 06/24/24 2228 New Bag at 06/24/24 2228    lactated ringers   Intravenous Continuous 100 mL/hr at 06/24/24 1823 New Bag at 06/24/24 1823     PRN Meds:.  Current Facility-Administered Medications:     acetaminophen, 650 mg, Oral, Q4H PRN    dextrose 10%, 12.5 g, Intravenous, PRN    dextrose 10%, 25 g, Intravenous, PRN    glucagon (human recombinant), 1 mg, Intramuscular, PRN    glucose, 16 g, Oral, PRN    glucose, 24 g, Oral, PRN    HYDROmorphone, 0.5 mg, Intravenous, Q3H PRN    insulin aspart U-100, 0-5 Units, Subcutaneous, Q6H PRN    melatonin, 6 mg, Oral, Nightly PRN    ondansetron, 4 mg, Intravenous, Q8H PRN    prochlorperazine, 5 mg, Intravenous, Q6H PRN    sodium chloride 0.9%, 10 mL, Intravenous, PRN    Anthropometrics  Temp: 98.4 °F (36.9 °C)  Height Method: Stated  Height: 4' 9" (144.8 cm)  Height (inches): 57 in  Weight Method: Bed Scale  Weight: 62.6 kg (138 lb)  Weight (lb): 138 lb  Ideal Body Weight (IBW), Female: 85 lb  % Ideal Body Weight, Female (lb): 162.35 %  BMI (Calculated): 29.9       Estimated/Assessed Needs  RMR ([place]-St. Jeor Equation): 1109.84     Temp: 98.4 °F (36.9 °C)Oral  Weight Used For Calorie Calculations: 62.6 kg (138 lb 0.1 oz)     Energy Calorie Requirements (kcal): 1565-1878kcal (25-30kcal/kg)  Weight Used For Protein Calculations: 62.6 kg (138 lb 0.1 oz)  Protein Requirements: 63-75g (1.0-1.2g/kg)       RDA Method " (mL): 7512       Nutrition by Nursing              Last Bowel Movement: 06/23/24                Nutrition Follow-Up  RD Follow-up?: Yes      Nutrition Discharge Planning: Unsure of discharge plans at this time. Will monitor and assess closer to discharge. Will benefit from liberalized diet on discharge.          Rajwinder Herrera MS, RD, LD  Available via Secure Chat

## 2024-06-25 NOTE — PROGRESS NOTES
[facility] Hospital Medicine  Progress Note    Patient Name: Hayley Landis  MRN: [id number]  Patient Class: IP- Inpatient   Admission Date: 6/24/2024  Length of Stay: 1 days  Attending Physician: Abner Vyas MD  Primary Care Provider: Elkin Coombs MD        Subjective:     Principal Problem:Acute acalculous cholecystitis        HPI:  Patient is a very unfortunate 53-year-old female with a history of colon cancer with mets to the liver and lung status post partial colectomy followed by reanastomosis, rectal cancer with mets to bones, and malignant biliary obstruction status post stent placement, type 2 diabetes, essential hypertension, and partial seizure who is undergoing palliative chemotherapy.  Patient experience severe right upper quadrant pain upon awakening this morning.  She did vomit once yesterday but nothing today and was able to tolerate mashed potatoes without any problems.  Patient also complained of diarrhea but she attributed this to chemotherapy and denied any changes in frequency, quantity or quality of diarrhea.  Patient denied any fever chills chest pain palpitation PND orthopnea lower extremity edema dysuria hematuria flank pain hematemesis hematochezia or melena in association.    On presentation, patient was slightly tachycardic but vital signs were otherwise stable and patient was afebrile.  Workup was notable for presence of hyperbilirubinemia with total bilirubin of 2.1, alkaline phosphatase of 1063, ALT of 108, and AST of 97.  CT showed multiple pulmonary nodules scattered throughout the lower lobes and liver showed interval increase in multiple hypoattenuating lesions throughout the right hepatic lobe along with gallbladder wall thickening and fluid surrounding the gallbladder. Common duct bile duct stent was in place.  Subsequently HIDA scan was performed which shows nonvisualization of gallbladder which is indicative cholecystitis.  Dr. Vyas felt that  patient will be too high of a risk risk for cholecystectomy and intents on placing a percutaneous cholecystotomy tube placement.  Patient will be admitted        Overview/Hospital Course:  No notes on file    Interval History: Patient was lying in the bed at the time of encounter, she was in acute distress and was still having pain in the RUQ. She denied any vomiting episodes overnight. Patient requested for some more medications for pain. Patient was updated on his latest labs and imaging results. She was febrile at time of encounter.  Patient sees an oncologist in [place], [place] for her colon cancer. She gets chemo every 2 weeks and is due for next one tomorrow.    Review of Systems   Constitutional:  Negative for chills and fever.   HENT:  Negative for postnasal drip and rhinorrhea.    Eyes:  Negative for itching.   Respiratory:  Negative for shortness of breath.    Cardiovascular:  Negative for chest pain, palpitations and leg swelling.   Gastrointestinal:  Positive for abdominal pain. Negative for abdominal distention, diarrhea, nausea and vomiting.        Right upper quadrant abdominal pain   Endocrine: Negative for cold intolerance, heat intolerance, polydipsia, polyphagia and polyuria.   Genitourinary:  Negative for dysuria and hematuria.   Musculoskeletal:  Negative for arthralgias, joint swelling, myalgias, neck pain and neck stiffness.   Skin:  Negative for pallor and rash.   Allergic/Immunologic: Negative for environmental allergies, food allergies and immunocompromised state.   Neurological:  Negative for dizziness, seizures, facial asymmetry, light-headedness and numbness.     Objective:     Vital Signs (Most Recent):  Temp: 98.4 °F (36.9 °C) (06/25/24 0720)  Pulse: 108 (06/25/24 0720)  Resp: 16 (06/25/24 0720)  BP: 118/75 (06/25/24 0720)  SpO2: 97 % (06/25/24 0720) Vital Signs (24h Range):  Temp:  [98.4 °F (36.9 °C)-100.8 °F (38.2 °C)] 98.4 °F (36.9 °C)  Pulse:  [104-112] 108  Resp:  [16-20] 16  SpO2:   [93 %-98 %] 97 %  BP: (101-122)/(57-79) 118/75     Weight: 62.6 kg (138 lb)  Body mass index is 29.86 kg/m².    Intake/Output Summary (Last 24 hours) at 6/25/2024 0830  Last data filed at 6/25/2024 0713  Gross per 24 hour   Intake --   Output 350 ml   Net -350 ml         Physical Exam  Vitals reviewed.   Constitutional:       General: She is in acute distress.      Appearance: She is ill-appearing.   HENT:      Head: Normocephalic and atraumatic.      Right Ear: External ear normal.      Left Ear: External ear normal.   Eyes:      Extraocular Movements: Extraocular movements intact.      Pupils: Pupils are equal, round, and reactive to light.   Cardiovascular:      Rate and Rhythm: Regular rhythm. Tachycardia present.      Pulses: Normal pulses.      Heart sounds: Normal heart sounds. No murmur heard.  Pulmonary:      Effort: Pulmonary effort is normal. No respiratory distress.      Breath sounds: Normal breath sounds. No wheezing.   Chest:      Chest wall: No tenderness.   Abdominal:      General: There is distension.      Palpations: There is no mass.      Tenderness: There is no abdominal tenderness. There is no right CVA tenderness or left CVA tenderness.      Comments: Right upper quadrant tenderness without rebound tenderness was noted   Musculoskeletal:         General: No swelling or tenderness. Normal range of motion.   Skin:     General: Skin is warm and dry.      Capillary Refill: Capillary refill takes less than 2 seconds.   Neurological:      General: No focal deficit present.      Mental Status: She is alert and oriented to person, place, and time. Mental status is at baseline.   Psychiatric:         Mood and Affect: Mood normal.         Thought Content: Thought content normal.             Significant Labs: All pertinent labs within the past 24 hours have been reviewed.    Significant Imaging: I have reviewed all pertinent imaging results/findings within the past 24 hours.    Assessment/Plan:      *  "Acute acalculous cholecystitis    CT of abdomen pelvis and HIDA scan was suggestive of acute acalculous cholecystitis.  However given patient's stage IV cancer status, surgery would be too risky.  Plan is to proceed with placement of percutaneous cholecystotomy tube placement.  As per primary service      Biliary obstruction due to malignant neoplasm    Common bile duct stent was patent on CT.  In addition total bilirubin level and AST and ALT were minimally elevated with the exception of alkaline phosphatase.  The latter could be due to bone mets from rectal cancer, rather than representing obstructive cholestasis.  HIDA scan:  Impression: Increased activity inferior right liver. Nonvisualization of gallbladder could indicate cholecystitis         Hypokalemia  Patient has hypokalemia which is Acute on Chronic and currently controlled. Most recent potassium levels reviewed-   Lab Results   Component Value Date    K 3.2 (L) 06/24/2024   . Will continue potassium replacement per protocol and recheck repeat levels after replacement completed.   - replacing with PO potasium.  - Potasium Bicarbonate 40 mEq TID for one day  - monitor serum potasium    Rectal cancer metastasized to bone    Patient is on palliative chemotherapy.  Prognosis is poor        Type 2 diabetes mellitus without complication, with long-term current use of insulin  Patient's FSGs are controlled on current medication regimen.  Last A1c reviewed-   Lab Results   Component Value Date    HGBA1C 6.7 (H) 04/09/2024     Most recent fingerstick glucose reviewed- No results for input(s): "POCTGLUCOSE" in the last 24 hours.  Current correctional scale  Low  Maintain anti-hyperglycemic dose as follows-   Antihyperglycemics (From admission, onward)      Start     Stop Route Frequency Ordered    06/25/24 0900  insulin glargine U-100 (Lantus) injection 40 Units         -- SubQ Daily 06/24/24 2033 06/24/24 2226  insulin aspart U-100 injection 0-5 Units         " -- SubQ Every 6 hours PRN 06/24/24 2128          Hold Oral hypoglycemics while patient is in the hospital.    Primary hypertension    Chronic, controlled. Latest blood pressure and vitals reviewed-     Temp:  [98.1 °F (36.7 °C)]   Pulse:  [106-112]   Resp:  [18-20]   BP: (101-136)/(57-86)   SpO2:  [99 %-100 %] .   Home meds for hypertension were reviewed and noted below.   Hypertension Medications               lisinopriL 10 MG tablet Take 1 tablet (10 mg total) by mouth once daily.          Will hold off on ACE-inhibitor while being hospitalized to minimize risk of renal failure    Will utilize p.r.n. blood pressure medication only if patient's blood pressure greater than 180/110 and she develops symptoms such as worsening chest pain or shortness of breath.        VTE Risk Mitigation (From admission, onward)           Ordered     heparin (porcine) injection 5,000 Units  Every 8 hours         06/24/24 2130     Place sequential compression device  Until discontinued         06/24/24 1715     IP VTE LOW RISK PATIENT  Once         06/24/24 1715                    Discharge Planning   [place]:      Code Status: Full Code   Is the patient medically ready for discharge?:     Reason for patient still in hospital (select all that apply): Treatment                     Elisha Pelayo MD  Department of Hospital Medicine   [facility]

## 2024-06-25 NOTE — SUBJECTIVE & OBJECTIVE
"Past Medical History:   Diagnosis Date    Colon cancer     CVA (cerebral vascular accident)     Diabetes     HTN (hypertension)     Hypokalemia     Liver cancer     Lung cancer     Malignant biliary obstruction     Obstruction of bile duct     Rectal cancer metastatic to bone     Seizure     TIA (transient ischemic attack)        Past Surgical History:   Procedure Laterality Date    COLON SURGERY      HERNIA REPAIR      HYSTERECTOMY         Review of patient's allergies indicates:   Allergen Reactions    Grape Anaphylaxis     Other reaction(s): Not available    Morphine sulfate Swelling    Nutritional supplements Anaphylaxis     Highly allergic to Cooked grapes    Pineapple Anaphylaxis    Opioids - morphine analogues Nausea And Vomiting    Rocephin [ceftriaxone] Itching    Aspirin      "Veins breakdown" per patient    Biguanides Diarrhea and Other (See Comments)    Dicyclomine Diarrhea    Dihydrocodeine Other (See Comments)     Bad headache in the top of head    Gabapentin Other (See Comments)     weakness  Other reaction(s): Not available  Seizures      Hydrocodone Other (See Comments)     Headache      Iron      Hemochromatosis    Iron analogues      Pt hx of hemachromatosis    Metformin      diarrhea    Penicillins Other (See Comments)     Family hx       Latex Rash and Other (See Comments)       No current facility-administered medications on file prior to encounter.     Current Outpatient Medications on File Prior to Encounter   Medication Sig    capecitabine (XELODA) 500 MG Tab Take 3 tablets twice daily for 7 days on and 7 days off.    cholecalciferol, vitamin D3, 1,250 mcg (50,000 unit) capsule Take 1 capsule (50,000 Units total) by mouth every 7 days.    clopidogreL (PLAVIX) 75 mg tablet Take 75 mg by mouth once daily.    insulin aspart U-100 (NOVOLOG) 100 unit/mL injection inject as directed by your physician per sliding scale    insulin glargine (LANTUS U-100 INSULIN) 100 unit/mL injection Inject 80 Units " into the skin once daily.    levETIRAcetam (KEPPRA) 500 MG Tab TAKE ONE TABLET BY MOUTH ONCE DAILY    lisinopriL 10 MG tablet Take 1 tablet (10 mg total) by mouth once daily.    [DISCONTINUED] cyclobenzaprine (FLEXERIL) 10 MG tablet Take 10 mg by mouth 3 (three) times daily as needed.    [DISCONTINUED] levoFLOXacin (LEVAQUIN) 500 MG tablet Take 750 mg by mouth.    [DISCONTINUED] metroNIDAZOLE (FLAGYL) 500 MG tablet Take 500 mg by mouth 3 (three) times daily.    blood sugar diagnostic (ONETOUCH ULTRA TEST) Strp check blood sugar TWICE DAILY    insulin syringe-needle U-100 1 mL 30 gauge x 5/16 Syrg Use as directed.    [DISCONTINUED] blood-glucose meter kit To check BG BID times daily, to use with insurance preferred meter    [DISCONTINUED] capecitabine (XELODA) 150 MG tablet Take 150 mg by mouth 3 (three) times daily.    [DISCONTINUED] hydroCHLOROthiazide (HYDRODIURIL) 12.5 MG Tab TAKE ONE TABLET BY MOUTH DAILY IN THE MORNING    [DISCONTINUED] LIDOcaine viscous HCl 2% (XYLOCAINE) 2 % Soln Use as directed in the mouth or throat every 6 (six) hours. Mix 5 ml with equal parts of maalox or mylanta, swish and swallow as needed     Family History       Problem Relation (Age of Onset)    No Known Problems Mother, Father, Sister, Brother, Daughter, Son, Maternal Aunt, Maternal Uncle, Paternal Aunt, Paternal Uncle, Maternal Grandmother, Maternal Grandfather, Paternal Grandmother, Paternal Grandfather          Tobacco Use    Smoking status: Never     Passive exposure: Never    Smokeless tobacco: Never   Substance and Sexual Activity    Alcohol use: Never    Drug use: Never    Sexual activity: Never     Review of Systems   Constitutional:  Negative for chills and fever.   HENT:  Negative for postnasal drip and rhinorrhea.    Eyes:  Negative for itching.   Respiratory:  Negative for shortness of breath.    Cardiovascular:  Negative for chest pain, palpitations and leg swelling.   Gastrointestinal:  Positive for vomiting. Negative  for abdominal distention, abdominal pain, diarrhea and nausea.        Right upper quadrant abdominal pain   Endocrine: Negative for cold intolerance, heat intolerance, polydipsia, polyphagia and polyuria.   Genitourinary:  Negative for dysuria and hematuria.   Musculoskeletal:  Negative for arthralgias, joint swelling, myalgias, neck pain and neck stiffness.   Skin:  Negative for pallor and rash.   Allergic/Immunologic: Negative for environmental allergies, food allergies and immunocompromised state.   Neurological:  Negative for dizziness, seizures, facial asymmetry, light-headedness and numbness.     Objective:     Vital Signs (Most Recent):  Temp: 98.1 °F (36.7 °C) (06/24/24 0541)  Pulse: (!) 112 (06/24/24 1928)  Resp: 18 (06/24/24 1813)  BP: (!) 101/57 (06/24/24 1928)  SpO2: 99 % (06/24/24 0656) Vital Signs (24h Range):  Temp:  [98.1 °F (36.7 °C)] 98.1 °F (36.7 °C)  Pulse:  [106-112] 112  Resp:  [18-20] 18  SpO2:  [99 %-100 %] 99 %  BP: (101-136)/(57-86) 101/57     Weight: 62.6 kg (138 lb)  Body mass index is 29.86 kg/m².     Physical Exam  Vitals reviewed.   Constitutional:       General: She is not in acute distress.     Appearance: She is ill-appearing.   HENT:      Head: Normocephalic and atraumatic.      Right Ear: External ear normal.      Left Ear: External ear normal.   Eyes:      Extraocular Movements: Extraocular movements intact.      Pupils: Pupils are equal, round, and reactive to light.   Cardiovascular:      Rate and Rhythm: Regular rhythm. Tachycardia present.      Pulses: Normal pulses.      Heart sounds: Normal heart sounds. No murmur heard.  Pulmonary:      Effort: Pulmonary effort is normal. No respiratory distress.      Breath sounds: Normal breath sounds. No wheezing.   Chest:      Chest wall: No tenderness.   Abdominal:      General: There is distension.      Palpations: There is no mass.      Tenderness: There is no abdominal tenderness. There is no right CVA tenderness or left CVA  tenderness.      Comments: Right upper quadrant tenderness without rebound tenderness was noted   Musculoskeletal:         General: No swelling or tenderness. Normal range of motion.   Skin:     General: Skin is warm and dry.      Capillary Refill: Capillary refill takes less than 2 seconds.   Neurological:      General: No focal deficit present.      Mental Status: She is alert and oriented to person, place, and time. Mental status is at baseline.   Psychiatric:         Mood and Affect: Mood normal.         Thought Content: Thought content normal.              CRANIAL NERVES     CN III, IV, VI   Pupils are equal, round, and reactive to light.       Significant Labs: All pertinent labs within the past 24 hours have been reviewed.    Significant Imaging: I have reviewed all pertinent imaging results/findings within the past 24 hours.

## 2024-06-25 NOTE — ASSESSMENT & PLAN NOTE
Common bile duct stent was patent on CT.  In addition total bilirubin level and AST and ALT were minimally elevated with the exception of alkaline phosphatase.  The latter could be due to bone mets from rectal cancer, rather than representing obstructive cholestasis.

## 2024-06-25 NOTE — H&P
[facility] - Emergency Department  Hospital Medicine  History & Physical    Patient Name: Hayley Landis  MRN: [id number]  Patient Class: IP- Inpatient  Admission Date: 6/24/2024  Consulting Physician:  STEPHANIE Briones MD  Primary Care Provider: Elkin Coombs MD         Patient information was obtained from patient and ER records.     Subjective:     Principal Problem:Acute acalculous cholecystitis    Chief Complaint:   Chief Complaint   Patient presents with    Abdominal Pain    Nausea        HPI: Patient is a very unfortunate 53-year-old female with a history of colon cancer with mets to the liver and lung status post partial colectomy followed by reanastomosis, rectal cancer with mets to bones, and malignant biliary obstruction status post stent placement, type 2 diabetes, essential hypertension, and partial seizure who is undergoing palliative chemotherapy.  Patient experience severe right upper quadrant pain upon awakening this morning.  She did vomit once yesterday but nothing today and was able to tolerate mashed potatoes without any problems.  Patient also complained of diarrhea but she attributed this to chemotherapy and denied any changes in frequency, quantity or quality of diarrhea.  Patient denied any fever chills chest pain palpitation PND orthopnea lower extremity edema dysuria hematuria flank pain hematemesis hematochezia or melena in association.    On presentation, patient was slightly tachycardic but vital signs were otherwise stable and patient was afebrile.  Workup was notable for presence of hyperbilirubinemia with total bilirubin of 2.1, alkaline phosphatase of 1063, ALT of 108, and AST of 97.  CT showed multiple pulmonary nodules scattered throughout the lower lobes and liver showed interval increase in multiple hypoattenuating lesions throughout the right hepatic lobe along with gallbladder wall thickening and fluid surrounding the gallbladder. Common duct bile duct stent was in  "place.  Subsequently HIDA scan was performed which shows nonvisualization of gallbladder which is indicative cholecystitis.  Dr. Vyas felt that patient will be too high of a risk risk for cholecystectomy and intents on placing a percutaneous cholecystotomy tube placement.  Patient will be admitted        Past Medical History:   Diagnosis Date    Colon cancer     CVA (cerebral vascular accident)     Diabetes     HTN (hypertension)     Hypokalemia     Liver cancer     Lung cancer     Malignant biliary obstruction     Obstruction of bile duct     Rectal cancer metastatic to bone     Seizure     TIA (transient ischemic attack)        Past Surgical History:   Procedure Laterality Date    COLON SURGERY      HERNIA REPAIR      HYSTERECTOMY         Review of patient's allergies indicates:   Allergen Reactions    Grape Anaphylaxis     Other reaction(s): Not available    Morphine sulfate Swelling    Nutritional supplements Anaphylaxis     Highly allergic to Cooked grapes    Pineapple Anaphylaxis    Opioids - morphine analogues Nausea And Vomiting    Rocephin [ceftriaxone] Itching    Aspirin      "Veins breakdown" per patient    Biguanides Diarrhea and Other (See Comments)    Dicyclomine Diarrhea    Dihydrocodeine Other (See Comments)     Bad headache in the top of head    Gabapentin Other (See Comments)     weakness  Other reaction(s): Not available  Seizures      Hydrocodone Other (See Comments)     Headache      Iron      Hemochromatosis    Iron analogues      Pt hx of hemachromatosis    Metformin      diarrhea    Penicillins Other (See Comments)     Family hx       Latex Rash and Other (See Comments)       No current facility-administered medications on file prior to encounter.     Current Outpatient Medications on File Prior to Encounter   Medication Sig    capecitabine (XELODA) 500 MG Tab Take 3 tablets twice daily for 7 days on and 7 days off.    cholecalciferol, vitamin D3, 1,250 mcg (50,000 unit) capsule Take 1 capsule " (50,000 Units total) by mouth every 7 days.    clopidogreL (PLAVIX) 75 mg tablet Take 75 mg by mouth once daily.    insulin aspart U-100 (NOVOLOG) 100 unit/mL injection inject as directed by your physician per sliding scale    insulin glargine (LANTUS U-100 INSULIN) 100 unit/mL injection Inject 80 Units into the skin once daily.    levETIRAcetam (KEPPRA) 500 MG Tab TAKE ONE TABLET BY MOUTH ONCE DAILY    lisinopriL 10 MG tablet Take 1 tablet (10 mg total) by mouth once daily.    [DISCONTINUED] cyclobenzaprine (FLEXERIL) 10 MG tablet Take 10 mg by mouth 3 (three) times daily as needed.    [DISCONTINUED] levoFLOXacin (LEVAQUIN) 500 MG tablet Take 750 mg by mouth.    [DISCONTINUED] metroNIDAZOLE (FLAGYL) 500 MG tablet Take 500 mg by mouth 3 (three) times daily.    blood sugar diagnostic (ONETOUCH ULTRA TEST) Strp check blood sugar TWICE DAILY    insulin syringe-needle U-100 1 mL 30 gauge x 5/16 Syrg Use as directed.    [DISCONTINUED] blood-glucose meter kit To check BG BID times daily, to use with insurance preferred meter    [DISCONTINUED] capecitabine (XELODA) 150 MG tablet Take 150 mg by mouth 3 (three) times daily.    [DISCONTINUED] hydroCHLOROthiazide (HYDRODIURIL) 12.5 MG Tab TAKE ONE TABLET BY MOUTH DAILY IN THE MORNING    [DISCONTINUED] LIDOcaine viscous HCl 2% (XYLOCAINE) 2 % Soln Use as directed in the mouth or throat every 6 (six) hours. Mix 5 ml with equal parts of maalox or mylanta, swish and swallow as needed     Family History       Problem Relation (Age of Onset)    No Known Problems Mother, Father, Sister, Brother, Daughter, Son, Maternal Aunt, Maternal Uncle, Paternal Aunt, Paternal Uncle, Maternal Grandmother, Maternal Grandfather, Paternal Grandmother, Paternal Grandfather          Tobacco Use    Smoking status: Never     Passive exposure: Never    Smokeless tobacco: Never   Substance and Sexual Activity    Alcohol use: Never    Drug use: Never    Sexual activity: Never     Review of Systems    Constitutional:  Negative for chills and fever.   HENT:  Negative for postnasal drip and rhinorrhea.    Eyes:  Negative for itching.   Respiratory:  Negative for shortness of breath.    Cardiovascular:  Negative for chest pain, palpitations and leg swelling.   Gastrointestinal:  Positive for vomiting. Negative for abdominal distention, abdominal pain, diarrhea and nausea.        Right upper quadrant abdominal pain   Endocrine: Negative for cold intolerance, heat intolerance, polydipsia, polyphagia and polyuria.   Genitourinary:  Negative for dysuria and hematuria.   Musculoskeletal:  Negative for arthralgias, joint swelling, myalgias, neck pain and neck stiffness.   Skin:  Negative for pallor and rash.   Allergic/Immunologic: Negative for environmental allergies, food allergies and immunocompromised state.   Neurological:  Negative for dizziness, seizures, facial asymmetry, light-headedness and numbness.     Objective:     Vital Signs (Most Recent):  Temp: 98.1 °F (36.7 °C) (06/24/24 0541)  Pulse: (!) 112 (06/24/24 1928)  Resp: 18 (06/24/24 1813)  BP: (!) 101/57 (06/24/24 1928)  SpO2: 99 % (06/24/24 0656) Vital Signs (24h Range):  Temp:  [98.1 °F (36.7 °C)] 98.1 °F (36.7 °C)  Pulse:  [106-112] 112  Resp:  [18-20] 18  SpO2:  [99 %-100 %] 99 %  BP: (101-136)/(57-86) 101/57     Weight: 62.6 kg (138 lb)  Body mass index is 29.86 kg/m².     Physical Exam  Vitals reviewed.   Constitutional:       General: She is not in acute distress.     Appearance: She is ill-appearing.   HENT:      Head: Normocephalic and atraumatic.      Right Ear: External ear normal.      Left Ear: External ear normal.   Eyes:      Extraocular Movements: Extraocular movements intact.      Pupils: Pupils are equal, round, and reactive to light.   Cardiovascular:      Rate and Rhythm: Regular rhythm. Tachycardia present.      Pulses: Normal pulses.      Heart sounds: Normal heart sounds. No murmur heard.  Pulmonary:      Effort: Pulmonary effort is  normal. No respiratory distress.      Breath sounds: Normal breath sounds. No wheezing.   Chest:      Chest wall: No tenderness.   Abdominal:      General: There is distension.      Palpations: There is no mass.      Tenderness: There is no abdominal tenderness. There is no right CVA tenderness or left CVA tenderness.      Comments: Right upper quadrant tenderness without rebound tenderness was noted   Musculoskeletal:         General: No swelling or tenderness. Normal range of motion.   Skin:     General: Skin is warm and dry.      Capillary Refill: Capillary refill takes less than 2 seconds.   Neurological:      General: No focal deficit present.      Mental Status: She is alert and oriented to person, place, and time. Mental status is at baseline.   Psychiatric:         Mood and Affect: Mood normal.         Thought Content: Thought content normal.              CRANIAL NERVES     CN III, IV, VI   Pupils are equal, round, and reactive to light.       Significant Labs: All pertinent labs within the past 24 hours have been reviewed.    Significant Imaging: I have reviewed all pertinent imaging results/findings within the past 24 hours.  Assessment/Plan:     * Acute acalculous cholecystitis    CT of abdomen pelvis and HIDA scan was suggestive of acute acalculous cholecystitis.  However given patient's stage IV cancer status, surgery would be too risky.  Plan is to proceed with placement of percutaneous cholecystotomy tube placement.  As per primary service      Biliary obstruction due to malignant neoplasm    Common bile duct stent was patent on CT.  In addition total bilirubin level and AST and ALT were minimally elevated with the exception of alkaline phosphatase.  The latter could be due to bone mets from rectal cancer, rather than representing obstructive cholestasis.      Rectal cancer metastasized to bone    Patient is on palliative chemotherapy.  Prognosis is poor        Type 2 diabetes mellitus without  "complication, with long-term current use of insulin  Patient's FSGs are controlled on current medication regimen.  Last A1c reviewed-   Lab Results   Component Value Date    HGBA1C 6.7 (H) 04/09/2024     Most recent fingerstick glucose reviewed- No results for input(s): "POCTGLUCOSE" in the last 24 hours.  Current correctional scale  Low  Maintain anti-hyperglycemic dose as follows-   Antihyperglycemics (From admission, onward)      Start     Stop Route Frequency Ordered    06/25/24 0900  insulin glargine U-100 (Lantus) injection 40 Units         -- SubQ Daily 06/24/24 2033          Hold Oral hypoglycemics while patient is in the hospital.    Primary hypertension    Chronic, controlled. Latest blood pressure and vitals reviewed-     Temp:  [98.1 °F (36.7 °C)]   Pulse:  [106-112]   Resp:  [18-20]   BP: (101-136)/(57-86)   SpO2:  [99 %-100 %] .   Home meds for hypertension were reviewed and noted below.   Hypertension Medications               lisinopriL 10 MG tablet Take 1 tablet (10 mg total) by mouth once daily.          Will hold off on ACE-inhibitor while being hospitalized to minimize risk of renal failure    Will utilize p.r.n. blood pressure medication only if patient's blood pressure greater than 180/110 and she develops symptoms such as worsening chest pain or shortness of breath.        VTE Risk Mitigation (From admission, onward)           Ordered     Place sequential compression device  Until discontinued         06/24/24 1715     IP VTE LOW RISK PATIENT  Once         06/24/24 1715                                    Min YOLANDE Briones MD  Department of Hospital Medicine  [facility] - Emergency Department          "

## 2024-06-26 LAB
ALBUMIN SERPL BCP-MCNC: 1.7 G/DL (ref 3.5–5)
ALBUMIN/GLOB SERPL: 0.5 {RATIO}
ALP SERPL-CCNC: 513 U/L (ref 41–108)
ALT SERPL W P-5'-P-CCNC: 33 U/L (ref 13–56)
ANION GAP SERPL CALCULATED.3IONS-SCNC: 11 MMOL/L (ref 7–16)
AST SERPL W P-5'-P-CCNC: 18 U/L (ref 15–37)
BASOPHILS # BLD AUTO: 0.03 K/UL (ref 0–0.2)
BASOPHILS NFR BLD AUTO: 0.2 % (ref 0–1)
BILIRUB SERPL-MCNC: 3 MG/DL (ref ?–1.2)
BUN SERPL-MCNC: 6 MG/DL (ref 7–18)
BUN/CREAT SERPL: 13 (ref 6–20)
CALCIUM SERPL-MCNC: 8.4 MG/DL (ref 8.5–10.1)
CHLORIDE SERPL-SCNC: 94 MMOL/L (ref 98–107)
CO2 SERPL-SCNC: 27 MMOL/L (ref 21–32)
CREAT SERPL-MCNC: 0.45 MG/DL (ref 0.55–1.02)
DIFFERENTIAL METHOD BLD: ABNORMAL
EGFR (NO RACE VARIABLE) (RUSH/TITUS): 116 ML/MIN/1.73M2
EOSINOPHIL # BLD AUTO: 0.03 K/UL (ref 0–0.5)
EOSINOPHIL NFR BLD AUTO: 0.2 % (ref 1–4)
ERYTHROCYTE [DISTWIDTH] IN BLOOD BY AUTOMATED COUNT: 18.6 % (ref 11.5–14.5)
GLOBULIN SER-MCNC: 3.6 G/DL (ref 2–4)
GLUCOSE SERPL-MCNC: 112 MG/DL (ref 70–105)
GLUCOSE SERPL-MCNC: 113 MG/DL (ref 70–105)
GLUCOSE SERPL-MCNC: 123 MG/DL (ref 70–105)
GLUCOSE SERPL-MCNC: 158 MG/DL (ref 70–105)
GLUCOSE SERPL-MCNC: 94 MG/DL (ref 74–106)
HCT VFR BLD AUTO: 30.2 % (ref 38–47)
HGB BLD-MCNC: 9.5 G/DL (ref 12–16)
IMM GRANULOCYTES # BLD AUTO: 0.08 K/UL (ref 0–0.04)
IMM GRANULOCYTES NFR BLD: 0.5 % (ref 0–0.4)
LYMPHOCYTES # BLD AUTO: 0.91 K/UL (ref 1–4.8)
LYMPHOCYTES NFR BLD AUTO: 5.8 % (ref 27–41)
MCH RBC QN AUTO: 30 PG (ref 27–31)
MCHC RBC AUTO-ENTMCNC: 31.5 G/DL (ref 32–36)
MCV RBC AUTO: 95.3 FL (ref 80–96)
MONOCYTES # BLD AUTO: 0.84 K/UL (ref 0–0.8)
MONOCYTES NFR BLD AUTO: 5.4 % (ref 2–6)
MPC BLD CALC-MCNC: 11.3 FL (ref 9.4–12.4)
NEUTROPHILS # BLD AUTO: 13.77 K/UL (ref 1.8–7.7)
NEUTROPHILS NFR BLD AUTO: 87.9 % (ref 53–65)
NRBC # BLD AUTO: 0 X10E3/UL
NRBC, AUTO (.00): 0 %
PLATELET # BLD AUTO: 206 K/UL (ref 150–400)
POTASSIUM SERPL-SCNC: 4.3 MMOL/L (ref 3.5–5.1)
PROT SERPL-MCNC: 5.3 G/DL (ref 6.4–8.2)
RBC # BLD AUTO: 3.17 M/UL (ref 4.2–5.4)
SODIUM SERPL-SCNC: 128 MMOL/L (ref 136–145)
WBC # BLD AUTO: 15.66 K/UL (ref 4.5–11)

## 2024-06-26 PROCEDURE — 97162 PT EVAL MOD COMPLEX 30 MIN: CPT

## 2024-06-26 PROCEDURE — 82962 GLUCOSE BLOOD TEST: CPT

## 2024-06-26 PROCEDURE — 36415 COLL VENOUS BLD VENIPUNCTURE: CPT

## 2024-06-26 PROCEDURE — 25000003 PHARM REV CODE 250

## 2024-06-26 PROCEDURE — 63600175 PHARM REV CODE 636 W HCPCS: Performed by: INTERNAL MEDICINE

## 2024-06-26 PROCEDURE — 11000001 HC ACUTE MED/SURG PRIVATE ROOM

## 2024-06-26 PROCEDURE — 94761 N-INVAS EAR/PLS OXIMETRY MLT: CPT

## 2024-06-26 PROCEDURE — 63600175 PHARM REV CODE 636 W HCPCS: Performed by: FAMILY MEDICINE

## 2024-06-26 PROCEDURE — 63600175 PHARM REV CODE 636 W HCPCS

## 2024-06-26 PROCEDURE — 85025 COMPLETE CBC W/AUTO DIFF WBC: CPT

## 2024-06-26 PROCEDURE — 25000003 PHARM REV CODE 250: Performed by: INTERNAL MEDICINE

## 2024-06-26 PROCEDURE — 27000221 HC OXYGEN, UP TO 24 HOURS

## 2024-06-26 PROCEDURE — 25000003 PHARM REV CODE 250: Performed by: NURSE PRACTITIONER

## 2024-06-26 PROCEDURE — 99900035 HC TECH TIME PER 15 MIN (STAT)

## 2024-06-26 PROCEDURE — 80053 COMPREHEN METABOLIC PANEL: CPT

## 2024-06-26 PROCEDURE — 99232 SBSQ HOSP IP/OBS MODERATE 35: CPT | Mod: GC,,, | Performed by: FAMILY MEDICINE

## 2024-06-26 RX ORDER — CHLORPROMAZINE HYDROCHLORIDE 25 MG/1
25 TABLET, FILM COATED ORAL 3 TIMES DAILY PRN
Status: DISCONTINUED | OUTPATIENT
Start: 2024-06-26 | End: 2024-06-27 | Stop reason: HOSPADM

## 2024-06-26 RX ORDER — SODIUM CHLORIDE 9 MG/ML
INJECTION, SOLUTION INTRAVENOUS CONTINUOUS
Status: DISCONTINUED | OUTPATIENT
Start: 2024-06-26 | End: 2024-06-27 | Stop reason: HOSPADM

## 2024-06-26 RX ORDER — PANTOPRAZOLE SODIUM 40 MG/1
40 TABLET, DELAYED RELEASE ORAL DAILY
Status: DISCONTINUED | OUTPATIENT
Start: 2024-06-26 | End: 2024-06-27 | Stop reason: HOSPADM

## 2024-06-26 RX ORDER — ACETAMINOPHEN 500 MG
1000 TABLET ORAL EVERY 6 HOURS PRN
Status: DISCONTINUED | OUTPATIENT
Start: 2024-06-26 | End: 2024-06-27 | Stop reason: HOSPADM

## 2024-06-26 RX ORDER — CHLORPROMAZINE HYDROCHLORIDE 25 MG/1
25 TABLET, FILM COATED ORAL 3 TIMES DAILY
Status: DISCONTINUED | OUTPATIENT
Start: 2024-06-26 | End: 2024-06-26

## 2024-06-26 RX ORDER — BACLOFEN 5 MG/1
5 TABLET ORAL 3 TIMES DAILY
Status: DISCONTINUED | OUTPATIENT
Start: 2024-06-26 | End: 2024-06-27 | Stop reason: HOSPADM

## 2024-06-26 RX ORDER — SODIUM CHLORIDE, SODIUM LACTATE, POTASSIUM CHLORIDE, CALCIUM CHLORIDE 600; 310; 30; 20 MG/100ML; MG/100ML; MG/100ML; MG/100ML
INJECTION, SOLUTION INTRAVENOUS
Status: DISCONTINUED | OUTPATIENT
Start: 2024-06-26 | End: 2024-06-26

## 2024-06-26 RX ADMIN — METRONIDAZOLE 500 MG: 500 INJECTION, SOLUTION INTRAVENOUS at 06:06

## 2024-06-26 RX ADMIN — SODIUM CHLORIDE, POTASSIUM CHLORIDE, SODIUM LACTATE AND CALCIUM CHLORIDE: 600; 310; 30; 20 INJECTION, SOLUTION INTRAVENOUS at 01:06

## 2024-06-26 RX ADMIN — HYDROMORPHONE HYDROCHLORIDE 0.5 MG: 2 INJECTION INTRAMUSCULAR; INTRAVENOUS; SUBCUTANEOUS at 05:06

## 2024-06-26 RX ADMIN — METRONIDAZOLE 500 MG: 500 INJECTION, SOLUTION INTRAVENOUS at 05:06

## 2024-06-26 RX ADMIN — METRONIDAZOLE 500 MG: 500 INJECTION, SOLUTION INTRAVENOUS at 10:06

## 2024-06-26 RX ADMIN — SODIUM CHLORIDE: 9 INJECTION, SOLUTION INTRAVENOUS at 03:06

## 2024-06-26 RX ADMIN — HEPARIN SODIUM 5000 UNITS: 5000 INJECTION, SOLUTION INTRAVENOUS; SUBCUTANEOUS at 05:06

## 2024-06-26 RX ADMIN — ONDANSETRON 4 MG: 2 INJECTION INTRAMUSCULAR; INTRAVENOUS at 09:06

## 2024-06-26 RX ADMIN — PANTOPRAZOLE SODIUM 40 MG: 40 TABLET, DELAYED RELEASE ORAL at 10:06

## 2024-06-26 RX ADMIN — DIPHENHYDRAMINE HYDROCHLORIDE 25 MG: 25 CAPSULE ORAL at 09:06

## 2024-06-26 RX ADMIN — CIPROFLOXACIN 400 MG: 2 INJECTION, SOLUTION INTRAVENOUS at 10:06

## 2024-06-26 RX ADMIN — CIPROFLOXACIN 400 MG: 2 INJECTION, SOLUTION INTRAVENOUS at 09:06

## 2024-06-26 RX ADMIN — SODIUM CHLORIDE: 9 INJECTION, SOLUTION INTRAVENOUS at 05:06

## 2024-06-26 RX ADMIN — HYDROMORPHONE HYDROCHLORIDE 0.5 MG: 2 INJECTION INTRAMUSCULAR; INTRAVENOUS; SUBCUTANEOUS at 11:06

## 2024-06-26 RX ADMIN — GUAIFENESIN 200 MG: 200 SOLUTION ORAL at 05:06

## 2024-06-26 RX ADMIN — BACLOFEN 5 MG: 5 TABLET ORAL at 10:06

## 2024-06-26 RX ADMIN — MUPIROCIN: 20 OINTMENT TOPICAL at 09:06

## 2024-06-26 RX ADMIN — MUPIROCIN: 20 OINTMENT TOPICAL at 10:06

## 2024-06-26 RX ADMIN — INSULIN GLARGINE 40 UNITS: 100 INJECTION, SOLUTION SUBCUTANEOUS at 10:06

## 2024-06-26 RX ADMIN — BACLOFEN 5 MG: 5 TABLET ORAL at 05:06

## 2024-06-26 RX ADMIN — BACLOFEN 5 MG: 5 TABLET ORAL at 09:06

## 2024-06-26 RX ADMIN — HEPARIN SODIUM 5000 UNITS: 5000 INJECTION, SOLUTION INTRAVENOUS; SUBCUTANEOUS at 09:06

## 2024-06-26 RX ADMIN — LEVETIRACETAM 500 MG: 500 TABLET, FILM COATED ORAL at 10:06

## 2024-06-26 NOTE — PLAN OF CARE
Problem: Adult Inpatient Plan of Care  Goal: Plan of Care Review  Outcome: Progressing  Goal: Patient-Specific Goal (Individualized)  Outcome: Progressing  Goal: Absence of Hospital-Acquired Illness or Injury  Outcome: Progressing  Goal: Optimal Comfort and Wellbeing  Outcome: Progressing  Goal: Readiness for Transition of Care  Outcome: Progressing     Problem: Diabetes Comorbidity  Goal: Blood Glucose Level Within Targeted Range  Outcome: Progressing     Problem: Infection  Goal: Absence of Infection Signs and Symptoms  Outcome: Progressing     Problem: Skin Injury Risk Increased  Goal: Skin Health and Integrity  Outcome: Progressing

## 2024-06-26 NOTE — PLAN OF CARE
Problem: Physical Therapy  Goal: Physical Therapy Goal  Description: Short Term Goals  Ambulate SBA - 100 feet with none assistive device.   Supine to sit minimum  Sit to stand minimum  SPT minimum  5. Independent with HEP    Long Term Goals  Ambulate SBA - 200 feet with none assistive device.   Supine to sit independent without device  Sit to stand independent without device  SPT independent without device  Needed equipment for home.         Outcome: Progressing

## 2024-06-26 NOTE — PROGRESS NOTES
Stable, no acute changes overnight.  Patient reports episodes of hiccups about every 30 minutes.  Protonix and baclofen started for hiccups.  Otherwise, doing well after drain placement yesterday.  Scant sanguinous output in accordion drain.  Once hiccups controlled, ready for discharge from general surgery standpoint.  Needs to follow-up with Dr. Doty at [facility].

## 2024-06-26 NOTE — PLAN OF CARE
Problem: Diabetes Comorbidity  Goal: Blood Glucose Level Within Targeted Range  Outcome: Progressing     Problem: Infection  Goal: Absence of Infection Signs and Symptoms  Outcome: Progressing     Problem: Skin Injury Risk Increased  Goal: Skin Health and Integrity  Outcome: Progressing

## 2024-06-26 NOTE — ASSESSMENT & PLAN NOTE
Chronic, controlled. Latest blood pressure and vitals reviewed-     Temp:  [98.7 °F (37.1 °C)-99.8 °F (37.7 °C)]   Pulse:  []   Resp:  [16-20]   BP: (113-128)/(63-78)   SpO2:  [93 %-100 %] .   Home meds for hypertension were reviewed and noted below.   Hypertension Medications               lisinopriL 10 MG tablet Take 1 tablet (10 mg total) by mouth once daily.          Will hold off on ACE-inhibitor while being hospitalized to minimize risk of renal failure    Will utilize p.r.n. blood pressure medication only if patient's blood pressure greater than 180/110 and she develops symptoms such as worsening chest pain or shortness of breath.    6/26  Well controlled without medications  Continue to monitor

## 2024-06-26 NOTE — NURSING
Patient states she is having terrible hiccups and itching. She said she is used to the hiccups because it is a side effect of the chemo she is on. I messaged Dr King to get the patient some benadryl.

## 2024-06-26 NOTE — PT/OT/SLP EVAL
Physical Therapy Evaluation    Patient Name:  Hayley Landis   MRN:  [id number]    Recommendations:     Discharge Recommendations: No Therapy Indicated   Discharge Equipment Recommendations: wheelchair   Barriers to discharge: None    Assessment:     Hayley Landis is a 53 y.o. female admitted with a medical diagnosis of Acute acalculous cholecystitis.  She presents with the following impairments/functional limitations:   Patient with generalized weakness from bedrest. Patient plans on dc home tomorrow.     Rehab Prognosis: Good; patient would benefit from acute skilled PT services to address these deficits and reach maximum level of function.    Recent Surgery: * No surgery found *      Plan:     During this hospitalization, patient to be seen 1 x/week to address the identified rehab impairments via gait training, therapeutic activities, therapeutic exercises and progress toward the following goals:    Plan of Care Expires:       Subjective     Chief Complaint: generalized weakness  Patient/Family Comments/goals: Request wc for home  Pain/Comfort:  Pain Rating 1: 4/10  Location 1: abdomen  Pain Addressed 1: Cessation of Activity, Pre-medicate for activity    Patients cultural, spiritual, Jainism conflicts given the current situation:      Living Environment:  Lives with aunt  Prior to admission, patients level of function was independent with cane.  Equipment used at home: cane, straight.  DME owned (not currently used): single point cane.  Upon discharge, patient will have assistance from aunt.    Objective:     Communicated with nurse prior to session.  Patient found supine with    upon PT entry to room.    General Precautions: Standard, fall  Orthopedic Precautions:    Braces:    Respiratory Status: Room air    Exams:  RLE ROM: WFL  RLE Strength: 3/5  LLE ROM: WFL  LLE Strength: 3/5    Functional Mobility:  Bed Mobility:     Supine to Sit: contact guard assistance  Sit to Supine: contact guard  assistance  Transfers:     Sit to Stand:  minimum assistance with hand-held assist  Bed to Chair: minimum assistance with  hand-held assist  using  Stand Pivot  Gait: ambulated 4 feet, shuffled steps to chair      AM-PAC 6 CLICK MOBILITY  Total Score:17       Treatment & Education:  Tx plan    Patient left supine with call button in reach.    GOALS:   Multidisciplinary Problems       Physical Therapy Goals          Problem: Physical Therapy    Goal Priority Disciplines Outcome Goal Variances Interventions   Physical Therapy Goal     PT, PT/OT Progressing     Description: Short Term Goals  Ambulate SBA - 100 feet with none assistive device.   Supine to sit minimum  Sit to stand minimum  SPT minimum  5. Independent with HEP    Long Term Goals  Ambulate SBA - 200 feet with none assistive device.   Supine to sit independent without device  Sit to stand independent without device  SPT independent without device  Needed equipment for home.                              History:     Past Medical History:   Diagnosis Date    Colon cancer     CVA (cerebral vascular accident)     Diabetes     HTN (hypertension)     Hypokalemia     Liver cancer     Lung cancer     Malignant biliary obstruction     Obstruction of bile duct     Rectal cancer metastatic to bone     Seizure     TIA (transient ischemic attack)        Past Surgical History:   Procedure Laterality Date    COLON SURGERY      HERNIA REPAIR      HYSTERECTOMY         Time Tracking:     PT Received On: 06/26/24  PT Start Time: 1530     PT Stop Time: 1555  PT Total Time (min): 25 min     Billable Minutes: Evaluation 20 06/26/2024

## 2024-06-26 NOTE — PROGRESS NOTES
[facility] Medicine  Progress Note    Patient Name: Hayley Landis  MRN: [id number]  Patient Class: IP- Inpatient   Admission Date: 6/24/2024  Length of Stay: 2 days  Attending Physician: Abner Vyas MD  Primary Care Provider: Elkin Coombs MD        Subjective:     Principal Problem:Acute acalculous cholecystitis        HPI:  Patient is a very unfortunate 53-year-old female with a history of colon cancer with mets to the liver and lung status post partial colectomy followed by reanastomosis, rectal cancer with mets to bones, and malignant biliary obstruction status post stent placement, type 2 diabetes, essential hypertension, and partial seizure who is undergoing palliative chemotherapy.  Patient experience severe right upper quadrant pain upon awakening this morning.  She did vomit once yesterday but nothing today and was able to tolerate mashed potatoes without any problems.  Patient also complained of diarrhea but she attributed this to chemotherapy and denied any changes in frequency, quantity or quality of diarrhea.  Patient denied any fever chills chest pain palpitation PND orthopnea lower extremity edema dysuria hematuria flank pain hematemesis hematochezia or melena in association.    On presentation, patient was slightly tachycardic but vital signs were otherwise stable and patient was afebrile.  Workup was notable for presence of hyperbilirubinemia with total bilirubin of 2.1, alkaline phosphatase of 1063, ALT of 108, and AST of 97.  CT showed multiple pulmonary nodules scattered throughout the lower lobes and liver showed interval increase in multiple hypoattenuating lesions throughout the right hepatic lobe along with gallbladder wall thickening and fluid surrounding the gallbladder. Common duct bile duct stent was in place.  Subsequently HIDA scan was performed which shows nonvisualization of gallbladder which is indicative cholecystitis.  Dr. Vyas felt that  patient will be too high of a risk risk for cholecystectomy and intents on placing a percutaneous cholecystotomy tube placement.  Patient will be admitted        Overview/Hospital Course:  No notes on file    Interval History: NAEO, NAD. Patient endorses headache and hiccups and tylenol prn, humidified O2, and thorazine as needed. Surgery ok for discharge s/p cholecystostomy tube placement by IR yesterday. Wound culture grew heavy growth coag-negative staph, ID and susceptibility pending. Continue Cipro and Flagyl for now pending results. Plan for DC tomorrow to home if stable.    Review of Systems   Constitutional:  Negative for chills and fever.   HENT:  Negative for postnasal drip and rhinorrhea.    Eyes:  Negative for itching.   Respiratory:  Negative for shortness of breath.    Cardiovascular:  Negative for chest pain, palpitations and leg swelling.   Gastrointestinal:  Positive for abdominal pain. Negative for abdominal distention, diarrhea, nausea and vomiting.        Right upper quadrant abdominal pain   Endocrine: Negative for cold intolerance, heat intolerance, polydipsia, polyphagia and polyuria.   Genitourinary:  Negative for dysuria and hematuria.   Musculoskeletal:  Negative for arthralgias, joint swelling, myalgias, neck pain and neck stiffness.   Skin:  Negative for pallor and rash.   Allergic/Immunologic: Negative for environmental allergies, food allergies and immunocompromised state.   Neurological:  Negative for dizziness, seizures, facial asymmetry, light-headedness and numbness.     Objective:     Vital Signs (Most Recent):  Temp: 99 °F (37.2 °C) (06/26/24 1152)  Pulse: 96 (06/26/24 1152)  Resp: 16 (06/26/24 1114)  BP: 123/78 (06/26/24 1152)  SpO2: 100 % (06/26/24 1152) Vital Signs (24h Range):  Temp:  [98.7 °F (37.1 °C)-99.8 °F (37.7 °C)] 99 °F (37.2 °C)  Pulse:  [] 96  Resp:  [16-20] 16  SpO2:  [93 %-100 %] 100 %  BP: (113-128)/(63-78) 123/78     Weight: 62.6 kg (138 lb)  Body mass  index is 29.86 kg/m².    Intake/Output Summary (Last 24 hours) at 6/26/2024 1253  Last data filed at 6/26/2024 1217  Gross per 24 hour   Intake 2695.09 ml   Output 800 ml   Net 1895.09 ml         Physical Exam  Vitals reviewed.   Constitutional:       General: She is in acute distress.      Appearance: She is ill-appearing.   HENT:      Head: Normocephalic and atraumatic.      Right Ear: External ear normal.      Left Ear: External ear normal.   Eyes:      Extraocular Movements: Extraocular movements intact.      Pupils: Pupils are equal, round, and reactive to light.   Cardiovascular:      Rate and Rhythm: Regular rhythm. Tachycardia present.      Pulses: Normal pulses.      Heart sounds: Normal heart sounds. No murmur heard.  Pulmonary:      Effort: Pulmonary effort is normal. No respiratory distress.      Breath sounds: Normal breath sounds. No wheezing.   Chest:      Chest wall: No tenderness.   Abdominal:      General: There is distension.      Palpations: There is no mass.      Tenderness: There is no abdominal tenderness. There is no right CVA tenderness or left CVA tenderness.      Comments: Right upper quadrant tenderness without rebound tenderness was noted   Musculoskeletal:         General: No swelling or tenderness. Normal range of motion.   Skin:     General: Skin is warm and dry.      Capillary Refill: Capillary refill takes less than 2 seconds.   Neurological:      General: No focal deficit present.      Mental Status: She is alert and oriented to person, place, and time. Mental status is at baseline.   Psychiatric:         Mood and Affect: Mood normal.         Thought Content: Thought content normal.             Significant Labs: All pertinent labs within the past 24 hours have been reviewed.    Significant Imaging: I have reviewed all pertinent imaging results/findings within the past 24 hours.    Assessment/Plan:      * Acute acalculous cholecystitis  CT of abdomen pelvis and HIDA scan was  suggestive of acute acalculous cholecystitis.  However given patient's stage IV cancer status, surgery would be too risky.  Plan is to proceed with placement of percutaneous cholecystotomy tube placement.  As per primary service    6/26  \Patient endorses headache and hiccups and tylenol prn, humidified O2, and thorazine as needed.   Surgery ok for discharge s/p cholecystostomy tube placement by IR yesterday.   Wound culture grew heavy growth coag-negative staph, ID and susceptibility pending. Continue Cipro and Flagyl for now pending results.   Plan for DC tomorrow to home if stable.    Biliary obstruction due to malignant neoplasm    Common bile duct stent was patent on CT.  In addition total bilirubin level and AST and ALT were minimally elevated with the exception of alkaline phosphatase.  The latter could be due to bone mets from rectal cancer, rather than representing obstructive cholestasis.  HIDA scan:  Impression: Increased activity inferior right liver. Nonvisualization of gallbladder could indicate cholecystitis         Rectal cancer metastasized to bone    Patient is on palliative chemotherapy.  Prognosis is poor        Primary hypertension    Chronic, controlled. Latest blood pressure and vitals reviewed-     Temp:  [98.7 °F (37.1 °C)-99.8 °F (37.7 °C)]   Pulse:  []   Resp:  [16-20]   BP: (113-128)/(63-78)   SpO2:  [93 %-100 %] .   Home meds for hypertension were reviewed and noted below.   Hypertension Medications               lisinopriL 10 MG tablet Take 1 tablet (10 mg total) by mouth once daily.          Will hold off on ACE-inhibitor while being hospitalized to minimize risk of renal failure    Will utilize p.r.n. blood pressure medication only if patient's blood pressure greater than 180/110 and she develops symptoms such as worsening chest pain or shortness of breath.    6/26  Well controlled without medications  Continue to monitor      Type 2 diabetes mellitus without complication, with  "long-term current use of insulin  Patient's FSGs are controlled on current medication regimen.  Last A1c reviewed-   Lab Results   Component Value Date    HGBA1C 6.7 (H) 04/09/2024     Most recent fingerstick glucose reviewed- No results for input(s): "POCTGLUCOSE" in the last 24 hours.  Current correctional scale  Low  Maintain anti-hyperglycemic dose as follows-   Antihyperglycemics (From admission, onward)      Start     Stop Route Frequency Ordered    06/25/24 0900  insulin glargine U-100 (Lantus) injection 40 Units         -- SubQ Daily 06/24/24 2033 06/24/24 2226  insulin aspart U-100 injection 0-5 Units         -- SubQ Every 6 hours PRN 06/24/24 2128          Hold Oral hypoglycemics while patient is in the hospital.    Hypokalemia  Patient has hypokalemia which is Acute on Chronic and currently controlled. Most recent potassium levels reviewed-   Lab Results   Component Value Date    K 4.3 06/26/2024   . Will continue potassium replacement per protocol and recheck repeat levels after replacement completed.   - replacing with PO potasium.  - Potasium Bicarbonate 40 mEq TID for one day  - monitor serum potasium    6/26  resolved      VTE Risk Mitigation (From admission, onward)           Ordered     heparin (porcine) injection 5,000 Units  Every 8 hours         06/24/24 2130     Place sequential compression device  Until discontinued         06/24/24 1715     IP VTE LOW RISK PATIENT  Once         06/24/24 1715                    Discharge Planning   [place]: 6/28/2024     Code Status: Full Code   Is the patient medically ready for discharge?:     Reason for patient still in hospital (select all that apply): Patient trending condition, Laboratory test, Treatment, Consult recommendations, and Pending disposition  Discharge Plan A: Home with family                  Elfego Chau DO  Department of Hospital Medicine   [facility]    "

## 2024-06-26 NOTE — PHYSICIAN QUERY
Please clarify the nutritional diagnosis associated with the above clinical findings:  Severe Protein Calorie Malnutrition

## 2024-06-26 NOTE — PLAN OF CARE
Problem: Adult Inpatient Plan of Care  Goal: Plan of Care Review  6/26/2024 0444 by Matthew Zuluaga RN  Outcome: Progressing  6/25/2024 2342 by Matthew Zuluaga RN  Outcome: Progressing  Goal: Patient-Specific Goal (Individualized)  6/26/2024 0444 by Matthew Zuluaga RN  Outcome: Progressing  6/25/2024 2342 by Matthew Zuluaga RN  Outcome: Progressing  Goal: Absence of Hospital-Acquired Illness or Injury  6/26/2024 0444 by Matthew Zuluaga RN  Outcome: Progressing  6/25/2024 2342 by Matthew Zuluaga RN  Outcome: Progressing  Goal: Optimal Comfort and Wellbeing  6/26/2024 0444 by Matthew Zuluaga RN  Outcome: Progressing  6/25/2024 2342 by Matthew Zuluaga RN  Outcome: Progressing  Goal: Readiness for Transition of Care  6/26/2024 0444 by Matthew Zuluaga RN  Outcome: Progressing  6/25/2024 2342 by Matthew Zuluaga RN  Outcome: Progressing     Problem: Diabetes Comorbidity  Goal: Blood Glucose Level Within Targeted Range  6/26/2024 0444 by Matthew Zuluaga RN  Outcome: Progressing  6/25/2024 2342 by Matthew Zuluaga RN  Outcome: Progressing     Problem: Infection  Goal: Absence of Infection Signs and Symptoms  6/26/2024 0444 by Matthew Zuluaga RN  Outcome: Progressing  6/25/2024 2342 by Matthew Zuluaga RN  Outcome: Progressing     Problem: Skin Injury Risk Increased  Goal: Skin Health and Integrity  6/26/2024 0444 by Matthew Zuluaga RN  Outcome: Progressing  6/25/2024 2342 by Matthew Zuluaga RN  Outcome: Progressing

## 2024-06-26 NOTE — ASSESSMENT & PLAN NOTE
CT of abdomen pelvis and HIDA scan was suggestive of acute acalculous cholecystitis.  However given patient's stage IV cancer status, surgery would be too risky.  Plan is to proceed with placement of percutaneous cholecystotomy tube placement.  As per primary service    6/26  \Patient endorses headache and hiccups and tylenol prn, humidified O2, and thorazine as needed.   Surgery ok for discharge s/p cholecystostomy tube placement by IR yesterday.   Wound culture grew heavy growth coag-negative staph, ID and susceptibility pending. Continue Cipro and Flagyl for now pending results.   Plan for DC tomorrow to home if stable.

## 2024-06-26 NOTE — ASSESSMENT & PLAN NOTE
Patient has hypokalemia which is Acute on Chronic and currently controlled. Most recent potassium levels reviewed-   Lab Results   Component Value Date    K 4.3 06/26/2024   . Will continue potassium replacement per protocol and recheck repeat levels after replacement completed.   - replacing with PO potasium.  - Potasium Bicarbonate 40 mEq TID for one day  - monitor serum potasium    6/26  resolved

## 2024-06-26 NOTE — SUBJECTIVE & OBJECTIVE
Interval History: NAEO, NAD. Patient endorses headache and hiccups and tylenol prn, humidified O2, and thorazine as needed. Surgery ok for discharge s/p cholecystostomy tube placement by IR yesterday. Wound culture grew heavy growth coag-negative staph, ID and susceptibility pending. Continue Cipro and Flagyl for now pending results. Plan for DC tomorrow to home if stable.    Review of Systems   Constitutional:  Negative for chills and fever.   HENT:  Negative for postnasal drip and rhinorrhea.    Eyes:  Negative for itching.   Respiratory:  Negative for shortness of breath.    Cardiovascular:  Negative for chest pain, palpitations and leg swelling.   Gastrointestinal:  Positive for abdominal pain. Negative for abdominal distention, diarrhea, nausea and vomiting.        Right upper quadrant abdominal pain   Endocrine: Negative for cold intolerance, heat intolerance, polydipsia, polyphagia and polyuria.   Genitourinary:  Negative for dysuria and hematuria.   Musculoskeletal:  Negative for arthralgias, joint swelling, myalgias, neck pain and neck stiffness.   Skin:  Negative for pallor and rash.   Allergic/Immunologic: Negative for environmental allergies, food allergies and immunocompromised state.   Neurological:  Negative for dizziness, seizures, facial asymmetry, light-headedness and numbness.     Objective:     Vital Signs (Most Recent):  Temp: 99 °F (37.2 °C) (06/26/24 1152)  Pulse: 96 (06/26/24 1152)  Resp: 16 (06/26/24 1114)  BP: 123/78 (06/26/24 1152)  SpO2: 100 % (06/26/24 1152) Vital Signs (24h Range):  Temp:  [98.7 °F (37.1 °C)-99.8 °F (37.7 °C)] 99 °F (37.2 °C)  Pulse:  [] 96  Resp:  [16-20] 16  SpO2:  [93 %-100 %] 100 %  BP: (113-128)/(63-78) 123/78     Weight: 62.6 kg (138 lb)  Body mass index is 29.86 kg/m².    Intake/Output Summary (Last 24 hours) at 6/26/2024 1253  Last data filed at 6/26/2024 1217  Gross per 24 hour   Intake 2695.09 ml   Output 800 ml   Net 1895.09 ml         Physical  Exam  Vitals reviewed.   Constitutional:       General: She is in acute distress.      Appearance: She is ill-appearing.   HENT:      Head: Normocephalic and atraumatic.      Right Ear: External ear normal.      Left Ear: External ear normal.   Eyes:      Extraocular Movements: Extraocular movements intact.      Pupils: Pupils are equal, round, and reactive to light.   Cardiovascular:      Rate and Rhythm: Regular rhythm. Tachycardia present.      Pulses: Normal pulses.      Heart sounds: Normal heart sounds. No murmur heard.  Pulmonary:      Effort: Pulmonary effort is normal. No respiratory distress.      Breath sounds: Normal breath sounds. No wheezing.   Chest:      Chest wall: No tenderness.   Abdominal:      General: There is distension.      Palpations: There is no mass.      Tenderness: There is no abdominal tenderness. There is no right CVA tenderness or left CVA tenderness.      Comments: Right upper quadrant tenderness without rebound tenderness was noted   Musculoskeletal:         General: No swelling or tenderness. Normal range of motion.   Skin:     General: Skin is warm and dry.      Capillary Refill: Capillary refill takes less than 2 seconds.   Neurological:      General: No focal deficit present.      Mental Status: She is alert and oriented to person, place, and time. Mental status is at baseline.   Psychiatric:         Mood and Affect: Mood normal.         Thought Content: Thought content normal.             Significant Labs: All pertinent labs within the past 24 hours have been reviewed.    Significant Imaging: I have reviewed all pertinent imaging results/findings within the past 24 hours.

## 2024-06-27 ENCOUNTER — TELEPHONE (OUTPATIENT)
Dept: ORTHOPEDICS | Facility: HOSPITAL | Age: 54
End: 2024-06-27
Payer: MEDICAID

## 2024-06-27 VITALS
OXYGEN SATURATION: 96 % | WEIGHT: 138 LBS | TEMPERATURE: 98 F | HEIGHT: 57 IN | SYSTOLIC BLOOD PRESSURE: 123 MMHG | BODY MASS INDEX: 29.77 KG/M2 | RESPIRATION RATE: 16 BRPM | DIASTOLIC BLOOD PRESSURE: 79 MMHG | HEART RATE: 99 BPM

## 2024-06-27 LAB
ALBUMIN SERPL BCP-MCNC: 1.7 G/DL (ref 3.5–5)
ALBUMIN/GLOB SERPL: 0.4 {RATIO}
ALP SERPL-CCNC: 579 U/L (ref 41–108)
ALT SERPL W P-5'-P-CCNC: 23 U/L (ref 13–56)
ANION GAP SERPL CALCULATED.3IONS-SCNC: 13 MMOL/L (ref 7–16)
AST SERPL W P-5'-P-CCNC: 15 U/L (ref 15–37)
BASOPHILS # BLD AUTO: 0.04 K/UL (ref 0–0.2)
BASOPHILS NFR BLD AUTO: 0.2 % (ref 0–1)
BILIRUB SERPL-MCNC: 2.2 MG/DL (ref ?–1.2)
BUN SERPL-MCNC: 7 MG/DL (ref 7–18)
BUN/CREAT SERPL: 15 (ref 6–20)
CALCIUM SERPL-MCNC: 8.1 MG/DL (ref 8.5–10.1)
CHLORIDE SERPL-SCNC: 94 MMOL/L (ref 98–107)
CO2 SERPL-SCNC: 25 MMOL/L (ref 21–32)
CREAT SERPL-MCNC: 0.46 MG/DL (ref 0.55–1.02)
DIFFERENTIAL METHOD BLD: ABNORMAL
EGFR (NO RACE VARIABLE) (RUSH/TITUS): 115 ML/MIN/1.73M2
EOSINOPHIL # BLD AUTO: 0.08 K/UL (ref 0–0.5)
EOSINOPHIL NFR BLD AUTO: 0.5 % (ref 1–4)
ERYTHROCYTE [DISTWIDTH] IN BLOOD BY AUTOMATED COUNT: 18.6 % (ref 11.5–14.5)
GLOBULIN SER-MCNC: 4 G/DL (ref 2–4)
GLUCOSE SERPL-MCNC: 103 MG/DL (ref 70–105)
GLUCOSE SERPL-MCNC: 112 MG/DL (ref 74–106)
GLUCOSE SERPL-MCNC: 154 MG/DL (ref 70–105)
HCT VFR BLD AUTO: 31.7 % (ref 38–47)
HGB BLD-MCNC: 10.2 G/DL (ref 12–16)
IMM GRANULOCYTES # BLD AUTO: 0.13 K/UL (ref 0–0.04)
IMM GRANULOCYTES NFR BLD: 0.7 % (ref 0–0.4)
LYMPHOCYTES # BLD AUTO: 0.83 K/UL (ref 1–4.8)
LYMPHOCYTES NFR BLD AUTO: 4.7 % (ref 27–41)
MCH RBC QN AUTO: 30.4 PG (ref 27–31)
MCHC RBC AUTO-ENTMCNC: 32.2 G/DL (ref 32–36)
MCV RBC AUTO: 94.6 FL (ref 80–96)
MICROORGANISM SPEC CULT: ABNORMAL
MONOCYTES # BLD AUTO: 0.99 K/UL (ref 0–0.8)
MONOCYTES NFR BLD AUTO: 5.7 % (ref 2–6)
MPC BLD CALC-MCNC: 11.3 FL (ref 9.4–12.4)
NEUTROPHILS # BLD AUTO: 15.44 K/UL (ref 1.8–7.7)
NEUTROPHILS NFR BLD AUTO: 88.2 % (ref 53–65)
NRBC # BLD AUTO: 0 X10E3/UL
NRBC, AUTO (.00): 0 %
PLATELET # BLD AUTO: 268 K/UL (ref 150–400)
POTASSIUM SERPL-SCNC: 3.7 MMOL/L (ref 3.5–5.1)
PROT SERPL-MCNC: 5.7 G/DL (ref 6.4–8.2)
RBC # BLD AUTO: 3.35 M/UL (ref 4.2–5.4)
SODIUM SERPL-SCNC: 128 MMOL/L (ref 136–145)
WBC # BLD AUTO: 17.51 K/UL (ref 4.5–11)

## 2024-06-27 PROCEDURE — 85025 COMPLETE CBC W/AUTO DIFF WBC: CPT

## 2024-06-27 PROCEDURE — 99900035 HC TECH TIME PER 15 MIN (STAT)

## 2024-06-27 PROCEDURE — 27000221 HC OXYGEN, UP TO 24 HOURS

## 2024-06-27 PROCEDURE — 97110 THERAPEUTIC EXERCISES: CPT

## 2024-06-27 PROCEDURE — 94761 N-INVAS EAR/PLS OXIMETRY MLT: CPT

## 2024-06-27 PROCEDURE — 63600175 PHARM REV CODE 636 W HCPCS

## 2024-06-27 PROCEDURE — 25000003 PHARM REV CODE 250

## 2024-06-27 PROCEDURE — 36415 COLL VENOUS BLD VENIPUNCTURE: CPT

## 2024-06-27 PROCEDURE — 25000003 PHARM REV CODE 250: Performed by: NURSE PRACTITIONER

## 2024-06-27 PROCEDURE — 25000003 PHARM REV CODE 250: Performed by: INTERNAL MEDICINE

## 2024-06-27 PROCEDURE — 82962 GLUCOSE BLOOD TEST: CPT

## 2024-06-27 PROCEDURE — 63600175 PHARM REV CODE 636 W HCPCS: Performed by: INTERNAL MEDICINE

## 2024-06-27 PROCEDURE — 80053 COMPREHEN METABOLIC PANEL: CPT

## 2024-06-27 PROCEDURE — 97116 GAIT TRAINING THERAPY: CPT

## 2024-06-27 RX ORDER — POLYETHYLENE GLYCOL 3350 17 G/17G
17 POWDER, FOR SOLUTION ORAL 2 TIMES DAILY PRN
Status: DISCONTINUED | OUTPATIENT
Start: 2024-06-27 | End: 2024-06-27 | Stop reason: HOSPADM

## 2024-06-27 RX ORDER — HYDROCODONE BITARTRATE AND ACETAMINOPHEN 7.5; 325 MG/1; MG/1
1 TABLET ORAL EVERY 6 HOURS PRN
Qty: 15 TABLET | Refills: 0 | Status: SHIPPED | OUTPATIENT
Start: 2024-06-27

## 2024-06-27 RX ORDER — SULFAMETHOXAZOLE AND TRIMETHOPRIM 800; 160 MG/1; MG/1
1 TABLET ORAL 2 TIMES DAILY
Qty: 14 TABLET | Refills: 0 | Status: SHIPPED | OUTPATIENT
Start: 2024-06-27 | End: 2024-07-04

## 2024-06-27 RX ADMIN — METRONIDAZOLE 500 MG: 500 INJECTION, SOLUTION INTRAVENOUS at 06:06

## 2024-06-27 RX ADMIN — POLYETHYLENE GLYCOL 3350 17 G: 17 POWDER, FOR SOLUTION ORAL at 06:06

## 2024-06-27 RX ADMIN — HEPARIN SODIUM 5000 UNITS: 5000 INJECTION, SOLUTION INTRAVENOUS; SUBCUTANEOUS at 06:06

## 2024-06-27 RX ADMIN — MUPIROCIN: 20 OINTMENT TOPICAL at 09:06

## 2024-06-27 RX ADMIN — HYDROMORPHONE HYDROCHLORIDE 0.5 MG: 2 INJECTION INTRAMUSCULAR; INTRAVENOUS; SUBCUTANEOUS at 04:06

## 2024-06-27 RX ADMIN — PANTOPRAZOLE SODIUM 40 MG: 40 TABLET, DELAYED RELEASE ORAL at 09:06

## 2024-06-27 RX ADMIN — LEVETIRACETAM 500 MG: 500 TABLET, FILM COATED ORAL at 09:06

## 2024-06-27 RX ADMIN — SODIUM CHLORIDE: 9 INJECTION, SOLUTION INTRAVENOUS at 06:06

## 2024-06-27 RX ADMIN — BACLOFEN 5 MG: 5 TABLET ORAL at 09:06

## 2024-06-27 RX ADMIN — BACLOFEN 5 MG: 5 TABLET ORAL at 02:06

## 2024-06-27 RX ADMIN — HEPARIN SODIUM 5000 UNITS: 5000 INJECTION, SOLUTION INTRAVENOUS; SUBCUTANEOUS at 02:06

## 2024-06-27 RX ADMIN — INSULIN GLARGINE 40 UNITS: 100 INJECTION, SOLUTION SUBCUTANEOUS at 09:06

## 2024-06-27 RX ADMIN — GUAIFENESIN 200 MG: 200 SOLUTION ORAL at 10:06

## 2024-06-27 RX ADMIN — CIPROFLOXACIN 400 MG: 2 INJECTION, SOLUTION INTRAVENOUS at 09:06

## 2024-06-27 NOTE — DISCHARGE INSTRUCTIONS
Empty drain as needed and record output.  1) No driving until follow up appointment.  2) May shower on day that dressing removed.Use a antibacterial soap to shower with. (No tub Bathe).change dressing daily and replace with gauze and tape  3) Notify your healthcare provider if you experience any of the following: temperature >100.4  4) Notify your healthcare provider if you experience any of the following: redness, tenderness, or      Or signs of infection (pain, swelling, redness, odor or green/ yellow discharge around incision site.)  5) Notify your healthcare provider if you experience any of the following: difficulty breathing or     increased cough.  6)Clean incision daily with antibacterial soap/water, then pat dry.   7)Notify your physician if you experience any persistent nausea, vomiting, or diarrhea or headache  8)Notify your physician if you experience any of the following:severe uncontrolled pain;worsening rash, increased confusion or weakness; dizziness, lightheadedness or visual disturbances

## 2024-06-27 NOTE — NURSING
Sitting up in bedside chair.  Skin warm et dry.  Resp even et unlabored.  Alert et verbally responsive.  Discharge instructions reviewed with pt and family.  Denies questions or concerns at this time.  Instructed to pick-up d/c meds from pharmacy of record. Safety measures in place et maintained.  Denies pain or discomfort.  No s/s of acute distress noted or voiced.  Awaiting transport home for self-care via private vehicle.

## 2024-06-27 NOTE — PT/OT/SLP PROGRESS
Physical Therapy Treatment    Patient Name:  Hayley Landis   MRN:  [id number]    Recommendations:     Discharge Recommendations: No Therapy Indicated  Discharge Equipment Recommendations: wheelchair  Barriers to discharge: None    Assessment:     Hayley Landis is a 53 y.o. female admitted with a medical diagnosis of Acute acalculous cholecystitis.  She presents with the following impairments/functional limitations:   Patient scheduled for home today. Very limited with gait distance due to decreased endurance and generalized weakness.    Rehab Prognosis: Good; patient would benefit from acute skilled PT services to address these deficits and reach maximum level of function.    Recent Surgery: * No surgery found *      Plan:     During this hospitalization, patient to be seen 1 x/week to address the identified rehab impairments via gait training, therapeutic activities, therapeutic exercises and progress toward the following goals:    Plan of Care Expires:       Subjective     Chief Complaint: generalized weakness  Patient/Family Comments/goals: Plans on home today. Request wc for home.   Pain/Comfort:  Pain Rating 1: 4/10  Location 1: abdomen  Pain Addressed 1: Cessation of Activity, Pre-medicate for activity      Objective:     Communicated with nurse prior to session.  Patient found supine with   upon PT entry to room.     General Precautions: Standard, fall  Orthopedic Precautions:    Braces:    Respiratory Status: Room air     Functional Mobility:  Bed Mobility:     Supine to Sit: minimum assistance  Transfers:     Sit to Stand:  minimum assistance with hand-held assist  Gait: ambulated 20 feet min assist, patient reaching for wall. Would benefit from walker. Limited by jacobu      AM-PAC 6 CLICK MOBILITY  Turning over in bed (including adjusting bedclothes, sheets and blankets)?: 3  Sitting down on and standing up from a chair with arms (e.g., wheelchair, bedside commode, etc.): 3  Moving from lying on back  to sitting on the side of the bed?: 3  Moving to and from a bed to a chair (including a wheelchair)?: 3  Need to walk in hospital room?: 3  Climbing 3-5 steps with a railing?: 3  Basic Mobility Total Score: 18       Treatment & Education:  BLE: aps, hs, saq, abd-add, mre ext, mre abd 3x10    Patient left up in chair with all lines intact..    GOALS:   Multidisciplinary Problems       Physical Therapy Goals          Problem: Physical Therapy    Goal Priority Disciplines Outcome Goal Variances Interventions   Physical Therapy Goal     PT, PT/OT Progressing     Description: Short Term Goals  Ambulate SBA - 100 feet with none assistive device.   Supine to sit minimum  Sit to stand minimum  SPT minimum  5. Independent with HEP    Long Term Goals  Ambulate SBA - 200 feet with none assistive device.   Supine to sit independent without device  Sit to stand independent without device  SPT independent without device  Needed equipment for home.                              Time Tracking:     PT Received On: 06/27/24  PT Start Time: 0910     PT Stop Time: 0935  PT Total Time (min): 25 min     Billable Minutes: Gait Training 10 and Therapeutic Exercise 15    Treatment Type: Evaluation  PT/PTA: PT     Number of PTA visits since last PT visit: 0     06/27/2024

## 2024-06-27 NOTE — PLAN OF CARE
[facility]  Discharge Final Note    Primary Care Provider: Elkin Coombs MD    Expected Discharge Date: 6/27/2024    Final Discharge Note (most recent)       Final Note - 06/27/24 1532          Final Note    Assessment Type Final Discharge Note     Anticipated Discharge Disposition Home or Self Care                     Important Message from Medicare-Medicaid              Follow-up providers       Abner Vyas MD   Specialty: General Surgery, Surgery    [address] [place] [id number]   Phone: [phone]       Next Steps: Follow up in 3 week(s)    Elkin Coombs MD   Specialty: Family Medicine   Relationship: PCP - General    [address] [facility]  [facility] [address]   Phone: [phone]       Next Steps: Schedule an appointment as soon as possible for a visit              After-discharge care                Durable Medical Equipment       [facility]   Service: Durable Medical Equipment    [address] [address]   Phone: [phone]                             [place] home. Auth for wheel chair is pending.

## 2024-06-27 NOTE — HOSPITAL COURSE
Patient admitted for acute cholecystitis.  History of stage IV colon cancer.  Poor candidate for surgery and would be unlikely to survive, therefore was managed with a cholecystostomy drain.  Drain is intact with scant sanguinous drainage.  Pain, nausea, and vomiting resolved.  Afebrile.  Labs and vital signs stable.  Have note, the culture did grow MRSA, sensitive to Bactrim.  We will continue Bactrim at home.  Follow-up with Oncology/Surgical Oncology as scheduled.  We will see her in clinic in a few weeks.

## 2024-06-27 NOTE — NURSING
"Patient states she thinks her stomach is hurting due to being constipated. She states "I usually go between chemo diarrhea and chemo constipation and I usually just let it work itself out but I think I need some help now". I messaged Dr King to see about getting an order for dulcolax or some milk of magnesia to assist patient in having a bowel movement.   "

## 2024-06-27 NOTE — NURSING
"Patient slept in chair last night. Offered multiple times to get her back in bed and she said "I am fine where I am. This is more comfortable for me".  "

## 2024-06-27 NOTE — PLAN OF CARE
Problem: Adult Inpatient Plan of Care  Goal: Plan of Care Review  Outcome: Adequate for Care Transition  Goal: Patient-Specific Goal (Individualized)  Outcome: Adequate for Care Transition  Goal: Absence of Hospital-Acquired Illness or Injury  Outcome: Adequate for Care Transition  Goal: Optimal Comfort and Wellbeing  Outcome: Adequate for Care Transition  Goal: Readiness for Transition of Care  Outcome: Adequate for Care Transition     Problem: Diabetes Comorbidity  Goal: Blood Glucose Level Within Targeted Range  Outcome: Adequate for Care Transition     Problem: Infection  Goal: Absence of Infection Signs and Symptoms  Outcome: Adequate for Care Transition     Problem: Skin Injury Risk Increased  Goal: Skin Health and Integrity  Outcome: Adequate for Care Transition     Problem: Skin Injury Risk Increased  Goal: Skin Health and Integrity  Outcome: Adequate for Care Transition     Problem: Fall Injury Risk  Goal: Absence of Fall and Fall-Related Injury  Outcome: Adequate for Care Transition     Problem: Infection  Goal: Absence of Infection Signs and Symptoms  Outcome: Adequate for Care Transition

## 2024-06-27 NOTE — PLAN OF CARE
Consult for wheel chair. Spoke with patient in her room. Will send order to [facility]. Called edna. Asked edna to call patient as patient is requesting a certain type of wc. Insurance requires prior auth. Edna will speak with patient.

## 2024-06-27 NOTE — TELEPHONE ENCOUNTER
Notified Gladis Maier RN with Dr Mily Ryan at [facility] oncology of patient status and discharge, Gladis stated she would contact the patient to schedule a follow up appt.

## 2024-06-27 NOTE — DISCHARGE SUMMARY
[facility]  General Surgery  Discharge Summary      Patient Name: Hayley Landis  MRN: [id number]  Admission Date: 6/24/2024  Hospital Length of Stay: 3 days  Discharge Date and Time:  06/27/2024 1:55 PM  Attending Physician: Abner Vyas MD   Discharging Provider: JUAN Novoa  Primary Care Provider: Elkin Coombs MD    HPI:   No notes on file    * No surgery found *      Indwelling Lines/Drains at time of discharge:   Lines/Drains/Airways       Drain  Duration                  Closed/Suction Drain 06/25/24 1306 Tube - 1 Right Abdomen Accordion 2 days    Female External Urinary Catheter w/ Suction 06/25/24 1930 1 day                  Hospital Course: Patient admitted for acute cholecystitis.  History of stage IV colon cancer.  Poor candidate for surgery and would be unlikely to survive, therefore was managed with a cholecystostomy drain.  Drain is intact with scant sanguinous drainage.  Pain, nausea, and vomiting resolved.  Afebrile.  Labs and vital signs stable.  Have note, the culture did grow MRSA, sensitive to Bactrim.  We will continue Bactrim at home.  Follow-up with Oncology/Surgical Oncology as scheduled.  We will see her in clinic in a few weeks.    Goals of Care Treatment Preferences:  Code Status: Full Code      Consults:   Consults (From admission, onward)          Status Ordering Provider     Inpatient consult to Social Work  Once        Provider:  (Not yet assigned)    Completed BRIAN HARP     Inpatient consult to Registered Dietitian/Nutritionist  Once        Provider:  (Not yet assigned)    Completed BRIAN HARP     Inpatient consult to Hospital Medicine  Once        Provider:  Brian Harp, MAURO Ireland            Significant Diagnostic Studies: Labs: All labs within the past 24 hours have been reviewed    Pending Diagnostic Studies:       None          Final Active Diagnoses:    Diagnosis Date Noted POA     PRINCIPAL PROBLEM:  Acute acalculous cholecystitis [K81.0] 06/24/2024 Yes    Rectal cancer metastasized to bone [C20, C79.51] 06/24/2024 Yes    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    Specialties: General Surgery, Surgery  Contact information:  [address] [place] [address] [place] [address]  [phone]               Elkin Coombs MD. Schedule an appointment as soon as possible for a visit.    Specialty: Family Medicine  Contact information:  [address] [facility]  [facility] [place] [address]  [phone]                           Patient Instructions:      Diet Adult Regular     Notify your health care provider if you experience any of the following:  temperature >100.4     Notify your health care provider if you experience any of the following:  persistent nausea and vomiting or diarrhea     Notify your health care provider if you experience any of the following:  severe uncontrolled pain     Notify your health care provider if you experience any of the following:  redness, tenderness, or signs of infection (pain, swelling, redness, odor or green/yellow discharge around incision site)     No dressing needed   Order Comments: Remove dressing and shower daily.  No tub baths. Replace with gauze and tape.     Activity as tolerated     Medications:  Reconciled Home Medications:      Medication List        START taking these medications      sulfamethoxazole-trimethoprim 800-160mg 800-160 mg Tab  Commonly known as: BACTRIM DS  Take 1  tablet by mouth 2 (two) times daily. for 7 days            CONTINUE taking these medications      blood sugar diagnostic Strp  Commonly known as: ONETOUCH ULTRA TEST  check blood sugar TWICE DAILY     capecitabine 500 MG Tab  Commonly known as: XELODA  Take 3 tablets twice daily for 7 days on and 7 days off.     cholecalciferol (vitamin D3) 1,250 mcg (50,000 unit) capsule  Take 1 capsule (50,000 Units total) by mouth every 7 days.     clopidogreL 75 mg tablet  Commonly known as: PLAVIX  Take 75 mg by mouth once daily.     insulin aspart U-100 100 unit/mL injection  Commonly known as: NovoLOG  inject as directed by your physician per sliding scale     insulin glargine U-100 (Lantus) 100 unit/mL injection  Commonly known as: LANTUS U-100 INSULIN  Inject 80 Units into the skin once daily.     insulin syringe-needle U-100 1 mL 30 gauge x 5/16 Syrg  Use as directed.     levETIRAcetam 500 MG Tab  Commonly known as: KEPPRA  TAKE ONE TABLET BY MOUTH ONCE DAILY     lisinopriL 10 MG tablet  Take 1 tablet (10 mg total) by mouth once daily.            Time spent on the discharge of patient: 30 minutes    JUAN Novoa  General Surgery  [facility]

## 2024-06-28 ENCOUNTER — HOSPITAL ENCOUNTER (EMERGENCY)
Facility: HOSPITAL | Age: 54
Discharge: ANOTHER HOSPITAL | End: 2024-06-28
Payer: MEDICAID

## 2024-06-28 ENCOUNTER — HOSPITAL ENCOUNTER (EMERGENCY)
Facility: HOSPITAL | Age: 54
Discharge: HOME | End: 2024-06-28
Attending: EMERGENCY MEDICINE
Payer: MEDICAID

## 2024-06-28 VITALS
SYSTOLIC BLOOD PRESSURE: 152 MMHG | WEIGHT: 139 LBS | HEART RATE: 87 BPM | OXYGEN SATURATION: 95 % | BODY MASS INDEX: 29.18 KG/M2 | RESPIRATION RATE: 20 BRPM | HEIGHT: 58 IN | TEMPERATURE: 98 F | DIASTOLIC BLOOD PRESSURE: 88 MMHG

## 2024-06-28 VITALS
BODY MASS INDEX: 29.18 KG/M2 | HEART RATE: 91 BPM | SYSTOLIC BLOOD PRESSURE: 147 MMHG | OXYGEN SATURATION: 96 % | RESPIRATION RATE: 18 BRPM | TEMPERATURE: 98 F | HEIGHT: 58 IN | DIASTOLIC BLOOD PRESSURE: 80 MMHG | WEIGHT: 139 LBS

## 2024-06-28 DIAGNOSIS — R79.89 D-DIMER, ELEVATED: ICD-10-CM

## 2024-06-28 DIAGNOSIS — E16.2 HYPOGLYCEMIA: ICD-10-CM

## 2024-06-28 DIAGNOSIS — M79.89 LEG SWELLING: Primary | ICD-10-CM

## 2024-06-28 DIAGNOSIS — R60.0 BILATERAL LOWER EXTREMITY EDEMA: Primary | ICD-10-CM

## 2024-06-28 DIAGNOSIS — E87.6 HYPOKALEMIA: ICD-10-CM

## 2024-06-28 DIAGNOSIS — E88.09 HYPOALBUMINEMIA: ICD-10-CM

## 2024-06-28 PROBLEM — C78.00 COLON CANCER METASTASIZED TO LUNG: Status: ACTIVE | Noted: 2024-06-28

## 2024-06-28 PROBLEM — C18.9 COLON CANCER METASTASIZED TO LIVER: Status: ACTIVE | Noted: 2024-06-28

## 2024-06-28 PROBLEM — C78.7 COLON CANCER METASTASIZED TO LIVER: Status: ACTIVE | Noted: 2024-06-28

## 2024-06-28 LAB
ALBUMIN SERPL BCP-MCNC: 1.7 G/DL (ref 3.5–5)
ALBUMIN/GLOB SERPL: 0.4 {RATIO}
ALP SERPL-CCNC: 1425 U/L (ref 41–108)
ALT SERPL W P-5'-P-CCNC: 19 U/L (ref 13–56)
ANION GAP SERPL CALCULATED.3IONS-SCNC: 12 MMOL/L (ref 7–16)
APTT PPP: 35.5 SECONDS (ref 25.2–37.3)
AST SERPL W P-5'-P-CCNC: 30 U/L (ref 15–37)
BASOPHILS # BLD AUTO: 0.02 K/UL (ref 0–0.2)
BASOPHILS NFR BLD AUTO: 0.2 % (ref 0–1)
BILIRUB SERPL-MCNC: 1.6 MG/DL (ref ?–1.2)
BUN SERPL-MCNC: 7 MG/DL (ref 7–18)
BUN/CREAT SERPL: 10 (ref 6–20)
CALCIUM SERPL-MCNC: 8.1 MG/DL (ref 8.5–10.1)
CHLORIDE SERPL-SCNC: 97 MMOL/L (ref 98–107)
CO2 SERPL-SCNC: 27 MMOL/L (ref 21–32)
CREAT SERPL-MCNC: 0.67 MG/DL (ref 0.55–1.02)
D DIMER PPP FEU-MCNC: 4.28 ΜG/ML (ref 0–0.47)
DIFFERENTIAL METHOD BLD: ABNORMAL
EGFR (NO RACE VARIABLE) (RUSH/TITUS): 105 ML/MIN/1.73M2
EOSINOPHIL # BLD AUTO: 0.1 K/UL (ref 0–0.5)
EOSINOPHIL NFR BLD AUTO: 0.8 % (ref 1–4)
ERYTHROCYTE [DISTWIDTH] IN BLOOD BY AUTOMATED COUNT: 18 % (ref 11.5–14.5)
GLOBULIN SER-MCNC: 3.9 G/DL (ref 2–4)
GLUCOSE SERPL-MCNC: 49 MG/DL (ref 74–106)
GLUCOSE SERPL-MCNC: 70 MG/DL (ref 70–105)
HCT VFR BLD AUTO: 31.7 % (ref 38–47)
HGB BLD-MCNC: 10.1 G/DL (ref 12–16)
IMM GRANULOCYTES # BLD AUTO: 0.17 K/UL (ref 0–0.04)
IMM GRANULOCYTES NFR BLD: 1.4 % (ref 0–0.4)
INR BLD: 1.23
LYMPHOCYTES # BLD AUTO: 1.04 K/UL (ref 1–4.8)
LYMPHOCYTES NFR BLD AUTO: 8.5 % (ref 27–41)
MCH RBC QN AUTO: 30 PG (ref 27–31)
MCHC RBC AUTO-ENTMCNC: 31.9 G/DL (ref 32–36)
MCV RBC AUTO: 94.1 FL (ref 80–96)
MONOCYTES # BLD AUTO: 0.75 K/UL (ref 0–0.8)
MONOCYTES NFR BLD AUTO: 6.1 % (ref 2–6)
MPC BLD CALC-MCNC: 10.5 FL (ref 9.4–12.4)
NEUTROPHILS # BLD AUTO: 10.19 K/UL (ref 1.8–7.7)
NEUTROPHILS NFR BLD AUTO: 83 % (ref 53–65)
NRBC # BLD AUTO: 0 X10E3/UL
NRBC, AUTO (.00): 0 %
PLATELET # BLD AUTO: 286 K/UL (ref 150–400)
POTASSIUM SERPL-SCNC: 2.9 MMOL/L (ref 3.5–5.1)
PROT SERPL-MCNC: 5.6 G/DL (ref 6.4–8.2)
PROTHROMBIN TIME: 16.2 SECONDS (ref 11.7–14.7)
RBC # BLD AUTO: 3.37 M/UL (ref 4.2–5.4)
SODIUM SERPL-SCNC: 133 MMOL/L (ref 136–145)
WBC # BLD AUTO: 12.27 K/UL (ref 4.5–11)

## 2024-06-28 PROCEDURE — 25000003 PHARM REV CODE 250

## 2024-06-28 PROCEDURE — 96360 HYDRATION IV INFUSION INIT: CPT

## 2024-06-28 PROCEDURE — 36415 COLL VENOUS BLD VENIPUNCTURE: CPT

## 2024-06-28 PROCEDURE — 85025 COMPLETE CBC W/AUTO DIFF WBC: CPT

## 2024-06-28 PROCEDURE — 85379 FIBRIN DEGRADATION QUANT: CPT

## 2024-06-28 PROCEDURE — 85730 THROMBOPLASTIN TIME PARTIAL: CPT

## 2024-06-28 PROCEDURE — 82962 GLUCOSE BLOOD TEST: CPT

## 2024-06-28 PROCEDURE — 85610 PROTHROMBIN TIME: CPT

## 2024-06-28 PROCEDURE — 80053 COMPREHEN METABOLIC PANEL: CPT

## 2024-06-28 PROCEDURE — 99285 EMERGENCY DEPT VISIT HI MDM: CPT | Mod: ,,, | Performed by: EMERGENCY MEDICINE

## 2024-06-28 PROCEDURE — 99284 EMERGENCY DEPT VISIT MOD MDM: CPT | Mod: 25

## 2024-06-28 RX ORDER — DEXTROSE MONOHYDRATE, SODIUM CHLORIDE, AND POTASSIUM CHLORIDE 50; 1.49; 9 G/1000ML; G/1000ML; G/1000ML
INJECTION, SOLUTION INTRAVENOUS
Status: COMPLETED | OUTPATIENT
Start: 2024-06-28 | End: 2024-06-28

## 2024-06-28 RX ADMIN — POTASSIUM BICARBONATE 20 MEQ: 782 TABLET, EFFERVESCENT ORAL at 04:06

## 2024-06-28 RX ADMIN — POTASSIUM CHLORIDE, DEXTROSE MONOHYDRATE AND SODIUM CHLORIDE 100 ML/HR: 150; 5; 900 INJECTION, SOLUTION INTRAVENOUS at 04:06

## 2024-06-28 NOTE — ED PROVIDER NOTES
"Encounter Date: 6/28/2024       History     Chief Complaint   Patient presents with    Leg Swelling     Patient comes in with bilateral lower leg swelling, patient had gallbladder  surgery and has a drain right upper abdomen.      Patient is a 53-year-old female who presents to the emergency department with concerns for lower extremity swelling.  She reports that she noticed the swelling 3-4 hours earlier today.  She does report that she was recently discharged yesterday from [facility] after placement of cholecystostomy drain for treatment of acute cholecystitis due to her being a poor surgical candidate related to her other chronic illnesses.  She has a complex past medical history of CVA, diabetes, hypertension, liver cancer, lung cancer, colon cancer, rectal cancer with bone metastasis, seizures, and TIAs.  She reports that she was not allowed to walk or get up while she was in the hospital and has a attributed this to the swelling in her legs.  She denies any shortness of breath, chest pain, weakness, dizziness, difficulty breathing, pain, or any other complaints at this time outside of the swelling in her lower extremities.  Her blood pressure is 150/91, temperature 97.7°, heart rate 90, respirations 18, and oxygen saturation 97% on room air.  She appears in no acute distress.    The history is provided by the patient.     Review of patient's allergies indicates:   Allergen Reactions    Grape Anaphylaxis     Other reaction(s): Not available    Morphine sulfate Swelling    Nutritional supplements Anaphylaxis     Highly allergic to Cooked grapes    Pineapple Anaphylaxis    Opioids - morphine analogues Nausea And Vomiting    Rocephin [ceftriaxone] Itching    Aspirin      "Veins breakdown" per patient    Biguanides Diarrhea and Other (See Comments)    Dicyclomine Diarrhea    Dihydrocodeine Other (See Comments)     Bad headache in the top of head    Gabapentin Other (See Comments)     weakness  Other reaction(s): " Not available  Seizures      Hydrocodone Other (See Comments)     Headache      Iron      Hemochromatosis    Iron analogues      Pt hx of hemachromatosis    Metformin      diarrhea    Penicillins Other (See Comments)     Family hx       Latex Rash and Other (See Comments)     Past Medical History:   Diagnosis Date    Colon cancer     CVA (cerebral vascular accident)     Diabetes     HTN (hypertension)     Hypokalemia     Liver cancer     Lung cancer     Malignant biliary obstruction     Obstruction of bile duct     Rectal cancer metastatic to bone     Seizure     TIA (transient ischemic attack)      Past Surgical History:   Procedure Laterality Date    COLON SURGERY      HERNIA REPAIR      HYSTERECTOMY       Family History   Problem Relation Name Age of Onset    No Known Problems Mother      No Known Problems Father      No Known Problems Sister      No Known Problems Brother      No Known Problems Daughter      No Known Problems Son      No Known Problems Maternal Aunt      No Known Problems Maternal Uncle      No Known Problems Paternal Aunt      No Known Problems Paternal Uncle      No Known Problems Maternal Grandmother      No Known Problems Maternal Grandfather      No Known Problems Paternal Grandmother      No Known Problems Paternal Grandfather       Social History     Tobacco Use    Smoking status: Never     Passive exposure: Never    Smokeless tobacco: Never   Substance Use Topics    Alcohol use: Never    Drug use: Never     Review of Systems   Constitutional:  Negative for activity change, appetite change, chills and fever.   HENT:  Negative for congestion, sore throat and trouble swallowing.    Eyes: Negative.    Respiratory:  Negative for cough, chest tightness and shortness of breath.    Cardiovascular:  Positive for leg swelling. Negative for chest pain and palpitations.   Gastrointestinal:  Negative for abdominal distention, abdominal pain, diarrhea, nausea and vomiting.   Endocrine: Negative.     Genitourinary:  Negative for difficulty urinating and dysuria.   Musculoskeletal:  Negative for arthralgias, back pain, neck pain and neck stiffness.   Skin:  Negative for color change, pallor and rash.   Allergic/Immunologic: Negative.    Neurological:  Negative for dizziness, speech difficulty, weakness, light-headedness and headaches.   Hematological:  Does not bruise/bleed easily.   Psychiatric/Behavioral:  Negative for confusion. The patient is not nervous/anxious.    All other systems reviewed and are negative.      Physical Exam     Initial Vitals [24 1451]   BP Pulse Resp Temp SpO2   (!) 150/91 90 18 97.7 °F (36.5 °C) 97 %      MAP       --         Physical Exam    Nursing note and vitals reviewed.  Constitutional: She appears well-developed and well-nourished. She is not diaphoretic. No distress.   HENT:   Head: Normocephalic and atraumatic.   Mouth/Throat: Oropharynx is clear and moist.   Eyes: Conjunctivae and EOM are normal. Pupils are equal, round, and reactive to light.   Neck: Neck supple.   Normal range of motion.  Cardiovascular:  Normal rate, regular rhythm, normal heart sounds, intact distal pulses and normal pulses.           Pulmonary/Chest: Breath sounds normal. No respiratory distress. She has no wheezes. She has no rhonchi. She has no rales. She exhibits no tenderness.   Abdominal: Abdomen is soft. Bowel sounds are normal. She exhibits no distension. There is no abdominal tenderness. There is no rebound and no guarding.   Musculoskeletal:         General: Normal range of motion.      Cervical back: Normal range of motion and neck supple.      Right lower le+ Edema present.      Left lower le+ Edema present.     Neurological: She is alert and oriented to person, place, and time. She has normal strength. GCS score is 15. GCS eye subscore is 4. GCS verbal subscore is 5. GCS motor subscore is 6.   Skin: Skin is warm and dry. Capillary refill takes less than 2 seconds.    Psychiatric: She has a normal mood and affect. Her behavior is normal. Judgment and thought content normal.         Medical Screening Exam   See Full Note    ED Course   Procedures  Labs Reviewed   COMPREHENSIVE METABOLIC PANEL - Abnormal; Notable for the following components:       Result Value    Sodium 133 (*)     Potassium 2.9 (*)     Chloride 97 (*)     Glucose 49 (*)     Calcium 8.1 (*)     Total Protein 5.6 (*)     Albumin 1.7 (*)     Bilirubin, Total 1.6 (*)     Alk Phos 1,425 (*)     All other components within normal limits   D DIMER, QUANTITATIVE - Abnormal; Notable for the following components:    D-Dimer 4.28 (*)     All other components within normal limits   PROTIME-INR - Abnormal; Notable for the following components:    PT 16.2 (*)     All other components within normal limits   CBC WITH DIFFERENTIAL - Abnormal; Notable for the following components:    WBC 12.27 (*)     RBC 3.37 (*)     Hemoglobin 10.1 (*)     Hematocrit 31.7 (*)     MCHC 31.9 (*)     RDW 18.0 (*)     Neutrophils % 83.0 (*)     Lymphocytes % 8.5 (*)     Monocytes % 6.1 (*)     Eosinophils % 0.8 (*)     Immature Granulocytes % 1.4 (*)     Neutrophils, Abs 10.19 (*)     Immature Granulocytes, Absolute 0.17 (*)     All other components within normal limits   APTT - Normal   CBC W/ AUTO DIFFERENTIAL    Narrative:     The following orders were created for panel order CBC auto differential.  Procedure                               Abnormality         Status                     ---------                               -----------         ------                     CBC with Differential[[id number]]       Abnormal            Final result                 Please view results for these tests on the individual orders.          Imaging Results    None          Medications   dextrose 5 % and 0.9 % NaCl with KCl 20 mEq infusion (100 mL/hr Intravenous New Bag 6/28/24 2741)   potassium bicarbonate disintegrating tablet 20 mEq (20 mEq Oral Given  6/28/24 0637)     Medical Decision Making  Patient presents to the ED by EMS for evaluation of lower extremity swelling.  She was alert and oriented x4.  GCS 15.  Vital signs stable.  Afebrile with temperature 97.7°.  Respirations are equal and clear bilaterally to auscultation.  She does have some lower extremity edema on exam.  Nonpitting.  No erythema or warmth.  Patient does have risk factors for DVT such as recent surgery/hospital admission and prior history of cancer.  She denies any known DVTs or PEs in the past.  Only chronic anticoagulation at this time as Plavix but she does report compliance.  Treatment options were discussed in detail and it was explained that we do not currently have ultrasound capability but we will initiate a workup with CBC, CMP, D-dimer, and coag studies.    16:09 workup complete.  H&H at baseline, persistent hypokalemia with a potassium of 2.9 today, sodium 133, chloride 97, alk-phos has increase to 1425, and her albumin is low at 1.7.  Her glucose was also noted to be 49 which has been a persistent problem as well.  We will provide Effer-K 20 mEq p.o. with orange juice and place her on D5 NS with 20 of KCl at 100 per hour.  It was also recommended that the patient be transferred to a facility with ultrasound capability due to her new leg swelling, significant risk factors, and profoundly elevated D-dimer. PFC request sent at this time.     Amount and/or Complexity of Data Reviewed  Independent Historian:      Details: Patient is a 53-year-old female who presents to the emergency department with concerns for lower extremity swelling.  She reports that she noticed the swelling 3-4 hours earlier today.  She does report that she was recently discharged yesterday from [facility] after placement of cholecystostomy drain for treatment of acute cholecystitis due to her being a poor surgical candidate related to her other chronic illnesses.  She has a complex past medical history of CVA,  diabetes, hypertension, liver cancer, lung cancer, colon cancer, rectal cancer with bone metastasis, seizures, and TIAs.  She reports that she was not allowed to walk or get up while she was in the hospital and has a attributed this to the swelling in her legs.  She denies any shortness of breath, chest pain, weakness, dizziness, difficulty breathing, pain, or any other complaints at this time outside of the swelling in her lower extremities.  Her blood pressure is 150/91, temperature 97.7°, heart rate 90, respirations 18, and oxygen saturation 97% on room air.  She appears in no acute distress.  External Data Reviewed: labs.     Details: H&H from yesterday noted to be 10.2 and 31.7.  Labs: ordered.     Details: CBC shows a WBC of 12.27, hemoglobin of 10.1, hematocrit 31.7, platelet count 286.  D-dimer elevated at 4.28, PTT normal at 35.5, PT 16.2, INR 1.23, CMP shows sodium 133, potassium 2.9, chloride 97, glucose 49, calcium 8.1, total protein of 5.6, albumin of 1.7, total bilirubin of 1.6, and alk-phos of 1425 but otherwise unremarkable.    Risk  Prescription drug management.  Risk Details: All historical, clinical, radiographic, and laboratory findings were reviewed with the patient/family in detail along with the indications for transport to the facility in [place], [place] in order to receive further evaluation and treatment.  All remaining questions and concerns were addressed at this time and the patient/family communicates understanding and agrees to proceed accordingly.  Similarly, all pertinent details of the encounter were discussed with Dr. Carrero who agrees to receive the patient at [facility] ED for further care as outlined above.  The patient will be transferred by [facility] ambulance services secondary to a need for ongoing hemodynamic monitoring en route.  JUAN TANNER  4:12 PM                                      Clinical Impression:   Final diagnoses:  [M79.89] Leg swelling  (Primary)  [R79.89] D-dimer, elevated  [E87.6] Hypokalemia  [E16.2] Hypoglycemia  [E88.09] Hypoalbuminemia        ED Disposition Condition    Transfer to Another Facility Stable                Hunter Flores, JUAN  06/28/24 4213

## 2024-06-28 NOTE — ED TRIAGE NOTES
Chief Complaint   Patient presents with    Leg Swelling     Pt presents to ed via transfer from [facility] for bilateral leg swelling and pain. Was sent for DVT ruleout

## 2024-06-29 RX ADMIN — DEXTROSE MONOHYDRATE 250 ML: 100 INJECTION, SOLUTION INTRAVENOUS at 07:06

## 2024-06-29 NOTE — ED NOTES
Assumed care of patient and introduced myself to her. Pt is AAOx4, reports she was transferred from [place] r/t needing a US to R/O DVT in her LLE. Pt went to [place] for BLE edema with the left leg being bigger than the right. Pt was just discharged yesterday from the hospital and still has a gallbladder drain in place with only a scant amount of drainage present. Pt reports she is slowing getting her strength back following multiple admissions over the last few months. Pt is vitally stable and a good historian.

## 2024-06-29 NOTE — ED PROVIDER NOTES
"Encounter Date: 6/28/2024    SCRIBE #1 NOTE: I, Chika Wiley, am scribing for, and in the presence of,  Parvez Gallegos MD. I have scribed the entire note.       History     Chief Complaint   Patient presents with    Leg Swelling     Pt presents to ed via transfer from [facility] for bilateral leg swelling and pain. Was sent for DVT ruleout     The pt is a 54 y/o female coming into the ED from [place] with complaints of bilateral leg swelling and pain. Pt states swelling is more significant on the left leg. Pt was transferred to rule out DVT. She mentions she was just diacharged from the ED yesterday. Pt states on 6/23/24 she started to have RUQ abdominal pain. She states coming to the ED and being admitted. She states she had a drain put into her gallbladder. Pt mentions having terminal cancer of the lungs, liver, and gallbladder. Pt states leg swelling was onset today. She also complains of a cough but denies SOB. Pt also states she has been able to eat minimally without vomiting. There are no other complaints at this time.    The history is provided by the patient. No  was used.     Review of patient's allergies indicates:   Allergen Reactions    Grape Anaphylaxis     Other reaction(s): Not available    Morphine sulfate Swelling    Nutritional supplements Anaphylaxis     Highly allergic to Cooked grapes    Pineapple Anaphylaxis    Opioids - morphine analogues Nausea And Vomiting    Rocephin [ceftriaxone] Itching    Aspirin      "Veins breakdown" per patient    Biguanides Diarrhea and Other (See Comments)    Dicyclomine Diarrhea    Dihydrocodeine Other (See Comments)     Bad headache in the top of head    Gabapentin Other (See Comments)     weakness  Other reaction(s): Not available  Seizures      Hydrocodone Other (See Comments)     Headache      Iron      Hemochromatosis    Iron analogues      Pt hx of hemachromatosis    Metformin      diarrhea    Penicillins Other (See Comments)     " Family hx       Latex Rash and Other (See Comments)     Past Medical History:   Diagnosis Date    Bell's palsy 03/01/2023    Biliary obstruction due to malignant neoplasm 06/24/2024    CVA (cerebral vascular accident)     HTN (hypertension)     Irritable bowel syndrome with diarrhea 09/07/2022    Lumbar radiculopathy, right 12/23/2021    Metastatic colon cancer to liver 06/28/2024    Primary hypertension 12/15/2021    Rectal cancer metastatic to bone     Restless legs syndrome 11/20/2023    Seizure     TIA (transient ischemic attack)     Type 2 diabetes mellitus without complication, with long-term current use of insulin 12/15/2021    Vitamin D deficiency 08/09/2023     Past Surgical History:   Procedure Laterality Date    COLON SURGERY      HERNIA REPAIR      HYSTERECTOMY       Family History   Problem Relation Name Age of Onset    No Known Problems Mother      No Known Problems Father      No Known Problems Sister      No Known Problems Brother      No Known Problems Daughter      No Known Problems Son      No Known Problems Maternal Aunt      No Known Problems Maternal Uncle      No Known Problems Paternal Aunt      No Known Problems Paternal Uncle      No Known Problems Maternal Grandmother      No Known Problems Maternal Grandfather      No Known Problems Paternal Grandmother      No Known Problems Paternal Grandfather       Social History     Tobacco Use    Smoking status: Never     Passive exposure: Never    Smokeless tobacco: Never   Substance Use Topics    Alcohol use: Never    Drug use: Never     Review of Systems   Respiratory:  Positive for cough. Negative for shortness of breath.    Cardiovascular:  Positive for leg swelling.   All other systems reviewed and are negative.      Physical Exam     Initial Vitals [06/28/24 1838]   BP Pulse Resp Temp SpO2   (!) 141/80 87 14 97.6 °F (36.4 °C) 96 %      MAP       --         Physical Exam    Nursing note and vitals reviewed.  Constitutional: She appears  well-developed and well-nourished.   HENT:   Head: Normocephalic and atraumatic.   Right Ear: External ear normal.   Left Ear: External ear normal.   Nose: Nose normal.   Mouth/Throat: Oropharynx is clear and moist.   Eyes: Conjunctivae and EOM are normal. Pupils are equal, round, and reactive to light. No scleral icterus.   Neck: Neck supple. No JVD present.   Normal range of motion.  Cardiovascular:  Normal rate, regular rhythm, normal heart sounds and intact distal pulses.     Exam reveals no gallop and no friction rub.       No murmur heard.  Pulmonary/Chest: Breath sounds normal. No stridor. No respiratory distress. She has no wheezes. She exhibits no tenderness.   Abdominal: Abdomen is soft. Bowel sounds are normal. She exhibits no distension and no mass. There is no abdominal tenderness. There is no rebound and no guarding.   Musculoskeletal:         General: Edema (bilateral leg edema) present. No tenderness. Normal range of motion.      Cervical back: Normal range of motion and neck supple.      Comments: Back is nontender to palpation.      Neurological: She is alert and oriented to person, place, and time. She has normal strength. No cranial nerve deficit.   Skin: Skin is warm and dry. Capillary refill takes less than 2 seconds. No rash noted. No pallor.   Psychiatric: She has a normal mood and affect. Thought content normal.         ED Course   Procedures  Labs Reviewed - No data to display       Imaging Results              US Lower Extremity Veins Bilateral (Final result)  Result time 06/29/24 07:20:28      Final result by Lonnie Nino MD (06/29/24 07:20:28)                   Impression:      Unremarkable duplex imaging of the bilateral lower extremity. No evidence of DVT.      Electronically signed by: Lonnie Nino  Date:    06/29/2024  Time:    07:20               Narrative:    EXAMINATION:  US LOWER EXTREMITY VEINS BILATERAL    CLINICAL HISTORY:  Bilateral lower extremity  swelling;    TECHNIQUE:  Duplex scan of the bilateral lower extremity veins using the B-mode/grayscale imaging and Doppler spectral analysis and color-flow    COMPARISON:  No previous similar    FINDINGS:  Major venous structures of the bilateral lower extremity demonstrate a normal course and caliber. There is no evidence of deep venous thrombosis. No abnormal intrinsic echogenic lesions are demonstrated in the scanned blood vessels. The veins of the bilateral lower extremity demonstrate good compressibility with normal color flow study and spectral analysis.                                       Medications - No data to display  Medical Decision Making            Attending Attestation:           Physician Attestation for Scribe:  Physician Attestation Statement for Scribe #1: I, Parvez Gallegos MD, reviewed documentation, as scribed by Chika Wiley in my presence, and it is both accurate and complete.             ED Course as of 06/30/24 0147 Fri Jun 28, 2024 2218 MDM:  A 53-year-old female patient who was evaluated earlier in another peripheral emergency department is brought to our department because of bilateral leg swelling and increasing D-dimer.  The venous Doppler study of the lower extremity is negative.  Patient will be discharged home to follow up with her primary care provider. [HK]      ED Course User Index  [HK] Parvez Gallegos MD                           Clinical Impression:  Final diagnoses:  [R60.0] Bilateral lower extremity edema (Primary)          ED Disposition Condition    Discharge Stable          ED Prescriptions    None       Follow-up Information    None          Parvez Gallegos MD  06/30/24 0147

## 2024-06-29 NOTE — ED NOTES
Transport information sent to [facility] for transfer to home - confirmation # [id number]

## 2024-06-30 ENCOUNTER — HOSPITAL ENCOUNTER (OUTPATIENT)
Facility: HOSPITAL | Age: 54
Discharge: HOME | End: 2024-07-01
Attending: EMERGENCY MEDICINE | Admitting: FAMILY MEDICINE
Payer: MEDICAID

## 2024-06-30 DIAGNOSIS — E16.2 HYPOGLYCEMIA: ICD-10-CM

## 2024-06-30 DIAGNOSIS — R07.9 CHEST PAIN: ICD-10-CM

## 2024-06-30 DIAGNOSIS — D72.829 LEUKOCYTOSIS: ICD-10-CM

## 2024-06-30 PROBLEM — C79.51: Status: ACTIVE | Noted: 2024-06-30

## 2024-06-30 PROBLEM — Z86.73 HISTORY OF CVA (CEREBROVASCULAR ACCIDENT): Status: ACTIVE | Noted: 2024-06-30

## 2024-06-30 PROBLEM — E87.6 HYPOKALEMIA: Status: ACTIVE | Noted: 2024-06-30

## 2024-06-30 PROBLEM — R56.9 PARTIAL SEIZURE: Status: ACTIVE | Noted: 2024-06-30

## 2024-06-30 PROBLEM — K81.9 ACALCULOUS CHOLECYSTITIS: Status: ACTIVE | Noted: 2024-06-30

## 2024-06-30 PROBLEM — C20: Status: ACTIVE | Noted: 2024-06-30

## 2024-06-30 LAB
ALBUMIN SERPL BCP-MCNC: 2 G/DL (ref 3.5–5)
ALBUMIN/GLOB SERPL: 0.5 {RATIO}
ALP SERPL-CCNC: 2249 U/L (ref 41–108)
ALT SERPL W P-5'-P-CCNC: 21 U/L (ref 13–56)
ANION GAP SERPL CALCULATED.3IONS-SCNC: 11 MMOL/L (ref 7–16)
ANISOCYTOSIS BLD QL SMEAR: ABNORMAL
AST SERPL W P-5'-P-CCNC: 52 U/L (ref 15–37)
BASOPHILS # BLD AUTO: 0.09 K/UL (ref 0–0.2)
BASOPHILS NFR BLD AUTO: 0.5 % (ref 0–1)
BILIRUB SERPL-MCNC: 1.5 MG/DL (ref ?–1.2)
BILIRUB UR QL STRIP: NEGATIVE
BUN SERPL-MCNC: 4 MG/DL (ref 7–18)
BUN/CREAT SERPL: 8 (ref 6–20)
CALCIUM SERPL-MCNC: 8.4 MG/DL (ref 8.5–10.1)
CHLORIDE SERPL-SCNC: 100 MMOL/L (ref 98–107)
CLARITY UR: CLEAR
CO2 SERPL-SCNC: 26 MMOL/L (ref 21–32)
COLOR UR: ABNORMAL
CREAT SERPL-MCNC: 0.48 MG/DL (ref 0.55–1.02)
CRENATED CELLS: ABNORMAL
DIFFERENTIAL METHOD BLD: ABNORMAL
EGFR (NO RACE VARIABLE) (RUSH/TITUS): 114 ML/MIN/1.73M2
EOSINOPHIL # BLD AUTO: 0.05 K/UL (ref 0–0.5)
EOSINOPHIL NFR BLD AUTO: 0.3 % (ref 1–4)
ERYTHROCYTE [DISTWIDTH] IN BLOOD BY AUTOMATED COUNT: 18.3 % (ref 11.5–14.5)
GLOBULIN SER-MCNC: 4.2 G/DL (ref 2–4)
GLUCOSE SERPL-MCNC: 103 MG/DL (ref 74–106)
GLUCOSE SERPL-MCNC: 122 MG/DL (ref 70–105)
GLUCOSE SERPL-MCNC: 141 MG/DL (ref 70–105)
GLUCOSE SERPL-MCNC: 36 MG/DL (ref 70–105)
GLUCOSE SERPL-MCNC: 94 MG/DL (ref 70–105)
GLUCOSE UR STRIP-MCNC: 50 MG/DL
HCT VFR BLD AUTO: 35.4 % (ref 38–47)
HGB BLD-MCNC: 11 G/DL (ref 12–16)
HYPOCHROMIA BLD QL SMEAR: ABNORMAL
IMM GRANULOCYTES # BLD AUTO: 1.01 K/UL (ref 0–0.04)
IMM GRANULOCYTES NFR BLD: 6.1 % (ref 0–0.4)
KETONES UR STRIP-SCNC: NEGATIVE MG/DL
LEUKOCYTE ESTERASE UR QL STRIP: NEGATIVE
LIPASE SERPL-CCNC: 70 U/L (ref 16–77)
LYMPHOCYTES # BLD AUTO: 1.38 K/UL (ref 1–4.8)
LYMPHOCYTES NFR BLD AUTO: 8.3 % (ref 27–41)
LYMPHOCYTES NFR BLD MANUAL: 7 % (ref 27–41)
MAGNESIUM SERPL-MCNC: 2 MG/DL (ref 1.7–2.3)
MCH RBC QN AUTO: 29.7 PG (ref 27–31)
MCHC RBC AUTO-ENTMCNC: 31.1 G/DL (ref 32–36)
MCV RBC AUTO: 95.7 FL (ref 80–96)
MONOCYTES # BLD AUTO: 0.84 K/UL (ref 0–0.8)
MONOCYTES NFR BLD AUTO: 5 % (ref 2–6)
MONOCYTES NFR BLD MANUAL: 3 % (ref 2–6)
MPC BLD CALC-MCNC: 11 FL (ref 9.4–12.4)
NEUTROPHILS # BLD AUTO: 13.27 K/UL (ref 1.8–7.7)
NEUTROPHILS NFR BLD AUTO: 79.8 % (ref 53–65)
NEUTS BAND NFR BLD MANUAL: 3 % (ref 1–5)
NEUTS SEG NFR BLD MANUAL: 87 % (ref 50–62)
NITRITE UR QL STRIP: NEGATIVE
NRBC # BLD AUTO: 0 X10E3/UL
NRBC, AUTO (.00): 0 %
PH UR STRIP: 6.5 PH UNITS
PLATELET # BLD AUTO: 354 K/UL (ref 150–400)
PLATELET MORPHOLOGY: ABNORMAL
POLYCHROMASIA BLD QL SMEAR: ABNORMAL
POTASSIUM SERPL-SCNC: 3 MMOL/L (ref 3.5–5.1)
PROT SERPL-MCNC: 6.2 G/DL (ref 6.4–8.2)
PROT UR QL STRIP: NEGATIVE
RBC # BLD AUTO: 3.7 M/UL (ref 4.2–5.4)
RBC # UR STRIP: NEGATIVE /UL
SODIUM SERPL-SCNC: 134 MMOL/L (ref 136–145)
SP GR UR STRIP: 1
UROBILINOGEN UR STRIP-ACNC: NORMAL MG/DL
WBC # BLD AUTO: 16.64 K/UL (ref 4.5–11)

## 2024-06-30 PROCEDURE — 85025 COMPLETE CBC W/AUTO DIFF WBC: CPT | Performed by: EMERGENCY MEDICINE

## 2024-06-30 PROCEDURE — G0378 HOSPITAL OBSERVATION PER HR: HCPCS

## 2024-06-30 PROCEDURE — 25000003 PHARM REV CODE 250: Performed by: FAMILY MEDICINE

## 2024-06-30 PROCEDURE — 83735 ASSAY OF MAGNESIUM: CPT

## 2024-06-30 PROCEDURE — 83690 ASSAY OF LIPASE: CPT | Performed by: EMERGENCY MEDICINE

## 2024-06-30 PROCEDURE — 25000003 PHARM REV CODE 250

## 2024-06-30 PROCEDURE — 82962 GLUCOSE BLOOD TEST: CPT

## 2024-06-30 PROCEDURE — 81003 URINALYSIS AUTO W/O SCOPE: CPT | Performed by: EMERGENCY MEDICINE

## 2024-06-30 PROCEDURE — 80053 COMPREHEN METABOLIC PANEL: CPT | Performed by: EMERGENCY MEDICINE

## 2024-06-30 PROCEDURE — 99232 SBSQ HOSP IP/OBS MODERATE 35: CPT | Mod: GC,,, | Performed by: FAMILY MEDICINE

## 2024-06-30 RX ORDER — NALOXONE HCL 0.4 MG/ML
0.02 VIAL (ML) INJECTION
Status: DISCONTINUED | OUTPATIENT
Start: 2024-06-30 | End: 2024-07-01 | Stop reason: HOSPADM

## 2024-06-30 RX ORDER — POLYETHYLENE GLYCOL 3350 17 G/17G
17 POWDER, FOR SOLUTION ORAL DAILY PRN
Status: DISCONTINUED | OUTPATIENT
Start: 2024-06-30 | End: 2024-07-01 | Stop reason: HOSPADM

## 2024-06-30 RX ORDER — TALC
6 POWDER (GRAM) TOPICAL NIGHTLY PRN
Status: DISCONTINUED | OUTPATIENT
Start: 2024-06-30 | End: 2024-07-01 | Stop reason: HOSPADM

## 2024-06-30 RX ORDER — ASPIRIN 325 MG
50000 TABLET, DELAYED RELEASE (ENTERIC COATED) ORAL
Status: DISCONTINUED | OUTPATIENT
Start: 2024-06-30 | End: 2024-06-30

## 2024-06-30 RX ORDER — LISINOPRIL 10 MG/1
10 TABLET ORAL DAILY
Status: DISCONTINUED | OUTPATIENT
Start: 2024-06-30 | End: 2024-07-01 | Stop reason: HOSPADM

## 2024-06-30 RX ORDER — ACETAMINOPHEN 325 MG/1
650 TABLET ORAL EVERY 8 HOURS PRN
Status: DISCONTINUED | OUTPATIENT
Start: 2024-06-30 | End: 2024-07-01 | Stop reason: HOSPADM

## 2024-06-30 RX ORDER — SODIUM CHLORIDE 0.9 % (FLUSH) 0.9 %
10 SYRINGE (ML) INJECTION EVERY 12 HOURS PRN
Status: DISCONTINUED | OUTPATIENT
Start: 2024-06-30 | End: 2024-07-01 | Stop reason: HOSPADM

## 2024-06-30 RX ORDER — ACETAMINOPHEN 325 MG/1
650 TABLET ORAL EVERY 4 HOURS PRN
Status: DISCONTINUED | OUTPATIENT
Start: 2024-06-30 | End: 2024-07-01 | Stop reason: HOSPADM

## 2024-06-30 RX ORDER — POTASSIUM CHLORIDE 20 MEQ/1
40 TABLET, EXTENDED RELEASE ORAL 3 TIMES DAILY
Status: COMPLETED | OUTPATIENT
Start: 2024-06-30 | End: 2024-07-01

## 2024-06-30 RX ORDER — IBUPROFEN 200 MG
16 TABLET ORAL
Status: DISCONTINUED | OUTPATIENT
Start: 2024-06-30 | End: 2024-07-01 | Stop reason: HOSPADM

## 2024-06-30 RX ORDER — IBUPROFEN 200 MG
24 TABLET ORAL
Status: DISCONTINUED | OUTPATIENT
Start: 2024-06-30 | End: 2024-07-01 | Stop reason: HOSPADM

## 2024-06-30 RX ORDER — POTASSIUM CHLORIDE 20 MEQ/1
40 TABLET, EXTENDED RELEASE ORAL 3 TIMES DAILY
Status: DISCONTINUED | OUTPATIENT
Start: 2024-06-30 | End: 2024-06-30

## 2024-06-30 RX ORDER — INSULIN ASPART 100 [IU]/ML
0-10 INJECTION, SOLUTION INTRAVENOUS; SUBCUTANEOUS
Status: DISCONTINUED | OUTPATIENT
Start: 2024-06-30 | End: 2024-07-01 | Stop reason: HOSPADM

## 2024-06-30 RX ORDER — GLUCAGON 1 MG
1 KIT INJECTION
Status: DISCONTINUED | OUTPATIENT
Start: 2024-06-30 | End: 2024-07-01 | Stop reason: HOSPADM

## 2024-06-30 RX ORDER — HYDROCODONE BITARTRATE AND ACETAMINOPHEN 7.5; 325 MG/1; MG/1
1 TABLET ORAL EVERY 6 HOURS PRN
Status: DISCONTINUED | OUTPATIENT
Start: 2024-06-30 | End: 2024-07-01 | Stop reason: HOSPADM

## 2024-06-30 RX ORDER — LEVETIRACETAM 500 MG/1
500 TABLET ORAL DAILY
Status: DISCONTINUED | OUTPATIENT
Start: 2024-06-30 | End: 2024-07-01 | Stop reason: HOSPADM

## 2024-06-30 RX ORDER — ONDANSETRON HYDROCHLORIDE 2 MG/ML
4 INJECTION, SOLUTION INTRAVENOUS EVERY 8 HOURS PRN
Status: DISCONTINUED | OUTPATIENT
Start: 2024-06-30 | End: 2024-07-01 | Stop reason: HOSPADM

## 2024-06-30 RX ORDER — DEXTROSE MONOHYDRATE 100 MG/ML
INJECTION, SOLUTION INTRAVENOUS
Status: COMPLETED
Start: 2024-06-30 | End: 2024-06-29

## 2024-06-30 RX ORDER — SULFAMETHOXAZOLE AND TRIMETHOPRIM 800; 160 MG/1; MG/1
1 TABLET ORAL 2 TIMES DAILY
Status: DISCONTINUED | OUTPATIENT
Start: 2024-06-30 | End: 2024-07-01 | Stop reason: HOSPADM

## 2024-06-30 RX ORDER — ERGOCALCIFEROL 1.25 MG/1
50000 CAPSULE ORAL
Status: DISCONTINUED | OUTPATIENT
Start: 2024-06-30 | End: 2024-07-01 | Stop reason: HOSPADM

## 2024-06-30 RX ORDER — ENOXAPARIN SODIUM 100 MG/ML
40 INJECTION SUBCUTANEOUS EVERY 24 HOURS
Status: DISCONTINUED | OUTPATIENT
Start: 2024-06-30 | End: 2024-07-01 | Stop reason: HOSPADM

## 2024-06-30 RX ORDER — CLOPIDOGREL BISULFATE 75 MG/1
75 TABLET ORAL DAILY
Status: DISCONTINUED | OUTPATIENT
Start: 2024-06-30 | End: 2024-07-01 | Stop reason: HOSPADM

## 2024-06-30 RX ADMIN — SULFAMETHOXAZOLE AND TRIMETHOPRIM 1 TABLET: 800; 160 TABLET ORAL at 08:06

## 2024-06-30 RX ADMIN — POTASSIUM CHLORIDE 40 MEQ: 1500 TABLET, EXTENDED RELEASE ORAL at 04:06

## 2024-06-30 RX ADMIN — LEVETIRACETAM 500 MG: 500 TABLET, FILM COATED ORAL at 12:06

## 2024-06-30 RX ADMIN — ERGOCALCIFEROL 50000 UNITS: 1.25 CAPSULE, LIQUID FILLED ORAL at 12:06

## 2024-06-30 RX ADMIN — ACETAMINOPHEN 650 MG: 325 TABLET ORAL at 08:06

## 2024-06-30 RX ADMIN — HYDROCODONE BITARTRATE AND ACETAMINOPHEN 1 TABLET: 7.5; 325 TABLET ORAL at 02:06

## 2024-06-30 RX ADMIN — POTASSIUM CHLORIDE 40 MEQ: 1500 TABLET, EXTENDED RELEASE ORAL at 08:06

## 2024-06-30 RX ADMIN — CLOPIDOGREL BISULFATE 75 MG: 75 TABLET ORAL at 12:06

## 2024-06-30 RX ADMIN — LISINOPRIL 10 MG: 10 TABLET ORAL at 02:06

## 2024-06-30 NOTE — ASSESSMENT & PLAN NOTE
Patient has metastatic cancer of colon primary. The cancer has metastasized to liver. The patient is under the care of an outpatient oncologist. The patient is undergoing active chemotherapy as follows- [unfilled] .Their staging information is listed below.   Cancer Staging   No matching staging information was found for the patient.    Sees Dr. Mily Ryan oncology at [facility] [place] [facility]  Follow-up with Oncology/Surgical Oncology including chemotherapy as scheduled on 7/8/24  Patient is on palliative chemotherapy. Prognosis is poor

## 2024-06-30 NOTE — ASSESSMENT & PLAN NOTE
Sees Dr. Mily Ryan oncology at [facility] [place] [facility]   Follow-up with Oncology/Surgical Oncology including chemotherapy as scheduled on 7/8/24   Patient is on palliative chemotherapy. Prognosis is poor

## 2024-06-30 NOTE — SUBJECTIVE & OBJECTIVE
"Past Medical History:   Diagnosis Date    Bell's palsy 03/01/2023    Biliary obstruction due to malignant neoplasm 06/24/2024    CVA (cerebral vascular accident)     HTN (hypertension)     Irritable bowel syndrome with diarrhea 09/07/2022    Lumbar radiculopathy, right 12/23/2021    Metastatic colon cancer to liver 06/28/2024    Primary hypertension 12/15/2021    Rectal cancer metastatic to bone     Restless legs syndrome 11/20/2023    Seizure     TIA (transient ischemic attack)     Type 2 diabetes mellitus without complication, with long-term current use of insulin 12/15/2021    Vitamin D deficiency 08/09/2023       Past Surgical History:   Procedure Laterality Date    COLON SURGERY      HERNIA REPAIR      HYSTERECTOMY         Review of patient's allergies indicates:   Allergen Reactions    Grape Anaphylaxis     Other reaction(s): Not available    Morphine sulfate Swelling    Nutritional supplements Anaphylaxis     Highly allergic to Cooked grapes    Pineapple Anaphylaxis    Opioids - morphine analogues Nausea And Vomiting    Rocephin [ceftriaxone] Itching    Aspirin      "Veins breakdown" per patient    Biguanides Diarrhea and Other (See Comments)    Dicyclomine Diarrhea    Dihydrocodeine Other (See Comments)     Bad headache in the top of head    Gabapentin Other (See Comments)     weakness  Other reaction(s): Not available  Seizures      Hydrocodone Other (See Comments)     Headache      Iron      Hemochromatosis    Iron analogues      Pt hx of hemachromatosis    Metformin      diarrhea    Penicillins Other (See Comments)     Family hx       Latex Rash and Other (See Comments)       No current facility-administered medications on file prior to encounter.     Current Outpatient Medications on File Prior to Encounter   Medication Sig    blood sugar diagnostic (ONETOUCH ULTRA TEST) Strp check blood sugar TWICE DAILY    capecitabine (XELODA) 500 MG Tab Take 3 tablets twice daily for 7 days on and 7 days off.    " cholecalciferol, vitamin D3, 1,250 mcg (50,000 unit) capsule Take 1 capsule (50,000 Units total) by mouth every 7 days.    clopidogreL (PLAVIX) 75 mg tablet Take 75 mg by mouth once daily.    HYDROcodone-acetaminophen (NORCO) 7.5-325 mg per tablet Take 1 tablet by mouth every 6 (six) hours as needed for Pain.    insulin aspart U-100 (NOVOLOG) 100 unit/mL injection inject as directed by your physician per sliding scale    insulin glargine (LANTUS U-100 INSULIN) 100 unit/mL injection Inject 80 Units into the skin once daily.    insulin syringe-needle U-100 1 mL 30 gauge x 5/16 Syrg Use as directed.    levETIRAcetam (KEPPRA) 500 MG Tab TAKE ONE TABLET BY MOUTH ONCE DAILY    lisinopriL 10 MG tablet Take 1 tablet (10 mg total) by mouth once daily.    sulfamethoxazole-trimethoprim 800-160mg (BACTRIM DS) 800-160 mg Tab Take 1 tablet by mouth 2 (two) times daily. for 7 days     Family History       Problem Relation (Age of Onset)    No Known Problems Mother, Father, Sister, Brother, Daughter, Son, Maternal Aunt, Maternal Uncle, Paternal Aunt, Paternal Uncle, Maternal Grandmother, Maternal Grandfather, Paternal Grandmother, Paternal Grandfather          Tobacco Use    Smoking status: Never     Passive exposure: Never    Smokeless tobacco: Never   Substance and Sexual Activity    Alcohol use: Never    Drug use: Never    Sexual activity: Never     Review of Systems   Constitutional:  Negative for activity change, appetite change, chills and fever.   HENT:  Negative for trouble swallowing.    Eyes:  Negative for visual disturbance.   Respiratory:  Negative for shortness of breath and wheezing.    Cardiovascular:  Negative for chest pain.   Gastrointestinal:  Negative for abdominal pain, diarrhea, nausea and vomiting.   Genitourinary:  Negative for difficulty urinating and hematuria.   Musculoskeletal:  Negative for back pain.   Skin:  Negative for wound.   Neurological:  Positive for tremors and weakness. Negative for  dizziness, syncope and light-headedness.   Psychiatric/Behavioral:  Positive for confusion. Negative for sleep disturbance.      Objective:     Vital Signs (Most Recent):  Temp: 97.7 °F (36.5 °C) (06/30/24 1022)  Pulse: 78 (06/30/24 1106)  Resp: 18 (06/30/24 0610)  BP: 131/68 (06/30/24 1106)  SpO2: 97 % (06/30/24 1106) Vital Signs (24h Range):  Temp:  [97.7 °F (36.5 °C)] 97.7 °F (36.5 °C)  Pulse:  [58-78] 78  Resp:  [18] 18  SpO2:  [97 %-100 %] 97 %  BP: (126-165)/(67-78) 131/68     Weight: 63.5 kg (140 lb)  Body mass index is 29.26 kg/m².     Physical Exam  Vitals and nursing note reviewed.   Constitutional:       General: She is not in acute distress.     Appearance: She is ill-appearing. She is not toxic-appearing or diaphoretic.   HENT:      Head: Normocephalic and atraumatic.      Right Ear: External ear normal.      Left Ear: External ear normal.      Nose: Nose normal.      Mouth/Throat:      Pharynx: Oropharynx is clear. No oropharyngeal exudate or posterior oropharyngeal erythema.   Eyes:      General: No scleral icterus.        Right eye: No discharge.         Left eye: No discharge.      Extraocular Movements: Extraocular movements intact.      Conjunctiva/sclera: Conjunctivae normal.      Pupils: Pupils are equal, round, and reactive to light.   Cardiovascular:      Rate and Rhythm: Normal rate and regular rhythm.      Pulses: Normal pulses.      Heart sounds: Normal heart sounds. No murmur heard.  Pulmonary:      Effort: Pulmonary effort is normal. No respiratory distress.      Breath sounds: Normal breath sounds. No wheezing.   Abdominal:      General: Bowel sounds are normal.      Palpations: Abdomen is soft.   Musculoskeletal:         General: No swelling, tenderness or deformity.      Cervical back: Neck supple.      Right lower leg: No edema.      Left lower leg: No edema.   Skin:     General: Skin is warm and dry.      Coloration: Skin is not jaundiced.      Findings: No lesion.   Neurological:       Mental Status: She is alert and oriented to person, place, and time.      Sensory: No sensory deficit.      Gait: Gait normal.   Psychiatric:         Mood and Affect: Mood normal.         Behavior: Behavior normal.              CRANIAL NERVES     CN III, IV, VI   Pupils are equal, round, and reactive to light.       Significant Labs: All pertinent labs within the past 24 hours have been reviewed.    Significant Imaging: I have reviewed all pertinent imaging results/findings within the past 24 hours.

## 2024-06-30 NOTE — ASSESSMENT & PLAN NOTE
Glucose 36 on presentation, patient attributes to accidentally taking additional SSI however suspect basal dose is more than required. Recent decreased intake due to chemo and cholecystitis.     Resolved with glucose WNL prior to admission. Home insulin regimen adjusted.

## 2024-06-30 NOTE — HPI
A very unfortunate 53-year-old female with a history of stage IV colon cancer with mets to the liver and lung status post partial colectomy followed by reanastomosis, rectal cancer with mets to bones, and malignant biliary obstruction status post stent placement, type 2 diabetes, essential hypertension, and partial seizure who is undergoing palliative chemotherapy. Patient was recently admitted for acute cholecystitis s/p cholecystostomy drain placement (Poor candidate for surgery and would be unlikely to survive) by general surgery and discharged on 6/27/24. Today, patient presents with hypoglycemia. Earlier today, patient was found by aunt at home on her sofa with shakes and being incoherent and she called 911. EMS showed up and found glucose 36 at around 0720. Denies hitting head or biting tongue. Aunt who has witnessed her seizures in the past says it didn't look like seizures to her. Patient had some confusion, shakes, and weakness that have all improved after glucose improved. Denies f/c/cp/sob/n/v/d. Denies appetite, urinary, or bowel habit changes. Patient took 82U qam insulin with SSI, and she said she might have taken more a few more units of SSI than usual by accident. Denies SI or injecting insulin on purpose.    Of note, patient's GB wound culture grew MRSA on 6/27/24 sensitive to Bactrim and was discharged home with it until 7/4/24. Patient was scheduled to followup with surgery in clinic in a few weeks. From previous admission, CT showed multiple pulmonary nodules scattered throughout the lower lobes and liver showed interval increase in multiple hypoattenuating lesions throughout the right hepatic lobe. Subsequently HIDA scan was performed which shows nonvisualization of gallbladder which is indicative cholecystitis treated with cholecystostomy drain.      Upon presentation, VS remarkable for 144/77. Labs remarkable for WBC 16.64 (WBC 17.51 on 6/27, 18 on 6/28), H/H 11/35.4. Na 134, K 3.0. Glucose at  0720 was 36 but improved with D10 250cc bolus to 122. CXR shows continued cardiomegaly, strandy and nodular parenchymal diseae throughout both lungs without change from previous study. No new or worsening infiltrate, no gross pleural effusion. UA negative for UTI. Patient is admitted to hospital medicine for observation for her hypoglycemia and for further management and care.

## 2024-06-30 NOTE — ASSESSMENT & PLAN NOTE
WBC 17.51 on 6/27/24, 18 on 6/28/24, 16.64 today upon admission  Can be related to metastatic cancer  No known source of infection upon admission  UA negative for UTI, CXR unchanged from previous  Monitor CBC daily

## 2024-06-30 NOTE — ASSESSMENT & PLAN NOTE
Patient has hypokalemia which is Acute on Chronic and currently uncontrolled. Most recent potassium levels reviewed-   Lab Results   Component Value Date    K 3.0 (L) 06/30/2024   . Will continue potassium replacement per protocol and recheck repeat levels after replacement completed.     Replace and checking Mg

## 2024-06-30 NOTE — ASSESSMENT & PLAN NOTE
Glucose at 36 likely 2/2 taking accidental higher dose of basal insulin vs SSI at home  Improved to 122 after D10 250cc bolus   POCT glucose tid before meals and qhs  SSI prn for now  Regular diet  Monitor and admitted for observation, will likely discharge soon if glucose stable.  Pt prefers going home at discharge.

## 2024-06-30 NOTE — ASSESSMENT & PLAN NOTE
"Patient's FSGs are controlled on current medication regimen.  Last A1c reviewed-   Lab Results   Component Value Date    HGBA1C 6.7 (H) 04/09/2024     Most recent fingerstick glucose reviewed- No results for input(s): "POCTGLUCOSE" in the last 24 hours.  Current correctional scale  Medium  Maintain anti-hyperglycemic dose as follows-   Antihyperglycemics (From admission, onward)      Start     Stop Route Frequency Ordered    06/30/24 1329  insulin aspart U-100 injection 0-10 Units         -- SubQ Before meals & nightly PRN 06/30/24 1230          Hold Oral hypoglycemics while patient is in the hospital.    Pt on 80U basal insulin at home with SSI prn  Holding home insulin for now d/t episode of hypoglycemia this AM PTA  See hypoglycemia  "

## 2024-06-30 NOTE — ED PROVIDER NOTES
"Encounter Date: 6/30/2024       History     Chief Complaint   Patient presents with    Hypoglycemia     52 y/o female with metastatic colon cancer with liver mets.  There is also some gallbladder involvement.  She presents with hypoglycemia.      Review of patient's allergies indicates:   Allergen Reactions    Grape Anaphylaxis     Other reaction(s): Not available    Morphine sulfate Swelling    Nutritional supplements Anaphylaxis     Highly allergic to Cooked grapes    Pineapple Anaphylaxis    Opioids - morphine analogues Nausea And Vomiting    Rocephin [ceftriaxone] Itching    Aspirin      "Veins breakdown" per patient    Biguanides Diarrhea and Other (See Comments)    Dicyclomine Diarrhea    Dihydrocodeine Other (See Comments)     Bad headache in the top of head    Gabapentin Other (See Comments)     weakness  Other reaction(s): Not available  Seizures      Hydrocodone Other (See Comments)     Headache      Iron      Hemochromatosis    Iron analogues      Pt hx of hemachromatosis    Metformin      diarrhea    Penicillins Other (See Comments)     Family hx       Latex Rash and Other (See Comments)     Past Medical History:   Diagnosis Date    Bell's palsy 03/01/2023    Biliary obstruction due to malignant neoplasm 06/24/2024    CVA (cerebral vascular accident)     HTN (hypertension)     Irritable bowel syndrome with diarrhea 09/07/2022    Lumbar radiculopathy, right 12/23/2021    Metastatic colon cancer to liver 06/28/2024    Primary hypertension 12/15/2021    Rectal cancer metastatic to bone     Restless legs syndrome 11/20/2023    Seizure     TIA (transient ischemic attack)     Type 2 diabetes mellitus without complication, with long-term current use of insulin 12/15/2021    Vitamin D deficiency 08/09/2023     Past Surgical History:   Procedure Laterality Date    COLON SURGERY      HERNIA REPAIR      HYSTERECTOMY       Family History   Problem Relation Name Age of Onset    No Known Problems Mother      No Known " "Problems Father      No Known Problems Sister      No Known Problems Brother      No Known Problems Daughter      No Known Problems Son      No Known Problems Maternal Aunt      No Known Problems Maternal Uncle      No Known Problems Paternal Aunt      No Known Problems Paternal Uncle      No Known Problems Maternal Grandmother      No Known Problems Maternal Grandfather      No Known Problems Paternal Grandmother      No Known Problems Paternal Grandfather       Social History     Tobacco Use    Smoking status: Never     Passive exposure: Never    Smokeless tobacco: Never   Substance Use Topics    Alcohol use: Never    Drug use: Never     Review of Systems    Physical Exam     Initial Vitals   BP Pulse Resp Temp SpO2   06/30/24 0606 06/30/24 0605 06/30/24 0610 -- 06/30/24 0605   (!) 144/77 68 18  100 %      MAP       --                Physical Exam    Medical Screening Exam   See Full Note    ED Course   Procedures  Labs Reviewed - No data to display       Imaging Results    None          Medications - No data to display  Medical Decision Making                                    Clinical Impression:    ***Please document a Clinical Impression and click the "Refresh" button to refresh your note and automatically pull in before signing.***         "

## 2024-06-30 NOTE — ASSESSMENT & PLAN NOTE
With cholecystostomy drain in place per surgery last admission (discharged 6/27/24)  GB wound culture grew MRSA sensitive to Bactrim, discharged home with Bactrim DS bid until 7/4/24  Continue home Bactrim course until 7/4/24

## 2024-06-30 NOTE — ASSESSMENT & PLAN NOTE
Patient has metastatic cancer of colon primary. The cancer has metastasized to liver. The patient is under the care of an outpatient oncologist. The patient is undergoing active chemotherapy as follows- [unfilled] .Their staging information is listed below.    Cancer Staging   No matching staging information was found for the patient.    Sees Dr. Mily Ryan oncology at [facility] [place] [facility]   On home Xeloda 500mg 3 tabs bid 7 days on, 7 days off  Off Xeloda this entire week per patient  Follow-up with Oncology/Surgical Oncology including chemotherapy as scheduled on 7/8/24   Patient is on palliative chemotherapy. Prognosis is poor

## 2024-06-30 NOTE — ASSESSMENT & PLAN NOTE
Sees Dr. Mily Ryan oncology at [facility] [place] [facility]   On home Xeloda 500mg 3 tabs bid 7 days on, 7 days off  Off Xeloda this entire week per patient  Follow-up with Oncology/Surgical Oncology including chemotherapy as scheduled on 7/8/24   Patient is on palliative chemotherapy. Prognosis is poor

## 2024-06-30 NOTE — H&P
[facility] - Emergency Department  [facility] Medicine  History & Physical    Patient Name: Hayley Landis  MRN: [id number]  Patient Class: OP- Observation  Admission Date: 6/30/2024  Attending Physician: Jolene Harp DO   Primary Care Provider: Elkin Coombs MD         Patient information was obtained from patient, past medical records, and ER records.     Subjective:     Principal Problem:Hypoglycemia    Chief Complaint:   Chief Complaint   Patient presents with    Hypoglycemia        HPI: A very unfortunate 53-year-old female with a history of stage IV colon cancer with mets to the liver and lung status post partial colectomy followed by reanastomosis, rectal cancer with mets to bones, and malignant biliary obstruction status post stent placement, type 2 diabetes, essential hypertension, and partial seizure who is undergoing palliative chemotherapy. Patient was recently admitted for acute cholecystitis s/p cholecystostomy drain placement (Poor candidate for surgery and would be unlikely to survive) by general surgery and discharged on 6/27/24. Today, patient presents with hypoglycemia. Earlier today, patient was found by aunt at home on her sofa with shakes and being incoherent and she called 911. EMS showed up and found glucose 36 at around 0720. Denies hitting head or biting tongue. Aunt who has witnessed her seizures in the past says it didn't look like seizures to her. Patient had some confusion, shakes, and weakness that have all improved after glucose improved. Denies f/c/cp/sob/n/v/d. Denies appetite, urinary, or bowel habit changes. Patient took 82U qam insulin with SSI, and she said she might have taken more a few more units of SSI than usual by accident. Denies SI or injecting insulin on purpose.    Of note, patient's GB wound culture grew MRSA on 6/27/24 sensitive to Bactrim and was discharged home with it until 7/4/24. Patient was scheduled to followup with surgery in clinic in a  few weeks. From previous admission, CT showed multiple pulmonary nodules scattered throughout the lower lobes and liver showed interval increase in multiple hypoattenuating lesions throughout the right hepatic lobe. Subsequently HIDA scan was performed which shows nonvisualization of gallbladder which is indicative cholecystitis treated with cholecystostomy drain.      Upon presentation, VS remarkable for 144/77. Labs remarkable for WBC 16.64 (WBC 17.51 on 6/27, 18 on 6/28), H/H 11/35.4. Na 134, K 3.0. Glucose at 0720 was 36 but improved with D10 250cc bolus to 122. CXR shows continued cardiomegaly, strandy and nodular parenchymal diseae throughout both lungs without change from previous study. No new or worsening infiltrate, no gross pleural effusion. UA negative for UTI. Patient is admitted to hospital medicine for observation for her hypoglycemia and for further management and care.       Past Medical History:   Diagnosis Date    Bell's palsy 03/01/2023    Biliary obstruction due to malignant neoplasm 06/24/2024    CVA (cerebral vascular accident)     HTN (hypertension)     Irritable bowel syndrome with diarrhea 09/07/2022    Lumbar radiculopathy, right 12/23/2021    Metastatic colon cancer to liver 06/28/2024    Primary hypertension 12/15/2021    Rectal cancer metastatic to bone     Restless legs syndrome 11/20/2023    Seizure     TIA (transient ischemic attack)     Type 2 diabetes mellitus without complication, with long-term current use of insulin 12/15/2021    Vitamin D deficiency 08/09/2023       Past Surgical History:   Procedure Laterality Date    COLON SURGERY      HERNIA REPAIR      HYSTERECTOMY         Review of patient's allergies indicates:   Allergen Reactions    Grape Anaphylaxis     Other reaction(s): Not available    Morphine sulfate Swelling    Nutritional supplements Anaphylaxis     Highly allergic to Cooked grapes    Pineapple Anaphylaxis    Opioids - morphine analogues Nausea And Vomiting     "Rocephin [ceftriaxone] Itching    Aspirin      "Veins breakdown" per patient    Biguanides Diarrhea and Other (See Comments)    Dicyclomine Diarrhea    Dihydrocodeine Other (See Comments)     Bad headache in the top of head    Gabapentin Other (See Comments)     weakness  Other reaction(s): Not available  Seizures      Hydrocodone Other (See Comments)     Headache      Iron      Hemochromatosis    Iron analogues      Pt hx of hemachromatosis    Metformin      diarrhea    Penicillins Other (See Comments)     Family hx       Latex Rash and Other (See Comments)       No current facility-administered medications on file prior to encounter.     Current Outpatient Medications on File Prior to Encounter   Medication Sig    blood sugar diagnostic (ONETOUCH ULTRA TEST) Strp check blood sugar TWICE DAILY    capecitabine (XELODA) 500 MG Tab Take 3 tablets twice daily for 7 days on and 7 days off.    cholecalciferol, vitamin D3, 1,250 mcg (50,000 unit) capsule Take 1 capsule (50,000 Units total) by mouth every 7 days.    clopidogreL (PLAVIX) 75 mg tablet Take 75 mg by mouth once daily.    HYDROcodone-acetaminophen (NORCO) 7.5-325 mg per tablet Take 1 tablet by mouth every 6 (six) hours as needed for Pain.    insulin aspart U-100 (NOVOLOG) 100 unit/mL injection inject as directed by your physician per sliding scale    insulin glargine (LANTUS U-100 INSULIN) 100 unit/mL injection Inject 80 Units into the skin once daily.    insulin syringe-needle U-100 1 mL 30 gauge x 5/16 Syrg Use as directed.    levETIRAcetam (KEPPRA) 500 MG Tab TAKE ONE TABLET BY MOUTH ONCE DAILY    lisinopriL 10 MG tablet Take 1 tablet (10 mg total) by mouth once daily.    sulfamethoxazole-trimethoprim 800-160mg (BACTRIM DS) 800-160 mg Tab Take 1 tablet by mouth 2 (two) times daily. for 7 days     Family History       Problem Relation (Age of Onset)    No Known Problems Mother, Father, Sister, Brother, Daughter, Son, Maternal Aunt, Maternal Uncle, Paternal " Aunt, Paternal Uncle, Maternal Grandmother, Maternal Grandfather, Paternal Grandmother, Paternal Grandfather          Tobacco Use    Smoking status: Never     Passive exposure: Never    Smokeless tobacco: Never   Substance and Sexual Activity    Alcohol use: Never    Drug use: Never    Sexual activity: Never     Review of Systems   Constitutional:  Negative for activity change, appetite change, chills and fever.   HENT:  Negative for trouble swallowing.    Eyes:  Negative for visual disturbance.   Respiratory:  Negative for shortness of breath and wheezing.    Cardiovascular:  Negative for chest pain.   Gastrointestinal:  Negative for abdominal pain, diarrhea, nausea and vomiting.   Genitourinary:  Negative for difficulty urinating and hematuria.   Musculoskeletal:  Negative for back pain.   Skin:  Negative for wound.   Neurological:  Positive for tremors and weakness. Negative for dizziness, syncope and light-headedness.   Psychiatric/Behavioral:  Positive for confusion. Negative for sleep disturbance.      Objective:     Vital Signs (Most Recent):  Temp: 97.7 °F (36.5 °C) (06/30/24 1022)  Pulse: 78 (06/30/24 1106)  Resp: 18 (06/30/24 0610)  BP: 131/68 (06/30/24 1106)  SpO2: 97 % (06/30/24 1106) Vital Signs (24h Range):  Temp:  [97.7 °F (36.5 °C)] 97.7 °F (36.5 °C)  Pulse:  [58-78] 78  Resp:  [18] 18  SpO2:  [97 %-100 %] 97 %  BP: (126-165)/(67-78) 131/68     Weight: 63.5 kg (140 lb)  Body mass index is 29.26 kg/m².     Physical Exam  Vitals and nursing note reviewed.   Constitutional:       General: She is not in acute distress.     Appearance: She is ill-appearing. She is not toxic-appearing or diaphoretic.   HENT:      Head: Normocephalic and atraumatic.      Right Ear: External ear normal.      Left Ear: External ear normal.      Nose: Nose normal.      Mouth/Throat:      Pharynx: Oropharynx is clear. No oropharyngeal exudate or posterior oropharyngeal erythema.   Eyes:      General: No scleral icterus.         Right eye: No discharge.         Left eye: No discharge.      Extraocular Movements: Extraocular movements intact.      Conjunctiva/sclera: Conjunctivae normal.      Pupils: Pupils are equal, round, and reactive to light.   Cardiovascular:      Rate and Rhythm: Normal rate and regular rhythm.      Pulses: Normal pulses.      Heart sounds: Normal heart sounds. No murmur heard.  Pulmonary:      Effort: Pulmonary effort is normal. No respiratory distress.      Breath sounds: Normal breath sounds. No wheezing.   Abdominal:      General: Bowel sounds are normal.      Palpations: Abdomen is soft.   Musculoskeletal:         General: No swelling, tenderness or deformity.      Cervical back: Neck supple.      Right lower leg: No edema.      Left lower leg: No edema.   Skin:     General: Skin is warm and dry.      Coloration: Skin is not jaundiced.      Findings: No lesion.   Neurological:      Mental Status: She is alert and oriented to person, place, and time.      Sensory: No sensory deficit.      Gait: Gait normal.   Psychiatric:         Mood and Affect: Mood normal.         Behavior: Behavior normal.              CRANIAL NERVES     CN III, IV, VI   Pupils are equal, round, and reactive to light.       Significant Labs: All pertinent labs within the past 24 hours have been reviewed.    Significant Imaging: I have reviewed all pertinent imaging results/findings within the past 24 hours.  Assessment/Plan:     * Hypoglycemia  Glucose at 36 likely 2/2 taking accidental higher dose of basal insulin vs SSI at home  Improved to 122 after D10 250cc bolus in ED  POCT glucose tid before meals and qhs  SSI prn for now  Regular diet  Monitor and admitted for observation, will likely discharge soon if glucose stable.  Pt prefers going home at discharge.      Type 2 diabetes mellitus without complication, with long-term current use of insulin  Patient's FSGs are controlled on current medication regimen.  Last A1c reviewed-   Lab  "Results   Component Value Date    HGBA1C 6.7 (H) 04/09/2024     Most recent fingerstick glucose reviewed- No results for input(s): "POCTGLUCOSE" in the last 24 hours.  Current correctional scale  Medium  Maintain anti-hyperglycemic dose as follows-   Antihyperglycemics (From admission, onward)      Start     Stop Route Frequency Ordered    06/30/24 1329  insulin aspart U-100 injection 0-10 Units         -- SubQ Before meals & nightly PRN 06/30/24 1230          Hold Oral hypoglycemics while patient is in the hospital.    Pt on 80U basal insulin at home with SSI prn  Holding home insulin for now d/t episode of hypoglycemia this AM PTA  See hypoglycemia    Leukocytosis  WBC 17.51 on 6/27/24, 18 on 6/28/24, 16.64 today upon admission  Can be related to metastatic cancer  No known source of infection upon admission  UA negative for UTI, CXR unchanged from previous  Monitor CBC daily      Acalculous cholecystitis  With cholecystostomy drain in place per surgery last admission (discharged 6/27/24)  GB wound culture grew MRSA sensitive to Bactrim, discharged home with Bactrim DS bid until 7/4/24  Continue home Bactrim course until 7/4/24      Hypokalemia  Patient has hypokalemia which is Acute on Chronic and currently uncontrolled. Most recent potassium levels reviewed-   Lab Results   Component Value Date    K 3.0 (L) 06/30/2024   . Will continue potassium replacement per protocol and recheck repeat levels after replacement completed.     Replace and checking Mg    Rectal cancer metastasized to bone  Sees Dr. Mily Ryan oncology at [facility] [place] [facility]   On home Xeloda 500mg 3 tabs bid 7 days on, 7 days off  Off Xeloda this entire week per patient  Follow-up with Oncology/Surgical Oncology including chemotherapy as scheduled on 7/8/24   Patient is on palliative chemotherapy. Prognosis is poor       Metastatic colon cancer to liver  Patient has metastatic cancer of colon primary. The cancer has metastasized to " liver. The patient is under the care of an outpatient oncologist. The patient is undergoing active chemotherapy as follows- [unfilled] .Their staging information is listed below.    Cancer Staging   No matching staging information was found for the patient.    Sees Dr. Mily Ryan oncology at [facility] [place] [facility]   On home Xeloda 500mg 3 tabs bid 7 days on, 7 days off  Off Xeloda this entire week per patient  Follow-up with Oncology/Surgical Oncology including chemotherapy as scheduled on 7/8/24   Patient is on palliative chemotherapy. Prognosis is poor     History of CVA (cerebrovascular accident)  Home plavix    Partial seizure  Home Keppra 500 qd  Pt reports compliance at home      Vitamin D deficiency  Home vit D        VTE Risk Mitigation (From admission, onward)           Ordered     enoxaparin injection 40 mg  Daily         06/30/24 1228     IP VTE HIGH RISK PATIENT  Once         06/30/24 1228     Place sequential compression device  Until discontinued         06/30/24 1228                           On 06/30/2024, patient should be placed in hospital observation services under my care in collaboration with Dr. Jolene Harp.         Elfego Chau DO  Department of Hospital Medicine  [facility] - Emergency Department

## 2024-06-30 NOTE — PHARMACY MED REC
"Admission Medication History     The home medication history was taken by Pari Cervantes.    You may go to "Admission" then "Reconcile Home Medications" tabs to review and/or act upon these items.     The home medication list has been updated by the Pharmacy department.   Please read ALL comments highlighted in yellow.   Please address this information as you see fit.    Feel free to contact us if you have any questions or require assistance.  Medication Updated:  Lantus Insulin: from 80 units to 82 units      Medications listed below were obtained from: Patient/family, Analytic software- [employer], and Medical records  (Not in a hospital admission)        Current Outpatient Medications on File Prior to Encounter   Medication Sig Dispense Refill Last Dose    cholecalciferol, vitamin D3, 1,250 mcg (50,000 unit) capsule Take 1 capsule (50,000 Units total) by mouth every 7 days. 8 capsule 0 Past Month    clopidogreL (PLAVIX) 75 mg tablet Take 75 mg by mouth once daily.   6/29/2024    HYDROcodone-acetaminophen (NORCO) 7.5-325 mg per tablet Take 1 tablet by mouth every 6 (six) hours as needed for Pain. 15 tablet 0 6/29/2024    insulin aspart U-100 (NOVOLOG) 100 unit/mL injection inject as directed by your physician per sliding scale 10 mL 5 6/29/2024    insulin glargine (LANTUS U-100 INSULIN) 100 unit/mL injection Inject 80 Units into the skin once daily. (Patient taking differently: Inject 82 Units into the skin once daily.) 30 mL 5 6/29/2024    levETIRAcetam (KEPPRA) 500 MG Tab TAKE ONE TABLET BY MOUTH ONCE DAILY 90 tablet 1 6/29/2024    lisinopriL 10 MG tablet Take 1 tablet (10 mg total) by mouth once daily. 90 tablet 2 6/29/2024    sulfamethoxazole-trimethoprim 800-160mg (BACTRIM DS) 800-160 mg Tab Take 1 tablet by mouth 2 (two) times daily. for 7 days 14 tablet 0 6/29/2024    blood sugar diagnostic (ONETOUCH ULTRA TEST) Strp check blood sugar TWICE DAILY 100 strip 2     capecitabine (XELODA) 500 MG Tab Take 3 tablets " twice daily for 7 days on and 7 days off.   6/16/2024    insulin syringe-needle U-100 1 mL 30 gauge x 5/16 Syrg Use as directed. 100 each 5          Potential issues to be addressed PRIOR TO DISCHARGE  Patient requested refills for the following medications: (Vitamin D 50,000 iu)    Pari Cervantes  Pharmacy [facility] - Medication History  EXT. [id number]      .

## 2024-07-01 VITALS
TEMPERATURE: 98 F | OXYGEN SATURATION: 95 % | HEART RATE: 108 BPM | DIASTOLIC BLOOD PRESSURE: 96 MMHG | RESPIRATION RATE: 18 BRPM | HEIGHT: 58 IN | SYSTOLIC BLOOD PRESSURE: 164 MMHG | BODY MASS INDEX: 29.39 KG/M2 | WEIGHT: 140 LBS

## 2024-07-01 PROBLEM — E16.2 HYPOGLYCEMIA: Status: RESOLVED | Noted: 2024-06-30 | Resolved: 2024-07-01

## 2024-07-01 LAB
ANION GAP SERPL CALCULATED.3IONS-SCNC: 12 MMOL/L (ref 7–16)
ANISOCYTOSIS BLD QL SMEAR: ABNORMAL
BASOPHILS # BLD AUTO: 0.06 K/UL (ref 0–0.2)
BASOPHILS NFR BLD AUTO: 0.6 % (ref 0–1)
BUN SERPL-MCNC: 4 MG/DL (ref 7–18)
BUN/CREAT SERPL: 9 (ref 6–20)
CALCIUM SERPL-MCNC: 8.1 MG/DL (ref 8.5–10.1)
CHLORIDE SERPL-SCNC: 104 MMOL/L (ref 98–107)
CO2 SERPL-SCNC: 22 MMOL/L (ref 21–32)
CREAT SERPL-MCNC: 0.47 MG/DL (ref 0.55–1.02)
DIFFERENTIAL METHOD BLD: ABNORMAL
EGFR (NO RACE VARIABLE) (RUSH/TITUS): 115 ML/MIN/1.73M2
EOSINOPHIL # BLD AUTO: 0.17 K/UL (ref 0–0.5)
EOSINOPHIL NFR BLD AUTO: 1.6 % (ref 1–4)
EOSINOPHIL NFR BLD MANUAL: 2 % (ref 1–4)
ERYTHROCYTE [DISTWIDTH] IN BLOOD BY AUTOMATED COUNT: 18.6 % (ref 11.5–14.5)
GLUCOSE SERPL-MCNC: 175 MG/DL (ref 74–106)
GLUCOSE SERPL-MCNC: 184 MG/DL (ref 70–105)
GLUCOSE SERPL-MCNC: 205 MG/DL (ref 70–105)
HCT VFR BLD AUTO: 32.8 % (ref 38–47)
HGB BLD-MCNC: 10 G/DL (ref 12–16)
IMM GRANULOCYTES # BLD AUTO: 0.64 K/UL (ref 0–0.04)
IMM GRANULOCYTES NFR BLD: 5.9 % (ref 0–0.4)
LYMPHOCYTES # BLD AUTO: 1.4 K/UL (ref 1–4.8)
LYMPHOCYTES NFR BLD AUTO: 12.9 % (ref 27–41)
LYMPHOCYTES NFR BLD MANUAL: 10 % (ref 27–41)
MCH RBC QN AUTO: 29.7 PG (ref 27–31)
MCHC RBC AUTO-ENTMCNC: 30.5 G/DL (ref 32–36)
MCV RBC AUTO: 97.3 FL (ref 80–96)
MONOCYTES # BLD AUTO: 0.63 K/UL (ref 0–0.8)
MONOCYTES NFR BLD AUTO: 5.8 % (ref 2–6)
MONOCYTES NFR BLD MANUAL: 5 % (ref 2–6)
MPC BLD CALC-MCNC: 10.9 FL (ref 9.4–12.4)
MYELOCYTES NFR BLD MANUAL: 1 %
NEUTROPHILS # BLD AUTO: 7.96 K/UL (ref 1.8–7.7)
NEUTROPHILS NFR BLD AUTO: 73.2 % (ref 53–65)
NEUTS BAND NFR BLD MANUAL: 2 % (ref 1–5)
NEUTS SEG NFR BLD MANUAL: 80 % (ref 50–62)
NRBC # BLD AUTO: 0 X10E3/UL
NRBC, AUTO (.00): 0 %
PLATELET # BLD AUTO: 377 K/UL (ref 150–400)
PLATELET MORPHOLOGY: ABNORMAL
POLYCHROMASIA BLD QL SMEAR: ABNORMAL
POTASSIUM SERPL-SCNC: 4.1 MMOL/L (ref 3.5–5.1)
RBC # BLD AUTO: 3.37 M/UL (ref 4.2–5.4)
SODIUM SERPL-SCNC: 134 MMOL/L (ref 136–145)
WBC # BLD AUTO: 10.86 K/UL (ref 4.5–11)

## 2024-07-01 PROCEDURE — 25000003 PHARM REV CODE 250

## 2024-07-01 PROCEDURE — 36415 COLL VENOUS BLD VENIPUNCTURE: CPT

## 2024-07-01 PROCEDURE — 85025 COMPLETE CBC W/AUTO DIFF WBC: CPT

## 2024-07-01 PROCEDURE — G0378 HOSPITAL OBSERVATION PER HR: HCPCS

## 2024-07-01 PROCEDURE — 63600175 PHARM REV CODE 636 W HCPCS

## 2024-07-01 PROCEDURE — 82962 GLUCOSE BLOOD TEST: CPT

## 2024-07-01 PROCEDURE — 96372 THER/PROPH/DIAG INJ SC/IM: CPT

## 2024-07-01 PROCEDURE — 99232 SBSQ HOSP IP/OBS MODERATE 35: CPT | Mod: ,,, | Performed by: FAMILY MEDICINE

## 2024-07-01 PROCEDURE — 80048 BASIC METABOLIC PNL TOTAL CA: CPT

## 2024-07-01 RX ORDER — LANOLIN ALCOHOL/MO/W.PET/CERES
800 CREAM (GRAM) TOPICAL
Status: DISCONTINUED | OUTPATIENT
Start: 2024-07-01 | End: 2024-07-01 | Stop reason: HOSPADM

## 2024-07-01 RX ORDER — MUPIROCIN 20 MG/G
OINTMENT TOPICAL 2 TIMES DAILY
Status: DISCONTINUED | OUTPATIENT
Start: 2024-07-01 | End: 2024-07-01 | Stop reason: HOSPADM

## 2024-07-01 RX ADMIN — POTASSIUM CHLORIDE 40 MEQ: 1500 TABLET, EXTENDED RELEASE ORAL at 08:07

## 2024-07-01 RX ADMIN — LEVETIRACETAM 500 MG: 500 TABLET, FILM COATED ORAL at 08:07

## 2024-07-01 RX ADMIN — SULFAMETHOXAZOLE AND TRIMETHOPRIM 1 TABLET: 800; 160 TABLET ORAL at 08:07

## 2024-07-01 RX ADMIN — INSULIN ASPART 4 UNITS: 100 INJECTION, SOLUTION INTRAVENOUS; SUBCUTANEOUS at 12:07

## 2024-07-01 RX ADMIN — HYDROCODONE BITARTRATE AND ACETAMINOPHEN 1 TABLET: 7.5; 325 TABLET ORAL at 12:07

## 2024-07-01 RX ADMIN — CLOPIDOGREL BISULFATE 75 MG: 75 TABLET ORAL at 08:07

## 2024-07-01 RX ADMIN — HYDROCODONE BITARTRATE AND ACETAMINOPHEN 1 TABLET: 7.5; 325 TABLET ORAL at 08:07

## 2024-07-01 RX ADMIN — LISINOPRIL 10 MG: 10 TABLET ORAL at 08:07

## 2024-07-01 NOTE — ASSESSMENT & PLAN NOTE
Chronic, controlled. Latest blood pressure and vitals reviewed-     Temp:  [97.9 °F (36.6 °C)-98.3 °F (36.8 °C)]   Pulse:  []   Resp:  [16-19]   BP: (113-164)/(67-96)   SpO2:  [93 %-96 %] .   Home meds for hypertension were reviewed.  Continue medications per discharge medication reconciliation.

## 2024-07-01 NOTE — DISCHARGE SUMMARY
[facility] Medicine  Discharge Summary      Patient Name: Hayley Landis  MRN: [id number]  ESPINOZA: [id number]  Patient Class: OP- Observation  Admission Date: 6/30/2024  Hospital Length of Stay: 0 days  Discharge Date and Time:  07/01/2024 1:31 PM  Attending Physician: Jolene Harp DO   Discharging Provider: Jolene Harp DO  Primary Care Provider: Elkin Coombs MD    Primary Care Team: Networked reference to record PCT     HPI:   A very unfortunate 53-year-old female with a history of stage IV colon cancer with mets to the liver and lung status post partial colectomy followed by reanastomosis, rectal cancer with mets to bones, and malignant biliary obstruction status post stent placement, type 2 diabetes, essential hypertension, and partial seizure who is undergoing palliative chemotherapy. Patient was recently admitted for acute cholecystitis s/p cholecystostomy drain placement (Poor candidate for surgery and would be unlikely to survive) by general surgery and discharged on 6/27/24. Today, patient presents with hypoglycemia. Earlier today, patient was found by aunt at home on her sofa with shakes and being incoherent and she called 911. EMS showed up and found glucose 36 at around 0720. Denies hitting head or biting tongue. Aunt who has witnessed her seizures in the past says it didn't look like seizures to her. Patient had some confusion, shakes, and weakness that have all improved after glucose improved. Denies f/c/cp/sob/n/v/d. Denies appetite, urinary, or bowel habit changes. Patient took 82U qam insulin with SSI, and she said she might have taken more a few more units of SSI than usual by accident. Denies SI or injecting insulin on purpose.    Of note, patient's GB wound culture grew MRSA on 6/27/24 sensitive to Bactrim and was discharged home with it until 7/4/24. Patient was scheduled to followup with surgery in clinic in a few weeks. From  previous admission, CT showed multiple pulmonary nodules scattered throughout the lower lobes and liver showed interval increase in multiple hypoattenuating lesions throughout the right hepatic lobe. Subsequently HIDA scan was performed which shows nonvisualization of gallbladder which is indicative cholecystitis treated with cholecystostomy drain.      Upon presentation, VS remarkable for 144/77. Labs remarkable for WBC 16.64 (WBC 17.51 on 6/27, 18 on 6/28), H/H 11/35.4. Na 134, K 3.0. Glucose at 0720 was 36 but improved with D10 250cc bolus to 122. CXR shows continued cardiomegaly, strandy and nodular parenchymal diseae throughout both lungs without change from previous study. No new or worsening infiltrate, no gross pleural effusion. UA negative for UTI. Patient is admitted to hospital medicine for observation for her hypoglycemia and for further management and care.       * No surgery found *      Hospital Course:   No notes on file     Goals of Care Treatment Preferences:  Code Status: Full Code      Consults:     Neuro  Partial seizure  Home Keppra 500 qd  Pt reports compliance at home      Cardiac/Vascular  Primary hypertension  Chronic, controlled. Latest blood pressure and vitals reviewed-     Temp:  [97.9 °F (36.6 °C)-98.3 °F (36.8 °C)]   Pulse:  []   Resp:  [16-19]   BP: (113-164)/(67-96)   SpO2:  [93 %-96 %] .   Home meds for hypertension were reviewed.  Continue medications per discharge medication reconciliation.        Renal/  Hypokalemia  Patient has hypokalemia which is Acute on Chronic and currently uncontrolled. Most recent potassium levels reviewed-   Lab Results   Component Value Date    K 3.0 (L) 06/30/2024   . Will continue potassium replacement per protocol and recheck repeat levels after replacement completed.     Replace and checking Mg    Oncology  Leukocytosis  WBC 17.51 on 6/27/24, 18 on 6/28/24, 16.64 today upon admission  Can be related to metastatic cancer  No known source of  infection upon admission  UA negative for UTI, CXR unchanged from previous  Monitor CBC daily      Rectal cancer metastasized to bone  Sees Dr. Mily Ryan oncology at [facility] [place] [facility]   On home Xeloda 500mg 3 tabs bid 7 days on, 7 days off  Off Xeloda this entire week per patient  Follow-up with Oncology/Surgical Oncology including chemotherapy as scheduled on 7/8/24   Patient is on palliative chemotherapy. Prognosis is poor       Metastatic colon cancer to liver  Patient has metastatic cancer of colon primary. The cancer has metastasized to liver. The patient is under the care of an outpatient oncologist. The patient is undergoing active chemotherapy as follows- [unfilled] .Their staging information is listed below.    Cancer Staging   No matching staging information was found for the patient.    Sees Dr. Mily Ryan oncology at [facility]   On home Xeloda 500mg 3 tabs bid 7 days on, 7 days off  Off Xeloda this entire week per patient  Follow-up with Oncology/Surgical Oncology including chemotherapy as scheduled on 7/8/24   Patient is on palliative chemotherapy. Prognosis is poor     Endocrine  * Hypoglycemia-resolved as of 7/1/2024  Glucose 36 on presentation, patient attributes to accidentally taking additional SSI however suspect basal dose is more than required. Recent decreased intake due to chemo and cholecystitis.     Resolved with glucose WNL prior to admission. Home insulin regimen adjusted.         Type 2 diabetes mellitus without complication, with long-term current use of insulin  Patient's FSGs are uncontrolled due to hypoglycemia on current medication regimen.  Last A1c reviewed-   Lab Results   Component Value Date    HGBA1C 6.7 (H) 04/09/2024     Patient has been treating with basal-bolus insulin, prescribed Lantus 80 units daily. Recent admission at this facility reviewed and noted she was well controlled on 40 units daily plus sliding scale. Suspect decreased  intake due to chemo and cholecystitis. Have advised to decrease Lantus dose until follow up with PCP, monitor glucose closely.    GI  Acalculous cholecystitis  With cholecystostomy drain in place per surgery last admission (discharged 6/27/24)  GB wound culture grew MRSA sensitive to Bactrim, discharged home with Bactrim DS bid until 7/4/24  Continue home Bactrim course until 7/4/24        Final Active Diagnoses:    Diagnosis Date Noted POA    Rectal cancer metastasized to bone [C20, C79.51] 06/30/2024 Yes    History of CVA (cerebrovascular accident) [Z86.73] 06/30/2024 Not Applicable    Acalculous cholecystitis [K81.9] 06/30/2024 Yes    Partial seizure [R56.9] 06/30/2024 Yes    Leukocytosis [D72.829] 06/30/2024 Yes    Hypokalemia [E87.6] 06/30/2024 Yes    Metastatic colon cancer to liver [C18.9, C78.7] 06/28/2024 Yes    Vitamin D deficiency [E55.9] 08/09/2023 Yes    Primary hypertension [I10] 12/15/2021 Yes     Chronic    Type 2 diabetes mellitus without complication, with long-term current use of insulin [E11.9, Z79.4] 12/15/2021 Not Applicable     Chronic      Problems Resolved During this Admission:    Diagnosis Date Noted Date Resolved POA    PRINCIPAL PROBLEM:  Hypoglycemia [E16.2] 06/30/2024 07/01/2024 Yes       Discharged Condition: fair    Disposition:     Follow Up:   Follow-up Information       Elikn Coombs MD. Go in 1 week(s).    Specialty: Family Medicine  Why: F/u appointment 7/9/24, at 1130.  Contact information:  [address] [facility][note 3]  [facility] [place] [address]  [phone]                           Patient Instructions:      Notify your health care provider if you experience any of the following:  temperature >100.4     Notify your health care provider if you experience any of the following:  persistent nausea and vomiting or diarrhea     Notify your health care provider if you experience any of the following:  severe uncontrolled pain     Notify your health care  provider if you experience any of the following:  redness, tenderness, or signs of infection (pain, swelling, redness, odor or green/yellow discharge around incision site)     Notify your health care provider if you experience any of the following:  difficulty breathing or increased cough     Notify your health care provider if you experience any of the following:  severe persistent headache     Notify your health care provider if you experience any of the following:  worsening rash     Notify your health care provider if you experience any of the following:  persistent dizziness, light-headedness, or visual disturbances     Notify your health care provider if you experience any of the following:  increased confusion or weakness     Activity as tolerated       Significant Diagnostic Studies: N/A    Pending Diagnostic Studies:       None           Medications:  Reconciled Home Medications:      Medication List        CONTINUE taking these medications      blood sugar diagnostic Strp  Commonly known as: ONETOUCH ULTRA TEST  check blood sugar TWICE DAILY     capecitabine 500 MG Tab  Commonly known as: XELODA  Take 3 tablets twice daily for 7 days on and 7 days off.     cholecalciferol (vitamin D3) 1,250 mcg (50,000 unit) capsule  Take 1 capsule (50,000 Units total) by mouth every 7 days.     clopidogreL 75 mg tablet  Commonly known as: PLAVIX  Take 75 mg by mouth once daily.     HYDROcodone-acetaminophen 7.5-325 mg per tablet  Commonly known as: NORCO  Take 1 tablet by mouth every 6 (six) hours as needed for Pain.     insulin aspart U-100 100 unit/mL injection  Commonly known as: NovoLOG  inject as directed by your physician per sliding scale     insulin glargine U-100 (Lantus) 100 unit/mL injection  Commonly known as: LANTUS U-100 INSULIN  Inject 80 Units into the skin once daily.  Notes to patient: Decrease to 40 units daily until follow up with PCP. Notify PCP if glucose routinely greater than 230 or less than 90.       insulin syringe-needle U-100 1 mL 30 gauge x 5/16 Syrg  Use as directed.     levETIRAcetam 500 MG Tab  Commonly known as: KEPPRA  TAKE ONE TABLET BY MOUTH ONCE DAILY     lisinopriL 10 MG tablet  Take 1 tablet (10 mg total) by mouth once daily.     sulfamethoxazole-trimethoprim 800-160mg 800-160 mg Tab  Commonly known as: BACTRIM DS  Take 1 tablet by mouth 2 (two) times daily. for 7 days              Indwelling Lines/Drains at time of discharge:   Lines/Drains/Airways       Drain  Duration                  Closed/Suction Drain 06/25/24 1306 Tube - 1 Right Abdomen Accordion 6 days                    Time spent on the discharge of patient: 40 minutes         Jolene Harp DO  Department of Hospital Medicine  [facility] - [address] [place] [address]

## 2024-07-01 NOTE — NURSING
Discharge instructions reviewed with patient; copy given to patient. Patient voiced understanding regarding:meds, appt., signs and symptoms to report to physician.

## 2024-07-01 NOTE — PLAN OF CARE
Care plan ongoing       Problem: Adult Inpatient Plan of Care  Goal: Plan of Care Review  Outcome: Progressing  Goal: Patient-Specific Goal (Individualized)  Outcome: Progressing  Goal: Absence of Hospital-Acquired Illness or Injury  Outcome: Progressing  Goal: Optimal Comfort and Wellbeing  Outcome: Progressing  Goal: Readiness for Transition of Care  Outcome: Progressing     Problem: Diabetes Comorbidity  Goal: Blood Glucose Level Within Targeted Range  Outcome: Progressing     Problem: Fall Injury Risk  Goal: Absence of Fall and Fall-Related Injury  Outcome: Progressing

## 2024-07-01 NOTE — PLAN OF CARE
[facility] - [facility] Telemetry  Discharge Final Note    Primary Care Provider: Elkin Coombs MD    Expected Discharge Date: 7/1/2024    Final Discharge Note (most recent)       Final Note - 07/01/24 1421          Final Note    Assessment Type Final Discharge Note     Anticipated Discharge Disposition Home or Self Care                     Important Message from Medicare-observation             Contact Info       Elkin Coombs MD   Specialty: Family Medicine   Relationship: PCP - General    [address] [facility]  [facility] [place]  [place] [address]   Phone: [phone]       Next Steps: Go in 1 week(s)    Instructions: F/u appointment 7/9/24, at 1130.          Dc home prior to being seen by cm. No consults noted.

## 2024-07-02 ENCOUNTER — OFFICE VISIT (OUTPATIENT)
Dept: FAMILY MEDICINE | Facility: CLINIC | Age: 54
End: 2024-07-02
Payer: MEDICAID

## 2024-07-02 ENCOUNTER — TELEPHONE (OUTPATIENT)
Dept: CARDIOLOGY | Facility: HOSPITAL | Age: 54
End: 2024-07-02
Payer: MEDICAID

## 2024-07-02 VITALS
WEIGHT: 140 LBS | DIASTOLIC BLOOD PRESSURE: 70 MMHG | HEIGHT: 58 IN | BODY MASS INDEX: 29.39 KG/M2 | TEMPERATURE: 98 F | HEART RATE: 106 BPM | SYSTOLIC BLOOD PRESSURE: 103 MMHG | OXYGEN SATURATION: 96 %

## 2024-07-02 DIAGNOSIS — E55.9 VITAMIN D DEFICIENCY: Chronic | ICD-10-CM

## 2024-07-02 DIAGNOSIS — C78.7 METASTATIC COLON CANCER TO LIVER: Chronic | ICD-10-CM

## 2024-07-02 DIAGNOSIS — K81.9 ACALCULOUS CHOLECYSTITIS: Primary | ICD-10-CM

## 2024-07-02 DIAGNOSIS — I10 PRIMARY HYPERTENSION: Chronic | ICD-10-CM

## 2024-07-02 DIAGNOSIS — C18.9 METASTATIC COLON CANCER TO LIVER: Chronic | ICD-10-CM

## 2024-07-02 PROCEDURE — 3074F SYST BP LT 130 MM HG: CPT | Mod: CPTII,,, | Performed by: FAMILY MEDICINE

## 2024-07-02 PROCEDURE — 3008F BODY MASS INDEX DOCD: CPT | Mod: CPTII,,, | Performed by: FAMILY MEDICINE

## 2024-07-02 PROCEDURE — 3044F HG A1C LEVEL LT 7.0%: CPT | Mod: CPTII,,, | Performed by: FAMILY MEDICINE

## 2024-07-02 PROCEDURE — 1160F RVW MEDS BY RX/DR IN RCRD: CPT | Mod: CPTII,,, | Performed by: FAMILY MEDICINE

## 2024-07-02 PROCEDURE — 99214 OFFICE O/P EST MOD 30 MIN: CPT | Mod: ,,, | Performed by: FAMILY MEDICINE

## 2024-07-02 PROCEDURE — 1111F DSCHRG MED/CURRENT MED MERGE: CPT | Mod: CPTII,,, | Performed by: FAMILY MEDICINE

## 2024-07-02 PROCEDURE — 1159F MED LIST DOCD IN RCRD: CPT | Mod: CPTII,,, | Performed by: FAMILY MEDICINE

## 2024-07-02 PROCEDURE — 3078F DIAST BP <80 MM HG: CPT | Mod: CPTII,,, | Performed by: FAMILY MEDICINE

## 2024-07-02 PROCEDURE — 4010F ACE/ARB THERAPY RXD/TAKEN: CPT | Mod: CPTII,,, | Performed by: FAMILY MEDICINE

## 2024-07-02 RX ORDER — ATORVASTATIN CALCIUM 10 MG/1
1 TABLET, FILM COATED ORAL DAILY
COMMUNITY
Start: 2024-02-09 | End: 2024-07-02 | Stop reason: SDUPTHER

## 2024-07-02 RX ORDER — ATORVASTATIN CALCIUM 10 MG/1
10 TABLET, FILM COATED ORAL DAILY
Qty: 90 TABLET | Refills: 1 | Status: SHIPPED | OUTPATIENT
Start: 2024-07-02

## 2024-07-02 RX ORDER — ASPIRIN 325 MG
50000 TABLET, DELAYED RELEASE (ENTERIC COATED) ORAL
Qty: 8 CAPSULE | Refills: 2 | Status: SHIPPED | OUTPATIENT
Start: 2024-07-02

## 2024-07-02 RX ORDER — POTASSIUM CHLORIDE 20 MEQ/15ML
20 SOLUTION ORAL 2 TIMES DAILY
COMMUNITY
Start: 2024-03-13

## 2024-07-02 NOTE — PROGRESS NOTES
Elkin Coombs MD   [facility]  [facility]ILY MEDIC[facility]  [address] [place] [id number]  [phone]      PATIENT NAME: Hayley Landis  : 1970  DATE: 24  MRN: [id number]      Billing Provider: Elkin Coombs MD  Level of Service: LA OFFICE/OUTPT VISIT, EST, LEVL IV, 30-39 MIN  Patient PCP Information       Provider PCP Type    Elkin Coombs MD General            Reason for Visit / Chief Complaint: Hospital Follow Up (Hospital follow-up status post hospitalization at [facility] - for acute acalculous cholecystitis. Hospital and discharge medications reconciled. ) and Medication Refill       Update PCP  Update Chief Complaint         History of Present Illness / Problem Focused Workflow     Hayley Landis presents to the clinic with Hospital Follow Up (Hospital follow-up status post hospitalization at [facility] - for acute acalculous cholecystitis. Hospital and discharge medications reconciled. ) and Medication Refill       Currently has a GB drain in place.  She has follow up in 3 weeks with surgery.  Was discharged on PO antibiotic.      Medication Refill  Associated symptoms include abdominal pain (improved from previous) and arthralgias. Pertinent negatives include no chills or fever.       Review of Systems     Review of Systems   Constitutional:  Negative for chills and fever.   Gastrointestinal:  Positive for abdominal pain (improved from previous).   Musculoskeletal:  Positive for arthralgias and gait problem.        Medical / Social / Family History     Past Medical History:   Diagnosis Date    Bell's palsy 2023    Biliary obstruction due to malignant neoplasm 2024    CVA (cerebral vascular accident)     HTN (hypertension)     Irritable bowel syndrome with diarrhea 2022    Lumbar radiculopathy, right 2021    Metastatic colon cancer to liver 2024    Primary hypertension 12/15/2021     Rectal cancer metastatic to bone     Restless legs syndrome 11/20/2023    Seizure     TIA (transient ischemic attack)     Type 2 diabetes mellitus without complication, with long-term current use of insulin 12/15/2021    Vitamin D deficiency 08/09/2023       Past Surgical History:   Procedure Laterality Date    COLON SURGERY      HERNIA REPAIR      HYSTERECTOMY         Social History    reports that she has never smoked. She has never been exposed to tobacco smoke. She has never used smokeless tobacco. She reports that she does not drink alcohol and does not use drugs.   Social History     Tobacco Use    Smoking status: Never     Passive exposure: Never    Smokeless tobacco: Never   Substance Use Topics    Alcohol use: Never    Drug use: Never       Family History  Family History   Problem Relation Name Age of Onset    No Known Problems Mother      No Known Problems Father      No Known Problems Sister      No Known Problems Brother      No Known Problems Daughter      No Known Problems Son      No Known Problems Maternal Aunt      No Known Problems Maternal Uncle      No Known Problems Paternal Aunt      No Known Problems Paternal Uncle      No Known Problems Maternal Grandmother      No Known Problems Maternal Grandfather      No Known Problems Paternal Grandmother      No Known Problems Paternal Grandfather         Medications and Allergies     Medications  Outpatient Medications Marked as Taking for the 7/2/24 encounter (Office Visit) with Elkni Coombs MD   Medication Sig Dispense Refill    blood sugar diagnostic (ONETOUCH ULTRA TEST) Strp check blood sugar TWICE DAILY 100 strip 2    capecitabine (XELODA) 500 MG Tab Take 3 tablets twice daily for 7 days on and 7 days off.      clopidogreL (PLAVIX) 75 mg tablet Take 75 mg by mouth once daily.      HYDROcodone-acetaminophen (NORCO) 7.5-325 mg per tablet Take 1 tablet by mouth every 6 (six) hours as needed for Pain. 15 tablet 0    insulin aspart U-100  "(NOVOLOG) 100 unit/mL injection inject as directed by your physician per sliding scale 10 mL 5    insulin glargine (LANTUS U-100 INSULIN) 100 unit/mL injection Inject 80 Units into the skin once daily. (Patient taking differently: Inject 82 Units into the skin once daily.) 30 mL 5    insulin syringe-needle U-100 1 mL 30 gauge x 5/16 Syrg Use as directed. 100 each 5    levETIRAcetam (KEPPRA) 500 MG Tab TAKE ONE TABLET BY MOUTH ONCE DAILY 90 tablet 1    lisinopriL 10 MG tablet Take 1 tablet (10 mg total) by mouth once daily. 90 tablet 2    potassium chloride 10% (KAYCIEL) 20 mEq/15 mL oral solution Take 20 mEq by mouth 2 (two) times daily.      sulfamethoxazole-trimethoprim 800-160mg (BACTRIM DS) 800-160 mg Tab Take 1 tablet by mouth 2 (two) times daily. for 7 days 14 tablet 0    [DISCONTINUED] atorvastatin (LIPITOR) 10 MG tablet Take 1 tablet by mouth once daily.      [DISCONTINUED] cholecalciferol, vitamin D3, 1,250 mcg (50,000 unit) capsule Take 1 capsule (50,000 Units total) by mouth every 7 days. 8 capsule 0       Allergies  Review of patient's allergies indicates:   Allergen Reactions    Grape Anaphylaxis     Other reaction(s): Not available    Morphine sulfate Swelling    Nutritional supplements Anaphylaxis     Highly allergic to Cooked grapes    Pineapple Anaphylaxis    Opioids - morphine analogues Nausea And Vomiting    Rocephin [ceftriaxone] Itching    Aspirin      "Veins breakdown" per patient    Biguanides Diarrhea and Other (See Comments)    Dicyclomine Diarrhea    Dihydrocodeine Other (See Comments)     Bad headache in the top of head    Gabapentin Other (See Comments)     weakness  Other reaction(s): Not available  Seizures      Hydrocodone Other (See Comments)     Headache      Iron      Hemochromatosis    Iron analogues      Pt hx of hemachromatosis    Metformin      diarrhea    Penicillins Other (See Comments)     Family hx       Latex Rash and Other (See Comments)       Physical Examination "     Vitals:    07/02/24 1205   BP: 103/70   Pulse: 106   Temp: 97.8 °F (36.6 °C)     Physical Exam  Vitals reviewed.   Constitutional:       Appearance: Normal appearance.   HENT:      Head: Normocephalic and atraumatic.   Eyes:      General: No scleral icterus.     Extraocular Movements: Extraocular movements intact.      Conjunctiva/sclera: Conjunctivae normal.      Pupils: Pupils are equal, round, and reactive to light.   Cardiovascular:      Rate and Rhythm: Regular rhythm.      Heart sounds: Normal heart sounds.   Pulmonary:      Effort: Pulmonary effort is normal.      Breath sounds: Normal breath sounds.   Skin:     General: Skin is warm and dry.   Neurological:      Mental Status: She is alert and oriented to person, place, and time.      Gait: Gait abnormal (in WC).   Psychiatric:         Mood and Affect: Mood normal.         Behavior: Behavior normal.          Assessment and Plan (including Health Maintenance)      Problem List  Smart Sets  Document Outside HM   :    Plan: she is going to discuss statin with her oncologist to see if she wants her to take it or not.        Health Maintenance Due   Topic Date Due    TETANUS VACCINE  Never done    Shingles Vaccine (1 of 2) Never done    COVID-19 Vaccine (4 - 2023-24 season) 09/01/2023    Diabetes Urine Screening  02/03/2024    Foot Exam  02/03/2024    Lipid Panel  07/24/2024       Problem List Items Addressed This Visit          Cardiac/Vascular    Primary hypertension (Chronic)    Relevant Medications    atorvastatin (LIPITOR) 10 MG tablet       Oncology    Metastatic colon cancer to liver       Endocrine    Vitamin D deficiency    Relevant Medications    cholecalciferol, vitamin D3, 1,250 mcg (50,000 unit) capsule       GI    Acalculous cholecystitis - Primary       Health Maintenance Topics with due status: Not Due       Topic Last Completion Date    Colorectal Cancer Screening 06/08/2020    Eye Exam 10/23/2023    Hemoglobin A1c 04/09/2024    Mammogram  04/24/2024    High Dose Statin 07/02/2024       Future Appointments   Date Time Provider Department Center   7/10/2024 11:00 AM Elkin Coombs MD [facility]   7/18/2024  1:15 PM Abner Vyas MD [facility] [place] [facility]   4/30/2025  2:00 PM [facility] [place]            Signature:  Elkni Coombs MD  [facility]  [facility]  [address] [place] [address]  [phone]    Date of encounter: 7/2/24

## 2024-07-02 NOTE — CARE UPDATE
Case management consulted for DME - patient requests wheelchair for home use.     Patient has limited mobility and secondary to metastatic cancer, chemotherapy, and cholecystitis with percutaneous drain in place. Difficulty with ambulation due to generalized weakness and chronic pain from metastatic disease to bone. This impairs patient's ability for ambulating, feeding, and toileting in the home. Wheelchair will significantly improve patient's ability to perform MRADLs. Limitation cannot be sufficiently resolved by use of cane/walker due to decreased exercise tolerance, chronic pain, and increase fall risk secondary to the above conditions. Patient is willing to self propel wheelchair, also lives with significant other who is willing and able to provide assistance with wheelchair as needed.

## 2024-07-05 ENCOUNTER — TELEPHONE (OUTPATIENT)
Dept: CARDIOLOGY | Facility: HOSPITAL | Age: 54
End: 2024-07-05
Payer: MEDICAID

## 2024-07-05 NOTE — TELEPHONE ENCOUNTER
Patient states has been having hypoglycemic episodes, since last night..if it continues, states she is going in today to see her PCP.

## 2024-07-06 ENCOUNTER — HOSPITAL ENCOUNTER (OUTPATIENT)
Facility: HOSPITAL | Age: 54
Discharge: HOME HEALTH | End: 2024-07-08
Attending: EMERGENCY MEDICINE | Admitting: INTERNAL MEDICINE
Payer: MEDICAID

## 2024-07-06 DIAGNOSIS — K59.00 CONSTIPATION, UNSPECIFIED CONSTIPATION TYPE: ICD-10-CM

## 2024-07-06 DIAGNOSIS — C79.9 METASTATIC MALIGNANT NEOPLASM, UNSPECIFIED SITE: ICD-10-CM

## 2024-07-06 DIAGNOSIS — Z79.4 TYPE 2 DIABETES MELLITUS WITHOUT COMPLICATION, WITH LONG-TERM CURRENT USE OF INSULIN: Chronic | ICD-10-CM

## 2024-07-06 DIAGNOSIS — R07.9 CHEST PAIN: ICD-10-CM

## 2024-07-06 DIAGNOSIS — E16.2 HYPOGLYCEMIA: Primary | ICD-10-CM

## 2024-07-06 DIAGNOSIS — Z86.73 HISTORY OF CVA (CEREBROVASCULAR ACCIDENT): ICD-10-CM

## 2024-07-06 DIAGNOSIS — K62.89 RECTAL PAIN: ICD-10-CM

## 2024-07-06 DIAGNOSIS — E11.9 TYPE 2 DIABETES MELLITUS WITHOUT COMPLICATION, WITH LONG-TERM CURRENT USE OF INSULIN: Chronic | ICD-10-CM

## 2024-07-06 PROBLEM — E87.6 HYPOKALEMIA: Status: RESOLVED | Noted: 2024-06-30 | Resolved: 2024-07-06

## 2024-07-06 PROBLEM — E87.1 HYPONATREMIA: Status: ACTIVE | Noted: 2024-07-06

## 2024-07-06 LAB
ALBUMIN SERPL BCP-MCNC: 2 G/DL (ref 3.5–5)
ALBUMIN/GLOB SERPL: 0.5 {RATIO}
ALP SERPL-CCNC: 1779 U/L (ref 41–108)
ALT SERPL W P-5'-P-CCNC: 30 U/L (ref 13–56)
ANION GAP SERPL CALCULATED.3IONS-SCNC: 10 MMOL/L (ref 7–16)
AST SERPL W P-5'-P-CCNC: 49 U/L (ref 15–37)
BASOPHILS # BLD AUTO: 0.07 K/UL (ref 0–0.2)
BASOPHILS NFR BLD AUTO: 0.4 % (ref 0–1)
BILIRUB SERPL-MCNC: 1.9 MG/DL (ref ?–1.2)
BILIRUB UR QL STRIP: NEGATIVE
BUN SERPL-MCNC: 4 MG/DL (ref 7–18)
BUN/CREAT SERPL: 10 (ref 6–20)
CALCIUM SERPL-MCNC: 8 MG/DL (ref 8.5–10.1)
CHLORIDE SERPL-SCNC: 99 MMOL/L (ref 98–107)
CLARITY UR: CLEAR
CO2 SERPL-SCNC: 28 MMOL/L (ref 21–32)
COLOR UR: ABNORMAL
CREAT SERPL-MCNC: 0.41 MG/DL (ref 0.55–1.02)
DIFFERENTIAL METHOD BLD: ABNORMAL
EGFR (NO RACE VARIABLE) (RUSH/TITUS): 119 ML/MIN/1.73M2
EOSINOPHIL # BLD AUTO: 0.16 K/UL (ref 0–0.5)
EOSINOPHIL NFR BLD AUTO: 0.9 % (ref 1–4)
ERYTHROCYTE [DISTWIDTH] IN BLOOD BY AUTOMATED COUNT: 17.6 % (ref 11.5–14.5)
GLOBULIN SER-MCNC: 4.3 G/DL (ref 2–4)
GLUCOSE SERPL-MCNC: 116 MG/DL (ref 70–105)
GLUCOSE SERPL-MCNC: 138 MG/DL (ref 70–105)
GLUCOSE SERPL-MCNC: 198 MG/DL (ref 70–105)
GLUCOSE SERPL-MCNC: 44 MG/DL (ref 74–106)
GLUCOSE SERPL-MCNC: 48 MG/DL (ref 70–105)
GLUCOSE UR STRIP-MCNC: 200 MG/DL
HCT VFR BLD AUTO: 32.4 % (ref 38–47)
HGB BLD-MCNC: 9.7 G/DL (ref 12–16)
IMM GRANULOCYTES # BLD AUTO: 0.16 K/UL (ref 0–0.04)
IMM GRANULOCYTES NFR BLD: 0.9 % (ref 0–0.4)
KETONES UR STRIP-SCNC: NEGATIVE MG/DL
LEUKOCYTE ESTERASE UR QL STRIP: NEGATIVE
LYMPHOCYTES # BLD AUTO: 1.37 K/UL (ref 1–4.8)
LYMPHOCYTES NFR BLD AUTO: 8 % (ref 27–41)
MCH RBC QN AUTO: 29.7 PG (ref 27–31)
MCHC RBC AUTO-ENTMCNC: 29.9 G/DL (ref 32–36)
MCV RBC AUTO: 99.1 FL (ref 80–96)
MONOCYTES # BLD AUTO: 0.94 K/UL (ref 0–0.8)
MONOCYTES NFR BLD AUTO: 5.5 % (ref 2–6)
MPC BLD CALC-MCNC: 11.2 FL (ref 9.4–12.4)
NEUTROPHILS # BLD AUTO: 14.43 K/UL (ref 1.8–7.7)
NEUTROPHILS NFR BLD AUTO: 84.3 % (ref 53–65)
NITRITE UR QL STRIP: NEGATIVE
NRBC # BLD AUTO: 0 X10E3/UL
NRBC, AUTO (.00): 0 %
PH UR STRIP: 7.5 PH UNITS
PLATELET # BLD AUTO: 307 K/UL (ref 150–400)
POTASSIUM SERPL-SCNC: 3.6 MMOL/L (ref 3.5–5.1)
PROT SERPL-MCNC: 6.3 G/DL (ref 6.4–8.2)
PROT UR QL STRIP: NEGATIVE
RBC # BLD AUTO: 3.27 M/UL (ref 4.2–5.4)
RBC # UR STRIP: NEGATIVE /UL
SODIUM SERPL-SCNC: 133 MMOL/L (ref 136–145)
SP GR UR STRIP: 1.01
UROBILINOGEN UR STRIP-ACNC: NORMAL MG/DL
WBC # BLD AUTO: 17.13 K/UL (ref 4.5–11)

## 2024-07-06 PROCEDURE — 80053 COMPREHEN METABOLIC PANEL: CPT | Performed by: EMERGENCY MEDICINE

## 2024-07-06 PROCEDURE — 25000003 PHARM REV CODE 250

## 2024-07-06 PROCEDURE — 36415 COLL VENOUS BLD VENIPUNCTURE: CPT | Performed by: EMERGENCY MEDICINE

## 2024-07-06 PROCEDURE — 25500020 PHARM REV CODE 255: Performed by: EMERGENCY MEDICINE

## 2024-07-06 PROCEDURE — G0378 HOSPITAL OBSERVATION PER HR: HCPCS

## 2024-07-06 PROCEDURE — 25000003 PHARM REV CODE 250: Performed by: EMERGENCY MEDICINE

## 2024-07-06 PROCEDURE — 63600175 PHARM REV CODE 636 W HCPCS: Performed by: EMERGENCY MEDICINE

## 2024-07-06 PROCEDURE — 99223 1ST HOSP IP/OBS HIGH 75: CPT | Mod: ,,, | Performed by: INTERNAL MEDICINE

## 2024-07-06 PROCEDURE — 81003 URINALYSIS AUTO W/O SCOPE: CPT | Performed by: EMERGENCY MEDICINE

## 2024-07-06 PROCEDURE — 85025 COMPLETE CBC W/AUTO DIFF WBC: CPT | Performed by: EMERGENCY MEDICINE

## 2024-07-06 RX ORDER — TALC
6 POWDER (GRAM) TOPICAL NIGHTLY PRN
Status: DISCONTINUED | OUTPATIENT
Start: 2024-07-07 | End: 2024-07-08 | Stop reason: HOSPADM

## 2024-07-06 RX ORDER — IBUPROFEN 200 MG
24 TABLET ORAL
Status: DISCONTINUED | OUTPATIENT
Start: 2024-07-07 | End: 2024-07-08 | Stop reason: HOSPADM

## 2024-07-06 RX ORDER — LISINOPRIL 10 MG/1
10 TABLET ORAL DAILY
Status: DISCONTINUED | OUTPATIENT
Start: 2024-07-07 | End: 2024-07-08 | Stop reason: HOSPADM

## 2024-07-06 RX ORDER — HYDROMORPHONE HYDROCHLORIDE 2 MG/ML
1 INJECTION, SOLUTION INTRAMUSCULAR; INTRAVENOUS; SUBCUTANEOUS
Status: COMPLETED | OUTPATIENT
Start: 2024-07-06 | End: 2024-07-06

## 2024-07-06 RX ORDER — SODIUM CHLORIDE 9 MG/ML
INJECTION, SOLUTION INTRAVENOUS CONTINUOUS
Status: DISPENSED | OUTPATIENT
Start: 2024-07-07 | End: 2024-07-07

## 2024-07-06 RX ORDER — CLOPIDOGREL BISULFATE 75 MG/1
75 TABLET ORAL DAILY
Status: DISCONTINUED | OUTPATIENT
Start: 2024-07-07 | End: 2024-07-08 | Stop reason: HOSPADM

## 2024-07-06 RX ORDER — GLUCAGON 1 MG
1 KIT INJECTION
Status: DISCONTINUED | OUTPATIENT
Start: 2024-07-07 | End: 2024-07-08 | Stop reason: HOSPADM

## 2024-07-06 RX ORDER — NALOXONE HCL 0.4 MG/ML
0.02 VIAL (ML) INJECTION
Status: DISCONTINUED | OUTPATIENT
Start: 2024-07-07 | End: 2024-07-08 | Stop reason: HOSPADM

## 2024-07-06 RX ORDER — ENOXAPARIN SODIUM 100 MG/ML
40 INJECTION SUBCUTANEOUS EVERY 24 HOURS
Status: DISCONTINUED | OUTPATIENT
Start: 2024-07-07 | End: 2024-07-08 | Stop reason: HOSPADM

## 2024-07-06 RX ORDER — ACETAMINOPHEN 325 MG/1
650 TABLET ORAL EVERY 4 HOURS PRN
Status: DISCONTINUED | OUTPATIENT
Start: 2024-07-07 | End: 2024-07-08 | Stop reason: HOSPADM

## 2024-07-06 RX ORDER — ACETAMINOPHEN 325 MG/1
650 TABLET ORAL EVERY 8 HOURS PRN
Status: DISCONTINUED | OUTPATIENT
Start: 2024-07-07 | End: 2024-07-08 | Stop reason: HOSPADM

## 2024-07-06 RX ORDER — IBUPROFEN 200 MG
16 TABLET ORAL
Status: DISCONTINUED | OUTPATIENT
Start: 2024-07-07 | End: 2024-07-08 | Stop reason: HOSPADM

## 2024-07-06 RX ORDER — POLYETHYLENE GLYCOL 3350 17 G/17G
17 POWDER, FOR SOLUTION ORAL DAILY PRN
Status: DISCONTINUED | OUTPATIENT
Start: 2024-07-07 | End: 2024-07-08 | Stop reason: HOSPADM

## 2024-07-06 RX ORDER — ASPIRIN 325 MG
50000 TABLET, DELAYED RELEASE (ENTERIC COATED) ORAL
Status: DISCONTINUED | OUTPATIENT
Start: 2024-07-09 | End: 2024-07-08

## 2024-07-06 RX ORDER — ONDANSETRON HYDROCHLORIDE 2 MG/ML
4 INJECTION, SOLUTION INTRAVENOUS
Status: COMPLETED | OUTPATIENT
Start: 2024-07-06 | End: 2024-07-06

## 2024-07-06 RX ORDER — SODIUM CHLORIDE 0.9 % (FLUSH) 0.9 %
10 SYRINGE (ML) INJECTION EVERY 12 HOURS PRN
Status: DISCONTINUED | OUTPATIENT
Start: 2024-07-07 | End: 2024-07-08 | Stop reason: HOSPADM

## 2024-07-06 RX ORDER — HYDROCODONE BITARTRATE AND ACETAMINOPHEN 7.5; 325 MG/1; MG/1
1 TABLET ORAL EVERY 6 HOURS PRN
Status: DISCONTINUED | OUTPATIENT
Start: 2024-07-06 | End: 2024-07-08 | Stop reason: HOSPADM

## 2024-07-06 RX ORDER — POLYETHYLENE GLYCOL 3350 17 G/17G
17 POWDER, FOR SOLUTION ORAL DAILY
Status: DISCONTINUED | OUTPATIENT
Start: 2024-07-07 | End: 2024-07-08 | Stop reason: HOSPADM

## 2024-07-06 RX ORDER — HYDRALAZINE HYDROCHLORIDE 20 MG/ML
10 INJECTION INTRAMUSCULAR; INTRAVENOUS EVERY 6 HOURS PRN
Status: DISCONTINUED | OUTPATIENT
Start: 2024-07-07 | End: 2024-07-08 | Stop reason: HOSPADM

## 2024-07-06 RX ORDER — ONDANSETRON HYDROCHLORIDE 2 MG/ML
4 INJECTION, SOLUTION INTRAVENOUS EVERY 8 HOURS PRN
Status: DISCONTINUED | OUTPATIENT
Start: 2024-07-07 | End: 2024-07-08 | Stop reason: HOSPADM

## 2024-07-06 RX ORDER — INSULIN ASPART 100 [IU]/ML
0-10 INJECTION, SOLUTION INTRAVENOUS; SUBCUTANEOUS
Status: DISCONTINUED | OUTPATIENT
Start: 2024-07-07 | End: 2024-07-08 | Stop reason: HOSPADM

## 2024-07-06 RX ORDER — ATORVASTATIN CALCIUM 10 MG/1
10 TABLET, FILM COATED ORAL DAILY
Status: DISCONTINUED | OUTPATIENT
Start: 2024-07-07 | End: 2024-07-08 | Stop reason: HOSPADM

## 2024-07-06 RX ORDER — LEVETIRACETAM 500 MG/1
500 TABLET ORAL DAILY
Status: DISCONTINUED | OUTPATIENT
Start: 2024-07-07 | End: 2024-07-08 | Stop reason: HOSPADM

## 2024-07-06 RX ADMIN — SODIUM CHLORIDE: 9 INJECTION, SOLUTION INTRAVENOUS at 11:07

## 2024-07-06 RX ADMIN — IOPAMIDOL 100 ML: 755 INJECTION, SOLUTION INTRAVENOUS at 08:07

## 2024-07-06 RX ADMIN — DEXTROSE MONOHYDRATE 250 ML: 100 INJECTION, SOLUTION INTRAVENOUS at 06:07

## 2024-07-06 RX ADMIN — HYDROMORPHONE HYDROCHLORIDE 1 MG: 2 INJECTION INTRAMUSCULAR; INTRAVENOUS; SUBCUTANEOUS at 09:07

## 2024-07-06 RX ADMIN — ONDANSETRON 4 MG: 2 INJECTION INTRAMUSCULAR; INTRAVENOUS at 09:07

## 2024-07-06 NOTE — Clinical Note
Diagnosis: Hypoglycemia [889645]   Future Attending Provider: CHERRIE GARCIA [[id number]]   Special Needs:: No Special Needs [1]

## 2024-07-06 NOTE — ED PROVIDER NOTES
"Encounter Date: 7/6/2024    SCRIBE #1 NOTE: I, Benito Quintero, am scribing for, and in the presence of,  Ced Nye MD. I have scribed the entire note.       History     Chief Complaint   Patient presents with    Hypoglycemia    Diarrhea     53 y.o.female presented to the ED via EMS for hypoglycemia and diarrhea.  She stated she initially ate and took her insulin and her blood pressure dropped 3 to 4 hours ago. PT stated that she didn't faint, but she had chills and confusion when the paramedics arrived. She stated her blood sugar was 40 and they got it up to 80.PT denies fever,vomit,  but has been having pebble like stool that's been going on for 2 days. PT has no HX of smoking. PT has medical HX of vitamin d deficiency, bell's palsy, irritable bowel syndrome w/ diarrhea, lumbar radiculopathy/right, type 2 diabetes, CVA, HTN, TIA, Seizure,and rectal cancer metastatic bone.        Review of patient's allergies indicates:   Allergen Reactions    Grape Anaphylaxis     Other reaction(s): Not available    Morphine sulfate Swelling    Nutritional supplements Anaphylaxis     Highly allergic to Cooked grapes    Pineapple Anaphylaxis    Opioids - morphine analogues Nausea And Vomiting    Rocephin [ceftriaxone] Itching    Aspirin      "Veins breakdown" per patient    Biguanides Diarrhea and Other (See Comments)    Dicyclomine Diarrhea    Dihydrocodeine Other (See Comments)     Bad headache in the top of head    Gabapentin Other (See Comments)     weakness  Other reaction(s): Not available  Seizures      Hydrocodone Other (See Comments)     Headache      Iron      Hemochromatosis    Iron analogues      Pt hx of hemachromatosis    Metformin      diarrhea    Penicillins Other (See Comments)     Family hx       Latex Rash and Other (See Comments)     Past Medical History:   Diagnosis Date    Bell's palsy 03/01/2023    Biliary obstruction due to malignant neoplasm 06/24/2024    CVA (cerebral vascular accident)     HTN " (hypertension)     Irritable bowel syndrome with diarrhea 09/07/2022    Lumbar radiculopathy, right 12/23/2021    Metastatic colon cancer to liver 06/28/2024    Primary hypertension 12/15/2021    Rectal cancer metastatic to bone     Restless legs syndrome 11/20/2023    Seizure     TIA (transient ischemic attack)     Type 2 diabetes mellitus without complication, with long-term current use of insulin 12/15/2021    Vitamin D deficiency 08/09/2023     Past Surgical History:   Procedure Laterality Date    COLON SURGERY      HERNIA REPAIR      HYSTERECTOMY       Family History   Problem Relation Name Age of Onset    No Known Problems Mother      No Known Problems Father      No Known Problems Sister      No Known Problems Brother      No Known Problems Daughter      No Known Problems Son      No Known Problems Maternal Aunt      No Known Problems Maternal Uncle      No Known Problems Paternal Aunt      No Known Problems Paternal Uncle      No Known Problems Maternal Grandmother      No Known Problems Maternal Grandfather      No Known Problems Paternal Grandmother      No Known Problems Paternal Grandfather       Social History     Tobacco Use    Smoking status: Never     Passive exposure: Never    Smokeless tobacco: Never   Substance Use Topics    Alcohol use: Never    Drug use: Never     Review of Systems   Constitutional:  Positive for chills. Negative for fever.   Respiratory:  Positive for cough.    Gastrointestinal:  Positive for nausea. Negative for blood in stool, diarrhea and vomiting.   Genitourinary:  Negative for difficulty urinating.   Neurological:  Positive for weakness. Negative for syncope.   Psychiatric/Behavioral:  Positive for confusion.    All other systems reviewed and are negative.      Physical Exam     Initial Vitals   BP Pulse Resp Temp SpO2   07/06/24 1832 07/06/24 1832 07/06/24 1832 07/06/24 1850 07/06/24 1832   129/70 94 17 98.6 °F (37 °C) 99 %      MAP       --                Physical  Exam    Nursing note and vitals reviewed.  Constitutional: She appears well-developed and well-nourished.   HENT:   Head: Normocephalic and atraumatic.   Mouth/Throat: Oropharynx is clear and moist.   Eyes: EOM are normal. Pupils are equal, round, and reactive to light.   Neck: Neck supple. No thyromegaly present.   Normal range of motion.  Cardiovascular:  Normal rate, regular rhythm, normal heart sounds and intact distal pulses.           No murmur heard.  Pulmonary/Chest: Effort normal and breath sounds normal. She has no wheezes.   Abdominal: Abdomen is soft. Bowel sounds are normal. She exhibits no distension. There is no abdominal tenderness.   Drainage tube ruq   Musculoskeletal:         General: No tenderness or edema. Normal range of motion.      Cervical back: Normal range of motion and neck supple.     Lymphadenopathy:     She has no cervical adenopathy.   Neurological: She is alert and oriented to person, place, and time. She has normal strength and normal reflexes. No cranial nerve deficit or sensory deficit. GCS score is 15. GCS eye subscore is 4. GCS verbal subscore is 5. GCS motor subscore is 6.   Skin: Skin is warm and dry. Capillary refill takes less than 2 seconds. No rash noted.   Psychiatric: She has a normal mood and affect.         ED Course   Procedures  Labs Reviewed   COMPREHENSIVE METABOLIC PANEL - Abnormal; Notable for the following components:       Result Value    Sodium 133 (*)     Glucose 44 (*)     BUN 4 (*)     Creatinine 0.41 (*)     Calcium 8.0 (*)     Total Protein 6.3 (*)     Albumin 2.0 (*)     Globulin 4.3 (*)     Bilirubin, Total 1.9 (*)     Alk Phos 1,779 (*)     AST 49 (*)     All other components within normal limits   URINALYSIS, REFLEX TO URINE CULTURE - Abnormal; Notable for the following components:    Glucose,  (*)     All other components within normal limits   CBC WITH DIFFERENTIAL - Abnormal; Notable for the following components:    WBC 17.13 (*)     RBC 3.27  (*)     Hemoglobin 9.7 (*)     Hematocrit 32.4 (*)     MCV 99.1 (*)     MCHC 29.9 (*)     RDW 17.6 (*)     Neutrophils % 84.3 (*)     Lymphocytes % 8.0 (*)     Eosinophils % 0.9 (*)     Immature Granulocytes % 0.9 (*)     Neutrophils, Abs 14.43 (*)     Monocytes, Absolute 0.94 (*)     Immature Granulocytes, Absolute 0.16 (*)     All other components within normal limits   POCT GLUCOSE MONITORING CONTINUOUS - Abnormal; Notable for the following components:    POC Glucose 48 (*)     All other components within normal limits   POCT GLUCOSE MONITORING CONTINUOUS - Abnormal; Notable for the following components:    POC Glucose 198 (*)     All other components within normal limits   POCT GLUCOSE MONITORING CONTINUOUS - Abnormal; Notable for the following components:    POC Glucose 116 (*)     All other components within normal limits   POCT GLUCOSE MONITORING CONTINUOUS - Abnormal; Notable for the following components:    POC Glucose 138 (*)     All other components within normal limits   CBC W/ AUTO DIFFERENTIAL    Narrative:     The following orders were created for panel order CBC auto differential.  Procedure                               Abnormality         Status                     ---------                               -----------         ------                     CBC with Differential[[id number]]       Abnormal            Final result                 Please view results for these tests on the individual orders.   POCT GLUCOSE MONITORING CONTINUOUS   POCT GLUCOSE MONITORING CONTINUOUS          Imaging Results              CT Abdomen Pelvis With IV Contrast NO Oral Contrast (Final result)  Result time 07/06/24 20:45:01      Final result by Henrique Armenta MD (07/06/24 20:45:01)                   Impression:      Interval placement of a cholecystostomy tube.  There are peripherally enhancing collections in the right hemiabdomen as detailed concerning for abscesses.    Similar appearance of the numerous pulmonary  metastases and hepatic metastases.    Small right pleural effusion has increased.    Left iliac/iliacus metastasis similar.  Retroperitoneal lymph nodes similar.      Electronically signed by: Henrique Armenta  Date:    07/06/2024  Time:    20:45               Narrative:    EXAMINATION:  CT ABDOMEN PELVIS WITH IV CONTRAST    CLINICAL HISTORY:  Abdominal abscess/infection suspected;    TECHNIQUE:  Low dose axial images, sagittal and coronal reformations were obtained from the lung bases to the pubic symphysis following the IV administration of 100 mL of Isovue 370.  Oral contrast was not given.    The CT examination was performed using one or more of the following dose reduction techniques: Automated exposure control, adjustment of the mA and kV according to patient's size, use of acute or iterative reconstruction techniques.    COMPARISON:  06/24/2024    FINDINGS:  Numerous pulmonary nodule seen of the lung bases compatible with metastatic disease.  There is a small right pleural effusion slightly increased from prior.    Innumerable hepatic metastases are seen.  CBD stent noted.    Cholecystostomy tube now present.  Gallbladder is decompressed.  There are now peripherally enhancing fluid collections in the right upper quadrant inferior and lateral to the gallbladder brain region.  There is a collection immediately inferior to the tip of the liver measuring 1.5 cm.  Additional collection more inferior along the right lateral peritoneal lining 1.5 x 3 cm.  Additional peripherally enhancing collection in the right lower quadrant mesentery measuring 2.3 cm in diameter    There is no pneumoperitoneum.  Moderate degree of colonic stool is seen.  Appendix normal.  No bowel obstruction.    There are several mildly prominent retroperitoneal lymph nodes.  In the left lower quadrant at the iliacus muscle region is a heterogeneous mass possibly emanating from the pelvic wing versus a muscular implant again seen similar to the recent  exam.  This measures over 6 cm in maximum dimension.    Urinary bladder decompressed.  Abdominal aorta normal in size.    No acute fracture.  Irregular appearance of the left ilium as above similar.                                       X-Ray Chest AP Portable (Final result)  Result time 07/06/24 19:28:26      Final result by Henrique Armenta MD (07/06/24 19:28:26)                   Impression:      Similar appearance of the chest.      Electronically signed by: Henrique Armenta  Date:    07/06/2024  Time:    19:28               Narrative:    EXAMINATION:  XR CHEST AP PORTABLE    CLINICAL HISTORY:  Hypoglycemia;    TECHNIQUE:  Single frontal view of the chest was performed.    COMPARISON:  06/30/2024    FINDINGS:  Diffuse pulmonary metastases are again seen and overall similar.  No pneumothorax or new abnormality in the chest.                                       Medications   atorvastatin tablet 10 mg (10 mg Oral Given 7/7/24 0916)   cholecalciferol (vitamin D3) 1,250 mcg (50,000 unit) capsule 50,000 Units (has no administration in time range)   clopidogreL tablet 75 mg (75 mg Oral Given 7/7/24 0916)   HYDROcodone-acetaminophen 7.5-325 mg per tablet 1 tablet (1 tablet Oral Given 7/7/24 1752)   levETIRAcetam tablet 500 mg (500 mg Oral Given 7/7/24 0916)   lisinopriL tablet 10 mg (10 mg Oral Given 7/7/24 0916)   polyethylene glycol packet 17 g (17 g Oral Given 7/7/24 0915)   sodium chloride 0.9% flush 10 mL (has no administration in time range)   melatonin tablet 6 mg (has no administration in time range)   ondansetron injection 4 mg (4 mg Intravenous Given 7/7/24 1303)   polyethylene glycol packet 17 g (has no administration in time range)   acetaminophen tablet 650 mg (has no administration in time range)   acetaminophen tablet 650 mg (has no administration in time range)   naloxone 0.4 mg/mL injection 0.02 mg (has no administration in time range)   glucose chewable tablet 16 g (has no administration in time range)    glucose chewable tablet 24 g (has no administration in time range)   glucagon (human recombinant) injection 1 mg (has no administration in time range)   enoxaparin injection 40 mg (40 mg Subcutaneous Given 7/7/24 1751)   dextrose 10% bolus 125 mL 125 mL (has no administration in time range)   dextrose 10% bolus 250 mL 250 mL (has no administration in time range)   insulin aspart U-100 injection 0-10 Units (has no administration in time range)   0.9%  NaCl infusion ( Intravenous Rate/Dose Change 7/7/24 2127)   hydrALAZINE injection 10 mg (has no administration in time range)   HYDROmorphone (PF) injection 1 mg (1 mg Intravenous Given 7/7/24 2043)   mupirocin 2 % ointment ( Topical (Top) Given 7/7/24 2042)   dextrose 10% bolus 250 mL 250 mL (0 mLs Intravenous Stopped 7/6/24 1905)   iopamidoL (ISOVUE-370) injection 100 mL (100 mLs Intravenous Given 7/6/24 2007)   HYDROmorphone (PF) injection 1 mg (1 mg Intravenous Given 7/6/24 2147)   ondansetron injection 4 mg (4 mg Intravenous Given 7/6/24 2147)     Medical Decision Making  Amount and/or Complexity of Data Reviewed  Labs: ordered.  Radiology: ordered.    Risk  Prescription drug management.              Attending Attestation:           Physician Attestation for Scribe:  Physician Attestation Statement for Scribe #1: I, Ced Nye MD, reviewed documentation, as scribed by Benito Serrano in my presence, and it is both accurate and complete.             ED Course as of 07/07/24 2256   Sat Jul 06, 2024   1836 Decision-making:  Differential diagnosis includes hypoglycemia, UTI, pneumonia, dehydration.  All testing ordered and interpreted by me. [BB]   2019 CMP is unremarkable except hyperglycemia as well as elevated bilirubin and alkaline phos which is around baseline when compared to review of outside medical record.  CBC shows elevated [place] of [address].  Will obtain CT abdomen. [BB]   2102 CT abdomen shows no acute findings. [BB]   2105 Urinalysis is  negative. [BB]   2106 Chest x-ray by my interpretation shows metastatic disease, no significant change from previous. [BB]   2114 Blood sugar has stabilized however patient is uncomfortable going home.  I discussed admission as observation with hospitalist who agrees. [BB]      ED Course User Index  [BB] Ced Nye MD                             Clinical Impression:  Final diagnoses:  [E16.2] Hypoglycemia (Primary)  [K59.00] Constipation, unspecified constipation type  [C79.9] Metastatic malignant neoplasm, unspecified site  [K62.89] Rectal pain          ED Disposition Condition    Observation Stable                Ced Nye MD  07/07/24 2465

## 2024-07-07 LAB
ANION GAP SERPL CALCULATED.3IONS-SCNC: 11 MMOL/L (ref 7–16)
BASOPHILS # BLD AUTO: 0.04 K/UL (ref 0–0.2)
BASOPHILS NFR BLD AUTO: 0.3 % (ref 0–1)
BUN SERPL-MCNC: 4 MG/DL (ref 7–18)
BUN/CREAT SERPL: 10 (ref 6–20)
CALCIUM SERPL-MCNC: 8.4 MG/DL (ref 8.5–10.1)
CHLORIDE SERPL-SCNC: 100 MMOL/L (ref 98–107)
CO2 SERPL-SCNC: 29 MMOL/L (ref 21–32)
CREAT SERPL-MCNC: 0.4 MG/DL (ref 0.55–1.02)
DIFFERENTIAL METHOD BLD: ABNORMAL
EGFR (NO RACE VARIABLE) (RUSH/TITUS): 119 ML/MIN/1.73M2
EOSINOPHIL # BLD AUTO: 0.17 K/UL (ref 0–0.5)
EOSINOPHIL NFR BLD AUTO: 1.4 % (ref 1–4)
ERYTHROCYTE [DISTWIDTH] IN BLOOD BY AUTOMATED COUNT: 17.9 % (ref 11.5–14.5)
GLUCOSE SERPL-MCNC: 104 MG/DL (ref 70–105)
GLUCOSE SERPL-MCNC: 110 MG/DL (ref 70–105)
GLUCOSE SERPL-MCNC: 112 MG/DL (ref 70–105)
GLUCOSE SERPL-MCNC: 69 MG/DL (ref 74–106)
GLUCOSE SERPL-MCNC: 82 MG/DL (ref 70–105)
GLUCOSE SERPL-MCNC: 89 MG/DL (ref 70–105)
GLUCOSE SERPL-MCNC: 92 MG/DL (ref 70–105)
GLUCOSE SERPL-MCNC: 99 MG/DL (ref 70–105)
GLUCOSE SERPL-MCNC: 99 MG/DL (ref 70–105)
HCT VFR BLD AUTO: 33.8 % (ref 38–47)
HGB BLD-MCNC: 10 G/DL (ref 12–16)
IMM GRANULOCYTES # BLD AUTO: 0.1 K/UL (ref 0–0.04)
IMM GRANULOCYTES NFR BLD: 0.8 % (ref 0–0.4)
LYMPHOCYTES # BLD AUTO: 1.2 K/UL (ref 1–4.8)
LYMPHOCYTES NFR BLD AUTO: 10.1 % (ref 27–41)
MCH RBC QN AUTO: 29.5 PG (ref 27–31)
MCHC RBC AUTO-ENTMCNC: 29.6 G/DL (ref 32–36)
MCV RBC AUTO: 99.7 FL (ref 80–96)
MONOCYTES # BLD AUTO: 0.56 K/UL (ref 0–0.8)
MONOCYTES NFR BLD AUTO: 4.7 % (ref 2–6)
MPC BLD CALC-MCNC: 11.3 FL (ref 9.4–12.4)
NEUTROPHILS # BLD AUTO: 9.79 K/UL (ref 1.8–7.7)
NEUTROPHILS NFR BLD AUTO: 82.7 % (ref 53–65)
NRBC # BLD AUTO: 0 X10E3/UL
NRBC, AUTO (.00): 0 %
PLATELET # BLD AUTO: 265 K/UL (ref 150–400)
POTASSIUM SERPL-SCNC: 4 MMOL/L (ref 3.5–5.1)
RBC # BLD AUTO: 3.39 M/UL (ref 4.2–5.4)
SODIUM SERPL-SCNC: 136 MMOL/L (ref 136–145)
WBC # BLD AUTO: 11.86 K/UL (ref 4.5–11)

## 2024-07-07 PROCEDURE — G0378 HOSPITAL OBSERVATION PER HR: HCPCS

## 2024-07-07 PROCEDURE — 63600175 PHARM REV CODE 636 W HCPCS: Performed by: INTERNAL MEDICINE

## 2024-07-07 PROCEDURE — 36415 COLL VENOUS BLD VENIPUNCTURE: CPT

## 2024-07-07 PROCEDURE — 96372 THER/PROPH/DIAG INJ SC/IM: CPT

## 2024-07-07 PROCEDURE — 80048 BASIC METABOLIC PNL TOTAL CA: CPT

## 2024-07-07 PROCEDURE — 63600175 PHARM REV CODE 636 W HCPCS

## 2024-07-07 PROCEDURE — 25000003 PHARM REV CODE 250: Performed by: HOSPITALIST

## 2024-07-07 PROCEDURE — 99233 SBSQ HOSP IP/OBS HIGH 50: CPT | Mod: ,,, | Performed by: HOSPITALIST

## 2024-07-07 PROCEDURE — 25000003 PHARM REV CODE 250

## 2024-07-07 PROCEDURE — 85025 COMPLETE CBC W/AUTO DIFF WBC: CPT

## 2024-07-07 RX ORDER — MUPIROCIN 20 MG/G
OINTMENT TOPICAL 2 TIMES DAILY
Status: DISCONTINUED | OUTPATIENT
Start: 2024-07-07 | End: 2024-07-08 | Stop reason: HOSPADM

## 2024-07-07 RX ORDER — HYDROMORPHONE HYDROCHLORIDE 2 MG/ML
1 INJECTION, SOLUTION INTRAMUSCULAR; INTRAVENOUS; SUBCUTANEOUS EVERY 6 HOURS PRN
Status: DISCONTINUED | OUTPATIENT
Start: 2024-07-07 | End: 2024-07-08 | Stop reason: HOSPADM

## 2024-07-07 RX ADMIN — ONDANSETRON 4 MG: 2 INJECTION INTRAMUSCULAR; INTRAVENOUS at 01:07

## 2024-07-07 RX ADMIN — ATORVASTATIN CALCIUM 10 MG: 10 TABLET, FILM COATED ORAL at 09:07

## 2024-07-07 RX ADMIN — HYDROCODONE BITARTRATE AND ACETAMINOPHEN 1 TABLET: 7.5; 325 TABLET ORAL at 05:07

## 2024-07-07 RX ADMIN — CLOPIDOGREL BISULFATE 75 MG: 75 TABLET ORAL at 09:07

## 2024-07-07 RX ADMIN — SODIUM CHLORIDE: 9 INJECTION, SOLUTION INTRAVENOUS at 01:07

## 2024-07-07 RX ADMIN — POLYETHYLENE GLYCOL 3350 17 G: 17 POWDER, FOR SOLUTION ORAL at 09:07

## 2024-07-07 RX ADMIN — HYDROCODONE BITARTRATE AND ACETAMINOPHEN 1 TABLET: 7.5; 325 TABLET ORAL at 09:07

## 2024-07-07 RX ADMIN — MUPIROCIN: 20 OINTMENT TOPICAL at 09:07

## 2024-07-07 RX ADMIN — HYDROMORPHONE HYDROCHLORIDE 1 MG: 2 INJECTION INTRAMUSCULAR; INTRAVENOUS; SUBCUTANEOUS at 08:07

## 2024-07-07 RX ADMIN — LISINOPRIL 10 MG: 10 TABLET ORAL at 09:07

## 2024-07-07 RX ADMIN — ENOXAPARIN SODIUM 40 MG: 40 INJECTION SUBCUTANEOUS at 05:07

## 2024-07-07 RX ADMIN — MUPIROCIN: 20 OINTMENT TOPICAL at 08:07

## 2024-07-07 RX ADMIN — HYDROMORPHONE HYDROCHLORIDE 1 MG: 2 INJECTION INTRAMUSCULAR; INTRAVENOUS; SUBCUTANEOUS at 04:07

## 2024-07-07 RX ADMIN — LEVETIRACETAM 500 MG: 500 TABLET, FILM COATED ORAL at 09:07

## 2024-07-07 NOTE — SUBJECTIVE & OBJECTIVE
"Past Medical History:   Diagnosis Date    Bell's palsy 03/01/2023    Biliary obstruction due to malignant neoplasm 06/24/2024    CVA (cerebral vascular accident)     HTN (hypertension)     Irritable bowel syndrome with diarrhea 09/07/2022    Lumbar radiculopathy, right 12/23/2021    Metastatic colon cancer to liver 06/28/2024    Primary hypertension 12/15/2021    Rectal cancer metastatic to bone     Restless legs syndrome 11/20/2023    Seizure     TIA (transient ischemic attack)     Type 2 diabetes mellitus without complication, with long-term current use of insulin 12/15/2021    Vitamin D deficiency 08/09/2023       Past Surgical History:   Procedure Laterality Date    COLON SURGERY      HERNIA REPAIR      HYSTERECTOMY         Review of patient's allergies indicates:   Allergen Reactions    Grape Anaphylaxis     Other reaction(s): Not available    Morphine sulfate Swelling    Nutritional supplements Anaphylaxis     Highly allergic to Cooked grapes    Pineapple Anaphylaxis    Opioids - morphine analogues Nausea And Vomiting    Rocephin [ceftriaxone] Itching    Aspirin      "Veins breakdown" per patient    Biguanides Diarrhea and Other (See Comments)    Dicyclomine Diarrhea    Dihydrocodeine Other (See Comments)     Bad headache in the top of head    Gabapentin Other (See Comments)     weakness  Other reaction(s): Not available  Seizures      Hydrocodone Other (See Comments)     Headache      Iron      Hemochromatosis    Iron analogues      Pt hx of hemachromatosis    Metformin      diarrhea    Penicillins Other (See Comments)     Family hx       Latex Rash and Other (See Comments)       No current facility-administered medications on file prior to encounter.     Current Outpatient Medications on File Prior to Encounter   Medication Sig    atorvastatin (LIPITOR) 10 MG tablet Take 1 tablet (10 mg total) by mouth once daily.    blood sugar diagnostic (ONETOUCH ULTRA TEST) Strp check blood sugar TWICE DAILY    " capecitabine (XELODA) 500 MG Tab Take 3 tablets twice daily for 7 days on and 7 days off.    cholecalciferol, vitamin D3, 1,250 mcg (50,000 unit) capsule Take 1 capsule (50,000 Units total) by mouth every 7 days.    clopidogreL (PLAVIX) 75 mg tablet Take 75 mg by mouth once daily.    HYDROcodone-acetaminophen (NORCO) 7.5-325 mg per tablet Take 1 tablet by mouth every 6 (six) hours as needed for Pain.    insulin aspart U-100 (NOVOLOG) 100 unit/mL injection inject as directed by your physician per sliding scale    insulin glargine (LANTUS U-100 INSULIN) 100 unit/mL injection Inject 80 Units into the skin once daily. (Patient taking differently: Inject 82 Units into the skin once daily.)    insulin syringe-needle U-100 1 mL 30 gauge x 5/16 Syrg Use as directed.    levETIRAcetam (KEPPRA) 500 MG Tab TAKE ONE TABLET BY MOUTH ONCE DAILY    lisinopriL 10 MG tablet Take 1 tablet (10 mg total) by mouth once daily.    potassium chloride 10% (KAYCIEL) 20 mEq/15 mL oral solution Take 20 mEq by mouth 2 (two) times daily.     Family History       Problem Relation (Age of Onset)    No Known Problems Mother, Father, Sister, Brother, Daughter, Son, Maternal Aunt, Maternal Uncle, Paternal Aunt, Paternal Uncle, Maternal Grandmother, Maternal Grandfather, Paternal Grandmother, Paternal Grandfather          Tobacco Use    Smoking status: Never     Passive exposure: Never    Smokeless tobacco: Never   Substance and Sexual Activity    Alcohol use: Never    Drug use: Never    Sexual activity: Never     Review of Systems   Constitutional:  Positive for diaphoresis. Negative for chills and fever.   HENT:  Negative for trouble swallowing.    Eyes:  Negative for visual disturbance.   Respiratory:  Negative for shortness of breath.    Cardiovascular:  Negative for chest pain and palpitations.   Gastrointestinal:  Positive for rectal pain (during BM). Negative for abdominal pain, diarrhea, nausea and vomiting.   Genitourinary:  Negative for  difficulty urinating.   Musculoskeletal:  Negative for back pain.   Skin:  Negative for wound.   Neurological:  Positive for dizziness. Negative for syncope.   Psychiatric/Behavioral:  Negative for sleep disturbance.      Objective:     Vital Signs (Most Recent):  Temp: 98.6 °F (37 °C) (07/06/24 1850)  Pulse: 101 (07/06/24 2105)  Resp: 18 (07/06/24 2147)  BP: (!) 152/83 (07/06/24 2105)  SpO2: (!) 94 % (07/06/24 2105) Vital Signs (24h Range):  Temp:  [98.6 °F (37 °C)] 98.6 °F (37 °C)  Pulse:  [] 101  Resp:  [17-18] 18  SpO2:  [94 %-99 %] 94 %  BP: (119-152)/(69-83) 152/83     Weight: 63 kg (139 lb)  Body mass index is 30.08 kg/m².     Physical Exam  Vitals and nursing note reviewed.   Constitutional:       General: She is not in acute distress.     Appearance: She is obese. She is ill-appearing. She is not toxic-appearing or diaphoretic.   HENT:      Head: Normocephalic and atraumatic.      Right Ear: External ear normal.      Left Ear: External ear normal.      Nose: Nose normal. No rhinorrhea.      Mouth/Throat:      Mouth: Mucous membranes are dry.      Pharynx: Oropharynx is clear. No oropharyngeal exudate or posterior oropharyngeal erythema.   Eyes:      General: No scleral icterus.        Right eye: No discharge.         Left eye: No discharge.      Extraocular Movements: Extraocular movements intact.      Conjunctiva/sclera: Conjunctivae normal.      Pupils: Pupils are equal, round, and reactive to light.   Cardiovascular:      Rate and Rhythm: Normal rate and regular rhythm.      Pulses: Normal pulses.      Heart sounds: Normal heart sounds. No murmur heard.  Pulmonary:      Effort: Pulmonary effort is normal. No respiratory distress.      Breath sounds: Normal breath sounds. No wheezing.   Abdominal:      General: Bowel sounds are normal.      Palpations: Abdomen is soft.      Comments: With cholecystostomy drain in place   Musculoskeletal:         General: Swelling present. No tenderness or deformity.       Cervical back: Neck supple.      Right lower leg: Edema present.      Left lower leg: Edema present.   Skin:     General: Skin is warm and dry.      Capillary Refill: Capillary refill takes less than 2 seconds.      Coloration: Skin is not jaundiced.      Findings: No lesion.   Neurological:      Mental Status: She is alert and oriented to person, place, and time.   Psychiatric:         Mood and Affect: Mood normal.         Behavior: Behavior normal.              CRANIAL NERVES     CN III, IV, VI   Pupils are equal, round, and reactive to light.       Significant Labs: All pertinent labs within the past 24 hours have been reviewed.    Significant Imaging: I have reviewed all pertinent imaging results/findings within the past 24 hours.

## 2024-07-07 NOTE — ASSESSMENT & PLAN NOTE
During BM for the past 2 days, new to patient  Trial of Miralax qd (patient prefers conservative treatment for now unless no relief)  Consider enema if no relief

## 2024-07-07 NOTE — ASSESSMENT & PLAN NOTE
WBC 17.51 on 6/27/24, 18 on 6/28/24, 10 on 7/1/24, 17.13 today upon admission  Can be related to metastatic cancer  No known source of infection upon admission  UA negative for UTI, CXR unchanged from previous  CT CAP no acute findings  Monitor CBC daily

## 2024-07-07 NOTE — ASSESSMENT & PLAN NOTE
Patient has hyponatremia which is uncontrolled,We will aim to correct the sodium by 4-6mEq in 24 hours. We will monitor sodium Daily. The hyponatremia is due to Dehydration/hypovolemia and polydipsia. We will obtain the following studies: BMP for now. We will treat the hyponatremia with IV fluids as follows: NS@100cc/hr. The patient's sodium results have been reviewed and are listed below.  Recent Labs   Lab 07/06/24  1840   *     Likely 2/2 polydipsia vs diarrhea episode vs poor PO intake from colon CA with mets to liver, lung  NS@100cc/hr for 1 day  Monitor BMP and replace as needed

## 2024-07-07 NOTE — ASSESSMENT & PLAN NOTE
With cholecystostomy drain in place per surgery last admission (discharged 6/27/24)  GB wound culture grew MRSA sensitive to Bactrim, completed Bactrim DS bid course on 7/4/24  F/u surgery OP

## 2024-07-07 NOTE — ASSESSMENT & PLAN NOTE
Patient has metastatic cancer of colon primary. The cancer has metastasized to liver, lung. The patient is under the care of an outpatient oncologist. The patient is undergoing active chemotherapy as follows- [unfilled] .Their staging information is listed below.   Cancer Staging   No matching staging information was found for the patient.    Sees Dr. Mily Ryan oncology at [facility]   On home Xeloda 500mg 3 tabs bid 7 days on, 7 days off  Follow-up with Oncology/Surgical Oncology including chemotherapy as scheduled on 7/8/24   Patient is on palliative chemotherapy. Prognosis is poor

## 2024-07-07 NOTE — ASSESSMENT & PLAN NOTE
Glucose in the 40s likely 2/2 taking higher dose of basal insulin then needed at home  On 40 units basal insulin at home, will likely need to adjust to around 10-20 units when ready for DC home.  Improved to wnl after D10 250cc bolus in ED  POCT glucose tid before meals and qhs  SSI prn for now  Regular diet  Monitor and admitted for observation, will likely discharge soon if glucose stable.  Pt prefers going home at discharge.

## 2024-07-07 NOTE — ASSESSMENT & PLAN NOTE
Sees Dr. Mily Ryan oncology at [facility]   On home Xeloda 500mg 3 tabs bid 7 days on, 7 days off  Off Xeloda this entire week per patient  Follow-up with Oncology/Surgical Oncology including chemotherapy as scheduled on 7/8/24   Patient is on palliative chemotherapy. Prognosis is poor

## 2024-07-07 NOTE — ASSESSMENT & PLAN NOTE
"Patient's FSGs are uncontrolled due to hypoglycemia on current medication regimen.  Last A1c reviewed-   Lab Results   Component Value Date    HGBA1C 6.7 (H) 04/09/2024     Most recent fingerstick glucose reviewed- No results for input(s): "POCTGLUCOSE" in the last 24 hours.  Current correctional scale  Medium  Maintain anti-hyperglycemic dose as follows-   Antihyperglycemics (From admission, onward)      Start     Stop Route Frequency Ordered    07/07/24 0007  insulin aspart U-100 injection 0-10 Units         -- SubQ Before meals & nightly PRN 07/06/24 2308          Hold Oral hypoglycemics while patient is in the hospital.  On 40 units basal insulin at home and SSI prn  Hold home basal insulin  SSI prn  Patient will likely benefit from taking lower doses of basal insulin likely around 10-20 units at discharge and increase as needed at PCP's f/u.  "

## 2024-07-07 NOTE — ED NOTES
Linens and diaper changed due to urinary and bowel incontinence. Skin protectant reapplied to red areas

## 2024-07-07 NOTE — H&P
[facility] - Emergency Department  Hospital Medicine  History & Physical    Patient Name: Hayley Landis  MRN: [id number]  Patient Class: OP- Observation  Admission Date: 7/6/2024  Attending Physician: Jonatan Briones MD   Primary Care Provider: Elkin Coombs MD         Patient information was obtained from patient, past medical records, and ER records.     Subjective:     Principal Problem:Hypoglycemia    Chief Complaint:   Chief Complaint   Patient presents with    Hypoglycemia    Diarrhea        HPI: 54yo female with a PMH of stage IV colon cancer with mets to the liver and lung status post partial colectomy followed by reanastomosis, rectal cancer with mets to bones, malignant biliary obstruction status post stent placement, cholecystitis s/p GB drain placement, type 2 diabetes, essential hypertension, and partial seizure who is undergoing palliative chemotherapy who presents to [facility] ED with hypoglycemia. Patient was hypoglycemic at home with glucose in the 40s earlier today with diaphoresis around the same time. She had dizziness at the time that has resolved after glucose improved. Denies f/c/cp/sob/n/v/d. Denies appetite, urinary, or bowel habit changes.     Of note, patient has had multiple recent ER visits and hospitalizations last one discharged on 7/1/24 and was admitted for hypoglycemia. Patient's glucose improved in the ED but she feels uncomfortable going home now. She endorses significant rectal pain with bowel movement with pebbly grainy stool (may benefit from enema). Last chemotherapy was about 1 month ago, next oncology appointment on 7/8/24 in [place].    Upon presentation, VS unremarkable. Labs remarkable for WBC 17.13, H/H 9.7/32.4 (Hgb 10.2 on 6/27/24), Na 133, BUN/Cr 4/0.41, , glucose 44. POCT glucose 48, 198, 116. CXR diffuse pulmonary metastases seen but similar to previous study and no acute findings. CT CAP shows interval placement of a cholecystostomy tube. There are  "peripherally enhancing collections in the right hemiabdomen as detailed concerning for abscesses. Similar appearance of the numerous pulmonary metastases and hepatic metastases. Small right pleural effusion has increased. Left iliac/iliacus metastasis similar. Retroperitoneal lymph nodes similar. Patient received D10 bolus IV 250cc, Dilaudid 1 IV, Zofran 4 IV. She is admitted to hospital medicine for observation of her hypoglycemia, further management, and care.    Past Medical History:   Diagnosis Date    Bell's palsy 03/01/2023    Biliary obstruction due to malignant neoplasm 06/24/2024    CVA (cerebral vascular accident)     HTN (hypertension)     Irritable bowel syndrome with diarrhea 09/07/2022    Lumbar radiculopathy, right 12/23/2021    Metastatic colon cancer to liver 06/28/2024    Primary hypertension 12/15/2021    Rectal cancer metastatic to bone     Restless legs syndrome 11/20/2023    Seizure     TIA (transient ischemic attack)     Type 2 diabetes mellitus without complication, with long-term current use of insulin 12/15/2021    Vitamin D deficiency 08/09/2023       Past Surgical History:   Procedure Laterality Date    COLON SURGERY      HERNIA REPAIR      HYSTERECTOMY         Review of patient's allergies indicates:   Allergen Reactions    Grape Anaphylaxis     Other reaction(s): Not available    Morphine sulfate Swelling    Nutritional supplements Anaphylaxis     Highly allergic to Cooked grapes    Pineapple Anaphylaxis    Opioids - morphine analogues Nausea And Vomiting    Rocephin [ceftriaxone] Itching    Aspirin      "Veins breakdown" per patient    Biguanides Diarrhea and Other (See Comments)    Dicyclomine Diarrhea    Dihydrocodeine Other (See Comments)     Bad headache in the top of head    Gabapentin Other (See Comments)     weakness  Other reaction(s): Not available  Seizures      Hydrocodone Other (See Comments)     Headache      Iron      Hemochromatosis    Iron analogues      Pt hx of " hemachromatosis    Metformin      diarrhea    Penicillins Other (See Comments)     Family hx       Latex Rash and Other (See Comments)       No current facility-administered medications on file prior to encounter.     Current Outpatient Medications on File Prior to Encounter   Medication Sig    atorvastatin (LIPITOR) 10 MG tablet Take 1 tablet (10 mg total) by mouth once daily.    blood sugar diagnostic (ONETOUCH ULTRA TEST) Strp check blood sugar TWICE DAILY    capecitabine (XELODA) 500 MG Tab Take 3 tablets twice daily for 7 days on and 7 days off.    cholecalciferol, vitamin D3, 1,250 mcg (50,000 unit) capsule Take 1 capsule (50,000 Units total) by mouth every 7 days.    clopidogreL (PLAVIX) 75 mg tablet Take 75 mg by mouth once daily.    HYDROcodone-acetaminophen (NORCO) 7.5-325 mg per tablet Take 1 tablet by mouth every 6 (six) hours as needed for Pain.    insulin aspart U-100 (NOVOLOG) 100 unit/mL injection inject as directed by your physician per sliding scale    insulin glargine (LANTUS U-100 INSULIN) 100 unit/mL injection Inject 80 Units into the skin once daily. (Patient taking differently: Inject 82 Units into the skin once daily.)    insulin syringe-needle U-100 1 mL 30 gauge x 5/16 Syrg Use as directed.    levETIRAcetam (KEPPRA) 500 MG Tab TAKE ONE TABLET BY MOUTH ONCE DAILY    lisinopriL 10 MG tablet Take 1 tablet (10 mg total) by mouth once daily.    potassium chloride 10% (KAYCIEL) 20 mEq/15 mL oral solution Take 20 mEq by mouth 2 (two) times daily.     Family History       Problem Relation (Age of Onset)    No Known Problems Mother, Father, Sister, Brother, Daughter, Son, Maternal Aunt, Maternal Uncle, Paternal Aunt, Paternal Uncle, Maternal Grandmother, Maternal Grandfather, Paternal Grandmother, Paternal Grandfather          Tobacco Use    Smoking status: Never     Passive exposure: Never    Smokeless tobacco: Never   Substance and Sexual Activity    Alcohol use: Never    Drug use: Never    Sexual  activity: Never     Review of Systems   Constitutional:  Positive for diaphoresis. Negative for chills and fever.   HENT:  Negative for trouble swallowing.    Eyes:  Negative for visual disturbance.   Respiratory:  Negative for shortness of breath.    Cardiovascular:  Negative for chest pain and palpitations.   Gastrointestinal:  Positive for rectal pain (during BM). Negative for abdominal pain, diarrhea, nausea and vomiting.   Genitourinary:  Negative for difficulty urinating.   Musculoskeletal:  Negative for back pain.   Skin:  Negative for wound.   Neurological:  Positive for dizziness. Negative for syncope.   Psychiatric/Behavioral:  Negative for sleep disturbance.      Objective:     Vital Signs (Most Recent):  Temp: 98.6 °F (37 °C) (07/06/24 1850)  Pulse: 101 (07/06/24 2105)  Resp: 18 (07/06/24 2147)  BP: (!) 152/83 (07/06/24 2105)  SpO2: (!) 94 % (07/06/24 2105) Vital Signs (24h Range):  Temp:  [98.6 °F (37 °C)] 98.6 °F (37 °C)  Pulse:  [] 101  Resp:  [17-18] 18  SpO2:  [94 %-99 %] 94 %  BP: (119-152)/(69-83) 152/83     Weight: 63 kg (139 lb)  Body mass index is 30.08 kg/m².     Physical Exam  Vitals and nursing note reviewed.   Constitutional:       General: She is not in acute distress.     Appearance: She is obese. She is ill-appearing. She is not toxic-appearing or diaphoretic.   HENT:      Head: Normocephalic and atraumatic.      Right Ear: External ear normal.      Left Ear: External ear normal.      Nose: Nose normal. No rhinorrhea.      Mouth/Throat:      Mouth: Mucous membranes are dry.      Pharynx: Oropharynx is clear. No oropharyngeal exudate or posterior oropharyngeal erythema.   Eyes:      General: No scleral icterus.        Right eye: No discharge.         Left eye: No discharge.      Extraocular Movements: Extraocular movements intact.      Conjunctiva/sclera: Conjunctivae normal.      Pupils: Pupils are equal, round, and reactive to light.   Cardiovascular:      Rate and Rhythm: Normal  rate and regular rhythm.      Pulses: Normal pulses.      Heart sounds: Normal heart sounds. No murmur heard.  Pulmonary:      Effort: Pulmonary effort is normal. No respiratory distress.      Breath sounds: Normal breath sounds. No wheezing.   Abdominal:      General: Bowel sounds are normal.      Palpations: Abdomen is soft.      Comments: With cholecystostomy drain in place   Musculoskeletal:         General: Swelling present. No tenderness or deformity.      Cervical back: Neck supple.      Right lower leg: Edema present.      Left lower leg: Edema present.   Skin:     General: Skin is warm and dry.      Capillary Refill: Capillary refill takes less than 2 seconds.      Coloration: Skin is not jaundiced.      Findings: No lesion.   Neurological:      Mental Status: She is alert and oriented to person, place, and time.   Psychiatric:         Mood and Affect: Mood normal.         Behavior: Behavior normal.              CRANIAL NERVES     CN III, IV, VI   Pupils are equal, round, and reactive to light.       Significant Labs: All pertinent labs within the past 24 hours have been reviewed.    Significant Imaging: I have reviewed all pertinent imaging results/findings within the past 24 hours.  Assessment/Plan:     * Hypoglycemia  Glucose in the 40s likely 2/2 taking higher dose of basal insulin then needed at home  On 40 units basal insulin at home, will likely need to adjust to around 10-20 units when ready for DC home.  Improved to wnl after D10 250cc bolus in ED  POCT glucose tid before meals and qhs  SSI prn for now  Regular diet  Monitor and admitted for observation, will likely discharge soon if glucose stable.  Pt prefers going home at discharge.      Type 2 diabetes mellitus without complication, with long-term current use of insulin  Patient's FSGs are uncontrolled due to hypoglycemia on current medication regimen.  Last A1c reviewed-   Lab Results   Component Value Date    HGBA1C 6.7 (H) 04/09/2024  "    Most recent fingerstick glucose reviewed- No results for input(s): "POCTGLUCOSE" in the last 24 hours.  Current correctional scale  Medium  Maintain anti-hyperglycemic dose as follows-   Antihyperglycemics (From admission, onward)      Start     Stop Route Frequency Ordered    07/07/24 0007  insulin aspart U-100 injection 0-10 Units         -- SubQ Before meals & nightly PRN 07/06/24 2308          Hold Oral hypoglycemics while patient is in the hospital.  On 40 units basal insulin at home and SSI prn  Hold home basal insulin  SSI prn  Patient will likely benefit from taking lower doses of basal insulin likely around 10-20 units at discharge and increase as needed at PCP's f/u.    Leukocytosis  WBC 17.51 on 6/27/24, 18 on 6/28/24, 10 on 7/1/24, 17.13 today upon admission  Can be related to metastatic cancer  No known source of infection upon admission  UA negative for UTI, CXR unchanged from previous  CT CAP no acute findings  Monitor CBC daily      Rectal pain  During BM for the past 2 days, new to patient  Trial of Miralax qd (patient prefers conservative treatment for now unless no relief)  Consider enema if no relief        Hyponatremia  Patient has hyponatremia which is uncontrolled,We will aim to correct the sodium by 4-6mEq in 24 hours. We will monitor sodium Daily. The hyponatremia is due to Dehydration/hypovolemia and polydipsia. We will obtain the following studies: BMP for now. We will treat the hyponatremia with IV fluids as follows: NS@100cc/hr. The patient's sodium results have been reviewed and are listed below.  Recent Labs   Lab 07/06/24  1840   *     Likely 2/2 polydipsia vs diarrhea episode vs poor PO intake from colon CA with mets to liver, lung  NS@100cc/hr for 1 day  Monitor BMP and replace as needed    Acalculous cholecystitis  With cholecystostomy drain in place per surgery last admission (discharged 6/27/24)  GB wound culture grew MRSA sensitive to Bactrim, completed Bactrim DS bid " course on 7/4/24  F/u surgery OP      Rectal cancer metastasized to bone  Sees Dr. Mily Ryan oncology at [facility] [place] [facility]   On home Xeloda 500mg 3 tabs bid 7 days on, 7 days off  Off Xeloda this entire week per patient  Follow-up with Oncology/Surgical Oncology including chemotherapy as scheduled on 7/8/24   Patient is on palliative chemotherapy. Prognosis is poor         Metastatic colon cancer to liver  Patient has metastatic cancer of colon primary. The cancer has metastasized to liver, lung. The patient is under the care of an outpatient oncologist. The patient is undergoing active chemotherapy as follows- [unfilled] .Their staging information is listed below.   Cancer Staging   No matching staging information was found for the patient.    Sees Dr. Mily Ryan oncology at [facility] OP   On home Xeloda 500mg 3 tabs bid 7 days on, 7 days off  Follow-up with Oncology/Surgical Oncology including chemotherapy as scheduled on 7/8/24   Patient is on palliative chemotherapy. Prognosis is poor     Primary hypertension  Chronic, controlled. Latest blood pressure and vitals reviewed-     Temp:  [98.6 °F (37 °C)]   Pulse:  []   Resp:  [16-18]   BP: (117-152)/(57-83)   SpO2:  [94 %-99 %] .   Home meds for hypertension were reviewed and noted below.   Hypertension Medications               lisinopriL 10 MG tablet Take 1 tablet (10 mg total) by mouth once daily.            While in the hospital, will manage blood pressure as follows; Continue home antihypertensive regimen    Will utilize p.r.n. blood pressure medication only if patient's blood pressure greater than 180/110 and she develops symptoms such as worsening chest pain or shortness of breath.    History of CVA (cerebrovascular accident)  Home plavix, lipitor    Partial seizure  Home Keppra 500 qd  Pt reports compliance at home      Vitamin D deficiency  Home vit d        VTE Risk Mitigation (From admission, onward)            Ordered     enoxaparin injection 40 mg  Daily         07/06/24 2306     IP VTE HIGH RISK PATIENT  Once         07/06/24 2306     Place sequential compression device  Until discontinued         07/06/24 2306                           On 07/06/2024, patient should be placed in hospital observation services under my care in collaboration with Dr. Jonatan Briones.         Elfego Chau DO  Department of Hospital Medicine  [facility] - Emergency Department

## 2024-07-07 NOTE — PLAN OF CARE
Problem: Adult Inpatient Plan of Care  Goal: Plan of Care Review  Outcome: Progressing  Goal: Absence of Hospital-Acquired Illness or Injury  Outcome: Progressing  Goal: Optimal Comfort and Wellbeing  Outcome: Progressing  Goal: Readiness for Transition of Care  Outcome: Progressing     Problem: Diabetes Comorbidity  Goal: Blood Glucose Level Within Targeted Range  Outcome: Progressing     Problem: Skin Injury Risk Increased  Goal: Skin Health and Integrity  Outcome: Progressing     Problem: Adult Inpatient Plan of Care  Goal: Plan of Care Review  Outcome: Progressing

## 2024-07-07 NOTE — ASSESSMENT & PLAN NOTE
Chronic, controlled. Latest blood pressure and vitals reviewed-     Temp:  [98.6 °F (37 °C)]   Pulse:  []   Resp:  [16-18]   BP: (117-152)/(57-83)   SpO2:  [94 %-99 %] .   Home meds for hypertension were reviewed and noted below.   Hypertension Medications               lisinopriL 10 MG tablet Take 1 tablet (10 mg total) by mouth once daily.            While in the hospital, will manage blood pressure as follows; Continue home antihypertensive regimen    Will utilize p.r.n. blood pressure medication only if patient's blood pressure greater than 180/110 and she develops symptoms such as worsening chest pain or shortness of breath.

## 2024-07-08 VITALS
OXYGEN SATURATION: 95 % | TEMPERATURE: 98 F | BODY MASS INDEX: 29.99 KG/M2 | HEIGHT: 57 IN | RESPIRATION RATE: 18 BRPM | DIASTOLIC BLOOD PRESSURE: 96 MMHG | HEART RATE: 102 BPM | WEIGHT: 139 LBS | SYSTOLIC BLOOD PRESSURE: 172 MMHG

## 2024-07-08 PROBLEM — N30.00 ACUTE CYSTITIS: Status: ACTIVE | Noted: 2024-07-08

## 2024-07-08 PROBLEM — Z22.322 MRSA CARRIER: Status: ACTIVE | Noted: 2024-07-08

## 2024-07-08 PROBLEM — K62.89 RECTAL PAIN: Status: RESOLVED | Noted: 2024-07-06 | Resolved: 2024-07-08

## 2024-07-08 PROBLEM — D72.829 LEUKOCYTOSIS: Status: RESOLVED | Noted: 2024-06-30 | Resolved: 2024-07-08

## 2024-07-08 PROBLEM — E16.2 HYPOGLYCEMIA: Status: RESOLVED | Noted: 2024-06-30 | Resolved: 2024-07-08

## 2024-07-08 PROBLEM — E87.1 HYPONATREMIA: Status: RESOLVED | Noted: 2024-07-06 | Resolved: 2024-07-08

## 2024-07-08 LAB
ANION GAP SERPL CALCULATED.3IONS-SCNC: 11 MMOL/L (ref 7–16)
BASOPHILS # BLD AUTO: 0.04 K/UL (ref 0–0.2)
BASOPHILS NFR BLD AUTO: 0.5 % (ref 0–1)
BUN SERPL-MCNC: 4 MG/DL (ref 7–18)
BUN/CREAT SERPL: 13 (ref 6–20)
CALCIUM SERPL-MCNC: 8.5 MG/DL (ref 8.5–10.1)
CHLORIDE SERPL-SCNC: 103 MMOL/L (ref 98–107)
CO2 SERPL-SCNC: 24 MMOL/L (ref 21–32)
CREAT SERPL-MCNC: 0.31 MG/DL (ref 0.55–1.02)
DIFFERENTIAL METHOD BLD: ABNORMAL
EGFR (NO RACE VARIABLE) (RUSH/TITUS): 127 ML/MIN/1.73M2
EOSINOPHIL # BLD AUTO: 0.2 K/UL (ref 0–0.5)
EOSINOPHIL NFR BLD AUTO: 2.5 % (ref 1–4)
ERYTHROCYTE [DISTWIDTH] IN BLOOD BY AUTOMATED COUNT: 17.2 % (ref 11.5–14.5)
GLUCOSE SERPL-MCNC: 112 MG/DL (ref 74–106)
GLUCOSE SERPL-MCNC: 133 MG/DL (ref 70–105)
GLUCOSE SERPL-MCNC: 182 MG/DL (ref 70–105)
HCT VFR BLD AUTO: 34.8 % (ref 38–47)
HGB BLD-MCNC: 10.5 G/DL (ref 12–16)
IMM GRANULOCYTES # BLD AUTO: 0.08 K/UL (ref 0–0.04)
IMM GRANULOCYTES NFR BLD: 1 % (ref 0–0.4)
LYMPHOCYTES # BLD AUTO: 0.97 K/UL (ref 1–4.8)
LYMPHOCYTES NFR BLD AUTO: 12.3 % (ref 27–41)
MCH RBC QN AUTO: 29.2 PG (ref 27–31)
MCHC RBC AUTO-ENTMCNC: 30.2 G/DL (ref 32–36)
MCV RBC AUTO: 96.9 FL (ref 80–96)
MONOCYTES # BLD AUTO: 0.33 K/UL (ref 0–0.8)
MONOCYTES NFR BLD AUTO: 4.2 % (ref 2–6)
MPC BLD CALC-MCNC: 10.9 FL (ref 9.4–12.4)
NEUTROPHILS # BLD AUTO: 6.27 K/UL (ref 1.8–7.7)
NEUTROPHILS NFR BLD AUTO: 79.5 % (ref 53–65)
NRBC # BLD AUTO: 0 X10E3/UL
NRBC, AUTO (.00): 0 %
PLATELET # BLD AUTO: 255 K/UL (ref 150–400)
POTASSIUM SERPL-SCNC: 4.2 MMOL/L (ref 3.5–5.1)
RBC # BLD AUTO: 3.59 M/UL (ref 4.2–5.4)
SODIUM SERPL-SCNC: 134 MMOL/L (ref 136–145)
T4 SERPL-MCNC: 11.1 ΜG/DL (ref 4.8–13.9)
TSH SERPL DL<=0.005 MIU/L-ACNC: 1.05 UIU/ML (ref 0.36–3.74)
WBC # BLD AUTO: 7.89 K/UL (ref 4.5–11)

## 2024-07-08 PROCEDURE — 36415 COLL VENOUS BLD VENIPUNCTURE: CPT

## 2024-07-08 PROCEDURE — G0378 HOSPITAL OBSERVATION PER HR: HCPCS

## 2024-07-08 PROCEDURE — 84443 ASSAY THYROID STIM HORMONE: CPT | Performed by: HOSPITALIST

## 2024-07-08 PROCEDURE — 25000003 PHARM REV CODE 250: Performed by: HOSPITALIST

## 2024-07-08 PROCEDURE — 63600175 PHARM REV CODE 636 W HCPCS

## 2024-07-08 PROCEDURE — 63600175 PHARM REV CODE 636 W HCPCS: Performed by: INTERNAL MEDICINE

## 2024-07-08 PROCEDURE — 80048 BASIC METABOLIC PNL TOTAL CA: CPT

## 2024-07-08 PROCEDURE — 96372 THER/PROPH/DIAG INJ SC/IM: CPT

## 2024-07-08 PROCEDURE — 84436 ASSAY OF TOTAL THYROXINE: CPT | Performed by: HOSPITALIST

## 2024-07-08 PROCEDURE — 99239 HOSP IP/OBS DSCHRG MGMT >30: CPT | Mod: ,,, | Performed by: HOSPITALIST

## 2024-07-08 PROCEDURE — 25000003 PHARM REV CODE 250

## 2024-07-08 PROCEDURE — 85025 COMPLETE CBC W/AUTO DIFF WBC: CPT

## 2024-07-08 RX ORDER — ACETAMINOPHEN 500 MG
50000 TABLET ORAL
Status: DISCONTINUED | OUTPATIENT
Start: 2024-07-09 | End: 2024-07-08 | Stop reason: HOSPADM

## 2024-07-08 RX ORDER — SULFAMETHOXAZOLE AND TRIMETHOPRIM 800; 160 MG/1; MG/1
1 TABLET ORAL 2 TIMES DAILY
Qty: 28 TABLET | Refills: 0 | Status: SHIPPED | OUTPATIENT
Start: 2024-07-08

## 2024-07-08 RX ORDER — SODIUM CHLORIDE 9 MG/ML
INJECTION, SOLUTION INTRAVENOUS CONTINUOUS
Status: DISCONTINUED | OUTPATIENT
Start: 2024-07-08 | End: 2024-07-08

## 2024-07-08 RX ORDER — SULFAMETHOXAZOLE AND TRIMETHOPRIM 800; 160 MG/1; MG/1
1 TABLET ORAL 2 TIMES DAILY
Status: DISCONTINUED | OUTPATIENT
Start: 2024-07-08 | End: 2024-07-08 | Stop reason: HOSPADM

## 2024-07-08 RX ORDER — POLYETHYLENE GLYCOL 3350 17 G/17G
17 POWDER, FOR SOLUTION ORAL DAILY PRN
Qty: 30 PACKET | Refills: 3 | Status: SHIPPED | OUTPATIENT
Start: 2024-07-08

## 2024-07-08 RX ADMIN — LISINOPRIL 10 MG: 10 TABLET ORAL at 10:07

## 2024-07-08 RX ADMIN — ATORVASTATIN CALCIUM 10 MG: 10 TABLET, FILM COATED ORAL at 10:07

## 2024-07-08 RX ADMIN — SULFAMETHOXAZOLE AND TRIMETHOPRIM 1 TABLET: 800; 160 TABLET ORAL at 04:07

## 2024-07-08 RX ADMIN — MUPIROCIN: 20 OINTMENT TOPICAL at 10:07

## 2024-07-08 RX ADMIN — CLOPIDOGREL BISULFATE 75 MG: 75 TABLET ORAL at 10:07

## 2024-07-08 RX ADMIN — HYDROCODONE BITARTRATE AND ACETAMINOPHEN 1 TABLET: 7.5; 325 TABLET ORAL at 12:07

## 2024-07-08 RX ADMIN — SODIUM CHLORIDE: 9 INJECTION, SOLUTION INTRAVENOUS at 12:07

## 2024-07-08 RX ADMIN — LEVETIRACETAM 500 MG: 500 TABLET, FILM COATED ORAL at 10:07

## 2024-07-08 RX ADMIN — HYDROMORPHONE HYDROCHLORIDE 1 MG: 2 INJECTION INTRAMUSCULAR; INTRAVENOUS; SUBCUTANEOUS at 06:07

## 2024-07-08 RX ADMIN — POLYETHYLENE GLYCOL 3350 17 G: 17 POWDER, FOR SOLUTION ORAL at 10:07

## 2024-07-08 RX ADMIN — HYDROCODONE BITARTRATE AND ACETAMINOPHEN 1 TABLET: 7.5; 325 TABLET ORAL at 10:07

## 2024-07-08 RX ADMIN — INSULIN ASPART 2 UNITS: 100 INJECTION, SOLUTION INTRAVENOUS; SUBCUTANEOUS at 01:07

## 2024-07-08 RX ADMIN — ENOXAPARIN SODIUM 40 MG: 40 INJECTION SUBCUTANEOUS at 04:07

## 2024-07-08 NOTE — PLAN OF CARE
[facility] - Orthopedic  Initial Discharge Assessment       Primary Care Provider: Elkin Coombs MD    Admission Diagnosis: Rectal pain [K62.89]  Hypoglycemia [E16.2]  Chest pain [R07.9]  Constipation, unspecified constipation type [K59.00]  Metastatic malignant neoplasm, unspecified site [C79.9]    Admission Date: 7/6/2024  Expected Discharge Date: 7/8/2024         Payor: [place]ICAID [place] / Plan: [facility]ICAID [place] [facility] / Product Type: Managed Medicaid /     Extended Emergency Contact Information  Primary Emergency Contact: Sabiha,Chacha  Mobile Phone: [phone]  Relation: Relative  Preferred language: English   needed? No    Discharge Plan A: Home  Discharge Plan B: Home      [facility] [address] [place] [address]  Phone: [phone] Fax: [phone]    [facility] [employer] [facility] [address]  [address]  [address]  [facility] [address]  Phone: [phone] Fax: [phone]      Initial Assessment (most recent)       Adult Discharge Assessment - 07/08/24 1209          Discharge Assessment    Assessment Type Discharge Planning Assessment     Source of Information patient     People in Home other relative(s)     Do you expect to return to your current living situation? Yes     Do you have help at home or someone to help you manage your care at home? Yes     Who are your caregiver(s) and their phone number(s)? Chacha Landis - aunt - [phone]     Current cognitive status: Alert/Oriented     Walking or Climbing Stairs Difficulty yes     Walking or Climbing Stairs ambulation difficulty, requires equipment     Dressing/Bathing Difficulty no     Home Accessibility stairs to enter home;stairs within home     Number of Stairs, Within Home, Primary none     Number of Stairs, Main Entrance three     Equipment Currently Used at Home cane, straight     Readmission within 30 days? Yes      Patient currently being followed by outpatient case management? No     Do you currently have service(s) that help you manage your care at home? No     Do you take prescription medications? Yes     Do you have prescription coverage? Yes     Coverage [facility] Medicaid     Do you have any problems affording any of your prescribed medications? No     Is the patient taking medications as prescribed? yes     Who is going to help you get home at discharge? family     How do you get to doctors appointments? family or friend will provide     Are you on dialysis? No     Do you take coumadin? No     Discharge Plan A Home     Discharge Plan B Home     Discharge Plan discussed with: Patient                   BAL spoke to pt @ bedside. She is not current with HH and uses a straight cane at home. SDOH completed 06/25/24. Pt states she was able to complete ADL's herself but has required some assistant, from her aunt, the last 2 weeks. Pt plans to return home on d/c and voices no needs. CM will continue to follow for anticipated d/c needs.

## 2024-07-08 NOTE — NURSING
Patient crying and moaning stating pain in her legs is terrible when the SCDs inflate and she begged me to please turn them off for a little while, so I did.  Educated her on importance of SCDs since she is immobile and she verbalized understanding and said she will allow us to turn the SCDs back on once her pain subsides.

## 2024-07-08 NOTE — HPI
History of colon cancer with mets to lung and liver.  Recently admitted with acute cholecystitis.  Surgery too high risk, therefore a cholecystostomy tube was placed.  Drainage tube still in place with brown to bilious output.  Patient reports having some drainage from around the tube.  This is not abnormal following percutaneous drain placement.  CT does show some small fluid collections within the right abdomen.  Still would not be a candidate for surgery. The patient is afebrile, nontoxic appearing with stable vital signs.  Consider treatment with antibiotics.  Could possibly be accomplished outpatient.  General surgery will sign off.

## 2024-07-08 NOTE — CONSULTS
[facility]  General Surgery  Consult Note    Patient Name: Hayley Landis  MRN: [id number]  Code Status: Full Code  Admission Date: 7/6/2024  Hospital Length of Stay: 0 days  Attending Physician: Lee Vaughan MD  Primary Care Provider: Elkin Coombs MD    Patient information was obtained from patient and past medical records.     Inpatient consult to General Surgery  Consult performed by: Da Almanzar FNP  Consult ordered by: Lee Vaughan MD        Subjective:     Principal Problem: Hypoglycemia    History of Present Illness: History of colon cancer with mets to lung and liver.  Recently admitted with acute cholecystitis.  Surgery too high risk, therefore a cholecystostomy tube was placed.  Drainage tube still in place with brown to bilious output.  Patient reports having some drainage from around the tube.  This is not abnormal following percutaneous drain placement.  CT does show some small fluid collections within the right abdomen.  Still would not be a candidate for surgery. The patient is afebrile, nontoxic appearing with stable vital signs.  Consider treatment with antibiotics.  Could possibly be accomplished outpatient.  General surgery will sign off.    No current facility-administered medications on file prior to encounter.     Current Outpatient Medications on File Prior to Encounter   Medication Sig    atorvastatin (LIPITOR) 10 MG tablet Take 1 tablet (10 mg total) by mouth once daily.    blood sugar diagnostic (ONETOUCH ULTRA TEST) Strp check blood sugar TWICE DAILY    capecitabine (XELODA) 500 MG Tab Take 3 tablets twice daily for 7 days on and 7 days off.    cholecalciferol, vitamin D3, 1,250 mcg (50,000 unit) capsule Take 1 capsule (50,000 Units total) by mouth every 7 days.    clopidogreL (PLAVIX) 75 mg tablet Take 75 mg by mouth once daily.    HYDROcodone-acetaminophen (NORCO) 7.5-325 mg per tablet Take 1 tablet by mouth every 6 (six) hours as needed for  "Pain.    insulin aspart U-100 (NOVOLOG) 100 unit/mL injection inject as directed by your physician per sliding scale    insulin glargine (LANTUS U-100 INSULIN) 100 unit/mL injection Inject 80 Units into the skin once daily. (Patient taking differently: Inject 82 Units into the skin once daily.)    insulin syringe-needle U-100 1 mL 30 gauge x 5/16 Syrg Use as directed.    levETIRAcetam (KEPPRA) 500 MG Tab TAKE ONE TABLET BY MOUTH ONCE DAILY    lisinopriL 10 MG tablet Take 1 tablet (10 mg total) by mouth once daily.    potassium chloride 10% (KAYCIEL) 20 mEq/15 mL oral solution Take 20 mEq by mouth 2 (two) times daily.       Review of patient's allergies indicates:   Allergen Reactions    Grape Anaphylaxis     Other reaction(s): Not available    Morphine sulfate Swelling    Nutritional supplements Anaphylaxis     Highly allergic to Cooked grapes    Pineapple Anaphylaxis    Opioids - morphine analogues Nausea And Vomiting    Rocephin [ceftriaxone] Itching    Aspirin      "Veins breakdown" per patient    Biguanides Diarrhea and Other (See Comments)    Dicyclomine Diarrhea    Dihydrocodeine Other (See Comments)     Bad headache in the top of head    Gabapentin Other (See Comments)     weakness  Other reaction(s): Not available  Seizures      Hydrocodone Other (See Comments)     Headache      Iron      Hemochromatosis    Iron analogues      Pt hx of hemachromatosis    Metformin      diarrhea    Penicillins Other (See Comments)     Family hx       Latex Rash and Other (See Comments)       Past Medical History:   Diagnosis Date    Bell's palsy 03/01/2023    Biliary obstruction due to malignant neoplasm 06/24/2024    CVA (cerebral vascular accident)     HTN (hypertension)     Irritable bowel syndrome with diarrhea 09/07/2022    Lumbar radiculopathy, right 12/23/2021    Metastatic colon cancer to liver 06/28/2024    Primary hypertension 12/15/2021    Rectal cancer metastatic to bone     Restless legs syndrome 11/20/2023    " Seizure     TIA (transient ischemic attack)     Type 2 diabetes mellitus without complication, with long-term current use of insulin 12/15/2021    Vitamin D deficiency 08/09/2023     Past Surgical History:   Procedure Laterality Date    COLON SURGERY      HERNIA REPAIR      HYSTERECTOMY       Family History       Problem Relation (Age of Onset)    No Known Problems Mother, Father, Sister, Brother, Daughter, Son, Maternal Aunt, Maternal Uncle, Paternal Aunt, Paternal Uncle, Maternal Grandmother, Maternal Grandfather, Paternal Grandmother, Paternal Grandfather          Tobacco Use    Smoking status: Never     Passive exposure: Never    Smokeless tobacco: Never   Substance and Sexual Activity    Alcohol use: Never    Drug use: Never    Sexual activity: Never     Review of Systems   Constitutional:  Negative for fever.   Respiratory: Negative.     Cardiovascular: Negative.    Gastrointestinal:  Negative for abdominal pain, diarrhea, nausea and vomiting.   Neurological: Negative.      Objective:     Vital Signs (Most Recent):  Temp: 98 °F (36.7 °C) (07/08/24 1220)  Pulse: 106 (07/08/24 1220)  Resp: 18 (07/08/24 1013)  BP: (!) 186/95 (07/08/24 1220)  SpO2: 96 % (07/08/24 1220) Vital Signs (24h Range):  Temp:  [97.7 °F (36.5 °C)-98.2 °F (36.8 °C)] 98 °F (36.7 °C)  Pulse:  [] 106  Resp:  [16-20] 18  SpO2:  [94 %-97 %] 96 %  BP: (123-186)/(75-95) 186/95     Weight: 63 kg (139 lb)  Body mass index is 30.08 kg/m².     Physical Exam  Vitals reviewed.   Constitutional:       General: She is not in acute distress.  Cardiovascular:      Rate and Rhythm: Normal rate.   Pulmonary:      Effort: Pulmonary effort is normal. No respiratory distress.   Abdominal:      General: There is no distension.      Palpations: Abdomen is soft.      Tenderness: There is no abdominal tenderness.      Comments: Percutaneous drain right upper abdomen   Skin:     General: Skin is warm and dry.   Neurological:      Mental Status: She is alert.  Mental status is at baseline.            I have reviewed all pertinent lab results within the past 24 hours.    Significant Diagnostics:  I have reviewed all pertinent imaging results/findings within the past 24 hours.    Assessment/Plan:     No notes have been filed under this hospital service.  Service: General Surgery    VTE Risk Mitigation (From admission, onward)           Ordered     enoxaparin injection 40 mg  Daily         07/06/24 2306     IP VTE HIGH RISK PATIENT  Once         07/06/24 2306     Place sequential compression device  Until discontinued         07/06/24 2306                    Thank you for your consult. I will sign off. Please contact us if you have any additional questions.    Da Almanzar, JUAN  General Surgery  [facility]

## 2024-07-08 NOTE — ASSESSMENT & PLAN NOTE
"Patient's FSGs are uncontrolled due to hypoglycemia on current medication regimen.  Last A1c reviewed-   Lab Results   Component Value Date    HGBA1C 6.7 (H) 04/09/2024     Most recent fingerstick glucose reviewed- No results for input(s): "POCTGLUCOSE" in the last 24 hours.  Current correctional scale  Medium  Maintain anti-hyperglycemic dose as follows-   Antihyperglycemics (From admission, onward)      Start     Stop Route Frequency Ordered    07/07/24 0007  insulin aspart U-100 injection 0-10 Units         -- SubQ Before meals & nightly PRN 07/06/24 2308          Hold Oral hypoglycemics while patient is in the hospital.  On 40 units basal insulin at home and SSI prn  Hold home basal insulin  SSI prn  Patient will likely benefit from taking lower doses of basal insulin likely around 10-20 units at discharge and increase as needed at PCP's f/u.    07/08 patient to continue with sliding scale but to use 1/2 recommended dose of NovoLog.  To continue to monitor blood sugar.  If blood sugar starts going over 200 she can add back 5 units of Lantus.  This does not control after couple days add another 5 units and so on.  She is to follow up with Dr. Coombs next week and he can review her status at that time.    "

## 2024-07-08 NOTE — PROGRESS NOTES
[facility] Hospital Medicine  Progress Note    Patient Name: Hayley Landis  MRN: [id number]  Patient Class: OP- Observation   Admission Date: 7/6/2024  Length of Stay: 0 days  Attending Physician: Lee Vaughan MD  Primary Care Provider: Elkin Coombs MD        Subjective:     Principal Problem:Hypoglycemia        HPI:  52yo female with a PMH of stage IV colon cancer with mets to the liver and lung status post partial colectomy followed by reanastomosis, rectal cancer with mets to bones, malignant biliary obstruction status post stent placement, cholecystitis s/p GB drain placement, type 2 diabetes, essential hypertension, and partial seizure who is undergoing palliative chemotherapy who presents to [facility] ED with hypoglycemia. Patient was hypoglycemic at home with glucose in the 40s earlier today with diaphoresis around the same time. She had dizziness at the time that has resolved after glucose improved. Denies f/c/cp/sob/n/v/d. Denies appetite, urinary, or bowel habit changes.     Of note, patient has had multiple recent ER visits and hospitalizations last one discharged on 7/1/24 and was admitted for hypoglycemia. Patient's glucose improved in the ED but she feels uncomfortable going home now. She endorses significant rectal pain with bowel movement with pebbly grainy stool (may benefit from enema). Last chemotherapy was about 1 month ago, next oncology appointment on 7/8/24 in [place].    Upon presentation, VS unremarkable. Labs remarkable for WBC 17.13, H/H 9.7/32.4 (Hgb 10.2 on 6/27/24), Na 133, BUN/Cr 4/0.41, , glucose 44. POCT glucose 48, 198, 116. CXR diffuse pulmonary metastases seen but similar to previous study and no acute findings. CT CAP shows interval placement of a cholecystostomy tube. There are peripherally enhancing collections in the right hemiabdomen as detailed concerning for abscesses. Similar appearance of the numerous pulmonary metastases and hepatic  metastases. Small right pleural effusion has increased. Left iliac/iliacus metastasis similar. Retroperitoneal lymph nodes similar. Patient received D10 bolus IV 250cc, Dilaudid 1 IV, Zofran 4 IV. She is admitted to hospital medicine for observation of her hypoglycemia, further management, and care.    Overview/Hospital Course:  07/07 - Records reviewed. Admitted for hypoglycemia. Had recent cut lantus from 80 units day to 4o units but still low. Now holding insulin except SS. Pt looks much better than hx and imaging studies would suggest. None tender RUQ and tolerating diet. Outpt in GB drain. Watch tonight and have Dr Vyas review in am. [facility] Mily [facility]. Schedule to see her for chemotx 07/08.      Interval History:     Review of Systems   Constitutional:  Positive for fatigue. Negative for appetite change and fever.   HENT:  Negative for congestion, hearing loss and trouble swallowing.    Respiratory:  Negative for chest tightness, shortness of breath and wheezing.    Cardiovascular:  Negative for chest pain and palpitations.   Gastrointestinal:  Negative for abdominal pain, constipation and nausea.   Genitourinary:  Negative for difficulty urinating and dysuria.   Musculoskeletal:  Negative for back pain and neck stiffness.   Skin:  Negative for pallor and rash.   Neurological:  Negative for dizziness, speech difficulty and headaches.   Psychiatric/Behavioral:  Negative for confusion and suicidal ideas.      Objective:     Vital Signs (Most Recent):  Temp: 97.8 °F (36.6 °C) (07/07/24 2008)  Pulse: 94 (07/07/24 2008)  Resp: 18 (07/07/24 2043)  BP: 123/75 (07/07/24 2008)  SpO2: 97 % (07/07/24 2008) Vital Signs (24h Range):  Temp:  [97.8 °F (36.6 °C)-98.5 °F (36.9 °C)] 97.8 °F (36.6 °C)  Pulse:  [] 94  Resp:  [16-20] 18  SpO2:  [94 %-98 %] 97 %  BP: (115-168)/(57-89) 123/75     Weight: 63 kg (139 lb)  Body mass index is 30.08 kg/m².    Intake/Output Summary (Last  24 hours) at 7/7/2024 2057  Last data filed at 7/7/2024 0058  Gross per 24 hour   Intake 135.23 ml   Output --   Net 135.23 ml         Physical Exam  Vitals reviewed.   Constitutional:       General: She is awake. She is not in acute distress.     Appearance: She is well-developed. She is not toxic-appearing.   HENT:      Head: Normocephalic.      Nose: Nose normal.      Mouth/Throat:      Pharynx: Oropharynx is clear.   Eyes:      Extraocular Movements: Extraocular movements intact.      Pupils: Pupils are equal, round, and reactive to light.   Neck:      Thyroid: No thyroid mass.      Vascular: No carotid bruit.   Cardiovascular:      Rate and Rhythm: Normal rate and regular rhythm.      Pulses: Normal pulses.      Heart sounds: Normal heart sounds. No murmur heard.  Pulmonary:      Effort: Pulmonary effort is normal.      Breath sounds: Normal breath sounds and air entry. No wheezing.   Abdominal:      General: Bowel sounds are normal. There is no distension.      Palpations: Abdomen is soft.      Tenderness: There is no abdominal tenderness.      Comments: Drain in right side upper abd   Musculoskeletal:         General: Normal range of motion.      Cervical back: Neck supple. No rigidity.   Skin:     General: Skin is warm.      Coloration: Skin is not jaundiced.      Findings: No lesion.   Neurological:      General: No focal deficit present.      Mental Status: She is alert and oriented to person, place, and time.      Cranial Nerves: No cranial nerve deficit.   Psychiatric:         Attention and Perception: Attention normal.         Mood and Affect: Mood normal.         Behavior: Behavior normal. Behavior is cooperative.         Thought Content: Thought content normal.         Cognition and Memory: Cognition normal.             Significant Labs: All pertinent labs within the past 24 hours have been reviewed.  BMP:   Recent Labs   Lab 07/07/24  0400   GLU 69*      K 4.0      CO2 29   BUN 4*    CREATININE 0.40*   CALCIUM 8.4*     CBC:   Recent Labs   Lab 07/06/24  1840 07/07/24  0400   WBC 17.13* 11.86*   HGB 9.7* 10.0*   HCT 32.4* 33.8*    265     CMP:   Recent Labs   Lab 07/06/24  1840 07/07/24  0400   * 136   K 3.6 4.0   CL 99 100   CO2 28 29   GLU 44* 69*   BUN 4* 4*   CREATININE 0.41* 0.40*   CALCIUM 8.0* 8.4*   PROT 6.3*  --    ALBUMIN 2.0*  --    BILITOT 1.9*  --    ALKPHOS 1,779*  --    AST 49*  --    ALT 30  --    ANIONGAP 10 11       Significant Imaging: I have reviewed all pertinent imaging results/findings within the past 24 hours.    Imaging Results              CT Abdomen Pelvis With IV Contrast NO Oral Contrast (Final result)  Result time 07/06/24 20:45:01      Final result by Henrique Armenta MD (07/06/24 20:45:01)                   Impression:      Interval placement of a cholecystostomy tube.  There are peripherally enhancing collections in the right hemiabdomen as detailed concerning for abscesses.    Similar appearance of the numerous pulmonary metastases and hepatic metastases.    Small right pleural effusion has increased.    Left iliac/iliacus metastasis similar.  Retroperitoneal lymph nodes similar.      Electronically signed by: Henrique Armetna  Date:    07/06/2024  Time:    20:45               Narrative:    EXAMINATION:  CT ABDOMEN PELVIS WITH IV CONTRAST    CLINICAL HISTORY:  Abdominal abscess/infection suspected;    TECHNIQUE:  Low dose axial images, sagittal and coronal reformations were obtained from the lung bases to the pubic symphysis following the IV administration of 100 mL of Isovue 370.  Oral contrast was not given.    The CT examination was performed using one or more of the following dose reduction techniques: Automated exposure control, adjustment of the mA and kV according to patient's size, use of acute or iterative reconstruction techniques.    COMPARISON:  06/24/2024    FINDINGS:  Numerous pulmonary nodule seen of the lung bases compatible with metastatic  disease.  There is a small right pleural effusion slightly increased from prior.    Innumerable hepatic metastases are seen.  CBD stent noted.    Cholecystostomy tube now present.  Gallbladder is decompressed.  There are now peripherally enhancing fluid collections in the right upper quadrant inferior and lateral to the gallbladder brain region.  There is a collection immediately inferior to the tip of the liver measuring 1.5 cm.  Additional collection more inferior along the right lateral peritoneal lining 1.5 x 3 cm.  Additional peripherally enhancing collection in the right lower quadrant mesentery measuring 2.3 cm in diameter    There is no pneumoperitoneum.  Moderate degree of colonic stool is seen.  Appendix normal.  No bowel obstruction.    There are several mildly prominent retroperitoneal lymph nodes.  In the left lower quadrant at the iliacus muscle region is a heterogeneous mass possibly emanating from the pelvic wing versus a muscular implant again seen similar to the recent exam.  This measures over 6 cm in maximum dimension.    Urinary bladder decompressed.  Abdominal aorta normal in size.    No acute fracture.  Irregular appearance of the left ilium as above similar.                                       X-Ray Chest AP Portable (Final result)  Result time 07/06/24 19:28:26      Final result by Henrique Armenta MD (07/06/24 19:28:26)                   Impression:      Similar appearance of the chest.      Electronically signed by: Henrique Armenta  Date:    07/06/2024  Time:    19:28               Narrative:    EXAMINATION:  XR CHEST AP PORTABLE    CLINICAL HISTORY:  Hypoglycemia;    TECHNIQUE:  Single frontal view of the chest was performed.    COMPARISON:  06/30/2024    FINDINGS:  Diffuse pulmonary metastases are again seen and overall similar.  No pneumothorax or new abnormality in the chest.                                    Intake/Output - Last 3 Shifts         07/06 0700 07/07 0659 07/07 0700 07/08  0659    I.V. (mL/kg) 135.2 (2.1)     Total Intake(mL/kg) 135.2 (2.1)     Net +135.2           Urine Occurrence 1 x 1 x          Microbiology Results (last 7 days)       Procedure Component Value Units Date/Time    C diff toxin/antigen with reflex to PCR [[id number]]     Order Status: Canceled Specimen: Stool     Enteric Pathogen Panel [[id number]]     Order Status: Canceled Specimen: Stool     Fecal leukocytes [[id number]]     Order Status: Canceled Specimen: Stool               Assessment/Plan:      * Hypoglycemia  Glucose in the 40s likely 2/2 taking higher dose of basal insulin then needed at home  On 40 units basal insulin at home, will likely need to adjust to around 10-20 units when ready for DC home.  Improved to wnl after D10 250cc bolus in ED  POCT glucose tid before meals and qhs  SSI prn for now  Regular diet  Monitor and admitted for observation, will likely discharge soon if glucose stable.  Pt prefers going home at discharge.    07/07 Watch BS with SS insulin only for now      Hyponatremia  Patient has hyponatremia which is uncontrolled,We will aim to correct the sodium by 4-6mEq in 24 hours. We will monitor sodium Daily. The hyponatremia is due to Dehydration/hypovolemia and polydipsia. We will obtain the following studies: BMP for now. We will treat the hyponatremia with IV fluids as follows: NS@100cc/hr. The patient's sodium results have been reviewed and are listed below.  Recent Labs   Lab 07/06/24  1840   *     Likely 2/2 polydipsia vs diarrhea episode vs poor PO intake from colon CA with mets to liver, lung  NS@100cc/hr for 1 day  Monitor BMP and replace as needed    Rectal pain  During BM for the past 2 days, new to patient  Trial of Miralax qd (patient prefers conservative treatment for now unless no relief)  Consider enema if no relief        Leukocytosis  WBC 17.51 on 6/27/24, 18 on 6/28/24, 10 on 7/1/24, 17.13 today upon admission  Can be related to metastatic cancer  No known source  "of infection upon admission  UA negative for UTI, CXR unchanged from previous  CT CAP no acute findings  Monitor CBC daily      Partial seizure  Home Keppra 500 qd  Pt reports compliance at home      Acalculous cholecystitis  With cholecystostomy drain in place per surgery last admission (discharged 6/27/24)  GB wound culture grew MRSA sensitive to Bactrim, completed Bactrim DS bid course on 7/4/24  F/u surgery OP    07/07 Have DR Vyas review imaging Monday      History of CVA (cerebrovascular accident)  Home plavix, lipitor    Rectal cancer metastasized to bone  Sees Dr. Mily Ryan oncology at [facility] OP   On home Xeloda 500mg 3 tabs bid 7 days on, 7 days off  Off Xeloda this entire week per patient  Follow-up with Oncology/Surgical Oncology including chemotherapy as scheduled on 7/8/24   Patient is on palliative chemotherapy. Prognosis is poor         Metastatic colon cancer to liver  Patient has metastatic cancer of colon primary. The cancer has metastasized to liver, lung. The patient is under the care of an outpatient oncologist. The patient is undergoing active chemotherapy as follows- [unfilled] .Their staging information is listed below.   Cancer Staging   No matching staging information was found for the patient.    Sees Dr. Mily Ryan oncology at [facility] OP   On home Xeloda 500mg 3 tabs bid 7 days on, 7 days off  Follow-up with Oncology/Surgical Oncology including chemotherapy as scheduled on 7/8/24   Patient is on palliative chemotherapy. Prognosis is poor     Vitamin D deficiency  Home vit d      Type 2 diabetes mellitus without complication, with long-term current use of insulin  Patient's FSGs are uncontrolled due to hypoglycemia on current medication regimen.  Last A1c reviewed-   Lab Results   Component Value Date    HGBA1C 6.7 (H) 04/09/2024     Most recent fingerstick glucose reviewed- No results for input(s): "POCTGLUCOSE" in the last 24 hours.  Current " correctional scale  Medium  Maintain anti-hyperglycemic dose as follows-   Antihyperglycemics (From admission, onward)      Start     Stop Route Frequency Ordered    07/07/24 0007  insulin aspart U-100 injection 0-10 Units         -- SubQ Before meals & nightly PRN 07/06/24 2308          Hold Oral hypoglycemics while patient is in the hospital.  On 40 units basal insulin at home and SSI prn  Hold home basal insulin  SSI prn  Patient will likely benefit from taking lower doses of basal insulin likely around 10-20 units at discharge and increase as needed at PCP's f/u.    Primary hypertension  Chronic, controlled. Latest blood pressure and vitals reviewed-     Temp:  [98.6 °F (37 °C)]   Pulse:  []   Resp:  [16-18]   BP: (117-152)/(57-83)   SpO2:  [94 %-99 %] .   Home meds for hypertension were reviewed and noted below.   Hypertension Medications               lisinopriL 10 MG tablet Take 1 tablet (10 mg total) by mouth once daily.            While in the hospital, will manage blood pressure as follows; Continue home antihypertensive regimen    Will utilize p.r.n. blood pressure medication only if patient's blood pressure greater than 180/110 and she develops symptoms such as worsening chest pain or shortness of breath.      VTE Risk Mitigation (From admission, onward)           Ordered     enoxaparin injection 40 mg  Daily         07/06/24 2306     IP VTE HIGH RISK PATIENT  Once         07/06/24 2306     Place sequential compression device  Until discontinued         07/06/24 2306                    Discharge Planning   LAWRENCE:      Code Status: Full Code   Is the patient medically ready for discharge?:     Reason for patient still in hospital (select all that apply): Laboratory test, Treatment, and Imaging                     Lee Vaughan MD  Department of Hospital Medicine   [facility]

## 2024-07-08 NOTE — ASSESSMENT & PLAN NOTE
With cholecystostomy drain in place per surgery last admission (discharged 6/27/24)  GB wound culture grew MRSA sensitive to Bactrim, completed Bactrim DS bid course on 7/4/24  F/u surgery OP    07/07 Have DR Vyas review imaging Monday

## 2024-07-08 NOTE — PLAN OF CARE
"07/08/24 @ 153Raheem Lo MD advised pt is ready for d/c this afternoon and advised to set pt up for HH. CM spoke to pt in room. Pt declined HH services at this time advising she and her aunt "are able to handle things right now." CM will continue to follow.   "

## 2024-07-08 NOTE — ASSESSMENT & PLAN NOTE
Patient has metastatic cancer of colon primary. The cancer has metastasized to liver, lung. The patient is under the care of an outpatient oncologist. The patient is undergoing active chemotherapy as follows- [unfilled] .Their staging information is listed below.    Cancer Staging   No matching staging information was found for the patient.    Sees Dr. Mily Ryan oncology at [facility]   On home Xeloda 500mg 3 tabs bid 7 days on, 7 days off  Follow-up with Oncology/Surgical Oncology including chemotherapy as scheduled on 7/8/24   Patient is on palliative chemotherapy. Prognosis is poor     07/08 follow up with her oncologist in [place]

## 2024-07-08 NOTE — DISCHARGE SUMMARY
[facility] Medicine  Discharge Summary      Patient Name: Hayley Landis  MRN: [id number]  ESPINOZA: [id number]  Patient Class: OP- Observation  Admission Date: 7/6/2024  Hospital Length of Stay: 0 days  Discharge Date and Time:  07/08/2024 3:41 PM  Attending Physician: Lee Vaughan MD   Discharging Provider: Lee Vaughan MD  Primary Care Provider: Elkin Coombs MD    Primary Care Team: Networked reference to record PCT     HPI:   54yo female with a PMH of stage IV colon cancer with mets to the liver and lung status post partial colectomy followed by reanastomosis, rectal cancer with mets to bones, malignant biliary obstruction status post stent placement, cholecystitis s/p GB drain placement, type 2 diabetes, essential hypertension, and partial seizure who is undergoing palliative chemotherapy who presents to [facility] ED with hypoglycemia. Patient was hypoglycemic at home with glucose in the 40s earlier today with diaphoresis around the same time. She had dizziness at the time that has resolved after glucose improved. Denies f/c/cp/sob/n/v/d. Denies appetite, urinary, or bowel habit changes.     Of note, patient has had multiple recent ER visits and hospitalizations last one discharged on 7/1/24 and was admitted for hypoglycemia. Patient's glucose improved in the ED but she feels uncomfortable going home now. She endorses significant rectal pain with bowel movement with pebbly grainy stool (may benefit from enema). Last chemotherapy was about 1 month ago, next oncology appointment on 7/8/24 in [place].    Upon presentation, VS unremarkable. Labs remarkable for WBC 17.13, H/H 9.7/32.4 (Hgb 10.2 on 6/27/24), Na 133, BUN/Cr 4/0.41, , glucose 44. POCT glucose 48, 198, 116. CXR diffuse pulmonary metastases seen but similar to previous study and no acute findings. CT CAP shows interval placement of a cholecystostomy tube. There are peripherally enhancing collections in the right  hemiabdomen as detailed concerning for abscesses. Similar appearance of the numerous pulmonary metastases and hepatic metastases. Small right pleural effusion has increased. Left iliac/iliacus metastasis similar. Retroperitoneal lymph nodes similar. Patient received D10 bolus IV 250cc, Dilaudid 1 IV, Zofran 4 IV. She is admitted to hospital medicine for observation of her hypoglycemia, further management, and care.    * No surgery found *      Hospital Course:   07/07 - Records reviewed. Admitted for hypoglycemia. Had recent cut lantus from 80 units day to 4o units but still low. Now holding insulin except SS. Pt looks much better than hx and imaging studies would suggest. None tender RUQ and tolerating diet. Outpt in GB drain. Watch tonight and have Dr Vyas review in am. [facility] Mily [facility]. Schedule to see her for chemotx 07/08.    07/08 patient no new symptoms.  Tolerating diet.  Dr. Vyas reviewed x-rays and saw patient.  Would like to leave on antibiotics for a couple more weeks.  Patient has MRSA from drainage tube from gallbladder.  No pain or tenderness. No fever.  Normal white blood cell count.  Tolerate diet.  Blood sugar has done well off of insulin other than watching with sliding scale.  Told patient she needs to not restart the Lantus at this time but just followed with sliding scale using 1/2 previous dose of NovoLog.  If blood sugar starts climbing then could add Lantus 5 units and if climb some more a couple days later at 5 more units in so forth.  But not to change for couple of days between each time.  She is to follow up with Dr. Coombs in 1 week.  Continue with diabetic diet.   Dr. Vyas wished to restart the antibiotics.  Had just completed course of Bactrim 07/04 after having Klebsiella pneumonia UTI 06/19.  Patient having MRSA that was noted from drainage.  Talk with Dr. Vyas and will restart the Bactrim and he will check her in a couple weeks.    Malvin will see her in 1 week.  She is also to reschedule her appointment with her oncologist in [place].  Will discharge home.    Patient counseled on the importance of keeping all hospital follow up appointments. Stressed compliance with medications, therapies, or devices as prescribed in order to provide for the best health outcomes and decrease the risk of reoccurrence or further progression of issues.    Additionally, patient given written literature regarding their current disease processes and home care recommendations.   Patient given opportunity to ask questions and verbalized understanding of all information discussed.  Reinforced patient's primary care provider can be a big assets in monitoring and organizing issues after discharge.       Goals of Care Treatment Preferences:  Code Status: Full Code      Consults:   Consults (From admission, onward)          Status Ordering Provider     Inpatient consult to General Surgery  Once        Provider:  Abner Vays MD    Completed DAVON BORJA            Neuro  Partial seizure  Home Keppra 500 qd  Pt reports compliance at home      History of CVA (cerebrovascular accident)  Home plavix, lipitor    Cardiac/Vascular  Primary hypertension  Chronic, controlled. Latest blood pressure and vitals reviewed-     Temp:  [97.7 °F (36.5 °C)-98.2 °F (36.8 °C)]   Pulse:  []   Resp:  [16-20]   BP: (123-186)/(75-95)   SpO2:  [94 %-97 %] .   Home meds for hypertension were reviewed and noted below.   Hypertension Medications               lisinopriL 10 MG tablet Take 1 tablet (10 mg total) by mouth once daily.            While in the hospital, will manage blood pressure as follows; Continue home antihypertensive regimen    Will utilize p.r.n. blood pressure medication only if patient's blood pressure greater than 180/110 and she develops symptoms such as worsening chest pain or shortness of breath.    Renal/  Acute cystitis    Treated for urinary tract infection  Klebsiella pneumonia diagnosed 06/19 with previous slowly completing course of Bactrim on 07/04    ID  MRSA carrier    No clear ongoing infection  To receive 2 additional weeks of Bactrim orally    Oncology  Rectal cancer metastasized to bone  Sees Dr. Mily Ryan oncology at [facility]   On home Xeloda 500mg 3 tabs bid 7 days on, 7 days off  Off Xeloda this entire week per patient  Follow-up with Oncology/Surgical Oncology including chemotherapy as scheduled on 7/8/24   Patient is on palliative chemotherapy. Prognosis is poor     07/08 to follow up with the oncologist after discharge        Metastatic colon cancer to liver  Patient has metastatic cancer of colon primary. The cancer has metastasized to liver, lung. The patient is under the care of an outpatient oncologist. The patient is undergoing active chemotherapy as follows- [unfilled] .Their staging information is listed below.    Cancer Staging   No matching staging information was found for the patient.    Sees Dr. Mily Ryan oncology at [facility]   On home Xeloda 500mg 3 tabs bid 7 days on, 7 days off  Follow-up with Oncology/Surgical Oncology including chemotherapy as scheduled on 7/8/24   Patient is on palliative chemotherapy. Prognosis is poor     07/08 follow up with her oncologist in [place]    Endocrine  * Hypoglycemia-resolved as of 7/8/2024  Glucose in the 40s likely 2/2 taking higher dose of basal insulin then needed at home  On 40 units basal insulin at home, will likely need to adjust to around 10-20 units when ready for DC home.  Improved to wnl after D10 250cc bolus in ED  POCT glucose tid before meals and qhs  SSI prn for now  Regular diet  Monitor and admitted for observation, will likely discharge soon if glucose stable.  Pt prefers going home at discharge.    07/08 blood sugar stay normal watching off insulin at this time.  See documented plans going forward above    07/07 Watch BS with SS insulin  "only for now      Vitamin D deficiency  Home vit d      Type 2 diabetes mellitus without complication, with long-term current use of insulin  Patient's FSGs are uncontrolled due to hypoglycemia on current medication regimen.  Last A1c reviewed-   Lab Results   Component Value Date    HGBA1C 6.7 (H) 04/09/2024     Most recent fingerstick glucose reviewed- No results for input(s): "POCTGLUCOSE" in the last 24 hours.  Current correctional scale  Medium  Maintain anti-hyperglycemic dose as follows-   Antihyperglycemics (From admission, onward)      Start     Stop Route Frequency Ordered    07/07/24 0007  insulin aspart U-100 injection 0-10 Units         -- SubQ Before meals & nightly PRN 07/06/24 2308          Hold Oral hypoglycemics while patient is in the hospital.  On 40 units basal insulin at home and SSI prn  Hold home basal insulin  SSI prn  Patient will likely benefit from taking lower doses of basal insulin likely around 10-20 units at discharge and increase as needed at PCP's f/u.    07/08 patient to continue with sliding scale but to use 1/2 recommended dose of NovoLog.  To continue to monitor blood sugar.  If blood sugar starts going over 200 she can add back 5 units of Lantus.  This does not control after couple days add another 5 units and so on.  She is to follow up with Dr. Coombs next week and he can review her status at that time.      GI  Acalculous cholecystitis  With cholecystostomy drain in place per surgery last admission (discharged 6/27/24)  GB wound culture grew MRSA sensitive to Bactrim, completed Bactrim DS bid course on 7/4/24  F/u surgery OP    07/07 Have DR Vyas review imaging Monday 07/08 discussed with Dr. Vyas and will leave on antibiotics with follow up in a couple weeks outpatient        Final Active Diagnoses:    Diagnosis Date Noted POA    Acute cystitis [N30.00] 07/08/2024 Yes    MRSA carrier [Z22.322] 07/08/2024 Not Applicable    Acalculous cholecystitis [K81.9] 06/30/2024 Yes    " Rectal cancer metastasized to bone [C20, C79.51] 06/30/2024 Yes    History of CVA (cerebrovascular accident) [Z86.73] 06/30/2024 Not Applicable    Partial seizure [R56.9] 06/30/2024 Yes    Metastatic colon cancer to liver [C18.9, C78.7] 06/28/2024 Yes    Vitamin D deficiency [E55.9] 08/09/2023 Yes    Type 2 diabetes mellitus without complication, with long-term current use of insulin [E11.9, Z79.4] 12/15/2021 Not Applicable     Chronic    Primary hypertension [I10] 12/15/2021 Yes     Chronic      Problems Resolved During this Admission:    Diagnosis Date Noted Date Resolved POA    PRINCIPAL PROBLEM:  Hypoglycemia [E16.2] 06/30/2024 07/08/2024 Yes    Rectal pain [K62.89] 07/06/2024 07/08/2024 Yes    Hyponatremia [E87.1] 07/06/2024 07/08/2024 Yes    Leukocytosis [D72.829] 06/30/2024 07/08/2024 Yes    Hypokalemia [E87.6] 06/30/2024 07/06/2024 Yes       Discharged Condition: fair    Disposition: Home-Health Care [facility]    Follow Up:   Follow-up Information       Elkin Coombs MD. Schedule an appointment as soon as possible for a visit in 1 week(s).    Specialty: Family Medicine  Why: Patient will keep schedule follow up on July 10 at 11:00  Contact information:  [address]  [facility] [place] [address]  [phone]               Mily Ryan MD Follow up.    Specialty: Internal Medicine  Why: Patient to make follow up appointment with this provider  Patient will make follow up when she get home  Contact information:  [address] [facility] [place] [address]  [phone]               Abner Vyas MD Follow up in 2 week(s).    Specialties: General Surgery, Surgery  Why: Please follow up on July 18 at 1:15  Contact information:  [address] [place] [address] [place] [address]  [phone]                           Patient Instructions:      Diet diabetic     Activity as tolerated       Significant Diagnostic Studies: Labs: BMP:   Recent Labs   Lab  07/06/24 1840 07/07/24  0400 07/08/24  0735   GLU 44* 69* 112*   * 136 134*   K 3.6 4.0 4.2   CL 99 100 103   CO2 28 29 24   BUN 4* 4* 4*   CREATININE 0.41* 0.40* 0.31*   CALCIUM 8.0* 8.4* 8.5   , CMP   Recent Labs   Lab 07/06/24 1840 07/07/24 0400 07/08/24  0735   * 136 134*   K 3.6 4.0 4.2   CL 99 100 103   CO2 28 29 24   GLU 44* 69* 112*   BUN 4* 4* 4*   CREATININE 0.41* 0.40* 0.31*   CALCIUM 8.0* 8.4* 8.5   PROT 6.3*  --   --    ALBUMIN 2.0*  --   --    BILITOT 1.9*  --   --    ALKPHOS 1,779*  --   --    AST 49*  --   --    ALT 30  --   --    ANIONGAP 10 11 11   , and CBC   Recent Labs   Lab 07/06/24 1840 07/07/24 0400 07/08/24  0826   WBC 17.13* 11.86* 7.89   HGB 9.7* 10.0* 10.5*   HCT 32.4* 33.8* 34.8*    265 255       Imaging Results              CT Abdomen Pelvis With IV Contrast NO Oral Contrast (Final result)  Result time 07/06/24 20:45:01      Final result by Henrique Armenta MD (07/06/24 20:45:01)                   Impression:      Interval placement of a cholecystostomy tube.  There are peripherally enhancing collections in the right hemiabdomen as detailed concerning for abscesses.    Similar appearance of the numerous pulmonary metastases and hepatic metastases.    Small right pleural effusion has increased.    Left iliac/iliacus metastasis similar.  Retroperitoneal lymph nodes similar.      Electronically signed by: Henrique Armenta  Date:    07/06/2024  Time:    20:45               Narrative:    EXAMINATION:  CT ABDOMEN PELVIS WITH IV CONTRAST    CLINICAL HISTORY:  Abdominal abscess/infection suspected;    TECHNIQUE:  Low dose axial images, sagittal and coronal reformations were obtained from the lung bases to the pubic symphysis following the IV administration of 100 mL of Isovue 370.  Oral contrast was not given.    The CT examination was performed using one or more of the following dose reduction techniques: Automated exposure control, adjustment of the mA and kV according to  patient's size, use of acute or iterative reconstruction techniques.    COMPARISON:  06/24/2024    FINDINGS:  Numerous pulmonary nodule seen of the lung bases compatible with metastatic disease.  There is a small right pleural effusion slightly increased from prior.    Innumerable hepatic metastases are seen.  CBD stent noted.    Cholecystostomy tube now present.  Gallbladder is decompressed.  There are now peripherally enhancing fluid collections in the right upper quadrant inferior and lateral to the gallbladder brain region.  There is a collection immediately inferior to the tip of the liver measuring 1.5 cm.  Additional collection more inferior along the right lateral peritoneal lining 1.5 x 3 cm.  Additional peripherally enhancing collection in the right lower quadrant mesentery measuring 2.3 cm in diameter    There is no pneumoperitoneum.  Moderate degree of colonic stool is seen.  Appendix normal.  No bowel obstruction.    There are several mildly prominent retroperitoneal lymph nodes.  In the left lower quadrant at the iliacus muscle region is a heterogeneous mass possibly emanating from the pelvic wing versus a muscular implant again seen similar to the recent exam.  This measures over 6 cm in maximum dimension.    Urinary bladder decompressed.  Abdominal aorta normal in size.    No acute fracture.  Irregular appearance of the left ilium as above similar.                                       X-Ray Chest AP Portable (Final result)  Result time 07/06/24 19:28:26      Final result by Henrique Armenta MD (07/06/24 19:28:26)                   Impression:      Similar appearance of the chest.      Electronically signed by: Henrique Armenta  Date:    07/06/2024  Time:    19:28               Narrative:    EXAMINATION:  XR CHEST AP PORTABLE    CLINICAL HISTORY:  Hypoglycemia;    TECHNIQUE:  Single frontal view of the chest was performed.    COMPARISON:  06/30/2024    FINDINGS:  Diffuse pulmonary metastases are again seen  and overall similar.  No pneumothorax or new abnormality in the chest.                                    Intake/Output - Last 3 Shifts         07/06 0700 07/07 0659 07/07 0700 07/08 0659 07/08 0700 07/09 0659    I.V. (mL/kg) 135.2 (2.1)  2327.7 (36.9)    Total Intake(mL/kg) 135.2 (2.1)  2327.7 (36.9)    Urine (mL/kg/hr)  0 (0)     Drains  50     Total Output  50     Net +135.2 -50 +2327.7           Urine Occurrence 1 x 1 x           Microbiology Results (last 7 days)       Procedure Component Value Units Date/Time    C diff toxin/antigen with reflex to PCR [[id number]]     Order Status: Canceled Specimen: Stool     Enteric Pathogen Panel [[id number]]     Order Status: Canceled Specimen: Stool     Fecal leukocytes [[id number]]     Order Status: Canceled Specimen: Stool               Pending Diagnostic Studies:       Procedure Component Value Units Date/Time    EXTRA TUBES [[id number]] Collected: 07/08/24 0826    Order Status: Sent Lab Status: In process Updated: 07/08/24 0842    Specimen: Blood, Venous     Narrative:      The following orders were created for panel order EXTRA TUBES.  Procedure                               Abnormality         Status                     ---------                               -----------         ------                     Light Green Top Hold[[id number]]                            In process                   Please view results for these tests on the individual orders.           Medications:  Reconciled Home Medications:      Medication List        START taking these medications      polyethylene glycol 17 gram Pwpk  Commonly known as: GLYCOLAX  Take 17 g by mouth daily as needed for Constipation.     sulfamethoxazole-trimethoprim 800-160mg 800-160 mg Tab  Commonly known as: BACTRIM DS  Take 1 tablet by mouth 2 (two) times daily.            CONTINUE taking these medications      atorvastatin 10 MG tablet  Commonly known as: LIPITOR  Take 1 tablet (10 mg total) by mouth once  daily.     blood sugar diagnostic Strp  Commonly known as: ONETOUCH ULTRA TEST  check blood sugar TWICE DAILY     capecitabine 500 MG Tab  Commonly known as: XELODA  Take 3 tablets twice daily for 7 days on and 7 days off.     cholecalciferol (vitamin D3) 1,250 mcg (50,000 unit) capsule  Take 1 capsule (50,000 Units total) by mouth every 7 days.     clopidogreL 75 mg tablet  Commonly known as: PLAVIX  Take 75 mg by mouth once daily.     HYDROcodone-acetaminophen 7.5-325 mg per tablet  Commonly known as: NORCO  Take 1 tablet by mouth every 6 (six) hours as needed for Pain.     insulin aspart U-100 100 unit/mL injection  Commonly known as: NovoLOG  inject as directed by your physician per sliding scale     insulin syringe-needle U-100 1 mL 30 gauge x 5/16 Syrg  Use as directed.     levETIRAcetam 500 MG Tab  Commonly known as: KEPPRA  TAKE ONE TABLET BY MOUTH ONCE DAILY     lisinopriL 10 MG tablet  Take 1 tablet (10 mg total) by mouth once daily.            STOP taking these medications      insulin glargine U-100 (Lantus) 100 unit/mL injection  Commonly known as: LANTUS U-100 INSULIN     potassium chloride 10% 20 mEq/15 mL oral solution  Commonly known as: PATY              Indwelling Lines/Drains at time of discharge:   Lines/Drains/Airways       Drain  Duration                  Closed/Suction Drain 06/25/24 1306 Tube - 1 Right Abdomen Accordion 13 days    Female External Urinary Catheter w/ Suction 07/07/24 6353 <1 day                    Time spent on the discharge of patient: 40 minutes         Lee Vaughan MD  Department of Hospital Medicine  [facility]

## 2024-07-08 NOTE — ASSESSMENT & PLAN NOTE
Chronic, controlled. Latest blood pressure and vitals reviewed-     Temp:  [97.7 °F (36.5 °C)-98.2 °F (36.8 °C)]   Pulse:  []   Resp:  [16-20]   BP: (123-186)/(75-95)   SpO2:  [94 %-97 %] .   Home meds for hypertension were reviewed and noted below.   Hypertension Medications               lisinopriL 10 MG tablet Take 1 tablet (10 mg total) by mouth once daily.            While in the hospital, will manage blood pressure as follows; Continue home antihypertensive regimen    Will utilize p.r.n. blood pressure medication only if patient's blood pressure greater than 180/110 and she develops symptoms such as worsening chest pain or shortness of breath.

## 2024-07-08 NOTE — ASSESSMENT & PLAN NOTE
Treated for urinary tract infection Klebsiella pneumonia diagnosed 06/19 with previous slowly completing course of Bactrim on 07/04

## 2024-07-08 NOTE — ASSESSMENT & PLAN NOTE
With cholecystostomy drain in place per surgery last admission (discharged 6/27/24)  GB wound culture grew MRSA sensitive to Bactrim, completed Bactrim DS bid course on 7/4/24  F/u surgery OP    07/07 Have DR Vyas review imaging Monday 07/08 discussed with Dr. Vyas and will leave on antibiotics with follow up in a couple weeks outpatient

## 2024-07-08 NOTE — SUBJECTIVE & OBJECTIVE
Interval History:     Review of Systems   Constitutional:  Positive for fatigue. Negative for appetite change and fever.   HENT:  Negative for congestion, hearing loss and trouble swallowing.    Respiratory:  Negative for chest tightness, shortness of breath and wheezing.    Cardiovascular:  Negative for chest pain and palpitations.   Gastrointestinal:  Negative for abdominal pain, constipation and nausea.   Genitourinary:  Negative for difficulty urinating and dysuria.   Musculoskeletal:  Negative for back pain and neck stiffness.   Skin:  Negative for pallor and rash.   Neurological:  Negative for dizziness, speech difficulty and headaches.   Psychiatric/Behavioral:  Negative for confusion and suicidal ideas.      Objective:     Vital Signs (Most Recent):  Temp: 97.8 °F (36.6 °C) (07/07/24 2008)  Pulse: 94 (07/07/24 2008)  Resp: 18 (07/07/24 2043)  BP: 123/75 (07/07/24 2008)  SpO2: 97 % (07/07/24 2008) Vital Signs (24h Range):  Temp:  [97.8 °F (36.6 °C)-98.5 °F (36.9 °C)] 97.8 °F (36.6 °C)  Pulse:  [] 94  Resp:  [16-20] 18  SpO2:  [94 %-98 %] 97 %  BP: (115-168)/(57-89) 123/75     Weight: 63 kg (139 lb)  Body mass index is 30.08 kg/m².    Intake/Output Summary (Last 24 hours) at 7/7/2024 2057  Last data filed at 7/7/2024 0058  Gross per 24 hour   Intake 135.23 ml   Output --   Net 135.23 ml         Physical Exam  Vitals reviewed.   Constitutional:       General: She is awake. She is not in acute distress.     Appearance: She is well-developed. She is not toxic-appearing.   HENT:      Head: Normocephalic.      Nose: Nose normal.      Mouth/Throat:      Pharynx: Oropharynx is clear.   Eyes:      Extraocular Movements: Extraocular movements intact.      Pupils: Pupils are equal, round, and reactive to light.   Neck:      Thyroid: No thyroid mass.      Vascular: No carotid bruit.   Cardiovascular:      Rate and Rhythm: Normal rate and regular rhythm.      Pulses: Normal pulses.      Heart sounds: Normal heart  sounds. No murmur heard.  Pulmonary:      Effort: Pulmonary effort is normal.      Breath sounds: Normal breath sounds and air entry. No wheezing.   Abdominal:      General: Bowel sounds are normal. There is no distension.      Palpations: Abdomen is soft.      Tenderness: There is no abdominal tenderness.      Comments: Drain in right side upper abd   Musculoskeletal:         General: Normal range of motion.      Cervical back: Neck supple. No rigidity.   Skin:     General: Skin is warm.      Coloration: Skin is not jaundiced.      Findings: No lesion.   Neurological:      General: No focal deficit present.      Mental Status: She is alert and oriented to person, place, and time.      Cranial Nerves: No cranial nerve deficit.   Psychiatric:         Attention and Perception: Attention normal.         Mood and Affect: Mood normal.         Behavior: Behavior normal. Behavior is cooperative.         Thought Content: Thought content normal.         Cognition and Memory: Cognition normal.             Significant Labs: All pertinent labs within the past 24 hours have been reviewed.  BMP:   Recent Labs   Lab 07/07/24  0400   GLU 69*      K 4.0      CO2 29   BUN 4*   CREATININE 0.40*   CALCIUM 8.4*     CBC:   Recent Labs   Lab 07/06/24  1840 07/07/24  0400   WBC 17.13* 11.86*   HGB 9.7* 10.0*   HCT 32.4* 33.8*    265     CMP:   Recent Labs   Lab 07/06/24  1840 07/07/24  0400   * 136   K 3.6 4.0   CL 99 100   CO2 28 29   GLU 44* 69*   BUN 4* 4*   CREATININE 0.41* 0.40*   CALCIUM 8.0* 8.4*   PROT 6.3*  --    ALBUMIN 2.0*  --    BILITOT 1.9*  --    ALKPHOS 1,779*  --    AST 49*  --    ALT 30  --    ANIONGAP 10 11       Significant Imaging: I have reviewed all pertinent imaging results/findings within the past 24 hours.    Imaging Results              CT Abdomen Pelvis With IV Contrast NO Oral Contrast (Final result)  Result time 07/06/24 20:45:01      Final result by Henrique Armenta MD (07/06/24  20:45:01)                   Impression:      Interval placement of a cholecystostomy tube.  There are peripherally enhancing collections in the right hemiabdomen as detailed concerning for abscesses.    Similar appearance of the numerous pulmonary metastases and hepatic metastases.    Small right pleural effusion has increased.    Left iliac/iliacus metastasis similar.  Retroperitoneal lymph nodes similar.      Electronically signed by: Henrique Armenta  Date:    07/06/2024  Time:    20:45               Narrative:    EXAMINATION:  CT ABDOMEN PELVIS WITH IV CONTRAST    CLINICAL HISTORY:  Abdominal abscess/infection suspected;    TECHNIQUE:  Low dose axial images, sagittal and coronal reformations were obtained from the lung bases to the pubic symphysis following the IV administration of 100 mL of Isovue 370.  Oral contrast was not given.    The CT examination was performed using one or more of the following dose reduction techniques: Automated exposure control, adjustment of the mA and kV according to patient's size, use of acute or iterative reconstruction techniques.    COMPARISON:  06/24/2024    FINDINGS:  Numerous pulmonary nodule seen of the lung bases compatible with metastatic disease.  There is a small right pleural effusion slightly increased from prior.    Innumerable hepatic metastases are seen.  CBD stent noted.    Cholecystostomy tube now present.  Gallbladder is decompressed.  There are now peripherally enhancing fluid collections in the right upper quadrant inferior and lateral to the gallbladder brain region.  There is a collection immediately inferior to the tip of the liver measuring 1.5 cm.  Additional collection more inferior along the right lateral peritoneal lining 1.5 x 3 cm.  Additional peripherally enhancing collection in the right lower quadrant mesentery measuring 2.3 cm in diameter    There is no pneumoperitoneum.  Moderate degree of colonic stool is seen.  Appendix normal.  No bowel  obstruction.    There are several mildly prominent retroperitoneal lymph nodes.  In the left lower quadrant at the iliacus muscle region is a heterogeneous mass possibly emanating from the pelvic wing versus a muscular implant again seen similar to the recent exam.  This measures over 6 cm in maximum dimension.    Urinary bladder decompressed.  Abdominal aorta normal in size.    No acute fracture.  Irregular appearance of the left ilium as above similar.                                       X-Ray Chest AP Portable (Final result)  Result time 07/06/24 19:28:26      Final result by Henrique Armenta MD (07/06/24 19:28:26)                   Impression:      Similar appearance of the chest.      Electronically signed by: Henrique Armenta  Date:    07/06/2024  Time:    19:28               Narrative:    EXAMINATION:  XR CHEST AP PORTABLE    CLINICAL HISTORY:  Hypoglycemia;    TECHNIQUE:  Single frontal view of the chest was performed.    COMPARISON:  06/30/2024    FINDINGS:  Diffuse pulmonary metastases are again seen and overall similar.  No pneumothorax or new abnormality in the chest.                                    Intake/Output - Last 3 Shifts         07/06 0700 07/07 0659 07/07 0700 07/08 0659    I.V. (mL/kg) 135.2 (2.1)     Total Intake(mL/kg) 135.2 (2.1)     Net +135.2           Urine Occurrence 1 x 1 x          Microbiology Results (last 7 days)       Procedure Component Value Units Date/Time    C diff toxin/antigen with reflex to PCR [[id number]]     Order Status: Canceled Specimen: Stool     Enteric Pathogen Panel [[id number]]     Order Status: Canceled Specimen: Stool     Fecal leukocytes [[id number]]     Order Status: Canceled Specimen: Stool

## 2024-07-08 NOTE — NURSING
2323 In room helping tech clean up patient when observed patient's accordion drain to right side of abdomen. Drain open to air and not secured by sutures. Pt expressed nervousness that drain will be pulled out. Insertion site and visible tube cleaned with a chloraprep. Tube secured to abd with paper tape and a tegaderm. Pt voiced gratitude. Care ongoing.

## 2024-07-08 NOTE — NURSING
Placed patient on contact isolation and educated her about contact isolation due to MRSA in wound underneath drain.  Patient verbalized understanding.

## 2024-07-08 NOTE — ASSESSMENT & PLAN NOTE
Glucose in the 40s likely 2/2 taking higher dose of basal insulin then needed at home  On 40 units basal insulin at home, will likely need to adjust to around 10-20 units when ready for DC home.  Improved to wnl after D10 250cc bolus in ED  POCT glucose tid before meals and qhs  SSI prn for now  Regular diet  Monitor and admitted for observation, will likely discharge soon if glucose stable.  Pt prefers going home at discharge.    07/08 blood sugar stay normal watching off insulin at this time.  See documented plans going forward above    07/07 Watch BS with SS insulin only for now

## 2024-07-08 NOTE — SUBJECTIVE & OBJECTIVE
"No current facility-administered medications on file prior to encounter.     Current Outpatient Medications on File Prior to Encounter   Medication Sig    atorvastatin (LIPITOR) 10 MG tablet Take 1 tablet (10 mg total) by mouth once daily.    blood sugar diagnostic (ONETOUCH ULTRA TEST) Strp check blood sugar TWICE DAILY    capecitabine (XELODA) 500 MG Tab Take 3 tablets twice daily for 7 days on and 7 days off.    cholecalciferol, vitamin D3, 1,250 mcg (50,000 unit) capsule Take 1 capsule (50,000 Units total) by mouth every 7 days.    clopidogreL (PLAVIX) 75 mg tablet Take 75 mg by mouth once daily.    HYDROcodone-acetaminophen (NORCO) 7.5-325 mg per tablet Take 1 tablet by mouth every 6 (six) hours as needed for Pain.    insulin aspart U-100 (NOVOLOG) 100 unit/mL injection inject as directed by your physician per sliding scale    insulin glargine (LANTUS U-100 INSULIN) 100 unit/mL injection Inject 80 Units into the skin once daily. (Patient taking differently: Inject 82 Units into the skin once daily.)    insulin syringe-needle U-100 1 mL 30 gauge x 5/16 Syrg Use as directed.    levETIRAcetam (KEPPRA) 500 MG Tab TAKE ONE TABLET BY MOUTH ONCE DAILY    lisinopriL 10 MG tablet Take 1 tablet (10 mg total) by mouth once daily.    potassium chloride 10% (KAYCIEL) 20 mEq/15 mL oral solution Take 20 mEq by mouth 2 (two) times daily.       Review of patient's allergies indicates:   Allergen Reactions    Grape Anaphylaxis     Other reaction(s): Not available    Morphine sulfate Swelling    Nutritional supplements Anaphylaxis     Highly allergic to Cooked grapes    Pineapple Anaphylaxis    Opioids - morphine analogues Nausea And Vomiting    Rocephin [ceftriaxone] Itching    Aspirin      "Veins breakdown" per patient    Biguanides Diarrhea and Other (See Comments)    Dicyclomine Diarrhea    Dihydrocodeine Other (See Comments)     Bad headache in the top of head    Gabapentin Other (See Comments)     weakness  Other " reaction(s): Not available  Seizures      Hydrocodone Other (See Comments)     Headache      Iron      Hemochromatosis    Iron analogues      Pt hx of hemachromatosis    Metformin      diarrhea    Penicillins Other (See Comments)     Family hx       Latex Rash and Other (See Comments)       Past Medical History:   Diagnosis Date    Bell's palsy 03/01/2023    Biliary obstruction due to malignant neoplasm 06/24/2024    CVA (cerebral vascular accident)     HTN (hypertension)     Irritable bowel syndrome with diarrhea 09/07/2022    Lumbar radiculopathy, right 12/23/2021    Metastatic colon cancer to liver 06/28/2024    Primary hypertension 12/15/2021    Rectal cancer metastatic to bone     Restless legs syndrome 11/20/2023    Seizure     TIA (transient ischemic attack)     Type 2 diabetes mellitus without complication, with long-term current use of insulin 12/15/2021    Vitamin D deficiency 08/09/2023     Past Surgical History:   Procedure Laterality Date    COLON SURGERY      HERNIA REPAIR      HYSTERECTOMY       Family History       Problem Relation (Age of Onset)    No Known Problems Mother, Father, Sister, Brother, Daughter, Son, Maternal Aunt, Maternal Uncle, Paternal Aunt, Paternal Uncle, Maternal Grandmother, Maternal Grandfather, Paternal Grandmother, Paternal Grandfather          Tobacco Use    Smoking status: Never     Passive exposure: Never    Smokeless tobacco: Never   Substance and Sexual Activity    Alcohol use: Never    Drug use: Never    Sexual activity: Never     Review of Systems   Constitutional:  Negative for fever.   Respiratory: Negative.     Cardiovascular: Negative.    Gastrointestinal:  Negative for abdominal pain, diarrhea, nausea and vomiting.   Neurological: Negative.      Objective:     Vital Signs (Most Recent):  Temp: 98 °F (36.7 °C) (07/08/24 1220)  Pulse: 106 (07/08/24 1220)  Resp: 18 (07/08/24 1013)  BP: (!) 186/95 (07/08/24 1220)  SpO2: 96 % (07/08/24 1220) Vital Signs (24h  Range):  Temp:  [97.7 °F (36.5 °C)-98.2 °F (36.8 °C)] 98 °F (36.7 °C)  Pulse:  [] 106  Resp:  [16-20] 18  SpO2:  [94 %-97 %] 96 %  BP: (123-186)/(75-95) 186/95     Weight: 63 kg (139 lb)  Body mass index is 30.08 kg/m².     Physical Exam  Vitals reviewed.   Constitutional:       General: She is not in acute distress.  Cardiovascular:      Rate and Rhythm: Normal rate.   Pulmonary:      Effort: Pulmonary effort is normal. No respiratory distress.   Abdominal:      General: There is no distension.      Palpations: Abdomen is soft.      Tenderness: There is no abdominal tenderness.      Comments: Percutaneous drain right upper abdomen   Skin:     General: Skin is warm and dry.   Neurological:      Mental Status: She is alert. Mental status is at baseline.            I have reviewed all pertinent lab results within the past 24 hours.    Significant Diagnostics:  I have reviewed all pertinent imaging results/findings within the past 24 hours.

## 2024-07-08 NOTE — DISCHARGE INSTRUCTIONS
Hospitalist Discharge orders  *Notify your healthcare provider if you experience any of the following: temperature >100.4  * Notify your healthcare provider if you experience any of the following: redness, tenderness.    *Notify your healthcare provider if you experience any of the following: difficulty breathing or     increased cough.  *Notify your physician if you experience any persistent nausea, vomiting, or diarrhea or headache  *Notify your physician if you experience any of the following:severe uncontrolled pain;worsening rash, increased confusion or weakness; dizziness, lightheadedness or visual disturbances    *Told patient she needs to not restart the Lantus at this time but just followed with sliding scale using 1/2 previous dose of NovoLog. If blood sugar starts climbing then could add Lantus 5 units and if climb some more a couple days later at 5 more units in so forth. But not to change for couple of days between each time. She is to follow up with Dr. Coombs in 1 week. Continue with diabetic diet     Admin Instructions:   **MODERATE CORRECTION DOSE**  Blood Glucose  mg/dL                  Pre-meal                2200  151-200                2 units                    1 unit  201-250                4 units                    2 units  251-300                6 units                    3 units  301-350                8 units                    4 units  >350                     10 units                  5 units  Administer subcutaneously if needed at times designated by monitoring  schedule.

## 2024-07-08 NOTE — ASSESSMENT & PLAN NOTE
Glucose in the 40s likely 2/2 taking higher dose of basal insulin then needed at home  On 40 units basal insulin at home, will likely need to adjust to around 10-20 units when ready for DC home.  Improved to wnl after D10 250cc bolus in ED  POCT glucose tid before meals and qhs  SSI prn for now  Regular diet  Monitor and admitted for observation, will likely discharge soon if glucose stable.  Pt prefers going home at discharge.    07/07 Watch BS with SS insulin only for now

## 2024-07-10 ENCOUNTER — OFFICE VISIT (OUTPATIENT)
Dept: FAMILY MEDICINE | Facility: CLINIC | Age: 54
End: 2024-07-10
Payer: MEDICAID

## 2024-07-10 DIAGNOSIS — E11.649 HYPOGLYCEMIA ASSOCIATED WITH TYPE 2 DIABETES MELLITUS: ICD-10-CM

## 2024-07-10 DIAGNOSIS — I10 PRIMARY HYPERTENSION: Chronic | ICD-10-CM

## 2024-07-10 DIAGNOSIS — M16.0 PRIMARY OSTEOARTHRITIS OF BOTH HIPS: Chronic | ICD-10-CM

## 2024-07-10 DIAGNOSIS — E11.9 TYPE 2 DIABETES MELLITUS WITHOUT COMPLICATION, WITH LONG-TERM CURRENT USE OF INSULIN: Chronic | ICD-10-CM

## 2024-07-10 DIAGNOSIS — C78.7 METASTATIC COLON CANCER TO LIVER: Chronic | ICD-10-CM

## 2024-07-10 DIAGNOSIS — C18.9 METASTATIC COLON CANCER TO LIVER: Chronic | ICD-10-CM

## 2024-07-10 DIAGNOSIS — M25.559 HIP PAIN, UNSPECIFIED LATERALITY: Primary | ICD-10-CM

## 2024-07-10 DIAGNOSIS — Z79.4 TYPE 2 DIABETES MELLITUS WITHOUT COMPLICATION, WITH LONG-TERM CURRENT USE OF INSULIN: Chronic | ICD-10-CM

## 2024-07-10 PROCEDURE — 1160F RVW MEDS BY RX/DR IN RCRD: CPT | Mod: CPTII,,, | Performed by: FAMILY MEDICINE

## 2024-07-10 PROCEDURE — 3044F HG A1C LEVEL LT 7.0%: CPT | Mod: CPTII,,, | Performed by: FAMILY MEDICINE

## 2024-07-10 PROCEDURE — 4010F ACE/ARB THERAPY RXD/TAKEN: CPT | Mod: CPTII,,, | Performed by: FAMILY MEDICINE

## 2024-07-10 PROCEDURE — 99442 PR PHYSICIAN TELEPHONE EVALUATION 11-20 MIN: CPT | Mod: GT,,, | Performed by: FAMILY MEDICINE

## 2024-07-10 PROCEDURE — 1159F MED LIST DOCD IN RCRD: CPT | Mod: CPTII,,, | Performed by: FAMILY MEDICINE

## 2024-07-10 RX ORDER — HYDROCODONE BITARTRATE AND ACETAMINOPHEN 10; 325 MG/1; MG/1
1 TABLET ORAL EVERY 8 HOURS PRN
Qty: 60 TABLET | Refills: 0 | Status: SHIPPED | OUTPATIENT
Start: 2024-07-10

## 2024-07-10 NOTE — PROGRESS NOTES
"Established Patient - Audio Only Telehealth Visit     The patient location is: home  The chief complaint leading to consultation is: hip pain  Visit type: Virtual visit with audio only (telephone)  Total time spent with patient: 18 minutes        The reason for the audio only service rather than synchronous audio and video virtual visit was related to technical difficulties or patient preference/necessity.     Each patient to whom I provide medical services by telemedicine is:  (1) informed of the relationship between the physician and patient and the respective role of any other health care provider with respect to management of the patient; and (2) notified that they may decline to receive medical services by telemedicine and may withdraw from such care at any time. Patient verbally consented to receive this service via voice-only telephone call.        HPI: has metastatic colon cancer.  Has had several hypoglycemic episodes recently and has been to hospital.  Is also having "excruciating" hip pain.    Assessment and plan:   Stop insulin for now and monitor glucose.  We can restart as needed.  I am prescribing medication for pain control.  She will continue cancer treatments at [facility].  I am contacting medical supply to get a hospital bed for her.  She may follow up as needed.                                  This service was not originating from a related E/M service provided within the previous 7 days nor will  to an E/M service or procedure within the next 24 hours or my soonest available appointment.  Prevailing standard of care was able to be met in this audio-only visit.    "

## 2024-07-10 NOTE — PLAN OF CARE
[facility]  Discharge Final Note    Primary Care Provider: Elkin Coombs MD    Expected Discharge Date: 7/8/2024    Final Discharge Note (most recent)       Final Note - 07/10/24 1606          Final Note    Assessment Type Final Discharge Note     Anticipated Discharge Disposition Home or Self Care        Post-Acute Status    Discharge Delays None known at this time                     Important Message from Medicare             Contact Info       Elkin Coombs MD   Specialty: Family Medicine   Relationship: PCP - General    [address] [facility]  [facility] [place] [address]   Phone: [phone]       Next Steps: Schedule an appointment as soon as possible for a visit in 1 week(s)    Instructions: Patient will keep schedule follow up on July 10 at 11:00    Mily Ryan MD   Specialty: Internal Medicine    [address] [facility] [place] [address]   Phone: [phone]       Next Steps: Follow up    Instructions: Patient to make follow up appointment with this provider  Patient will make follow up when she get home    Abner Vyas MD   Specialty: General Surgery, Surgery    [address] [place] [id number]   Phone: [phone]       Next Steps: Follow up in 2 week(s)    Instructions: Please follow up on July 18 at 1:15          Pt d/c home to self care -- declined HH

## 2024-07-11 NOTE — PROGRESS NOTES
Patient requires frequent and immediate changes in body positioning in ways no feasible with a standard bed to alleviate pain due to rectal cancer metastasized to bone and metastatic colon caner to liver. Will submit order to [facility] for hospital bed.

## 2024-07-17 ENCOUNTER — HOSPITAL ENCOUNTER (EMERGENCY)
Facility: HOSPITAL | Age: 54
Discharge: HOME | End: 2024-07-17
Attending: FAMILY MEDICINE
Payer: MEDICAID

## 2024-07-17 ENCOUNTER — HOSPITAL ENCOUNTER (EMERGENCY)
Facility: HOSPITAL | Age: 54
Discharge: ANOTHER HOSPITAL | End: 2024-07-17
Payer: MEDICAID

## 2024-07-17 VITALS
HEIGHT: 57 IN | WEIGHT: 130 LBS | HEART RATE: 89 BPM | BODY MASS INDEX: 28.05 KG/M2 | SYSTOLIC BLOOD PRESSURE: 130 MMHG | RESPIRATION RATE: 18 BRPM | OXYGEN SATURATION: 96 % | DIASTOLIC BLOOD PRESSURE: 75 MMHG | TEMPERATURE: 98 F

## 2024-07-17 VITALS
DIASTOLIC BLOOD PRESSURE: 91 MMHG | HEART RATE: 87 BPM | TEMPERATURE: 98 F | HEIGHT: 57 IN | SYSTOLIC BLOOD PRESSURE: 128 MMHG | BODY MASS INDEX: 28.05 KG/M2 | WEIGHT: 130 LBS | RESPIRATION RATE: 18 BRPM | OXYGEN SATURATION: 99 %

## 2024-07-17 DIAGNOSIS — R53.1 GENERALIZED WEAKNESS: ICD-10-CM

## 2024-07-17 DIAGNOSIS — N30.01 ACUTE CYSTITIS WITH HEMATURIA: ICD-10-CM

## 2024-07-17 DIAGNOSIS — R11.0 NAUSEA: ICD-10-CM

## 2024-07-17 DIAGNOSIS — C76.2: Primary | ICD-10-CM

## 2024-07-17 DIAGNOSIS — R74.8 ELEVATED LIVER ENZYMES: Primary | ICD-10-CM

## 2024-07-17 PROBLEM — E80.6 HYPERBILIRUBINEMIA: Status: ACTIVE | Noted: 2024-07-17

## 2024-07-17 LAB
ALBUMIN SERPL BCP-MCNC: 2.3 G/DL (ref 3.5–5)
ALBUMIN/GLOB SERPL: 0.5 {RATIO}
ALP SERPL-CCNC: 2498 U/L (ref 41–108)
ALT SERPL W P-5'-P-CCNC: 144 U/L (ref 13–56)
AMYLASE SERPL-CCNC: 30 U/L (ref 25–115)
ANION GAP SERPL CALCULATED.3IONS-SCNC: 13 MMOL/L (ref 7–16)
APTT PPP: 29.4 SECONDS (ref 25.2–37.3)
AST SERPL W P-5'-P-CCNC: 199 U/L (ref 15–37)
BACTERIA #/AREA URNS HPF: ABNORMAL /HPF
BASOPHILS # BLD AUTO: 0.06 K/UL (ref 0–0.2)
BASOPHILS NFR BLD AUTO: 0.8 % (ref 0–1)
BILIRUB SERPL-MCNC: 7.5 MG/DL (ref ?–1.2)
BILIRUB UR QL STRIP: ABNORMAL
BUN SERPL-MCNC: 13 MG/DL (ref 7–18)
BUN/CREAT SERPL: 24 (ref 6–20)
CALCIUM SERPL-MCNC: 8.6 MG/DL (ref 8.5–10.1)
CHLORIDE SERPL-SCNC: 98 MMOL/L (ref 98–107)
CLARITY UR: CLEAR
CO2 SERPL-SCNC: 23 MMOL/L (ref 21–32)
COLOR UR: ABNORMAL
CREAT SERPL-MCNC: 0.54 MG/DL (ref 0.55–1.02)
DIFFERENTIAL METHOD BLD: ABNORMAL
EGFR (NO RACE VARIABLE) (RUSH/TITUS): 111 ML/MIN/1.73M2
EOSINOPHIL # BLD AUTO: 0.23 K/UL (ref 0–0.5)
EOSINOPHIL NFR BLD AUTO: 3 % (ref 1–4)
ERYTHROCYTE [DISTWIDTH] IN BLOOD BY AUTOMATED COUNT: 15.9 % (ref 11.5–14.5)
GLOBULIN SER-MCNC: 4.4 G/DL (ref 2–4)
GLUCOSE SERPL-MCNC: 182 MG/DL (ref 74–106)
GLUCOSE UR STRIP-MCNC: 100 MG/DL
HCT VFR BLD AUTO: 34.2 % (ref 38–47)
HGB BLD-MCNC: 10.5 G/DL (ref 12–16)
IMM GRANULOCYTES # BLD AUTO: 0.05 K/UL (ref 0–0.04)
IMM GRANULOCYTES NFR BLD: 0.7 % (ref 0–0.4)
INFLUENZA A MOLECULAR (OHS): NEGATIVE
INFLUENZA B MOLECULAR (OHS): NEGATIVE
INR BLD: 0.96
KETONES UR STRIP-SCNC: 15 MG/DL
LEUKOCYTE ESTERASE UR QL STRIP: NEGATIVE
LIPASE SERPL-CCNC: 54 U/L (ref 16–77)
LYMPHOCYTES # BLD AUTO: 0.99 K/UL (ref 1–4.8)
LYMPHOCYTES NFR BLD AUTO: 13.1 % (ref 27–41)
MCH RBC QN AUTO: 29.4 PG (ref 27–31)
MCHC RBC AUTO-ENTMCNC: 30.7 G/DL (ref 32–36)
MCV RBC AUTO: 95.8 FL (ref 80–96)
MONOCYTES # BLD AUTO: 0.46 K/UL (ref 0–0.8)
MONOCYTES NFR BLD AUTO: 6.1 % (ref 2–6)
MPC BLD CALC-MCNC: 11.3 FL (ref 9.4–12.4)
MUCOUS THREADS #/AREA URNS HPF: ABNORMAL /HPF
NEUTROPHILS # BLD AUTO: 5.77 K/UL (ref 1.8–7.7)
NEUTROPHILS NFR BLD AUTO: 76.3 % (ref 53–65)
NITRITE UR QL STRIP: NEGATIVE
NRBC # BLD AUTO: 0 X10E3/UL
NRBC, AUTO (.00): 0 %
PH UR STRIP: 5.5 PH UNITS
PLATELET # BLD AUTO: 220 K/UL (ref 150–400)
POTASSIUM SERPL-SCNC: 3.4 MMOL/L (ref 3.5–5.1)
PROT SERPL-MCNC: 6.7 G/DL (ref 6.4–8.2)
PROT UR QL STRIP: 30
PROTHROMBIN TIME: 13.5 SECONDS (ref 11.7–14.7)
RBC # BLD AUTO: 3.57 M/UL (ref 4.2–5.4)
RBC # UR STRIP: ABNORMAL /UL
RBC #/AREA URNS HPF: ABNORMAL /HPF
RENAL EPI CELLS #/AREA URNS LPF: ABNORMAL /LPF
SARS-COV-2 RDRP RESP QL NAA+PROBE: NORMAL
SODIUM SERPL-SCNC: 131 MMOL/L (ref 136–145)
SP GR UR STRIP: 1.02
SQUAMOUS #/AREA URNS LPF: ABNORMAL /LPF
TRANS CELLS #/AREA URNS LPF: ABNORMAL /LPF
UROBILINOGEN UR STRIP-ACNC: >=8 MG/DL
WBC # BLD AUTO: 7.56 K/UL (ref 4.5–11)
WBC #/AREA URNS HPF: ABNORMAL /HPF

## 2024-07-17 PROCEDURE — 82150 ASSAY OF AMYLASE: CPT | Performed by: NURSE PRACTITIONER

## 2024-07-17 PROCEDURE — 87502 INFLUENZA DNA AMP PROBE: CPT | Performed by: NURSE PRACTITIONER

## 2024-07-17 PROCEDURE — 99285 EMERGENCY DEPT VISIT HI MDM: CPT | Mod: 25

## 2024-07-17 PROCEDURE — 80053 COMPREHEN METABOLIC PANEL: CPT | Performed by: NURSE PRACTITIONER

## 2024-07-17 PROCEDURE — 81001 URINALYSIS AUTO W/SCOPE: CPT | Performed by: NURSE PRACTITIONER

## 2024-07-17 PROCEDURE — 25500020 PHARM REV CODE 255: Performed by: FAMILY MEDICINE

## 2024-07-17 PROCEDURE — 83690 ASSAY OF LIPASE: CPT | Performed by: NURSE PRACTITIONER

## 2024-07-17 PROCEDURE — 36415 COLL VENOUS BLD VENIPUNCTURE: CPT | Performed by: NURSE PRACTITIONER

## 2024-07-17 PROCEDURE — 85610 PROTHROMBIN TIME: CPT | Performed by: NURSE PRACTITIONER

## 2024-07-17 PROCEDURE — 87086 URINE CULTURE/COLONY COUNT: CPT | Performed by: NURSE PRACTITIONER

## 2024-07-17 PROCEDURE — 85025 COMPLETE CBC W/AUTO DIFF WBC: CPT | Performed by: NURSE PRACTITIONER

## 2024-07-17 PROCEDURE — 87635 SARS-COV-2 COVID-19 AMP PRB: CPT | Performed by: NURSE PRACTITIONER

## 2024-07-17 PROCEDURE — 25000003 PHARM REV CODE 250: Performed by: NURSE PRACTITIONER

## 2024-07-17 PROCEDURE — 85730 THROMBOPLASTIN TIME PARTIAL: CPT | Performed by: NURSE PRACTITIONER

## 2024-07-17 PROCEDURE — 81003 URINALYSIS AUTO W/O SCOPE: CPT | Performed by: NURSE PRACTITIONER

## 2024-07-17 RX ORDER — SODIUM CHLORIDE 9 MG/ML
1000 INJECTION, SOLUTION INTRAVENOUS
Status: COMPLETED | OUTPATIENT
Start: 2024-07-17 | End: 2024-07-17

## 2024-07-17 RX ADMIN — SODIUM CHLORIDE 1000 ML: 900 INJECTION, SOLUTION INTRAVENOUS at 10:07

## 2024-07-17 RX ADMIN — IOPAMIDOL 100 ML: 755 INJECTION, SOLUTION INTRAVENOUS at 12:07

## 2024-07-17 NOTE — ED NOTES
Thank you. Your form was submitted successfully.  Please don't forget the Face Sheet & Medical Records.      Confirmation #: [id number]  Submission Date / Time: Jul 17, 2024 3:33 PM

## 2024-07-17 NOTE — ED TRIAGE NOTES
Pt presents to ed via ems, from [place] for CT ABD to r/o biliary drain blockage. Pt drainage bag noted to have serosanguinous fluid in bag, pt states drain has been draining normally since having it placed 3 weeks ago.

## 2024-07-17 NOTE — SUBJECTIVE & OBJECTIVE
"Current Facility-Administered Medications on File Prior to Encounter   Medication    [COMPLETED] 0.9%  NaCl infusion     Current Outpatient Medications on File Prior to Encounter   Medication Sig    capecitabine (XELODA) 500 MG Tab Take 3 tablets twice daily for 7 days on and 7 days off.    cholecalciferol, vitamin D3, 1,250 mcg (50,000 unit) capsule Take 1 capsule (50,000 Units total) by mouth every 7 days.    clopidogreL (PLAVIX) 75 mg tablet Take 75 mg by mouth once daily.    levETIRAcetam (KEPPRA) 500 MG Tab TAKE ONE TABLET BY MOUTH ONCE DAILY    lisinopriL 10 MG tablet Take 1 tablet (10 mg total) by mouth once daily.    polyethylene glycol (GLYCOLAX) 17 gram PwPk Take 17 g by mouth daily as needed for Constipation.    sulfamethoxazole-trimethoprim 800-160mg (BACTRIM DS) 800-160 mg Tab Take 1 tablet by mouth 2 (two) times daily.    [DISCONTINUED] atorvastatin (LIPITOR) 10 MG tablet Take 1 tablet (10 mg total) by mouth once daily.    [DISCONTINUED] blood sugar diagnostic (ONETOUCH ULTRA TEST) Strp check blood sugar TWICE DAILY    [DISCONTINUED] HYDROcodone-acetaminophen (NORCO)  mg per tablet Take 1 tablet by mouth every 8 (eight) hours as needed for Pain (in hip).    [DISCONTINUED] insulin aspart U-100 (NOVOLOG) 100 unit/mL injection inject as directed by your physician per sliding scale    [DISCONTINUED] insulin syringe-needle U-100 1 mL 30 gauge x 5/16 Syrg Use as directed.       Review of patient's allergies indicates:   Allergen Reactions    Grape Anaphylaxis     Other reaction(s): Not available    Morphine sulfate Swelling    Nutritional supplements Anaphylaxis     Highly allergic to Cooked grapes    Pineapple Anaphylaxis    Opioids - morphine analogues Nausea And Vomiting    Rocephin [ceftriaxone] Itching    Aspirin      "Veins breakdown" per patient    Biguanides Diarrhea and Other (See Comments)    Dicyclomine Diarrhea    Dihydrocodeine Other (See Comments)     Bad headache in the top of head    " Gabapentin Other (See Comments)     weakness  Other reaction(s): Not available  Seizures      Hydrocodone Other (See Comments)     Headache      Iron      Hemochromatosis    Iron analogues      Pt hx of hemachromatosis    Metformin      diarrhea    Penicillins Other (See Comments)     Family hx       Latex Rash and Other (See Comments)       Past Medical History:   Diagnosis Date    Bell's palsy 03/01/2023    Biliary obstruction due to malignant neoplasm 06/24/2024    CVA (cerebral vascular accident)     HTN (hypertension)     Irritable bowel syndrome with diarrhea 09/07/2022    Lumbar radiculopathy, right 12/23/2021    Metastatic colon cancer to liver 06/28/2024    Primary hypertension 12/15/2021    Rectal cancer metastatic to bone     Restless legs syndrome 11/20/2023    Seizure     TIA (transient ischemic attack)     Type 2 diabetes mellitus without complication, with long-term current use of insulin 12/15/2021    Vitamin D deficiency 08/09/2023     Past Surgical History:   Procedure Laterality Date    COLON SURGERY      HERNIA REPAIR      HYSTERECTOMY       Family History       Problem Relation (Age of Onset)    No Known Problems Mother, Father, Sister, Brother, Daughter, Son, Maternal Aunt, Maternal Uncle, Paternal Aunt, Paternal Uncle, Maternal Grandmother, Maternal Grandfather, Paternal Grandmother, Paternal Grandfather          Tobacco Use    Smoking status: Never     Passive exposure: Never    Smokeless tobacco: Never   Substance and Sexual Activity    Alcohol use: Never    Drug use: Never    Sexual activity: Never     Review of Systems   Constitutional:  Negative for fever.   Respiratory: Negative.     Cardiovascular: Negative.    Gastrointestinal:  Positive for abdominal pain (upper, minimal) and nausea. Negative for abdominal distention and vomiting.   Neurological: Negative.      Objective:     Vital Signs (Most Recent):  Temp: 97.6 °F (36.4 °C) (07/17/24 1202)  Pulse: 89 (07/17/24 1432)  Resp: (!) 22  (07/17/24 1202)  BP: 130/75 (07/17/24 1432)  SpO2: 96 % (07/17/24 1432) Vital Signs (24h Range):  Temp:  [97.6 °F (36.4 °C)-97.9 °F (36.6 °C)] 97.6 °F (36.4 °C)  Pulse:  [86-98] 89  Resp:  [18-22] 22  SpO2:  [96 %-99 %] 96 %  BP: ()/(69-96) 130/75     Weight: 59 kg (130 lb)  Body mass index is 28.13 kg/m².     Physical Exam  Vitals reviewed.   Constitutional:       General: She is not in acute distress.  Cardiovascular:      Rate and Rhythm: Normal rate.   Pulmonary:      Effort: Pulmonary effort is normal. No respiratory distress.   Abdominal:      General: There is no distension.      Palpations: Abdomen is soft.      Tenderness: There is abdominal tenderness (mild upper).      Comments: Right upper quadrant percutaneous drain with serous sanguinous output   Skin:     General: Skin is warm and dry.   Neurological:      Mental Status: She is alert. Mental status is at baseline.            I have reviewed all pertinent lab results within the past 24 hours.    Significant Diagnostics:  I have reviewed all pertinent imaging results/findings within the past 24 hours.

## 2024-07-17 NOTE — ED NOTES
Thank you. Your form was submitted successfully.  Please don't forget the Face Sheet & Medical Records.      Confirmation #: [id number]  Submission Date / Time: Jul 17, 2024 5:33 PM

## 2024-07-17 NOTE — HPI
Transferred from [facility] where she presented with malaise and fatigue with some nausea.  History of stage IV colon cancer with mets to the liver and lungs.  Recent admission for acute cholecystitis.  Due to high risk of surgery, patient had a cholecystostomy tube placed.  Was discharged home doing relatively well.    On presentation today, LFTs elevated with a bilirubin of 7.5.  Previously normal.  Biliary stent in place.  Placed by  at [facility].  CT is largely unchanged from previous.  Concern of occluded biliary stent or other similar etiology as source of increase in the LFTs.    Would recommend transferring to [facility] for consideration of TPC.  Advise consultation with Gastroenterology at [facility].

## 2024-07-17 NOTE — ED PROVIDER NOTES
"Encounter Date: 7/17/2024       History     Chief Complaint   Patient presents with    Nausea    Weakness     X 4 days     52yo female with a PMH of stage IV colon cancer with mets to the liver and lung status post partial colectomy followed by reanastomosis, rectal cancer with mets to bones, malignant biliary obstruction status post stent placement, cholecystitis s/p GB drain placement, type 2 diabetes, essential hypertension, and partial seizure who is undergoing palliative chemotherapy who presents to the ER for "flu symptoms."  She reports generalized weakness, nausea and mild cough for 4 days. Denies fever, body aches, vomiting, abd pain. Pt was recently admitted to [facility] with acute cholecystitis.  Surgery too high risk, therefore a cholecystostomy tube was placed (tube remains in place without problems).  She was scheduled to follow-up with Dr Vyas this morning for tube check.     The history is provided by the patient.     Review of patient's allergies indicates:   Allergen Reactions    Grape Anaphylaxis     Other reaction(s): Not available    Morphine sulfate Swelling    Nutritional supplements Anaphylaxis     Highly allergic to Cooked grapes    Pineapple Anaphylaxis    Opioids - morphine analogues Nausea And Vomiting    Rocephin [ceftriaxone] Itching    Aspirin      "Veins breakdown" per patient    Biguanides Diarrhea and Other (See Comments)    Dicyclomine Diarrhea    Dihydrocodeine Other (See Comments)     Bad headache in the top of head    Gabapentin Other (See Comments)     weakness  Other reaction(s): Not available  Seizures      Hydrocodone Other (See Comments)     Headache      Iron      Hemochromatosis    Iron analogues      Pt hx of hemachromatosis    Metformin      diarrhea    Penicillins Other (See Comments)     Family hx       Latex Rash and Other (See Comments)     Past Medical History:   Diagnosis Date    Bell's palsy 03/01/2023    Biliary obstruction due to malignant neoplasm 06/24/2024 "    CVA (cerebral vascular accident)     HTN (hypertension)     Irritable bowel syndrome with diarrhea 09/07/2022    Lumbar radiculopathy, right 12/23/2021    Metastatic colon cancer to liver 06/28/2024    Primary hypertension 12/15/2021    Rectal cancer metastatic to bone     Restless legs syndrome 11/20/2023    Seizure     TIA (transient ischemic attack)     Type 2 diabetes mellitus without complication, with long-term current use of insulin 12/15/2021    Vitamin D deficiency 08/09/2023     Past Surgical History:   Procedure Laterality Date    COLON SURGERY      HERNIA REPAIR      HYSTERECTOMY       Family History   Problem Relation Name Age of Onset    No Known Problems Mother      No Known Problems Father      No Known Problems Sister      No Known Problems Brother      No Known Problems Daughter      No Known Problems Son      No Known Problems Maternal Aunt      No Known Problems Maternal Uncle      No Known Problems Paternal Aunt      No Known Problems Paternal Uncle      No Known Problems Maternal Grandmother      No Known Problems Maternal Grandfather      No Known Problems Paternal Grandmother      No Known Problems Paternal Grandfather       Social History     Tobacco Use    Smoking status: Never     Passive exposure: Never    Smokeless tobacco: Never   Substance Use Topics    Alcohol use: Never    Drug use: Never     Review of Systems   Constitutional:  Negative for fever.   HENT:  Negative for congestion, ear pain, sinus pressure, sinus pain, sneezing and sore throat.    Respiratory:  Positive for cough. Negative for shortness of breath.    Cardiovascular:  Negative for chest pain.   Gastrointestinal:  Positive for nausea. Negative for abdominal pain, diarrhea and vomiting.   Genitourinary:  Negative for dysuria.   Musculoskeletal:  Negative for back pain.   Skin:  Negative for rash.   Neurological:  Positive for weakness.   Hematological:  Does not bruise/bleed easily.       Physical Exam     Initial  Vitals [07/17/24 0910]   BP Pulse Resp Temp SpO2   (!) 131/96 98 18 97.9 °F (36.6 °C) 98 %      MAP       --         Physical Exam    Nursing note and vitals reviewed.  Constitutional: She appears well-developed and well-nourished. She is not diaphoretic. She is cooperative.  Non-toxic appearance. She has a sickly appearance (chronic ill appearing). She appears ill. No distress.   HENT:   Head: Normocephalic and atraumatic.   Eyes: Pupils are equal, round, and reactive to light.   Neck: Neck supple.   Cardiovascular:  Normal rate, regular rhythm, normal heart sounds and intact distal pulses.     Exam reveals no gallop and no friction rub.       No murmur heard.  Pulmonary/Chest: Breath sounds normal. No respiratory distress. She has no wheezes. She has no rhonchi. She has no rales. She exhibits no tenderness.   Musculoskeletal:      Cervical back: Neck supple.     Neurological: She is alert and oriented to person, place, and time.   Skin: Skin is warm and dry. Capillary refill takes less than 2 seconds. No rash noted. There is pallor.   jaundice   Psychiatric: She has a normal mood and affect.         Medical Screening Exam   See Full Note    ED Course   Procedures  Labs Reviewed   COMPREHENSIVE METABOLIC PANEL - Abnormal; Notable for the following components:       Result Value    Sodium 131 (*)     Potassium 3.4 (*)     Glucose 182 (*)     Creatinine 0.54 (*)     BUN/Creatinine Ratio 24 (*)     Albumin 2.3 (*)     Globulin 4.4 (*)     Bilirubin, Total 7.5 (*)     Alk Phos 2,498 (*)      (*)      (*)     All other components within normal limits   URINALYSIS, REFLEX TO URINE CULTURE - Abnormal; Notable for the following components:    Protein, UA 30 (*)     Glucose,  (*)     Ketones, UA 15 (*)     Urobilinogen, UA >=8.0 (*)     Bilirubin, UA Large (*)     Blood, UA Moderate (*)     All other components within normal limits   CBC WITH DIFFERENTIAL - Abnormal; Notable for the following components:     RBC 3.57 (*)     Hemoglobin 10.5 (*)     Hematocrit 34.2 (*)     MCHC 30.7 (*)     RDW 15.9 (*)     Neutrophils % 76.3 (*)     Lymphocytes % 13.1 (*)     Monocytes % 6.1 (*)     Immature Granulocytes % 0.7 (*)     Lymphocytes, Absolute 0.99 (*)     Immature Granulocytes, Absolute 0.05 (*)     All other components within normal limits   URINALYSIS, MICROSCOPIC - Abnormal; Notable for the following components:    RBC, UA 5-10 (*)     Bacteria, UA Moderate (*)     Squamous Epithelial Cells, UA Many (*)     Transitional Epithelial Cells, UA Few (*)     Renal Epithelial Cells, UA Occasional (*)     Mucus, UA Many (*)     All other components within normal limits   INFLUENZA A & B BY MOLECULAR - Normal   SARS-COV-2 RNA AMPLIFICATION, QUAL - Normal    Narrative:     TEST REPEATED AND FAILED 2X   CULTURE, URINE   CBC W/ AUTO DIFFERENTIAL    Narrative:     The following orders were created for panel order CBC auto differential.  Procedure                               Abnormality         Status                     ---------                               -----------         ------                     CBC with Differential[[id number]]       Abnormal            Final result                 Please view results for these tests on the individual orders.   LIPASE   AMYLASE   APTT   PROTIME-INR          Imaging Results              X-Ray Chest AP Portable (Final result)  Result time 07/17/24 09:18:14      Final result by Henrique Armenta MD (07/17/24 09:18:14)                   Impression:      Similar appearance of the numerous bilateral pulmonary nodules/lesions.      Electronically signed by: Henrique Armenta  Date:    07/17/2024  Time:    09:18               Narrative:    EXAMINATION:  XR CHEST AP PORTABLE    CLINICAL HISTORY:  cough;    TECHNIQUE:  Single frontal view of the chest was performed.    COMPARISON:  07/06/2024    FINDINGS:  Diffuse pulmonary nodularity again seen.  No pneumothorax.  No pleural effusion.  No new  abnormality in the chest.                                       Medications   0.9%  NaCl infusion (1,000 mLs Intravenous New Bag 7/17/24 1055)     Medical Decision Making  Problems Addressed:  Acute cystitis with hematuria: acute illness or injury     Details: Currently on Bactrim DS, urine C&S pending  Elevated liver enzymes: acute illness or injury  Generalized weakness: acute illness or injury  Nausea: acute illness or injury    Amount and/or Complexity of Data Reviewed  Labs: ordered. Decision-making details documented in ED Course.  Radiology: ordered. Decision-making details documented in ED Course.    Risk  Prescription drug management.               ED Course as of 07/17/24 1103   Wed Jul 17, 2024   1003 Ketones, UA(!): 15 [AG]   1004 Bilirubin (UA)(!): Large [AG]   1004 Blood, UA(!): Moderate [AG]   1010 Bacteria, UA(!): Moderate  Pt is on Bactrim DS now.  Culture pending.  [AG]   1011 Pt is currently on Bactrim DS.  Will wait on culture to return prior to changing the antibiotic. [AG]   1032 Spoke with Naomi (Dr Vyas's nurse).  She is discussing the case with Dr Vyas or his NP. [AG]   1041 Spoke with Quan Pozo NP with general sx who recommends transferring to [facility] for CT scan and evaluation since out CT machine is down. PFC order placed.  [AG]   1046 COVID swab is invalid due to blood on the swab.  The RN tried to obtain another swab, and it had blood on it, too.  Lab reports they are unable to run COVID on swab with blood.  [AG]   1056 Dr Kat accepted the patient to the ER at [facility] [AG]   1103 With patient having hematuria and intermittent nose bleeds, coags added. [AG]      ED Course User Index  [AG] Daisy Oreilly, FNP                           Clinical Impression:   Final diagnoses:  [R11.0] Nausea  [R53.1] Generalized weakness  [N30.01] Acute cystitis with hematuria  [R74.8] Elevated liver enzymes (Primary)        ED Disposition Condition    Transfer to Another Facility Stable                 Daisy Oreilly, [facility]  07/17/24 1057       Daisy Oreilly, [facility]  07/17/24 1106

## 2024-07-17 NOTE — ED PROVIDER NOTES
"Encounter Date: 7/17/2024    SCRIBE #1 NOTE: I, Marianne Sutherland, am scribing for, and in the presence of,  Mack Kat DO.       History     Chief Complaint   Patient presents with    Weakness     This patient is a 54 y/o Female that presents to the ED via EMS with c/o Weakness. The patient comes to the ED from [place] for CT of Abdomin for a Biliary drain blockage. The patient states about 3 to 4 days ago she was cooking and got very weak and couldn't finish. She said the weakness has gotten worst. . PMHx consists of stage IV colon cancer with mets to the liver and lung status post partial colectomy followed by reanastomosis, rectal cancer with mets to bones, malignant biliary obstruction status post stent placement, cholecystitis s/p GB drain placement, type 2 diabetes, essential hypertension. She reports generalized weakness, nausea. The pt was just recently admitted to [facility] with acute cholecystitis. The surgery too high risk, therefore a cholecystostomy tube was placed (tube remains in place without problems).  She was scheduled to follow-up with Dr Vyas this morning for tube check. The pt did mention she was a bit constipated and had to take pill this morning.    The history is provided by the patient and the EMS personnel. No  was used.     Review of patient's allergies indicates:   Allergen Reactions    Grape Anaphylaxis     Other reaction(s): Not available    Morphine sulfate Swelling    Nutritional supplements Anaphylaxis     Highly allergic to Cooked grapes    Pineapple Anaphylaxis    Opioids - morphine analogues Nausea And Vomiting    Rocephin [ceftriaxone] Itching    Aspirin      "Veins breakdown" per patient    Biguanides Diarrhea and Other (See Comments)    Dicyclomine Diarrhea    Dihydrocodeine Other (See Comments)     Bad headache in the top of head    Gabapentin Other (See Comments)     weakness  Other reaction(s): Not available  Seizures      Hydrocodone Other (See " Comments)     Headache      Iron      Hemochromatosis    Iron analogues      Pt hx of hemachromatosis    Metformin      diarrhea    Penicillins Other (See Comments)     Family hx       Latex Rash and Other (See Comments)     Past Medical History:   Diagnosis Date    Bell's palsy 03/01/2023    Biliary obstruction due to malignant neoplasm 06/24/2024    CVA (cerebral vascular accident)     HTN (hypertension)     Irritable bowel syndrome with diarrhea 09/07/2022    Lumbar radiculopathy, right 12/23/2021    Metastatic colon cancer to liver 06/28/2024    Primary hypertension 12/15/2021    Rectal cancer metastatic to bone     Restless legs syndrome 11/20/2023    Seizure     TIA (transient ischemic attack)     Type 2 diabetes mellitus without complication, with long-term current use of insulin 12/15/2021    Vitamin D deficiency 08/09/2023     Past Surgical History:   Procedure Laterality Date    COLON SURGERY      HERNIA REPAIR      HYSTERECTOMY       Family History   Problem Relation Name Age of Onset    No Known Problems Mother      No Known Problems Father      No Known Problems Sister      No Known Problems Brother      No Known Problems Daughter      No Known Problems Son      No Known Problems Maternal Aunt      No Known Problems Maternal Uncle      No Known Problems Paternal Aunt      No Known Problems Paternal Uncle      No Known Problems Maternal Grandmother      No Known Problems Maternal Grandfather      No Known Problems Paternal Grandmother      No Known Problems Paternal Grandfather       Social History     Tobacco Use    Smoking status: Never     Passive exposure: Never    Smokeless tobacco: Never   Substance Use Topics    Alcohol use: Never    Drug use: Never     Review of Systems   Constitutional:  Negative for fever.   Gastrointestinal:  Positive for constipation and nausea.   Neurological:  Positive for weakness.   All other systems reviewed and are negative.      Physical Exam     Initial Vitals   BP  Pulse Resp Temp SpO2   07/17/24 1202 07/17/24 1206 07/17/24 1202 07/17/24 1202 07/17/24 1202   95/72 86 (!) 22 97.6 °F (36.4 °C) 99 %      MAP       --                Physical Exam    Constitutional: She appears well-developed and well-nourished.   HENT:   Head: Normocephalic and atraumatic.   Right Ear: External ear normal.   Left Ear: External ear normal.   Nose: Nose normal.   Mouth/Throat: Oropharynx is clear and moist.   Eyes: Conjunctivae and EOM are normal. Pupils are equal, round, and reactive to light.   Neck: Neck supple.   Normal range of motion.  Cardiovascular:  Normal rate, regular rhythm, normal heart sounds and intact distal pulses.           Pulmonary/Chest: Breath sounds normal.   Abdominal: Abdomen is soft. Bowel sounds are normal.   Genitourinary:    Vagina and uterus normal.     Musculoskeletal:         General: Normal range of motion.      Cervical back: Normal range of motion and neck supple.     Neurological: She is alert and oriented to person, place, and time. She has normal strength and normal reflexes.   Skin: Skin is warm. Capillary refill takes less than 2 seconds.   Psychiatric: She has a normal mood and affect. Her behavior is normal. Judgment and thought content normal.         ED Course   Procedures  Labs Reviewed - No data to display       Imaging Results              CT Abdomen Pelvis With IV Contrast NO Oral Contrast (Final result)  Result time 07/17/24 12:43:11      Final result by Calderon Harden II, MD (07/17/24 12:43:11)                   Impression:      Distended intrahepatic biliary ducts appears similar to previous CT.  The internal biliary stent and catheter in the gallbladder fossa appear unchanged from previous.  Heterogeneous hepatic density in the right lobe appears similar to previous CT.      Electronically signed by: Calderon Harden  Date:    07/17/2024  Time:    12:43               Narrative:    EXAMINATION:  CT ABDOMEN PELVIS WITH IV CONTRAST    CLINICAL  HISTORY:  Biliary obstruction suspected (Ped 0-18y);    TECHNIQUE:  Axial CT imaging of the abdomen and pelvis is performed with intravenous contrast. Contrast dose is 100 cc Isovue 370.    CT dose reduction technique used - Dose Rite and tube current modulation.    COMPARISON:  6 July 2024    FINDINGS:  Cardiac and lung bases are within normal limits    CT abdomen: Liver has heterogeneous area of density without significant enhancement involving majority of the right lobe of the liver.  There is internal biliary stent present.  There is a drain in the gallbladder fossa.  The intrahepatic biliary ducts increased but similar to previous study.  Spleen, pancreas and adrenal glands are normal in size and enhancement.  No evidence of focal lesion is demonstrated in these solid organs.    Kidneys are normal in size and enhancement.  No evidence of hydronephrosis or nephrolithiasis is seen.    The bowel caliber is normal and no wall thickening or adjacent inflammatory change is seen.  No evidence of free fluid or free air is present.  Appendix appears normal.    CT pelvis: Complex mass density in the left iliacus muscle appears similar to previous CT measuring 7.5 by 5.5 cm there is irregularity of the adjacent iliac bone similar to previous CT.  The pelvic bowel appears within normal limits.  Bladder shows no evidence of abnormality.  The pelvic organs show no evidence of abnormality.                                    X-Rays:   Independently Interpreted Readings:   Other Readings:  EXAMINATION:  CT ABDOMEN PELVIS WITH IV CONTRAST     CLINICAL HISTORY:  Biliary obstruction suspected (Ped 0-18y);     TECHNIQUE:  Axial CT imaging of the abdomen and pelvis is performed with intravenous contrast. Contrast dose is 100 cc Isovue 370.     CT dose reduction technique used - Dose Rite and tube current modulation.     COMPARISON:  6 July 2024     FINDINGS:  Cardiac and lung bases are within normal limits     CT abdomen: Liver has  heterogeneous area of density without significant enhancement involving majority of the right lobe of the liver.  There is internal biliary stent present.  There is a drain in the gallbladder fossa.  The intrahepatic biliary ducts increased but similar to previous study.  Spleen, pancreas and adrenal glands are normal in size and enhancement.  No evidence of focal lesion is demonstrated in these solid organs.     Kidneys are normal in size and enhancement.  No evidence of hydronephrosis or nephrolithiasis is seen.     The bowel caliber is normal and no wall thickening or adjacent inflammatory change is seen.  No evidence of free fluid or free air is present.  Appendix appears normal.     CT pelvis: Complex mass density in the left iliacus muscle appears similar to previous CT measuring 7.5 by 5.5 cm there is irregularity of the adjacent iliac bone similar to previous CT.  The pelvic bowel appears within normal limits.  Bladder shows no evidence of abnormality.  The pelvic organs show no evidence of abnormality.     Impression:     Distended intrahepatic biliary ducts appears similar to previous CT.  The internal biliary stent and catheter in the gallbladder fossa appear unchanged from previous.  Heterogeneous hepatic density in the right lobe appears similar to previous CT.        Electronically signed by:Calderon Harden  Date:                                            07/17/2024  Time:                                           12:43                Medications   iopamidoL (ISOVUE-370) injection 100 mL (100 mLs Intravenous Given 7/17/24 1232)     Medical Decision Making  The patient has been accepted to [facility] by Dr. Phillips.    Amount and/or Complexity of Data Reviewed  Radiology: ordered.    Risk  Prescription drug management.              Attending Attestation:           Physician Attestation for Scribe:  Physician Attestation Statement for Scribe #1: IMack, DO, reviewed documentation, as scribed by  Marianne Sutherland in my presence, and it is both accurate and complete.             ED Course as of 07/25/24 0818 Wed Jul 17, 2024   1331 07/17/24 1245  CT Abdomen Pelvis With IV Contrast NO Oral Contrast  Performed: 07/17/24 1231  Final     [CM]      ED Course User Index  [CM] Sutherland, Marianne               Medical Decision Making:   Initial Assessment:   This patient is a 52 y/o Female that presents to the ED via EMS with c/o Weakness. The patient comes to the ED from [place] for CT of Abdomin for a Biliary drain blockage. The patient states about 3 to 4 days ago she was cooking and got very weak and couldn't finish. She said the weakness has gotten worst. . PMHx consists of stage IV colon cancer with mets to the liver and lung status post partial colectomy followed by reanastomosis, rectal cancer with mets to bones, malignant biliary obstruction status post stent placement, cholecystitis s/p GB drain placement, type 2 diabetes, essential hypertension. She reports generalized weakness, nausea. The pt was just recently admitted to [facility] with acute cholecystitis. The surgery too high risk, therefore a cholecystostomy tube was placed (tube remains in place without problems).  She was scheduled to follow-up with Dr Vyas this morning for tube check. The pt did mention she was a bit constipated and had to take pill this morning.    The history is provided by the patient and the EMS personnel. No  was used.     Differential Diagnosis:   Blocked stent or occlusion of the stent.  ED Management:  Patient be transferred to Dr. Phillips at [facility]             Clinical Impression:  Final diagnoses:  [C76.2] Cancer of abdominal organ (Primary)          ED Disposition Condition    Transfer to Another Facility                 Mack Kat,   07/25/24 0818

## 2024-07-17 NOTE — CONSULTS
[facility] - Emergency Department  General Surgery  Consult Note    Patient Name: Hayley Landis  MRN: [id number]  Code Status: Prior  Admission Date: 7/17/2024  Hospital Length of Stay: 0 days  Attending Physician: Mack Kat DO  Primary Care Provider: Elkin Coombs MD    Patient information was obtained from patient and ER records.     Consults  Subjective:     Principal Problem: Hyperbilirubinemia    History of Present Illness: Transferred from [place] ER where she presented with malaise and fatigue with some nausea.  History of stage IV colon cancer with mets to the liver and lungs.  Recent admission for acute cholecystitis.  Due to high risk of surgery, patient had a cholecystostomy tube placed.  Was discharged home doing relatively well.    On presentation today, LFTs elevated with a bilirubin of 7.5.  Previously normal.  Biliary stent in place.  Placed by  at [facility].  CT is largely unchanged from previous.  Concern of occluded biliary stent or other similar etiology as source of increase in the LFTs.    Would recommend transferring to [facility] for consideration of TPC.  Advise consultation with Gastroenterology at [facility].    Current Facility-Administered Medications on File Prior to Encounter   Medication    [COMPLETED] 0.9%  NaCl infusion     Current Outpatient Medications on File Prior to Encounter   Medication Sig    capecitabine (XELODA) 500 MG Tab Take 3 tablets twice daily for 7 days on and 7 days off.    cholecalciferol, vitamin D3, 1,250 mcg (50,000 unit) capsule Take 1 capsule (50,000 Units total) by mouth every 7 days.    clopidogreL (PLAVIX) 75 mg tablet Take 75 mg by mouth once daily.    levETIRAcetam (KEPPRA) 500 MG Tab TAKE ONE TABLET BY MOUTH ONCE DAILY    lisinopriL 10 MG tablet Take 1 tablet (10 mg total) by mouth once daily.    polyethylene glycol (GLYCOLAX) 17 gram PwPk Take 17 g by mouth daily as needed for Constipation.    sulfamethoxazole-trimethoprim 800-160mg  "(BACTRIM DS) 800-160 mg Tab Take 1 tablet by mouth 2 (two) times daily.    [DISCONTINUED] atorvastatin (LIPITOR) 10 MG tablet Take 1 tablet (10 mg total) by mouth once daily.    [DISCONTINUED] blood sugar diagnostic (ONETOUCH ULTRA TEST) Strp check blood sugar TWICE DAILY    [DISCONTINUED] HYDROcodone-acetaminophen (NORCO)  mg per tablet Take 1 tablet by mouth every 8 (eight) hours as needed for Pain (in hip).    [DISCONTINUED] insulin aspart U-100 (NOVOLOG) 100 unit/mL injection inject as directed by your physician per sliding scale    [DISCONTINUED] insulin syringe-needle U-100 1 mL 30 gauge x 5/16 Syrg Use as directed.       Review of patient's allergies indicates:   Allergen Reactions    Grape Anaphylaxis     Other reaction(s): Not available    Morphine sulfate Swelling    Nutritional supplements Anaphylaxis     Highly allergic to Cooked grapes    Pineapple Anaphylaxis    Opioids - morphine analogues Nausea And Vomiting    Rocephin [ceftriaxone] Itching    Aspirin      "Veins breakdown" per patient    Biguanides Diarrhea and Other (See Comments)    Dicyclomine Diarrhea    Dihydrocodeine Other (See Comments)     Bad headache in the top of head    Gabapentin Other (See Comments)     weakness  Other reaction(s): Not available  Seizures      Hydrocodone Other (See Comments)     Headache      Iron      Hemochromatosis    Iron analogues      Pt hx of hemachromatosis    Metformin      diarrhea    Penicillins Other (See Comments)     Family hx       Latex Rash and Other (See Comments)       Past Medical History:   Diagnosis Date    Bell's palsy 03/01/2023    Biliary obstruction due to malignant neoplasm 06/24/2024    CVA (cerebral vascular accident)     HTN (hypertension)     Irritable bowel syndrome with diarrhea 09/07/2022    Lumbar radiculopathy, right 12/23/2021    Metastatic colon cancer to liver 06/28/2024    Primary hypertension 12/15/2021    Rectal cancer metastatic to bone     Restless legs syndrome " 11/20/2023    Seizure     TIA (transient ischemic attack)     Type 2 diabetes mellitus without complication, with long-term current use of insulin 12/15/2021    Vitamin D deficiency 08/09/2023     Past Surgical History:   Procedure Laterality Date    COLON SURGERY      HERNIA REPAIR      HYSTERECTOMY       Family History       Problem Relation (Age of Onset)    No Known Problems Mother, Father, Sister, Brother, Daughter, Son, Maternal Aunt, Maternal Uncle, Paternal Aunt, Paternal Uncle, Maternal Grandmother, Maternal Grandfather, Paternal Grandmother, Paternal Grandfather          Tobacco Use    Smoking status: Never     Passive exposure: Never    Smokeless tobacco: Never   Substance and Sexual Activity    Alcohol use: Never    Drug use: Never    Sexual activity: Never     Review of Systems   Constitutional:  Negative for fever.   Respiratory: Negative.     Cardiovascular: Negative.    Gastrointestinal:  Positive for abdominal pain (upper, minimal) and nausea. Negative for abdominal distention and vomiting.   Neurological: Negative.      Objective:     Vital Signs (Most Recent):  Temp: 97.6 °F (36.4 °C) (07/17/24 1202)  Pulse: 89 (07/17/24 1432)  Resp: (!) 22 (07/17/24 1202)  BP: 130/75 (07/17/24 1432)  SpO2: 96 % (07/17/24 1432) Vital Signs (24h Range):  Temp:  [97.6 °F (36.4 °C)-97.9 °F (36.6 °C)] 97.6 °F (36.4 °C)  Pulse:  [86-98] 89  Resp:  [18-22] 22  SpO2:  [96 %-99 %] 96 %  BP: ()/(69-96) 130/75     Weight: 59 kg (130 lb)  Body mass index is 28.13 kg/m².     Physical Exam  Vitals reviewed.   Constitutional:       General: She is not in acute distress.  Cardiovascular:      Rate and Rhythm: Normal rate.   Pulmonary:      Effort: Pulmonary effort is normal. No respiratory distress.   Abdominal:      General: There is no distension.      Palpations: Abdomen is soft.      Tenderness: There is abdominal tenderness (mild upper).      Comments: Right upper quadrant percutaneous drain with serous sanguinous  output   Skin:     General: Skin is warm and dry.   Neurological:      Mental Status: She is alert. Mental status is at baseline.            I have reviewed all pertinent lab results within the past 24 hours.    Significant Diagnostics:  I have reviewed all pertinent imaging results/findings within the past 24 hours.    Assessment/Plan:     No notes have been filed under this hospital service.  Service: General Surgery    VTE Risk Mitigation (From admission, onward)      None            Thank you for your consult. I will sign off. Please contact us if you have any additional questions.    JUAN Novoa  General Surgery  [facility] - Emergency Department

## 2024-07-23 LAB — UA COMPLETE W REFLEX CULTURE PNL UR: NORMAL

## 2024-08-16 ENCOUNTER — TELEPHONE (OUTPATIENT)
Dept: FAMILY MEDICINE | Facility: CLINIC | Age: 54
End: 2024-08-16
Payer: MEDICAID

## 2024-08-16 DIAGNOSIS — R45.89 ANXIETY ABOUT HEALTH: Primary | ICD-10-CM

## 2024-08-16 DIAGNOSIS — C18.9 METASTATIC COLON CANCER TO LIVER: ICD-10-CM

## 2024-08-16 DIAGNOSIS — C78.7 METASTATIC COLON CANCER TO LIVER: ICD-10-CM

## 2024-08-16 RX ORDER — ALPRAZOLAM 0.25 MG/1
0.25 TABLET ORAL 2 TIMES DAILY PRN
Qty: 60 TABLET | Refills: 1 | Status: SHIPPED | OUTPATIENT
Start: 2024-08-16

## 2024-08-16 NOTE — ED TRIAGE NOTES
Provider E-Visit time total (minutes): < 2    Patient can drop off forms.    Pt states that she has a hx of colon cancer and how has lung, liver, gallbladder cancer. Patient states that she has been told she has less than 3 months to live. Pt is extremely jaundice and weak. Patient keeps stating that she wants the pain to go away and she just wants to stay at home. Patient says she is extremely dehydrated and her mouth is extremely dry regardless of drinking lots of fluids at home.

## 2024-08-16 NOTE — ED NOTES
RN contacted [facility] and spoke with RN about a representative coming to talk to patient and family about benefits of hospice and what they can offer her in her own home.

## 2024-08-16 NOTE — ED NOTES
Thank you. Your form was submitted successfully.  Please don't forget the Face Sheet & Medical Records.      Confirmation #: [id number]  Submission Date / Time: Aug 16, 2024 6:52 PM

## 2024-08-16 NOTE — TELEPHONE ENCOUNTER
Pt is having anxiety and panic attacks.  Would like to get rx of xanax to see if that helps. Send rx to the pharmacy of [place]

## 2024-08-16 NOTE — DISCHARGE INSTRUCTIONS
Assumed care of pt.   AAOx 4.   O2 at 1 L, sating 96%.  Educated and encouraged use of the IS.   CHINO,  LUE Weakness.   Stand-by assist.  Left arm in sling.  Regular diet, tolerating well.    Voiding , BS, flatus, LBM 3/23s.    Family : Wife at bedside.    Pain: pt reporting pain of 2 out of 10.    Follow up with hospice at home

## 2024-08-16 NOTE — ED NOTES
Upon inspection by RN, it seems that family and pt do not understand the gravity of the patient's condition and terminal diagnosis that the patient communicated to the nurse. Provider and RN agreed that it was time to recommend hospice. RN contacted [facility]. Dilcia from [facility] came to ED to talk to patient and family. Hospice offered daily visits from the aid, oxygen, and comfort pack medications. Dilcia also thoroughly explained what hospice means and what the patient will receive on the service.  The pt and family agreed to this care following a blood transfusion. RN will have patient transported by EMS home, where [facility] care on call nurse will meet with them to help begin care.

## 2024-08-16 NOTE — ED PROVIDER NOTES
"Encounter Date: 8/16/2024    SCRIBE #1 NOTE: I, Marianne Ena, am scribing for, and in the presence of,  Kar Anna MD.       History     Chief Complaint   Patient presents with    Dehydration    Generalized Body Aches     This patient is a 53 y.o. female that presents to the ED via EMS with c/o SOB and Pain all over. The patient has Mhx of cancer of the Colon, Liver, Gallbladder, and Lungs. The patient said she started feeling bad a few days ago but did not call the EMS until today. Patient states that she has been told she has less than 3 months to live. She is in much pain with being dehydrated and her mouth is extremely dry regardless of drinking lots of fluids at home. The patient stated she took Hydrocodone last night and knew it would effect her liver but she was in pain. There are no other issues to report with this patient at this time.          The history is provided by the patient and the EMS personnel. No  was used.     Review of patient's allergies indicates:   Allergen Reactions    Grape Anaphylaxis     Other reaction(s): Not available    Morphine sulfate Swelling    Nutritional supplements Anaphylaxis     Highly allergic to Cooked grapes    Pineapple Anaphylaxis    Opioids - morphine analogues Nausea And Vomiting    Rocephin [ceftriaxone] Itching    Aspirin      "Veins breakdown" per patient    Biguanides Diarrhea and Other (See Comments)    Dicyclomine Diarrhea    Dihydrocodeine Other (See Comments)     Bad headache in the top of head    Gabapentin Other (See Comments)     weakness  Other reaction(s): Not available  Seizures      Hydrocodone Other (See Comments)     Headache      Iron      Hemochromatosis    Iron analogues      Pt hx of hemachromatosis    Metformin      diarrhea    Penicillins Other (See Comments)     Family hx       Latex Rash and Other (See Comments)     Past Medical History:   Diagnosis Date    Bell's palsy 03/01/2023    Biliary obstruction due to " malignant neoplasm 06/24/2024    CVA (cerebral vascular accident)     HTN (hypertension)     Irritable bowel syndrome with diarrhea 09/07/2022    Lumbar radiculopathy, right 12/23/2021    Metastatic colon cancer to liver 06/28/2024    Primary hypertension 12/15/2021    Rectal cancer metastatic to bone     Restless legs syndrome 11/20/2023    Seizure     TIA (transient ischemic attack)     Type 2 diabetes mellitus without complication, with long-term current use of insulin 12/15/2021    Vitamin D deficiency 08/09/2023     Past Surgical History:   Procedure Laterality Date    COLON SURGERY      HERNIA REPAIR      HYSTERECTOMY       Family History   Problem Relation Name Age of Onset    No Known Problems Mother      No Known Problems Father      No Known Problems Sister      No Known Problems Brother      No Known Problems Daughter      No Known Problems Son      No Known Problems Maternal Aunt      No Known Problems Maternal Uncle      No Known Problems Paternal Aunt      No Known Problems Paternal Uncle      No Known Problems Maternal Grandmother      No Known Problems Maternal Grandfather      No Known Problems Paternal Grandmother      No Known Problems Paternal Grandfather       Social History     Tobacco Use    Smoking status: Never     Passive exposure: Never    Smokeless tobacco: Never   Substance Use Topics    Alcohol use: Never    Drug use: Never     Review of Systems   Constitutional:  Positive for fatigue. Negative for chills and fever.   Eyes:  Negative for pain.   Respiratory:  Negative for shortness of breath.    Cardiovascular:  Negative for chest pain.   Gastrointestinal:  Negative for nausea.   Endocrine: Negative for polyuria.   Genitourinary:  Negative for difficulty urinating.   Skin:  Positive for color change.   Allergic/Immunologic: Negative.    Neurological:  Positive for weakness.   All other systems reviewed and are negative.      Physical Exam     Initial Vitals   BP Pulse Resp Temp SpO2    08/16/24 1316 08/16/24 1316 08/16/24 1331 08/16/24 1418 08/16/24 1316   (!) 76/40 84 12 98.8 °F (37.1 °C) 96 %      MAP       --                Physical Exam    Nursing note and vitals reviewed.  Constitutional:   Patient is in pain all over and she has Jaundice.   HENT:   Head: Normocephalic and atraumatic.   Eyes: EOM are normal. Pupils are equal, round, and reactive to light. Scleral icterus is present.   Neck: Neck supple.   Normal range of motion.  Cardiovascular:  Normal rate, regular rhythm, normal heart sounds and intact distal pulses.           Pulmonary/Chest: Breath sounds normal.   Abdominal: Abdomen is soft. Bowel sounds are normal.   Musculoskeletal:         General: Normal range of motion.      Cervical back: Normal range of motion and neck supple.     Neurological: She is alert and oriented to person, place, and time. She has normal strength.   Skin: Skin is warm and dry. Capillary refill takes less than 2 seconds.   Psychiatric: She has a normal mood and affect. Thought content normal.         ED Course   Procedures  Labs Reviewed   COMPREHENSIVE METABOLIC PANEL - Abnormal       Result Value    Sodium 131 (*)     Potassium 4.1      Chloride 98      CO2 22      Anion Gap 15      Glucose 126 (*)     BUN 41 (*)     Creatinine 1.99 (*)     BUN/Creatinine Ratio 21 (*)     Calcium 8.3 (*)     Total Protein 4.8 (*)     Albumin 1.0 (*)     Globulin 3.8      A/G Ratio 0.3      Bilirubin, Total 21.0 (*)     Alk Phos 8,174 (*)     ALT 57 (*)      (*)     eGFR 30 (*)    MAGNESIUM - Abnormal    Magnesium 2.7 (*)    APTT - Abnormal    PTT 53.7 (*)    PROTIME-INR - Abnormal    PT 55.0 (*)     INR 6.36 (*)    CBC WITH DIFFERENTIAL - Abnormal    WBC 31.15 (*)     RBC 1.16 (*)     Hemoglobin 3.2 (*)     Hematocrit 10.4 (*)     MCV 89.7      MCH 27.6      MCHC 30.8 (*)     RDW 15.2 (*)     Platelet Count 224      MPV 11.3      Neutrophils % 85.4 (*)     Lymphocytes % 6.6 (*)     Monocytes % 2.7       Eosinophils % 0.4 (*)     Basophils % 0.1      Immature Granulocytes % 4.8 (*)     nRBC, Auto 0.2 (*)     Neutrophils, Abs 26.58 (*)     Lymphocytes, Absolute 2.07      Monocytes, Absolute 0.85 (*)     Eosinophils, Absolute 0.13      Basophils, Absolute 0.04      Immature Granulocytes, Absolute 1.48 (*)     nRBC, Absolute 0.06 (*)     Diff Type Manual     MANUAL DIFFERENTIAL - Abnormal    Segmented Neutrophils, Man % 84 (*)     Bands, Man % 5      Lymphocytes, Man % 7 (*)     Monocytes, Man % 3      Eosinophils, Man % 1      Platelet Morphology Few Large Platelets (*)     Anisocytosis Few      Microcytosis Few      Polychromasia Few      Hypochromic Few     TYPE & SCREEN - Abnormal    Specimen Outdate 08/19/2024 23:59      Group & Rh O POS      Indirect Fanny POS (*)    LIPASE - Normal    Lipase 36     TROPONIN I - Normal    Troponin I High Sensitivity 5.2     LACTIC ACID, PLASMA - Normal    Lactic Acid 1.7     AMMONIA - Normal    Ammonia 12     CBC W/ AUTO DIFFERENTIAL    Narrative:     The following orders were created for panel order CBC auto differential.  Procedure                               Abnormality         Status                     ---------                               -----------         ------                     CBC with Differential[[id number]]       Abnormal            Final result               Manual Differential[[id number]]         Abnormal            Final result                 Please view results for these tests on the individual orders.   ABORH RETYPE    ABORH Retype O POS     ANTIBODY IDENTIFICATION    Antibody ID POS, Anti-FyA (Puente)     PREPARE RBC SOFT    UNIT NUMBER [id number]      UNIT ABO/RH O POS      DISPENSE STATUS Transfused      Unit Expiration [id number]      Product Code [id number]      Unit Blood Type Code 5100      CROSSMATCH INTERPRETATION Compatible       EKG Readings: (Independently Interpreted)   Heart Rate: 84 bpm.   Sinus rhythm   Short ID interval   Small  inferior Q waves: infarct cannot be excluded   Possible anterior infarct - age undetermined   Lateral ST-T abnormality may be due to myocardial ischemia   Low QRS voltages in precordial leads   Abnormal ECG     ECG Results              EKG 12-lead (Final result)        Collection Time Result Time QRS Duration OHS QTC Calculation    08/16/24 13:19:48 08/28/24 12:46:46 80 450                     Final result by Interface, Lab In [facility] (08/28/24 12:46:59)                   Narrative:    Test Reason : R53.1,    Vent. Rate : 084 BPM     Atrial Rate : 000 BPM     P-R Int : 106 ms          QRS Dur : 080 ms      QT Int : 408 ms       P-R-T Axes : 058 086 -21 degrees     QTc Int : 450 ms    Sinus rhythm  Short DE interval  Small inferior Q waves: infarct cannot be excluded  Possible anterior infarct - age undetermined  Lateral ST-T abnormality may be due to myocardial ischemia  Low QRS voltages in precordial leads  Abnormal ECG    Confirmed by Chris QUINONES, Junior POTTER ([id number]) on 8/28/2024 12:46:44 PM    Referred By: [email or web address]   SELF           Confirmed By:Junior Perez MD                                  Imaging Results    None          Medications   lactated ringers bolus 1,000 mL (0 mLs Intravenous Stopped 8/16/24 1452)   fentaNYL injection 50 mcg (50 mcg Intravenous Given 8/16/24 1451)   fentaNYL injection 50 mcg (50 mcg Intravenous Given 8/16/24 1754)     Medical Decision Making  AMS.  WEAKNESS.  JAUNDICE EXTREME.    DDX:  HEPATOBILIARY OBSTRUCTION +/- SEPSIS +/- OTHER METABOLIC ISSUE.  ILL IN EXTREMIS.      ADMIT VS HOME HOSPICE        Amount and/or Complexity of Data Reviewed  Labs: ordered. Decision-making details documented in ED Course.  ECG/medicine tests: ordered. Decision-making details documented in ED Course.  Discussion of management or test interpretation with external provider(s): CONSULTED HOSPICE.  DECIDE FOR HOME HOSPICE.      Risk  Prescription drug management.              Attending Attestation:            Physician Attestation for Scribe:  Physician Attestation Statement for Scribe #1: I, Kar Anna MD, reviewed documentation, as scribed by Marianne Sutherland in my presence, and it is both accurate and complete.             ED Course as of 09/03/24 0535   Fri Aug 16, 2024   1541 WBC(!!): 31.15 [CM]   1541 Hemoglobin(!!): 3.2 [CM]   1541 Hematocrit(!!): 10.4 [CM]   1541 Magnesium (!): 2.7 [CM]   1541 PT(!): 55.0 [CM]   1541 PTT(!): 53.7 [CM]   1542 BP(!): 79/40 [CM]      ED Course User Index  [CM] Marianne Sutherland                           Clinical Impression:  Final diagnoses:  [R53.1] Generalized weakness (Primary)  [D64.9] Anemia, unspecified type  [Z51.5] Encounter for end of life care          ED Disposition Condition    Discharge Stable          ED Prescriptions    None       Follow-up Information    None          Kar Anna MD  09/03/24 0535

## 2024-08-17 NOTE — ED NOTES
INQUIRED OF HOSPITAL BED USE FOR PATIENT DUE TO [facility] SCORE. PATIENT REPORTS THE ER BED IS MORE COMFORTABLE FOR HER AND HER SITUATION AND WOULD LIKE TO REMAIN ON A HARD SURFACE.

## 2024-08-28 LAB
OHS QRS DURATION: 80 MS
OHS QTC CALCULATION: 450 MS